# Patient Record
Sex: MALE | Race: BLACK OR AFRICAN AMERICAN | NOT HISPANIC OR LATINO | Employment: OTHER | RURAL
[De-identification: names, ages, dates, MRNs, and addresses within clinical notes are randomized per-mention and may not be internally consistent; named-entity substitution may affect disease eponyms.]

---

## 2020-08-04 ENCOUNTER — HISTORICAL (OUTPATIENT)
Dept: ADMINISTRATIVE | Facility: HOSPITAL | Age: 75
End: 2020-08-04

## 2020-08-04 LAB — SARS-COV+SARS-COV-2 AG RESP QL IA.RAPID: NEGATIVE

## 2020-10-14 ENCOUNTER — HISTORICAL (OUTPATIENT)
Dept: ADMINISTRATIVE | Facility: HOSPITAL | Age: 75
End: 2020-10-14

## 2021-07-26 ENCOUNTER — OFFICE VISIT (OUTPATIENT)
Dept: FAMILY MEDICINE | Facility: CLINIC | Age: 76
End: 2021-07-26
Payer: COMMERCIAL

## 2021-07-26 VITALS
OXYGEN SATURATION: 99 % | TEMPERATURE: 98 F | RESPIRATION RATE: 18 BRPM | BODY MASS INDEX: 21.75 KG/M2 | HEIGHT: 71 IN | HEART RATE: 82 BPM | SYSTOLIC BLOOD PRESSURE: 140 MMHG | WEIGHT: 155.38 LBS | DIASTOLIC BLOOD PRESSURE: 70 MMHG

## 2021-07-26 DIAGNOSIS — N50.89 SCROTAL SWELLING: ICD-10-CM

## 2021-07-26 DIAGNOSIS — Z13.6 ENCOUNTER FOR SCREENING FOR ABDOMINAL AORTIC ANEURYSM (AAA) IN PATIENT 50 YEARS OF AGE OR OLDER WITH HISTORY OF SMOKING: ICD-10-CM

## 2021-07-26 DIAGNOSIS — Z12.5 ENCOUNTER FOR SCREENING FOR MALIGNANT NEOPLASM OF PROSTATE: ICD-10-CM

## 2021-07-26 DIAGNOSIS — Z87.891 ENCOUNTER FOR SCREENING FOR ABDOMINAL AORTIC ANEURYSM (AAA) IN PATIENT 50 YEARS OF AGE OR OLDER WITH HISTORY OF SMOKING: ICD-10-CM

## 2021-07-26 DIAGNOSIS — J30.2 SEASONAL ALLERGIES: ICD-10-CM

## 2021-07-26 DIAGNOSIS — R35.0 URINARY FREQUENCY: ICD-10-CM

## 2021-07-26 DIAGNOSIS — I10 ESSENTIAL HYPERTENSION: Primary | ICD-10-CM

## 2021-07-26 DIAGNOSIS — Z11.59 ENCOUNTER FOR HEPATITIS C SCREENING TEST FOR LOW RISK PATIENT: ICD-10-CM

## 2021-07-26 LAB
ALBUMIN SERPL BCP-MCNC: 3.3 G/DL (ref 3.5–5)
ALBUMIN/GLOB SERPL: 0.7 {RATIO}
ALP SERPL-CCNC: 68 U/L (ref 45–115)
ALT SERPL W P-5'-P-CCNC: 18 U/L (ref 16–61)
ANION GAP SERPL CALCULATED.3IONS-SCNC: 8 MMOL/L (ref 7–16)
AST SERPL W P-5'-P-CCNC: 19 U/L (ref 15–37)
BASOPHILS # BLD AUTO: 0.05 K/UL (ref 0–0.2)
BASOPHILS NFR BLD AUTO: 0.6 % (ref 0–1)
BILIRUB SERPL-MCNC: 0.3 MG/DL (ref 0–1.2)
BILIRUB UR QL STRIP: NEGATIVE
BUN SERPL-MCNC: 14 MG/DL (ref 7–18)
BUN/CREAT SERPL: 14 (ref 6–20)
CALCIUM SERPL-MCNC: 8.7 MG/DL (ref 8.5–10.1)
CHLORIDE SERPL-SCNC: 105 MMOL/L (ref 98–107)
CHOLEST SERPL-MCNC: 212 MG/DL (ref 0–200)
CHOLEST/HDLC SERPL: 2.9 {RATIO}
CLARITY UR: CLEAR
CO2 SERPL-SCNC: 28 MMOL/L (ref 21–32)
COLOR UR: YELLOW
CREAT SERPL-MCNC: 1 MG/DL (ref 0.7–1.3)
DIFFERENTIAL METHOD BLD: ABNORMAL
EOSINOPHIL # BLD AUTO: 0.11 K/UL (ref 0–0.5)
EOSINOPHIL NFR BLD AUTO: 1.4 % (ref 1–4)
ERYTHROCYTE [DISTWIDTH] IN BLOOD BY AUTOMATED COUNT: 18.2 % (ref 11.5–14.5)
GLOBULIN SER-MCNC: 5 G/DL (ref 2–4)
GLUCOSE SERPL-MCNC: 90 MG/DL (ref 74–106)
GLUCOSE UR STRIP-MCNC: NEGATIVE MG/DL
HCT VFR BLD AUTO: 25.7 % (ref 40–54)
HCV AB SER QL: NORMAL
HDLC SERPL-MCNC: 73 MG/DL (ref 40–60)
HGB BLD-MCNC: 7.1 G/DL (ref 13.5–18)
IMM GRANULOCYTES # BLD AUTO: 0.02 K/UL (ref 0–0.04)
IMM GRANULOCYTES NFR BLD: 0.3 % (ref 0–0.4)
KETONES UR STRIP-SCNC: NEGATIVE MG/DL
LDLC SERPL CALC-MCNC: 122 MG/DL
LDLC/HDLC SERPL: 1.7 {RATIO}
LEUKOCYTE ESTERASE UR QL STRIP: NEGATIVE
LYMPHOCYTES # BLD AUTO: 2.1 K/UL (ref 1–4.8)
LYMPHOCYTES NFR BLD AUTO: 26.5 % (ref 27–41)
MCH RBC QN AUTO: 21.6 PG (ref 27–31)
MCHC RBC AUTO-ENTMCNC: 27.6 G/DL (ref 32–36)
MCV RBC AUTO: 78.4 FL (ref 80–96)
MONOCYTES # BLD AUTO: 0.96 K/UL (ref 0–0.8)
MONOCYTES NFR BLD AUTO: 12.1 % (ref 2–6)
MPC BLD CALC-MCNC: 12 FL (ref 9.4–12.4)
NEUTROPHILS # BLD AUTO: 4.68 K/UL (ref 1.8–7.7)
NEUTROPHILS NFR BLD AUTO: 59.1 % (ref 53–65)
NITRITE UR QL STRIP: NEGATIVE
NONHDLC SERPL-MCNC: 139 MG/DL
NRBC # BLD AUTO: 0 X10E3/UL
NRBC, AUTO (.00): 0 %
PH UR STRIP: 6 PH UNITS
PLATELET # BLD AUTO: 363 K/UL (ref 150–400)
POTASSIUM SERPL-SCNC: 4.4 MMOL/L (ref 3.5–5.1)
PROT SERPL-MCNC: 8.3 G/DL (ref 6.4–8.2)
PROT UR QL STRIP: NEGATIVE
PSA SERPL-MCNC: 2.84 NG/ML (ref 0–4.4)
RBC # BLD AUTO: 3.28 M/UL (ref 4.6–6.2)
RBC # UR STRIP: NEGATIVE /UL
SODIUM SERPL-SCNC: 137 MMOL/L (ref 136–145)
SP GR UR STRIP: 1.02
TRIGL SERPL-MCNC: 83 MG/DL (ref 35–150)
UROBILINOGEN UR STRIP-ACNC: 1 MG/DL
VLDLC SERPL-MCNC: 17 MG/DL
WBC # BLD AUTO: 7.92 K/UL (ref 4.5–11)

## 2021-07-26 PROCEDURE — 80053 COMPREHENSIVE METABOLIC PANEL: ICD-10-PCS | Mod: QW,,, | Performed by: CLINICAL MEDICAL LABORATORY

## 2021-07-26 PROCEDURE — 85025 CBC WITH DIFFERENTIAL: ICD-10-PCS | Mod: QW,,, | Performed by: CLINICAL MEDICAL LABORATORY

## 2021-07-26 PROCEDURE — G0103 PSA, SCREENING: ICD-10-PCS | Mod: ,,, | Performed by: CLINICAL MEDICAL LABORATORY

## 2021-07-26 PROCEDURE — 81003 URINALYSIS, REFLEX TO URINE CULTURE: ICD-10-PCS | Mod: QW,,, | Performed by: CLINICAL MEDICAL LABORATORY

## 2021-07-26 PROCEDURE — 86803 HEPATITIS C ANTIBODY: ICD-10-PCS | Mod: QW,,, | Performed by: CLINICAL MEDICAL LABORATORY

## 2021-07-26 PROCEDURE — 80053 COMPREHEN METABOLIC PANEL: CPT | Mod: QW,,, | Performed by: CLINICAL MEDICAL LABORATORY

## 2021-07-26 PROCEDURE — G0103 PSA SCREENING: HCPCS | Mod: ,,, | Performed by: CLINICAL MEDICAL LABORATORY

## 2021-07-26 PROCEDURE — 99214 OFFICE O/P EST MOD 30 MIN: CPT | Mod: ,,, | Performed by: FAMILY MEDICINE

## 2021-07-26 PROCEDURE — 80061 LIPID PANEL: CPT | Mod: QW,,, | Performed by: CLINICAL MEDICAL LABORATORY

## 2021-07-26 PROCEDURE — 81003 URINALYSIS AUTO W/O SCOPE: CPT | Mod: QW,,, | Performed by: CLINICAL MEDICAL LABORATORY

## 2021-07-26 PROCEDURE — 80061 LIPID PANEL: ICD-10-PCS | Mod: QW,,, | Performed by: CLINICAL MEDICAL LABORATORY

## 2021-07-26 PROCEDURE — 86803 HEPATITIS C AB TEST: CPT | Mod: QW,,, | Performed by: CLINICAL MEDICAL LABORATORY

## 2021-07-26 PROCEDURE — 99214 PR OFFICE/OUTPT VISIT, EST, LEVL IV, 30-39 MIN: ICD-10-PCS | Mod: ,,, | Performed by: FAMILY MEDICINE

## 2021-07-26 PROCEDURE — 85025 COMPLETE CBC W/AUTO DIFF WBC: CPT | Mod: QW,,, | Performed by: CLINICAL MEDICAL LABORATORY

## 2021-07-26 RX ORDER — AMLODIPINE BESYLATE 10 MG/1
10 TABLET ORAL DAILY
COMMUNITY
End: 2021-07-26 | Stop reason: SDUPTHER

## 2021-07-26 RX ORDER — LISINOPRIL 40 MG/1
40 TABLET ORAL DAILY
COMMUNITY
End: 2021-07-26 | Stop reason: SDUPTHER

## 2021-07-26 RX ORDER — AMLODIPINE BESYLATE 10 MG/1
10 TABLET ORAL DAILY
Qty: 90 TABLET | Refills: 1 | Status: SHIPPED | OUTPATIENT
Start: 2021-07-26 | End: 2022-08-01 | Stop reason: SDUPTHER

## 2021-07-26 RX ORDER — LISINOPRIL 40 MG/1
40 TABLET ORAL DAILY
Qty: 90 TABLET | Refills: 1 | Status: SHIPPED | OUTPATIENT
Start: 2021-07-26 | End: 2022-06-16 | Stop reason: ALTCHOICE

## 2021-07-26 RX ORDER — HYDRALAZINE HYDROCHLORIDE 100 MG/1
100 TABLET, FILM COATED ORAL 2 TIMES DAILY
Qty: 180 TABLET | Refills: 1 | Status: SHIPPED | OUTPATIENT
Start: 2021-07-26 | End: 2022-06-16 | Stop reason: ALTCHOICE

## 2021-07-26 RX ORDER — CETIRIZINE HYDROCHLORIDE 10 MG/1
10 TABLET ORAL DAILY
COMMUNITY
End: 2021-07-26 | Stop reason: SDUPTHER

## 2021-07-26 RX ORDER — CETIRIZINE HYDROCHLORIDE 10 MG/1
10 TABLET ORAL DAILY
Qty: 90 TABLET | Refills: 1 | Status: SHIPPED | OUTPATIENT
Start: 2021-07-26 | End: 2022-06-16

## 2021-07-26 RX ORDER — HYDRALAZINE HYDROCHLORIDE 100 MG/1
100 TABLET, FILM COATED ORAL 2 TIMES DAILY
COMMUNITY
End: 2021-07-26 | Stop reason: SDUPTHER

## 2021-08-02 ENCOUNTER — HOSPITAL ENCOUNTER (OUTPATIENT)
Dept: RADIOLOGY | Facility: HOSPITAL | Age: 76
Discharge: HOME | End: 2021-08-02
Attending: FAMILY MEDICINE
Payer: COMMERCIAL

## 2021-08-02 DIAGNOSIS — Z13.6 ENCOUNTER FOR SCREENING FOR ABDOMINAL AORTIC ANEURYSM (AAA) IN PATIENT 50 YEARS OF AGE OR OLDER WITH HISTORY OF SMOKING: ICD-10-CM

## 2021-08-02 DIAGNOSIS — Z87.891 ENCOUNTER FOR SCREENING FOR ABDOMINAL AORTIC ANEURYSM (AAA) IN PATIENT 50 YEARS OF AGE OR OLDER WITH HISTORY OF SMOKING: ICD-10-CM

## 2021-08-02 DIAGNOSIS — N50.89 SCROTAL SWELLING: ICD-10-CM

## 2021-08-02 PROCEDURE — 76706 US AAA SCREENING: ICD-10-PCS | Mod: 26,,, | Performed by: RADIOLOGY

## 2021-08-02 PROCEDURE — 76706 US ABDL AORTA SCREEN AAA: CPT | Mod: TC

## 2021-08-02 PROCEDURE — 76870 US EXAM SCROTUM: CPT | Mod: TC

## 2021-08-02 PROCEDURE — 76706 US ABDL AORTA SCREEN AAA: CPT | Mod: 26,,, | Performed by: RADIOLOGY

## 2021-08-02 PROCEDURE — 76870 US SCROTUM AND TESTICLES: ICD-10-PCS | Mod: 26,,, | Performed by: RADIOLOGY

## 2021-08-02 PROCEDURE — 76870 US EXAM SCROTUM: CPT | Mod: 26,,, | Performed by: RADIOLOGY

## 2021-08-17 ENCOUNTER — LAB VISIT (OUTPATIENT)
Dept: LAB | Facility: CLINIC | Age: 76
End: 2021-08-17
Payer: COMMERCIAL

## 2021-08-17 DIAGNOSIS — D64.9 ANEMIA, UNSPECIFIED TYPE: Primary | ICD-10-CM

## 2021-08-17 LAB
BASOPHILS # BLD AUTO: 0.05 K/UL (ref 0–0.2)
BASOPHILS NFR BLD AUTO: 0.7 % (ref 0–1)
DIFFERENTIAL METHOD BLD: ABNORMAL
EOSINOPHIL # BLD AUTO: 0.13 K/UL (ref 0–0.5)
EOSINOPHIL NFR BLD AUTO: 1.8 % (ref 1–4)
ERYTHROCYTE [DISTWIDTH] IN BLOOD BY AUTOMATED COUNT: 18.7 % (ref 11.5–14.5)
FERRITIN SERPL-MCNC: 6 NG/ML (ref 26–388)
HCT VFR BLD AUTO: 24.2 % (ref 40–54)
HGB BLD-MCNC: 6.7 G/DL (ref 13.5–18)
IMM GRANULOCYTES # BLD AUTO: 0.03 K/UL (ref 0–0.04)
IMM GRANULOCYTES NFR BLD: 0.4 % (ref 0–0.4)
IRON SATN MFR SERPL: 5 % (ref 14–50)
IRON SERPL-MCNC: 20 ΜG/DL (ref 65–175)
LYMPHOCYTES # BLD AUTO: 1.91 K/UL (ref 1–4.8)
LYMPHOCYTES NFR BLD AUTO: 27.1 % (ref 27–41)
MCH RBC QN AUTO: 21.3 PG (ref 27–31)
MCHC RBC AUTO-ENTMCNC: 27.7 G/DL (ref 32–36)
MCV RBC AUTO: 77.1 FL (ref 80–96)
MONOCYTES # BLD AUTO: 0.74 K/UL (ref 0–0.8)
MONOCYTES NFR BLD AUTO: 10.5 % (ref 2–6)
MPC BLD CALC-MCNC: 11.7 FL (ref 9.4–12.4)
NEUTROPHILS # BLD AUTO: 4.19 K/UL (ref 1.8–7.7)
NEUTROPHILS NFR BLD AUTO: 59.5 % (ref 53–65)
NRBC # BLD AUTO: 0 X10E3/UL
NRBC, AUTO (.00): 0 %
PLATELET # BLD AUTO: 314 K/UL (ref 150–400)
RBC # BLD AUTO: 3.14 M/UL (ref 4.6–6.2)
TIBC SERPL-MCNC: 404 ΜG/DL (ref 250–450)
WBC # BLD AUTO: 7.05 K/UL (ref 4.5–11)

## 2021-08-17 PROCEDURE — 82728 ASSAY OF FERRITIN: CPT | Mod: ,,, | Performed by: CLINICAL MEDICAL LABORATORY

## 2021-08-17 PROCEDURE — 82728 FERRITIN: ICD-10-PCS | Mod: ,,, | Performed by: CLINICAL MEDICAL LABORATORY

## 2021-08-17 PROCEDURE — 85025 CBC WITH DIFFERENTIAL: ICD-10-PCS | Mod: ,,, | Performed by: CLINICAL MEDICAL LABORATORY

## 2021-08-17 PROCEDURE — 85025 COMPLETE CBC W/AUTO DIFF WBC: CPT | Mod: ,,, | Performed by: CLINICAL MEDICAL LABORATORY

## 2021-08-17 PROCEDURE — 83550 IRON BINDING TEST: CPT | Mod: ,,, | Performed by: CLINICAL MEDICAL LABORATORY

## 2021-08-17 PROCEDURE — 83540 IRON AND TIBC: ICD-10-PCS | Mod: ,,, | Performed by: CLINICAL MEDICAL LABORATORY

## 2021-08-17 PROCEDURE — 83550 IRON AND TIBC: ICD-10-PCS | Mod: ,,, | Performed by: CLINICAL MEDICAL LABORATORY

## 2021-08-17 PROCEDURE — 83540 ASSAY OF IRON: CPT | Mod: ,,, | Performed by: CLINICAL MEDICAL LABORATORY

## 2021-08-24 ENCOUNTER — LAB VISIT (OUTPATIENT)
Dept: LAB | Facility: CLINIC | Age: 76
End: 2021-08-24
Payer: COMMERCIAL

## 2021-08-24 DIAGNOSIS — R89.9 ABNORMAL LABORATORY TEST RESULT: Primary | ICD-10-CM

## 2021-08-24 LAB
BASOPHILS # BLD AUTO: 0.03 K/UL (ref 0–0.2)
BASOPHILS NFR BLD AUTO: 0.4 % (ref 0–1)
DIFFERENTIAL METHOD BLD: ABNORMAL
EOSINOPHIL # BLD AUTO: 0.09 K/UL (ref 0–0.5)
EOSINOPHIL NFR BLD AUTO: 1.3 % (ref 1–4)
ERYTHROCYTE [DISTWIDTH] IN BLOOD BY AUTOMATED COUNT: 18.6 % (ref 11.5–14.5)
HCT VFR BLD AUTO: 22.4 % (ref 40–54)
HGB BLD-MCNC: 6.4 G/DL (ref 13.5–18)
IMM GRANULOCYTES # BLD AUTO: 0.02 K/UL (ref 0–0.04)
IMM GRANULOCYTES NFR BLD: 0.3 % (ref 0–0.4)
LYMPHOCYTES # BLD AUTO: 1.88 K/UL (ref 1–4.8)
LYMPHOCYTES NFR BLD AUTO: 27.3 % (ref 27–41)
MCH RBC QN AUTO: 21.8 PG (ref 27–31)
MCHC RBC AUTO-ENTMCNC: 28.6 G/DL (ref 32–36)
MCV RBC AUTO: 76.2 FL (ref 80–96)
MONOCYTES # BLD AUTO: 0.94 K/UL (ref 0–0.8)
MONOCYTES NFR BLD AUTO: 13.7 % (ref 2–6)
MPC BLD CALC-MCNC: 12.3 FL (ref 9.4–12.4)
NEUTROPHILS # BLD AUTO: 3.92 K/UL (ref 1.8–7.7)
NEUTROPHILS NFR BLD AUTO: 57 % (ref 53–65)
NRBC # BLD AUTO: 0 X10E3/UL
NRBC, AUTO (.00): 0 %
PLATELET # BLD AUTO: 307 K/UL (ref 150–400)
RBC # BLD AUTO: 2.94 M/UL (ref 4.6–6.2)
WBC # BLD AUTO: 6.88 K/UL (ref 4.5–11)

## 2021-08-24 PROCEDURE — 85025 CBC WITH DIFFERENTIAL: ICD-10-PCS | Mod: QW,,, | Performed by: CLINICAL MEDICAL LABORATORY

## 2021-08-24 PROCEDURE — 85025 COMPLETE CBC W/AUTO DIFF WBC: CPT | Mod: QW,,, | Performed by: CLINICAL MEDICAL LABORATORY

## 2021-08-25 ENCOUNTER — INFUSION (OUTPATIENT)
Dept: INFUSION THERAPY | Facility: HOSPITAL | Age: 76
End: 2021-08-25
Attending: FAMILY MEDICINE
Payer: COMMERCIAL

## 2021-08-25 ENCOUNTER — LAB VISIT (OUTPATIENT)
Dept: LAB | Facility: CLINIC | Age: 76
End: 2021-08-25
Payer: COMMERCIAL

## 2021-08-25 VITALS
OXYGEN SATURATION: 100 % | HEIGHT: 71 IN | WEIGHT: 160 LBS | RESPIRATION RATE: 18 BRPM | BODY MASS INDEX: 22.4 KG/M2 | SYSTOLIC BLOOD PRESSURE: 163 MMHG | HEART RATE: 68 BPM | TEMPERATURE: 99 F | DIASTOLIC BLOOD PRESSURE: 76 MMHG

## 2021-08-25 DIAGNOSIS — D50.0 IRON DEFICIENCY ANEMIA DUE TO CHRONIC BLOOD LOSS: Primary | ICD-10-CM

## 2021-08-25 DIAGNOSIS — R89.9 ABNORMAL LABORATORY TEST RESULT: Primary | ICD-10-CM

## 2021-08-25 DIAGNOSIS — R89.9 ABNORMAL LABORATORY TEST RESULT: ICD-10-CM

## 2021-08-25 LAB — OCCULT BLOOD: NEGATIVE

## 2021-08-25 PROCEDURE — P9016 RBC LEUKOCYTES REDUCED: HCPCS

## 2021-08-25 PROCEDURE — 36430 TRANSFUSION BLD/BLD COMPNT: CPT

## 2021-08-25 PROCEDURE — 25000003 PHARM REV CODE 250: Performed by: FAMILY MEDICINE

## 2021-08-25 PROCEDURE — 82271 OCCULT BLOOD X 1, STOOL: ICD-10-PCS | Mod: ,,, | Performed by: CLINICAL MEDICAL LABORATORY

## 2021-08-25 PROCEDURE — 82271 OCCULT BLOOD OTHER SOURCES: CPT | Mod: ,,, | Performed by: CLINICAL MEDICAL LABORATORY

## 2021-08-25 RX ORDER — HYDROCODONE BITARTRATE AND ACETAMINOPHEN 500; 5 MG/1; MG/1
TABLET ORAL
Status: DISCONTINUED | OUTPATIENT
Start: 2021-08-25 | End: 2022-06-16

## 2021-08-25 RX ADMIN — SODIUM CHLORIDE: 9 INJECTION, SOLUTION INTRAVENOUS at 03:08

## 2021-12-20 ENCOUNTER — TELEPHONE (OUTPATIENT)
Dept: FAMILY MEDICINE | Facility: CLINIC | Age: 76
End: 2021-12-20
Payer: MEDICAID

## 2022-01-13 ENCOUNTER — TELEPHONE (OUTPATIENT)
Dept: FAMILY MEDICINE | Facility: CLINIC | Age: 77
End: 2022-01-13
Payer: MEDICAID

## 2022-01-26 ENCOUNTER — OFFICE VISIT (OUTPATIENT)
Dept: FAMILY MEDICINE | Facility: CLINIC | Age: 77
End: 2022-01-26
Payer: COMMERCIAL

## 2022-01-26 VITALS
WEIGHT: 169 LBS | OXYGEN SATURATION: 100 % | BODY MASS INDEX: 23.66 KG/M2 | SYSTOLIC BLOOD PRESSURE: 146 MMHG | HEART RATE: 78 BPM | DIASTOLIC BLOOD PRESSURE: 70 MMHG | HEIGHT: 71 IN | TEMPERATURE: 99 F | RESPIRATION RATE: 18 BRPM

## 2022-01-26 DIAGNOSIS — Z23 IMMUNIZATION DUE: ICD-10-CM

## 2022-01-26 DIAGNOSIS — I10 ESSENTIAL HYPERTENSION: Primary | ICD-10-CM

## 2022-01-26 DIAGNOSIS — J30.2 SEASONAL ALLERGIES: ICD-10-CM

## 2022-01-26 DIAGNOSIS — D50.8 OTHER IRON DEFICIENCY ANEMIA: ICD-10-CM

## 2022-01-26 LAB
ALBUMIN SERPL BCP-MCNC: 3.6 G/DL (ref 3.5–5)
ALBUMIN/GLOB SERPL: 0.8 {RATIO}
ALP SERPL-CCNC: 61 U/L (ref 45–115)
ALT SERPL W P-5'-P-CCNC: 20 U/L (ref 16–61)
ANION GAP SERPL CALCULATED.3IONS-SCNC: 12 MMOL/L (ref 7–16)
AST SERPL W P-5'-P-CCNC: 16 U/L (ref 15–37)
BASOPHILS # BLD AUTO: 0.06 K/UL (ref 0–0.2)
BASOPHILS NFR BLD AUTO: 0.8 % (ref 0–1)
BILIRUB SERPL-MCNC: 0.3 MG/DL (ref 0–1.2)
BUN SERPL-MCNC: 16 MG/DL (ref 7–18)
BUN/CREAT SERPL: 15 (ref 6–20)
CALCIUM SERPL-MCNC: 9.3 MG/DL (ref 8.5–10.1)
CHLORIDE SERPL-SCNC: 104 MMOL/L (ref 98–107)
CHOLEST SERPL-MCNC: 241 MG/DL (ref 0–200)
CHOLEST/HDLC SERPL: 2.5 {RATIO}
CO2 SERPL-SCNC: 25 MMOL/L (ref 21–32)
CREAT SERPL-MCNC: 1.04 MG/DL (ref 0.7–1.3)
CREAT UR-MCNC: 114 MG/DL (ref 39–259)
DIFFERENTIAL METHOD BLD: ABNORMAL
EOSINOPHIL # BLD AUTO: 0.39 K/UL (ref 0–0.5)
EOSINOPHIL NFR BLD AUTO: 5 % (ref 1–4)
ERYTHROCYTE [DISTWIDTH] IN BLOOD BY AUTOMATED COUNT: 16.2 % (ref 11.5–14.5)
GLOBULIN SER-MCNC: 4.7 G/DL (ref 2–4)
GLUCOSE SERPL-MCNC: 58 MG/DL (ref 74–106)
HCT VFR BLD AUTO: 31.2 % (ref 40–54)
HDLC SERPL-MCNC: 98 MG/DL (ref 40–60)
HGB BLD-MCNC: 9.2 G/DL (ref 13.5–18)
IMM GRANULOCYTES # BLD AUTO: 0.01 K/UL (ref 0–0.04)
IMM GRANULOCYTES NFR BLD: 0.1 % (ref 0–0.4)
LDLC SERPL CALC-MCNC: 130 MG/DL
LDLC/HDLC SERPL: 1.3 {RATIO}
LYMPHOCYTES # BLD AUTO: 1.97 K/UL (ref 1–4.8)
LYMPHOCYTES NFR BLD AUTO: 25.4 % (ref 27–41)
MCH RBC QN AUTO: 28.2 PG (ref 27–31)
MCHC RBC AUTO-ENTMCNC: 29.5 G/DL (ref 32–36)
MCV RBC AUTO: 95.7 FL (ref 80–96)
MICROALBUMIN UR-MCNC: 1.3 MG/DL (ref 0–2.8)
MICROALBUMIN/CREAT RATIO PNL UR: 11.4 MG/G (ref 0–30)
MONOCYTES # BLD AUTO: 1.04 K/UL (ref 0–0.8)
MONOCYTES NFR BLD AUTO: 13.4 % (ref 2–6)
MPC BLD CALC-MCNC: 12.1 FL (ref 9.4–12.4)
NEUTROPHILS # BLD AUTO: 4.3 K/UL (ref 1.8–7.7)
NEUTROPHILS NFR BLD AUTO: 55.3 % (ref 53–65)
NONHDLC SERPL-MCNC: 143 MG/DL
NRBC # BLD AUTO: 0 X10E3/UL
NRBC, AUTO (.00): 0 %
PLATELET # BLD AUTO: 276 K/UL (ref 150–400)
POTASSIUM SERPL-SCNC: 4.1 MMOL/L (ref 3.5–5.1)
PROT SERPL-MCNC: 8.3 G/DL (ref 6.4–8.2)
RBC # BLD AUTO: 3.26 M/UL (ref 4.6–6.2)
SODIUM SERPL-SCNC: 137 MMOL/L (ref 136–145)
TRIGL SERPL-MCNC: 64 MG/DL (ref 35–150)
VLDLC SERPL-MCNC: 13 MG/DL
WBC # BLD AUTO: 7.77 K/UL (ref 4.5–11)

## 2022-01-26 PROCEDURE — 99213 OFFICE O/P EST LOW 20 MIN: CPT | Mod: ,,, | Performed by: FAMILY MEDICINE

## 2022-01-26 PROCEDURE — 90662 IIV NO PRSV INCREASED AG IM: CPT | Mod: ,,, | Performed by: FAMILY MEDICINE

## 2022-01-26 PROCEDURE — 3077F PR MOST RECENT SYSTOLIC BLOOD PRESSURE >= 140 MM HG: ICD-10-PCS | Mod: ,,, | Performed by: FAMILY MEDICINE

## 2022-01-26 PROCEDURE — G0008 ADMIN INFLUENZA VIRUS VAC: HCPCS | Mod: ,,, | Performed by: FAMILY MEDICINE

## 2022-01-26 PROCEDURE — 99213 PR OFFICE/OUTPT VISIT, EST, LEVL III, 20-29 MIN: ICD-10-PCS | Mod: ,,, | Performed by: FAMILY MEDICINE

## 2022-01-26 PROCEDURE — 82570 MICROALBUMIN / CREATININE RATIO URINE: ICD-10-PCS | Mod: ,,, | Performed by: CLINICAL MEDICAL LABORATORY

## 2022-01-26 PROCEDURE — 80061 LIPID PANEL: ICD-10-PCS | Mod: ,,, | Performed by: CLINICAL MEDICAL LABORATORY

## 2022-01-26 PROCEDURE — 82043 MICROALBUMIN / CREATININE RATIO URINE: ICD-10-PCS | Mod: ,,, | Performed by: CLINICAL MEDICAL LABORATORY

## 2022-01-26 PROCEDURE — 3288F FALL RISK ASSESSMENT DOCD: CPT | Mod: ,,, | Performed by: FAMILY MEDICINE

## 2022-01-26 PROCEDURE — 1126F AMNT PAIN NOTED NONE PRSNT: CPT | Mod: ,,, | Performed by: FAMILY MEDICINE

## 2022-01-26 PROCEDURE — 1160F PR REVIEW ALL MEDS BY PRESCRIBER/CLIN PHARMACIST DOCUMENTED: ICD-10-PCS | Mod: ,,, | Performed by: FAMILY MEDICINE

## 2022-01-26 PROCEDURE — 82570 ASSAY OF URINE CREATININE: CPT | Mod: ,,, | Performed by: CLINICAL MEDICAL LABORATORY

## 2022-01-26 PROCEDURE — 1160F RVW MEDS BY RX/DR IN RCRD: CPT | Mod: ,,, | Performed by: FAMILY MEDICINE

## 2022-01-26 PROCEDURE — 85025 CBC WITH DIFFERENTIAL: ICD-10-PCS | Mod: ,,, | Performed by: CLINICAL MEDICAL LABORATORY

## 2022-01-26 PROCEDURE — 1159F PR MEDICATION LIST DOCUMENTED IN MEDICAL RECORD: ICD-10-PCS | Mod: ,,, | Performed by: FAMILY MEDICINE

## 2022-01-26 PROCEDURE — 3008F PR BODY MASS INDEX (BMI) DOCUMENTED: ICD-10-PCS | Mod: ,,, | Performed by: FAMILY MEDICINE

## 2022-01-26 PROCEDURE — 90662 FLU VACCINE - QUADRIVALENT - HIGH DOSE (65+) PRESERVATIVE FREE IM: ICD-10-PCS | Mod: ,,, | Performed by: FAMILY MEDICINE

## 2022-01-26 PROCEDURE — 3078F DIAST BP <80 MM HG: CPT | Mod: ,,, | Performed by: FAMILY MEDICINE

## 2022-01-26 PROCEDURE — 3288F PR FALLS RISK ASSESSMENT DOCUMENTED: ICD-10-PCS | Mod: ,,, | Performed by: FAMILY MEDICINE

## 2022-01-26 PROCEDURE — 3078F PR MOST RECENT DIASTOLIC BLOOD PRESSURE < 80 MM HG: ICD-10-PCS | Mod: ,,, | Performed by: FAMILY MEDICINE

## 2022-01-26 PROCEDURE — 80053 COMPREHENSIVE METABOLIC PANEL: ICD-10-PCS | Mod: ,,, | Performed by: CLINICAL MEDICAL LABORATORY

## 2022-01-26 PROCEDURE — 85025 COMPLETE CBC W/AUTO DIFF WBC: CPT | Mod: ,,, | Performed by: CLINICAL MEDICAL LABORATORY

## 2022-01-26 PROCEDURE — 1126F PR PAIN SEVERITY QUANTIFIED, NO PAIN PRESENT: ICD-10-PCS | Mod: ,,, | Performed by: FAMILY MEDICINE

## 2022-01-26 PROCEDURE — G0008 FLU VACCINE - QUADRIVALENT - HIGH DOSE (65+) PRESERVATIVE FREE IM: ICD-10-PCS | Mod: ,,, | Performed by: FAMILY MEDICINE

## 2022-01-26 PROCEDURE — 80061 LIPID PANEL: CPT | Mod: ,,, | Performed by: CLINICAL MEDICAL LABORATORY

## 2022-01-26 PROCEDURE — 3077F SYST BP >= 140 MM HG: CPT | Mod: ,,, | Performed by: FAMILY MEDICINE

## 2022-01-26 PROCEDURE — 82043 UR ALBUMIN QUANTITATIVE: CPT | Mod: ,,, | Performed by: CLINICAL MEDICAL LABORATORY

## 2022-01-26 PROCEDURE — 1159F MED LIST DOCD IN RCRD: CPT | Mod: ,,, | Performed by: FAMILY MEDICINE

## 2022-01-26 PROCEDURE — 1101F PT FALLS ASSESS-DOCD LE1/YR: CPT | Mod: ,,, | Performed by: FAMILY MEDICINE

## 2022-01-26 PROCEDURE — 3008F BODY MASS INDEX DOCD: CPT | Mod: ,,, | Performed by: FAMILY MEDICINE

## 2022-01-26 PROCEDURE — 1101F PR PT FALLS ASSESS DOC 0-1 FALLS W/OUT INJ PAST YR: ICD-10-PCS | Mod: ,,, | Performed by: FAMILY MEDICINE

## 2022-01-26 PROCEDURE — 80053 COMPREHEN METABOLIC PANEL: CPT | Mod: ,,, | Performed by: CLINICAL MEDICAL LABORATORY

## 2022-01-26 NOTE — PROGRESS NOTES
Clinic Note    Patient Name: Stephane Mathis  : 1945  MRN: [id number]    HPI:    Chief Complaint   Patient presents with    Follow-up     Six months      Mr. Stephane Mathis is a 76 y.o. male who present to clinic today with CC of follow up on chronic disease processes including HTN and allergies.  Patient reports chronic issues are well controlled on current medication regimen.  Denies problems or side effects with mediations.  Patient has a h/o scrotal pain and was previously referred to Urology. PSA was normal at last office visit. Appt with Urology is scheduled for tomorrow at [facility].  Patient has a h/o iron deficiency anemia. He has had a prior blood transfusion. Stool for occult blood was previously negative. Patient is scheduled to see Hematology/Oncology at [facility] in 2022. Reports he is taking an OTC iron supplement. States this particular one does not upset his stomach (blood builder minis). Reports he feels much improved.   Patient is, otherwise, without complaints.     Medications:  Current Outpatient Medications on File Prior to Visit   Medication Sig Dispense Refill    amLODIPine (NORVASC) 10 MG tablet Take 1 tablet (10 mg total) by mouth once daily. 90 tablet 1    cetirizine (ZYRTEC) 10 MG tablet Take 1 tablet (10 mg total) by mouth once daily. 90 tablet 1    hydrALAZINE (APRESOLINE) 100 MG tablet Take 1 tablet (100 mg total) by mouth 2 (two) times daily. 180 tablet 1    lisinopriL (PRINIVIL,ZESTRIL) 40 MG tablet Take 1 tablet (40 mg total) by mouth once daily. 90 tablet 1     Current Facility-Administered Medications on File Prior to Visit   Medication Dose Route Frequency Provider Last Rate Last Admin    0.9%  NaCl infusion (for blood administration)   Intravenous Q24H PRN Scott Maynard,    Given at 21 1523     Allergies: Codeine      Past Medical History:    Past Medical History:   Diagnosis Date    Anemia     Hypertension        Past Surgical  "History:    History reviewed. No pertinent surgical history.      Social History:    Social History     Tobacco Use   Smoking Status Current Every Day Smoker    Packs/day: 10.00    Types: Cigarettes   Smokeless Tobacco Never Used     Social History     Substance and Sexual Activity   Alcohol Use Yes    Comment: half pint of liquor a day     Social History     Substance and Sexual Activity   Drug Use Yes    Types: Marijuana         Family History:    History reviewed. No pertinent family history.    Review of Systems:    Review of Systems   Constitutional: Negative for appetite change, chills, fatigue, fever and unexpected weight change.   Eyes: Negative for visual disturbance.   Respiratory: Negative for cough and shortness of breath.    Cardiovascular: Negative for chest pain and leg swelling.   Gastrointestinal: Negative for abdominal pain, change in bowel habit, constipation, diarrhea, nausea, vomiting and change in bowel habit.   Musculoskeletal: Negative for arthralgias.   Integumentary:  Negative for rash.   Neurological: Negative for dizziness and headaches.   Psychiatric/Behavioral: The patient is not nervous/anxious.         Vitals:    Vitals:    01/26/22 0829 01/26/22 0930   BP: (!) 140/60 (!) 146/70   BP Location: Right arm Left arm   Patient Position: Sitting Sitting   BP Method: Medium (Manual) Large (Manual)   Pulse: 78    Resp: 18    Temp: 98.7 °F (37.1 °C)    TempSrc: Oral    SpO2: 100%    Weight: 76.7 kg (169 lb)    Height: 5' 11" (1.803 m)           Physical Exam:    Physical Exam  Constitutional:       General: He is not in acute distress.     Appearance: Normal appearance.   HENT:      Nose: Nose normal.      Mouth/Throat:      Mouth: Mucous membranes are moist.      Pharynx: Oropharynx is clear.   Eyes:      Conjunctiva/sclera: Conjunctivae normal.   Cardiovascular:      Rate and Rhythm: Normal rate and regular rhythm.      Heart sounds: Normal heart sounds. No murmur heard.      Pulmonary: "      Effort: Pulmonary effort is normal. No respiratory distress.      Breath sounds: Normal breath sounds. No wheezing, rhonchi or rales.   Abdominal:      General: Bowel sounds are normal.      Palpations: Abdomen is soft.      Tenderness: There is no abdominal tenderness.   Musculoskeletal:      Cervical back: Neck supple.   Skin:     Findings: No rash.   Neurological:      General: No focal deficit present.      Mental Status: He is alert. Mental status is at baseline.   Psychiatric:         Mood and Affect: Mood normal.         Assessment/Plan:   Stephane was seen today for follow-up.    Diagnoses and all orders for this visit:    Essential hypertension  -     CBC Auto Differential; Future  -     Comprehensive Metabolic Panel; Future  -     Lipid Panel; Future  -     Microalbumin/Creatinine Ratio, Urine    Seasonal allergies         - Continue current medications and plan.    Immunization due  -     Influenza - High Dose (65+) (PF) (IM)    Other iron deficiency anemia  -     CBC Auto Differential; Future  -     Comprehensive Metabolic Panel; Future  - Follow up with Hematology as scheduled in [place]      RTC in 6 months for follow up on chronic disease processes.  RTC sooner if needed.   Patient voiced understanding and is agreeable to plan.      Pebbles Dickerson MD    Family Medicine

## 2022-02-14 NOTE — TELEPHONE ENCOUNTER
Urology Referral placed at last office visit. Appt scheduled for 1/27/2022 at 2:15p.m. with Ernestine Mcintyre NP. [facility]. Pt notified of appt date and time. Pt voiced understanding. Letter Mailed.   
No

## 2022-06-10 ENCOUNTER — HOSPITAL ENCOUNTER (EMERGENCY)
Facility: HOSPITAL | Age: 77
Discharge: ANOTHER HOSPITAL | End: 2022-06-11
Payer: MEDICARE

## 2022-06-10 DIAGNOSIS — R41.82 ALTERED MENTAL STATUS, UNSPECIFIED ALTERED MENTAL STATUS TYPE: ICD-10-CM

## 2022-06-10 DIAGNOSIS — R53.1 ACUTE RIGHT-SIDED WEAKNESS: Primary | ICD-10-CM

## 2022-06-10 DIAGNOSIS — R56.9 SEIZURE: ICD-10-CM

## 2022-06-10 LAB
ALBUMIN SERPL BCP-MCNC: 3.6 G/DL (ref 3.5–5)
ALBUMIN/GLOB SERPL: 0.8 {RATIO}
ALP SERPL-CCNC: 59 U/L (ref 45–115)
ALT SERPL W P-5'-P-CCNC: 31 U/L (ref 16–61)
ANION GAP SERPL CALCULATED.3IONS-SCNC: 14 MMOL/L (ref 7–16)
AST SERPL W P-5'-P-CCNC: 32 U/L (ref 15–37)
BASOPHILS # BLD AUTO: 0.03 K/UL (ref 0–0.2)
BASOPHILS NFR BLD AUTO: 0.4 % (ref 0–1)
BILIRUB SERPL-MCNC: 0.2 MG/DL (ref 0–1.2)
BUN SERPL-MCNC: 19 MG/DL (ref 7–18)
BUN/CREAT SERPL: 15 (ref 6–20)
CALCIUM SERPL-MCNC: 9.1 MG/DL (ref 8.5–10.1)
CHLORIDE SERPL-SCNC: 102 MMOL/L (ref 98–107)
CO2 SERPL-SCNC: 26 MMOL/L (ref 21–32)
CREAT SERPL-MCNC: 1.25 MG/DL (ref 0.7–1.3)
DIFFERENTIAL METHOD BLD: ABNORMAL
EOSINOPHIL # BLD AUTO: 0.1 K/UL (ref 0–0.5)
EOSINOPHIL NFR BLD AUTO: 1.3 % (ref 1–4)
ERYTHROCYTE [DISTWIDTH] IN BLOOD BY AUTOMATED COUNT: 18.7 % (ref 11.5–14.5)
GLOBULIN SER-MCNC: 4.5 G/DL (ref 2–4)
GLUCOSE SERPL-MCNC: 88 MG/DL (ref 74–106)
HCT VFR BLD AUTO: 33.3 % (ref 40–54)
HGB BLD-MCNC: 10.8 G/DL (ref 13.5–18)
INR BLD: 0.83 (ref 0.9–1.1)
LYMPHOCYTES # BLD AUTO: 1.93 K/UL (ref 1–4.8)
LYMPHOCYTES NFR BLD AUTO: 25.2 % (ref 27–41)
MCH RBC QN AUTO: 31.1 PG (ref 27–31)
MCHC RBC AUTO-ENTMCNC: 32.4 G/DL (ref 32–36)
MCV RBC AUTO: 96 FL (ref 80–96)
MONOCYTES # BLD AUTO: 0.94 K/UL (ref 0–0.8)
MONOCYTES NFR BLD AUTO: 12.3 % (ref 2–6)
MPC BLD CALC-MCNC: 12.5 FL (ref 9.4–12.4)
NEUTROPHILS # BLD AUTO: 4.65 K/UL (ref 1.8–7.7)
NEUTROPHILS NFR BLD AUTO: 60.8 % (ref 53–65)
PLATELET # BLD AUTO: 215 K/UL (ref 150–400)
POTASSIUM SERPL-SCNC: 3.9 MMOL/L (ref 3.5–5.1)
PROT SERPL-MCNC: 8.1 G/DL (ref 6.4–8.2)
PROTHROMBIN TIME: 11.2 SECONDS (ref 11.7–14.7)
RBC # BLD AUTO: 3.47 M/UL (ref 4.6–6.2)
SODIUM SERPL-SCNC: 138 MMOL/L (ref 136–145)
WBC # BLD AUTO: 7.65 K/UL (ref 4.5–11)

## 2022-06-10 PROCEDURE — 63600175 PHARM REV CODE 636 W HCPCS: Performed by: NURSE PRACTITIONER

## 2022-06-10 PROCEDURE — 25000003 PHARM REV CODE 250

## 2022-06-10 PROCEDURE — 99285 EMERGENCY DEPT VISIT HI MDM: CPT | Mod: 25

## 2022-06-10 PROCEDURE — 85610 PROTHROMBIN TIME: CPT | Performed by: NURSE PRACTITIONER

## 2022-06-10 PROCEDURE — G0426 PR INPT TELEHEALTH CONSULT 50M: ICD-10-PCS | Mod: GT,,, | Performed by: PSYCHIATRY & NEUROLOGY

## 2022-06-10 PROCEDURE — G0426 INPT/ED TELECONSULT50: HCPCS | Mod: GT,,, | Performed by: PSYCHIATRY & NEUROLOGY

## 2022-06-10 PROCEDURE — 96375 TX/PRO/DX INJ NEW DRUG ADDON: CPT

## 2022-06-10 PROCEDURE — 80053 COMPREHEN METABOLIC PANEL: CPT | Performed by: NURSE PRACTITIONER

## 2022-06-10 PROCEDURE — 99285 EMERGENCY DEPT VISIT HI MDM: CPT | Mod: GF | Performed by: NURSE PRACTITIONER

## 2022-06-10 PROCEDURE — 25000003 PHARM REV CODE 250: Performed by: NURSE PRACTITIONER

## 2022-06-10 PROCEDURE — 36415 COLL VENOUS BLD VENIPUNCTURE: CPT | Performed by: NURSE PRACTITIONER

## 2022-06-10 PROCEDURE — 96365 THER/PROPH/DIAG IV INF INIT: CPT

## 2022-06-10 PROCEDURE — 85025 COMPLETE CBC W/AUTO DIFF WBC: CPT | Performed by: NURSE PRACTITIONER

## 2022-06-10 RX ORDER — KETOROLAC TROMETHAMINE 30 MG/ML
30 INJECTION, SOLUTION INTRAMUSCULAR; INTRAVENOUS
Status: DISCONTINUED | OUTPATIENT
Start: 2022-06-10 | End: 2022-06-10

## 2022-06-10 RX ORDER — LEVETIRACETAM 500 MG/5ML
2000 INJECTION, SOLUTION, CONCENTRATE INTRAVENOUS
Status: COMPLETED | OUTPATIENT
Start: 2022-06-10 | End: 2022-06-10

## 2022-06-10 RX ORDER — DEXTROSE MONOHYDRATE 50 MG/ML
INJECTION, SOLUTION INTRAVENOUS
Status: COMPLETED
Start: 2022-06-10 | End: 2022-06-10

## 2022-06-10 RX ORDER — ORPHENADRINE CITRATE 30 MG/ML
60 INJECTION INTRAMUSCULAR; INTRAVENOUS
Status: DISCONTINUED | OUTPATIENT
Start: 2022-06-10 | End: 2022-06-10

## 2022-06-10 RX ORDER — HYDRALAZINE HYDROCHLORIDE 20 MG/ML
10 INJECTION INTRAMUSCULAR; INTRAVENOUS
Status: COMPLETED | OUTPATIENT
Start: 2022-06-10 | End: 2022-06-10

## 2022-06-10 RX ORDER — ASPIRIN 325 MG
325 TABLET ORAL
Status: COMPLETED | OUTPATIENT
Start: 2022-06-10 | End: 2022-06-10

## 2022-06-10 RX ADMIN — LEVETIRACETAM 2000 MG: 100 INJECTION, SOLUTION INTRAVENOUS at 06:06

## 2022-06-10 RX ADMIN — ASPIRIN 325 MG ORAL TABLET 325 MG: 325 PILL ORAL at 05:06

## 2022-06-10 RX ADMIN — HYDRALAZINE HYDROCHLORIDE 10 MG: 20 INJECTION INTRAMUSCULAR; INTRAVENOUS at 07:06

## 2022-06-10 RX ADMIN — DEXTROSE 100 ML: 50 INJECTION, SOLUTION INTRAVENOUS at 06:06

## 2022-06-10 NOTE — HPI
76M started feeling funny this afternoon in garden, developed R hemiparesis and slurred speech.  Symptoms had completely resolved upon return to CT and now he has recurrent symptoms again.

## 2022-06-10 NOTE — SUBJECTIVE & OBJECTIVE
Woke up with symptoms?: no    Recent bleeding noted: no  Does the patient take any Blood Thinners? no  Medications: No relevant medications      Past Medical History: hypertension    Past Surgical History: no major surgeries within the last 2 weeks    Family History: no relevant history    Social History: no smoking, no drinking, no drugs    Allergies: Codeine No relevant allergies    Review of Systems   Constitutional: Negative for appetite change, chills and fever.   HENT: Negative for congestion and sore throat.    Eyes: Negative for discharge and itching.   Respiratory: Negative for apnea and shortness of breath.    Cardiovascular: Negative for chest pain and palpitations.   Gastrointestinal: Negative for abdominal pain and anal bleeding.   Endocrine: Negative for cold intolerance and polydipsia.   Genitourinary: Negative for dysuria and hematuria.   Musculoskeletal: Negative for joint swelling and myalgias.   Skin: Negative for color change and rash.   Neurological: Negative for tremors.   Psychiatric/Behavioral: Negative for hallucinations and self-injury.     Objective:   Vitals: Blood pressure (!) 200/84, pulse 87, resp. rate 18, SpO2 97 %.     CT READ: Yes  No hemmorhage. No mass effect. No early infarct signs.     Physical Exam  Constitutional:       General: He is not in acute distress.  HENT:      Head: Atraumatic.      Right Ear: External ear normal.      Left Ear: External ear normal.   Eyes:      General: No scleral icterus.     Conjunctiva/sclera: Conjunctivae normal.   Pulmonary:      Effort: Pulmonary effort is normal.   Abdominal:      General: There is no distension.      Tenderness: There is no guarding.   Musculoskeletal:         General: No deformity. Normal range of motion.      Cervical back: Normal range of motion.   Skin:     General: Skin is warm and dry.   Neurological:      Mental Status: He is alert.

## 2022-06-10 NOTE — CONSULTS
[facility]  Vascular Neurology  Comprehensive Stroke Center  TeleVascular Neurology Acute Consultation Note      Consult to Telemedicine - Acute Stroke  Consult performed by: Orlando Andino MD  Consult ordered by: EVIE Chamberlain          Consulting Provider: [facility] MAIN.  Current Providers  No providers found    Patient Location:  [facility] EMERGENCY DEPART* Emergency Department  Spoke hospital nurse at bedside with patient assisting consultant.     Patient information was obtained from patient.         Assessment/Plan:       Diagnoses:   Seizure  Multiple witnessed transient episodes of neurological dysfunction. Initially described as speech difficulty and right sided weakness that resolved after CT. But then patient had a witnessed episode during the video encounter where he started to have focal motor seizure activity of R UE and then became nonverbal with grunting and unable to raise all arms or legs without considerable effort, was still able to follow commands very slowly and seemed very frustrated and then eventually after 2-3 minutes immediately returned to baseline w/o any deficits and stated that he was aware of my commands the whole time. No postictal state appreciated.    High suspicion at this time for seizure activity, recommending loading of Keppra 30 mg/kg and avoiding benzodiazepines at this time given his return to neurological baseline.    Also recommend vessel imaging as some basilar artery occlusions can have intermittent stuttering movements that appear to be seizure-like and can involve all 4 limbs including anarthria. I think this is less likely but should be ruled out. Ok to start asa 81 until vessels are cleared.        STROKE DOCUMENTATION     Acute Stroke Times:   Acute Stroke Times   Last Known Normal Time: 1400  Stroke Team Called Time: 1725  Stroke Team Arrival Time: 1735  CT Interpretation Time: 1735    NIH Scale:        Modified  [place] Coma Scale:    ABCD2 Score:    [id number] Score:   HAS -BLED Score:   ICH Score:   Hunt & Hendricks Classification:       Blood pressure (!) 200/84, pulse 87, resp. rate 18, weight 70.3 kg (155 lb), SpO2 97 %.  Alteplase Eligible?: No  Alteplase Recommendation: Alteplase not recommended due to Patient back to neurological baseline  Possible Interventional Revascularization Candidate? vessel imaging requested    Disposition Recommendation: pending further studies    Subjective:     History of Present Illness:  76M started feeling funny this afternoon in garden, developed R hemiparesis and slurred speech.  Symptoms had completely resolved upon return to CT and now he has recurrent symptoms again.        Woke up with symptoms?: no    Recent bleeding noted: no  Does the patient take any Blood Thinners? no  Medications: No relevant medications      Past Medical History: hypertension    Past Surgical History: no major surgeries within the last 2 weeks    Family History: no relevant history    Social History: no smoking, no drinking, no drugs    Allergies: Codeine No relevant allergies    Review of Systems   Constitutional: Negative for appetite change, chills and fever.   HENT: Negative for congestion and sore throat.    Eyes: Negative for discharge and itching.   Respiratory: Negative for apnea and shortness of breath.    Cardiovascular: Negative for chest pain and palpitations.   Gastrointestinal: Negative for abdominal pain and anal bleeding.   Endocrine: Negative for cold intolerance and polydipsia.   Genitourinary: Negative for dysuria and hematuria.   Musculoskeletal: Negative for joint swelling and myalgias.   Skin: Negative for color change and rash.   Neurological: Negative for tremors.   Psychiatric/Behavioral: Negative for hallucinations and self-injury.     Objective:   Vitals: Blood pressure (!) 200/84, pulse 87, resp. rate 18, SpO2 97 %.     CT READ: Yes  No hemmorhage. No mass effect. No  early infarct signs.     Physical Exam  Constitutional:       General: He is not in acute distress.  HENT:      Head: Atraumatic.      Right Ear: External ear normal.      Left Ear: External ear normal.   Eyes:      General: No scleral icterus.     Conjunctiva/sclera: Conjunctivae normal.   Pulmonary:      Effort: Pulmonary effort is normal.   Abdominal:      General: There is no distension.      Tenderness: There is no guarding.   Musculoskeletal:         General: No deformity. Normal range of motion.      Cervical back: Normal range of motion.   Skin:     General: Skin is warm and dry.   Neurological:      Mental Status: He is alert.               Recommended the emergency room physician to have a brief discussion with the patient and/or family if available regarding the  risks and benefits of treatment, and to briefly document the occurrence of that discussion in his clinical encounter note.     The attending portion of this evaluation, treatment, and documentation was performed per Orlando Andino MD via audiovisual.    In your opinion, this was a: Tier 1 Van Negative    Consult End Time: 5:53 PM     Orlando Andino MD  [facility] Stroke Center  Vascular Neurology   [facility]

## 2022-06-10 NOTE — ED NOTES
Patient now awake and alert. Moves right arm and right leg and speaks coherently and is oriented x 3

## 2022-06-10 NOTE — ED NOTES
Presents with weakness on right side. Unable to raise right arm and right leg and unable to speak

## 2022-06-10 NOTE — ED PROVIDER NOTES
Encounter Date: 6/10/2022       History     Chief Complaint   Patient presents with    Altered Mental Status     Patient presents with garbled speech in right-sided weakness with a loose onset around 1400 today.  We the patient was with a friend he reports he was normal, then spoke to  family around 1400 and was normal on the phone.  Around 1500, family went to his home and found him with garbled speech and right-sided weakness.  Is unable to move the right upper or lower extremities.  He is able to follow commands.         Review of patient's allergies indicates:   Allergen Reactions    Codeine Itching     Past Medical History:   Diagnosis Date    Anemia     Hypertension      Past Surgical History:   Procedure Laterality Date    SCROTAL SURGERY      in february     History reviewed. No pertinent family history.  Social History     Tobacco Use    Smoking status: Current Every Day Smoker     Packs/day: 10.00     Types: Cigarettes    Smokeless tobacco: Never Used   Substance Use Topics    Alcohol use: Yes     Comment: half pint of liquor a day    Drug use: Yes     Types: Marijuana     Review of Systems   Unable to perform ROS: Mental status change       Physical Exam     Initial Vitals   BP Pulse Resp Temp SpO2   06/10/22 1708 06/10/22 1705 06/10/22 1705 -- 06/10/22 1705   (!) 200/84 87 18  97 %      MAP       --                Physical Exam    Vitals reviewed.  Constitutional: No distress.   HENT:   Head: Normocephalic and atraumatic.   Eyes: EOM are normal. Pupils are equal, round, and reactive to light.   Neck: Neck supple.   Cardiovascular: Normal rate, regular rhythm and normal heart sounds.   Pulmonary/Chest: Breath sounds normal. No respiratory distress.   Musculoskeletal:         General: No edema.      Cervical back: Neck supple.     Neurological: He is alert. A cranial nerve deficit is present. GCS eye subscore is 4. GCS verbal subscore is 2. GCS motor subscore is 6.   Initial exam, right facial  droop.  Right upper and lower extremities rigid. Able to follow commands.   Skin: Skin is warm and dry. Capillary refill takes less than 2 seconds.         Medical Screening Exam   See Full Note    ED Course   Procedures  Labs Reviewed   COMPREHENSIVE METABOLIC PANEL - Abnormal; Notable for the following components:       Result Value    BUN 19 (*)     Globulin 4.5 (*)     All other components within normal limits   PROTIME-INR - Abnormal; Notable for the following components:    PT 11.2 (*)     INR 0.83 (*)     All other components within normal limits   CBC WITH DIFFERENTIAL - Abnormal; Notable for the following components:    RBC 3.47 (*)     Hemoglobin 10.8 (*)     Hematocrit 33.3 (*)     MCH 31.1 (*)     RDW 18.7 (*)     MPV 12.5 (*)     Lymphocytes % 25.2 (*)     Monocytes % 12.3 (*)     Monocytes, Absolute 0.94 (*)     All other components within normal limits   CBC W/ AUTO DIFFERENTIAL    Narrative:     The following orders were created for panel order CBC auto differential.  Procedure                               Abnormality         Status                     ---------                               -----------         ------                     CBC with Differential[[id number]]        Abnormal            Final result                 Please view results for these tests on the individual orders.          Imaging Results          CT Head Without Contrast (Final result)  Result time 06/10/22 17:24:33    Final result by Teresa Lugo MD (06/10/22 17:24:33)                 Impression:      White matter changes most likely attributable to chronic microvascular ischemia.  No cortical infarct is seen at this time.  No evidence of acute intracranial hemorrhage.      Electronically signed by: Teresa Luog  Date:    06/10/2022  Time:    17:24             Narrative:    EXAMINATION:  CT HEAD WITHOUT CONTRAST    CLINICAL HISTORY:  Neuro deficit, acute, stroke suspected;right sided weakness;    TECHNIQUE:  Low dose axial  images were obtained through the head.  Sagittal and coronal 2D reconstructions were performed.  Contrast was not administered.    COMPARISON:  12/23/2016    FINDINGS:  The ventricles and sulci are normally prominent for the patient's age.  There is no mass effect or midline shift.  There is no evidence of acute hemorrhage.  No cortical infarct is seen at this time.  There are mild to moderate areas of low density in the periventricular white matter.  The calvarium is intact.  There is a small mucous retention cyst in the left maxillary sinus.  There is calcific plaque present within the cavernous ICAs.                                 Medications   levETIRAcetam injection 2,000 mg (2,000 mg Intravenous Given 6/10/22 1806)   aspirin tablet 325 mg (325 mg Oral Given 6/10/22 1754)   dextrose 5 % infusion (  Stopped 6/10/22 1925)   hydrALAZINE injection 10 mg (10 mg Intravenous Given 6/10/22 1929)                 ED Course as of 06/11/22 0007   Fri Rolando 10, 2022   1710 Patient has had complete resolution of symptoms.  Face symmetrical.  Alert and oriented x4.  Moves all extremities equally.  Negative pronator drift. [GM]   1718 Dr Andino notified by [facility] at 1718 [GM]   1740 Video consult with , neurology at [facility].  Patient had a witnessed episode during the consult. Dr Andino advise likely partial seizure vs basal artery ischemia.  Recommended CTA or MRA.  Load with Keppra and give aspirin.  [GM]   1821 Care taken lover from [facility]. Awaiting confirmation of if [facility] can perform CTA. It has been confirmed that we cannot do CTA and patient will require tsf for further work-up. Patient currently getting Keppra.  [LP]   1842 Family present and requesting tsf to [facility] in [place], [place] as this is where daughter is from and she will be the one caring for him on d/c.   Notified [facility] of request. Ebonie with [facility] updated request in chart.  Patient is asymptomatic at this time. State that he feels back to  baseline.  [LP]   7057 SNEHA Garcia with [facility] with [facility] hospitalist services returned call. Discussed patient case and today's findings. She has agreed to accept patient to their facility on behalf of [place] [facility] hospital[facility] services at [facility]. Requesting that we fax records to [phone]. Will return call with room number. Call ended at 1204. [LP]      ED Course User Index  [GM] EVIE Chamberlain  [LP] EVIE Potter          Clinical Impression:   Final diagnoses:  [R53.1] Acute right-sided weakness (Primary)  [R41.82] Altered mental status, unspecified altered mental status type          ED Disposition Condition    Transfer to Another Facility Stable              EVIE Potter  06/11/22 0007

## 2022-06-10 NOTE — ASSESSMENT & PLAN NOTE
Multiple witnessed transient episodes of neurological dysfunction. Initially described as speech difficulty and right sided weakness that resolved after CT. But then patient had a witnessed episode during the video encounter where he started to have focal motor seizure activity of R UE and then became nonverbal with grunting and unable to raise all arms or legs without considerable effort, was still able to follow commands very slowly and seemed very frustrated and then eventually after 2-3 minutes immediately returned to baseline w/o any deficits and stated that he was aware of my commands the whole time. No postictal state appreciated.    High suspicion at this time for seizure activity, recommending loading of Keppra 30 mg/kg and avoiding benzodiazepines at this time given his return to neurological baseline.    Also recommend vessel imaging as some basilar artery occlusions can have intermittent stuttering movements that appear to be seizure-like and can involve all 4 limbs including anarthria. I think this is less likely but should be ruled out. Ok to start asa 81 until vessels are cleared.

## 2022-06-11 VITALS
DIASTOLIC BLOOD PRESSURE: 61 MMHG | RESPIRATION RATE: 18 BRPM | WEIGHT: 155 LBS | HEART RATE: 61 BPM | OXYGEN SATURATION: 100 % | BODY MASS INDEX: 21.62 KG/M2 | SYSTOLIC BLOOD PRESSURE: 140 MMHG

## 2022-06-11 LAB — SARS-COV+SARS-COV-2 AG RESP QL IA.RAPID: NEGATIVE

## 2022-06-11 PROCEDURE — 87426 SARSCOV CORONAVIRUS AG IA: CPT | Performed by: NURSE PRACTITIONER

## 2022-06-11 NOTE — ED NOTES
Addended by: MARINA CLARKE on: 5/1/2019 03:01 PM     Modules accepted: Orders     Report given to ANTONI Walker

## 2022-06-11 NOTE — ED NOTES
"Secure chat sent to [facility] asking if [facility] has reached out to them with room number or number for report. Was advised that they are "having a number of beds cleaned at this time" and "when bed is ready they will call out". Pt continues to sleep in room. VS are WNL.  "

## 2022-06-11 NOTE — ED NOTES
[facility] called with room number and number for report. Pt being transferred to [facility] Bed 1221. Number for report is [phone]

## 2022-06-16 ENCOUNTER — OFFICE VISIT (OUTPATIENT)
Dept: FAMILY MEDICINE | Facility: CLINIC | Age: 77
End: 2022-06-16
Payer: MEDICARE

## 2022-06-16 VITALS
DIASTOLIC BLOOD PRESSURE: 68 MMHG | RESPIRATION RATE: 20 BRPM | BODY MASS INDEX: 21.31 KG/M2 | WEIGHT: 152.19 LBS | OXYGEN SATURATION: 96 % | HEIGHT: 71 IN | TEMPERATURE: 98 F | SYSTOLIC BLOOD PRESSURE: 134 MMHG | HEART RATE: 86 BPM

## 2022-06-16 DIAGNOSIS — Z09 HOSPITAL DISCHARGE FOLLOW-UP: Primary | ICD-10-CM

## 2022-06-16 DIAGNOSIS — R56.9 SEIZURE-LIKE ACTIVITY: ICD-10-CM

## 2022-06-16 DIAGNOSIS — Z51.81 ENCOUNTER FOR THERAPEUTIC DRUG LEVEL MONITORING: ICD-10-CM

## 2022-06-16 DIAGNOSIS — J30.2 SEASONAL ALLERGIES: ICD-10-CM

## 2022-06-16 LAB
ALBUMIN SERPL BCP-MCNC: 3.3 G/DL (ref 3.5–5)
ALBUMIN/GLOB SERPL: 0.7 {RATIO}
ALP SERPL-CCNC: 60 U/L (ref 45–115)
ALT SERPL W P-5'-P-CCNC: 24 U/L (ref 16–61)
ANION GAP SERPL CALCULATED.3IONS-SCNC: 8 MMOL/L (ref 7–16)
AST SERPL W P-5'-P-CCNC: 23 U/L (ref 15–37)
BASOPHILS # BLD AUTO: 0.04 K/UL (ref 0–0.2)
BASOPHILS NFR BLD AUTO: 0.6 % (ref 0–1)
BILIRUB SERPL-MCNC: 0.2 MG/DL (ref 0–1.2)
BUN SERPL-MCNC: 19 MG/DL (ref 7–18)
BUN/CREAT SERPL: 17 (ref 6–20)
CALCIUM SERPL-MCNC: 9.2 MG/DL (ref 8.5–10.1)
CHLORIDE SERPL-SCNC: 109 MMOL/L (ref 98–107)
CO2 SERPL-SCNC: 26 MMOL/L (ref 21–32)
CREAT SERPL-MCNC: 1.12 MG/DL (ref 0.7–1.3)
DIFFERENTIAL METHOD BLD: ABNORMAL
EOSINOPHIL # BLD AUTO: 0.15 K/UL (ref 0–0.5)
EOSINOPHIL NFR BLD AUTO: 2.2 % (ref 1–4)
ERYTHROCYTE [DISTWIDTH] IN BLOOD BY AUTOMATED COUNT: 18.3 % (ref 11.5–14.5)
GLOBULIN SER-MCNC: 4.5 G/DL (ref 2–4)
GLUCOSE SERPL-MCNC: 72 MG/DL (ref 74–106)
HCT VFR BLD AUTO: 30.4 % (ref 40–54)
HGB BLD-MCNC: 9.6 G/DL (ref 13.5–18)
IMM GRANULOCYTES # BLD AUTO: 0.01 K/UL (ref 0–0.04)
IMM GRANULOCYTES NFR BLD: 0.1 % (ref 0–0.4)
LYMPHOCYTES # BLD AUTO: 2.1 K/UL (ref 1–4.8)
LYMPHOCYTES NFR BLD AUTO: 30.9 % (ref 27–41)
MCH RBC QN AUTO: 30.6 PG (ref 27–31)
MCHC RBC AUTO-ENTMCNC: 31.6 G/DL (ref 32–36)
MCV RBC AUTO: 96.8 FL (ref 80–96)
MONOCYTES # BLD AUTO: 0.71 K/UL (ref 0–0.8)
MONOCYTES NFR BLD AUTO: 10.4 % (ref 2–6)
MPC BLD CALC-MCNC: 12.5 FL (ref 9.4–12.4)
NEUTROPHILS # BLD AUTO: 3.79 K/UL (ref 1.8–7.7)
NEUTROPHILS NFR BLD AUTO: 55.8 % (ref 53–65)
NRBC # BLD AUTO: 0 X10E3/UL
NRBC, AUTO (.00): 0 %
PLATELET # BLD AUTO: 206 K/UL (ref 150–400)
POTASSIUM SERPL-SCNC: 4.5 MMOL/L (ref 3.5–5.1)
PROT SERPL-MCNC: 7.8 G/DL (ref 6.4–8.2)
RBC # BLD AUTO: 3.14 M/UL (ref 4.6–6.2)
SODIUM SERPL-SCNC: 138 MMOL/L (ref 136–145)
WBC # BLD AUTO: 6.8 K/UL (ref 4.5–11)

## 2022-06-16 PROCEDURE — 3061F PR NEG MICROALBUMINURIA RESULT DOCUMENTED/REVIEW: ICD-10-PCS | Mod: ,,, | Performed by: FAMILY MEDICINE

## 2022-06-16 PROCEDURE — 80177 DRUG SCRN QUAN LEVETIRACETAM: CPT | Mod: ,,, | Performed by: CLINICAL MEDICAL LABORATORY

## 2022-06-16 PROCEDURE — 80053 COMPREHENSIVE METABOLIC PANEL: ICD-10-PCS | Mod: ,,, | Performed by: CLINICAL MEDICAL LABORATORY

## 2022-06-16 PROCEDURE — 3078F PR MOST RECENT DIASTOLIC BLOOD PRESSURE < 80 MM HG: ICD-10-PCS | Mod: ,,, | Performed by: FAMILY MEDICINE

## 2022-06-16 PROCEDURE — 1160F PR REVIEW ALL MEDS BY PRESCRIBER/CLIN PHARMACIST DOCUMENTED: ICD-10-PCS | Mod: ,,, | Performed by: FAMILY MEDICINE

## 2022-06-16 PROCEDURE — 99213 PR OFFICE/OUTPT VISIT, EST, LEVL III, 20-29 MIN: ICD-10-PCS | Mod: ,,, | Performed by: FAMILY MEDICINE

## 2022-06-16 PROCEDURE — 1101F PR PT FALLS ASSESS DOC 0-1 FALLS W/OUT INJ PAST YR: ICD-10-PCS | Mod: ,,, | Performed by: FAMILY MEDICINE

## 2022-06-16 PROCEDURE — 1159F PR MEDICATION LIST DOCUMENTED IN MEDICAL RECORD: ICD-10-PCS | Mod: ,,, | Performed by: FAMILY MEDICINE

## 2022-06-16 PROCEDURE — 85025 COMPLETE CBC W/AUTO DIFF WBC: CPT | Mod: ,,, | Performed by: CLINICAL MEDICAL LABORATORY

## 2022-06-16 PROCEDURE — 3075F PR MOST RECENT SYSTOLIC BLOOD PRESS GE 130-139MM HG: ICD-10-PCS | Mod: ,,, | Performed by: FAMILY MEDICINE

## 2022-06-16 PROCEDURE — 1126F PR PAIN SEVERITY QUANTIFIED, NO PAIN PRESENT: ICD-10-PCS | Mod: ,,, | Performed by: FAMILY MEDICINE

## 2022-06-16 PROCEDURE — 1126F AMNT PAIN NOTED NONE PRSNT: CPT | Mod: ,,, | Performed by: FAMILY MEDICINE

## 2022-06-16 PROCEDURE — 85025 CBC WITH DIFFERENTIAL: ICD-10-PCS | Mod: ,,, | Performed by: CLINICAL MEDICAL LABORATORY

## 2022-06-16 PROCEDURE — 3008F BODY MASS INDEX DOCD: CPT | Mod: ,,, | Performed by: FAMILY MEDICINE

## 2022-06-16 PROCEDURE — 3288F PR FALLS RISK ASSESSMENT DOCUMENTED: ICD-10-PCS | Mod: ,,, | Performed by: FAMILY MEDICINE

## 2022-06-16 PROCEDURE — 3288F FALL RISK ASSESSMENT DOCD: CPT | Mod: ,,, | Performed by: FAMILY MEDICINE

## 2022-06-16 PROCEDURE — 80053 COMPREHEN METABOLIC PANEL: CPT | Mod: ,,, | Performed by: CLINICAL MEDICAL LABORATORY

## 2022-06-16 PROCEDURE — 3078F DIAST BP <80 MM HG: CPT | Mod: ,,, | Performed by: FAMILY MEDICINE

## 2022-06-16 PROCEDURE — 1160F RVW MEDS BY RX/DR IN RCRD: CPT | Mod: ,,, | Performed by: FAMILY MEDICINE

## 2022-06-16 PROCEDURE — 99213 OFFICE O/P EST LOW 20 MIN: CPT | Mod: ,,, | Performed by: FAMILY MEDICINE

## 2022-06-16 PROCEDURE — 3075F SYST BP GE 130 - 139MM HG: CPT | Mod: ,,, | Performed by: FAMILY MEDICINE

## 2022-06-16 PROCEDURE — 3008F PR BODY MASS INDEX (BMI) DOCUMENTED: ICD-10-PCS | Mod: ,,, | Performed by: FAMILY MEDICINE

## 2022-06-16 PROCEDURE — 1159F MED LIST DOCD IN RCRD: CPT | Mod: ,,, | Performed by: FAMILY MEDICINE

## 2022-06-16 PROCEDURE — 3066F PR DOCUMENTATION OF TREATMENT FOR NEPHROPATHY: ICD-10-PCS | Mod: ,,, | Performed by: FAMILY MEDICINE

## 2022-06-16 PROCEDURE — 1101F PT FALLS ASSESS-DOCD LE1/YR: CPT | Mod: ,,, | Performed by: FAMILY MEDICINE

## 2022-06-16 PROCEDURE — 3066F NEPHROPATHY DOC TX: CPT | Mod: ,,, | Performed by: FAMILY MEDICINE

## 2022-06-16 PROCEDURE — 3061F NEG MICROALBUMINURIA REV: CPT | Mod: ,,, | Performed by: FAMILY MEDICINE

## 2022-06-16 PROCEDURE — 80177 LEVETIRACETAM  (KEPPRA) LEVEL: ICD-10-PCS | Mod: ,,, | Performed by: CLINICAL MEDICAL LABORATORY

## 2022-06-16 RX ORDER — ASPIRIN 81 MG/1
81 TABLET ORAL DAILY
COMMUNITY
End: 2023-10-04 | Stop reason: SDUPTHER

## 2022-06-16 RX ORDER — ATORVASTATIN CALCIUM 40 MG/1
40 TABLET, FILM COATED ORAL NIGHTLY
COMMUNITY
End: 2022-08-01 | Stop reason: SDUPTHER

## 2022-06-16 RX ORDER — CETIRIZINE HYDROCHLORIDE 10 MG/1
10 TABLET ORAL DAILY PRN
Qty: 90 TABLET | Refills: 1
Start: 2022-06-16 | End: 2023-06-22

## 2022-06-16 RX ORDER — LEVETIRACETAM 1000 MG/1
1000 TABLET ORAL 2 TIMES DAILY
COMMUNITY
End: 2022-12-22 | Stop reason: SDUPTHER

## 2022-06-16 RX ORDER — OXYCODONE AND ACETAMINOPHEN 5; 325 MG/1; MG/1
1 TABLET ORAL EVERY 6 HOURS PRN
COMMUNITY
Start: 2022-02-07 | End: 2022-06-16

## 2022-06-16 NOTE — PROGRESS NOTES
"Clinic Note    Patient Name: Stephane Mathis  : 1945  MRN: [id number]    HPI:    Chief Complaint   Patient presents with    Hospital Follow Up     Mr. Stephane Mathis is a 76 y.o. male who present to clinic today with CC of hospital follow up. Patient was initially evaluated at [facility] in the ER on 6/10/22 with CC of slurred speech, AMS, and R sided weakness. Patient's evaluation in ER concerning for CVA and/or some possible associated seizure activity. Telemedicine consult with Neurology from ER recommended CTA, ASA, and Keppra. [facility] does not have capability to perform CTA so transfer to another facility was required. Family requested transfer to [place], [place] because this where patient's daughter lives. Patient was subsequently transferred.   I do not have discharge summary from [place]- records requested from [facility].  Today, patient comes in for hospital follow up. He is accompanied to this visit by his grandaughter.   Patient has a few papers from the hospital with him today. The information is on alcohol withdrawal and seizures.  Patient and granddaughter both confirm that he was diagnosed with seizure as a result of alcohol withdrawal.   Patient states his routine has been to drink a couple of beers every evening. Reports he traveled to [place] over Memorial Day weekend and drank more heavily than he normally does. He reports, however, after he got back from [place] he quit drinking to "clean out to get ready for the next party" reporting that he was planning to drink again this weekend at a "trail ride".  Patient reports he is doing well but is still currently staying with his daughter in [place]. Reports he plans to move back home soon. Denies any further issues or seizure activity since being home from hospital. Denies any alcohol use.  Patient is, otherwise, without complaints.     Medications:  Current Outpatient Medications on File Prior to Visit "   Medication Sig Dispense Refill    amLODIPine (NORVASC) 10 MG tablet Take 1 tablet (10 mg total) by mouth once daily. 90 tablet 1    aspirin (ECOTRIN) 81 MG EC tablet Take 81 mg by mouth once daily.      atorvastatin (LIPITOR) 40 MG tablet Take 40 mg by mouth once daily.      levETIRAcetam (KEPPRA) 1000 MG tablet Take 1,000 mg by mouth 2 (two) times daily.      naltrexone HCl (NALTREXONE ORAL) Take 50 mg by mouth Daily. For 12 weeks       No current facility-administered medications on file prior to visit.         Allergies: Codeine      Past Medical History:    Past Medical History:   Diagnosis Date    Anemia     Hypertension        Past Surgical History:    Past Surgical History:   Procedure Laterality Date    SCROTAL SURGERY      in february         Social History:    Social History     Tobacco Use   Smoking Status Current Every Day Smoker    Packs/day: 10.00    Types: Cigarettes   Smokeless Tobacco Never Used     Social History     Substance and Sexual Activity   Alcohol Use Yes    Comment: half pint of liquor a day     Social History     Substance and Sexual Activity   Drug Use Yes    Types: Marijuana         Family History:    History reviewed. No pertinent family history.    Review of Systems:    Review of Systems   Constitutional: Negative for appetite change, chills, fatigue, fever and unexpected weight change.   Eyes: Negative for visual disturbance.   Respiratory: Negative for cough and shortness of breath.    Cardiovascular: Negative for chest pain and leg swelling.   Gastrointestinal: Negative for abdominal pain, change in bowel habit, constipation, diarrhea, nausea, vomiting and change in bowel habit.   Musculoskeletal: Negative for arthralgias.   Integumentary:  Negative for rash.   Neurological: Negative for dizziness and headaches.   Psychiatric/Behavioral: The patient is not nervous/anxious.         Vitals:    /68 (BP Location: Left arm, Patient Position: Sitting, BP Method: Large  "(Manual))   Pulse 86   Temp 98.3 °F (36.8 °C) (Temporal)   Resp 20   Ht 5' 11" (1.803 m)   Wt 69 kg (152 lb 3.2 oz)   SpO2 96%   BMI 21.23 kg/m²        Physical Exam:    Physical Exam  Constitutional:       General: He is not in acute distress.     Appearance: Normal appearance.   HENT:      Nose: Nose normal.      Mouth/Throat:      Mouth: Mucous membranes are moist.      Pharynx: Oropharynx is clear.   Eyes:      Conjunctiva/sclera: Conjunctivae normal.   Cardiovascular:      Rate and Rhythm: Normal rate and regular rhythm.      Heart sounds: Normal heart sounds. No murmur heard.  Pulmonary:      Effort: Pulmonary effort is normal. No respiratory distress.      Breath sounds: Normal breath sounds. No wheezing, rhonchi or rales.   Abdominal:      General: Bowel sounds are normal.      Palpations: Abdomen is soft.      Tenderness: There is no abdominal tenderness.   Musculoskeletal:      Cervical back: Neck supple.   Skin:     Findings: No rash.   Neurological:      General: No focal deficit present.      Mental Status: He is alert. Mental status is at baseline.      Comments: Ambulating with assistance of cane   Psychiatric:         Mood and Affect: Mood normal.         Assessment/Plan:   Hospital discharge follow-up        - Hospital discharge medications reconciled with home medications. Patient provided an up to date list of medications.        - Complete records requested from [facility] in [place], [place]    Seizure-like activity  -     Comprehensive Metabolic Panel; Future; Expected date: 06/16/2022  -     CBC Auto Differential; Future; Expected date: 06/16/2022  -     Levetiracetam Level; Future; Expected date: 06/16/2022  - Referral to Neurology at [place] (Dr. Ramsey)    Encounter for therapeutic drug level monitoring  -     Levetiracetam Level; Future; Expected date: 06/16/2022    Seasonal allergies  -     cetirizine (ZYRTEC) 10 MG tablet; Take 1 tablet (10 mg total) by mouth daily as needed " for Allergies.  Dispense: 90 tablet; Refill: 1       RTC as scheduled.  RTC sooner if needed.   Patient voiced understanding and is agreeable to plan.      Pebbles Dickerson MD    Family Medicine

## 2022-06-21 LAB — LEVETIRACETAM SERPL-MCNC: 52 ΜG/ML (ref 12–46)

## 2022-06-22 ENCOUNTER — PATIENT MESSAGE (OUTPATIENT)
Dept: FAMILY MEDICINE | Facility: CLINIC | Age: 77
End: 2022-06-22
Payer: MEDICARE

## 2022-07-11 ENCOUNTER — LAB VISIT (OUTPATIENT)
Dept: LAB | Facility: CLINIC | Age: 77
End: 2022-07-11
Payer: MEDICARE

## 2022-07-11 ENCOUNTER — HOSPITAL ENCOUNTER (EMERGENCY)
Facility: HOSPITAL | Age: 77
Discharge: OTHER INSTITUTION | End: 2022-07-12
Payer: MEDICARE

## 2022-07-11 DIAGNOSIS — R42 DIZZINESS: ICD-10-CM

## 2022-07-11 DIAGNOSIS — R89.9 ABNORMAL LABORATORY TEST RESULT: Primary | ICD-10-CM

## 2022-07-11 DIAGNOSIS — R53.1 WEAKNESS: ICD-10-CM

## 2022-07-11 DIAGNOSIS — K92.2 GASTROINTESTINAL HEMORRHAGE, UNSPECIFIED GASTROINTESTINAL HEMORRHAGE TYPE: Primary | ICD-10-CM

## 2022-07-11 DIAGNOSIS — D64.9 ANEMIA, UNSPECIFIED TYPE: ICD-10-CM

## 2022-07-11 LAB
ALBUMIN SERPL BCP-MCNC: 2.9 G/DL (ref 3.5–5)
ALBUMIN/GLOB SERPL: 0.7 {RATIO}
ALP SERPL-CCNC: 69 U/L (ref 45–115)
ALT SERPL W P-5'-P-CCNC: 11 U/L (ref 16–61)
ANION GAP SERPL CALCULATED.3IONS-SCNC: 12 MMOL/L (ref 7–16)
ANISOCYTOSIS BLD QL SMEAR: ABNORMAL
AST SERPL W P-5'-P-CCNC: 13 U/L (ref 15–37)
BASOPHILS # BLD AUTO: 0.02 K/UL (ref 0–0.2)
BASOPHILS # BLD AUTO: 0.06 K/UL (ref 0–0.2)
BASOPHILS NFR BLD AUTO: 0.3 % (ref 0–1)
BASOPHILS NFR BLD AUTO: 0.8 % (ref 0–1)
BILIRUB SERPL-MCNC: 0.1 MG/DL (ref 0–1.2)
BUN SERPL-MCNC: 12 MG/DL (ref 7–18)
BUN/CREAT SERPL: 11 (ref 6–20)
CALCIUM SERPL-MCNC: 8.4 MG/DL (ref 8.5–10.1)
CHLORIDE SERPL-SCNC: 105 MMOL/L (ref 98–107)
CO2 SERPL-SCNC: 27 MMOL/L (ref 21–32)
CREAT SERPL-MCNC: 1.14 MG/DL (ref 0.7–1.3)
DIFFERENTIAL METHOD BLD: ABNORMAL
DIFFERENTIAL METHOD BLD: ABNORMAL
EOSINOPHIL # BLD AUTO: 0.1 K/UL (ref 0–0.5)
EOSINOPHIL # BLD AUTO: 0.16 K/UL (ref 0–0.5)
EOSINOPHIL NFR BLD AUTO: 1.3 % (ref 1–4)
EOSINOPHIL NFR BLD AUTO: 2.3 % (ref 1–4)
ERYTHROCYTE [DISTWIDTH] IN BLOOD BY AUTOMATED COUNT: 16.4 % (ref 11.5–14.5)
ERYTHROCYTE [DISTWIDTH] IN BLOOD BY AUTOMATED COUNT: 16.9 % (ref 11.5–14.5)
FOLATE SERPL-MCNC: 17 NG/ML (ref 3.1–17.5)
GLOBULIN SER-MCNC: 4 G/DL (ref 2–4)
GLUCOSE SERPL-MCNC: 127 MG/DL (ref 74–106)
GROUP & RH: NORMAL
HCT VFR BLD AUTO: 15.9 % (ref 40–54)
HCT VFR BLD AUTO: 16 % (ref 40–54)
HGB BLD-MCNC: 4.9 G/DL (ref 13.5–18)
HGB BLD-MCNC: 5 G/DL (ref 13.5–18)
HYPOCHROMIA BLD QL SMEAR: ABNORMAL
IMM GRANULOCYTES # BLD AUTO: 0.03 K/UL (ref 0–0.04)
IMM GRANULOCYTES NFR BLD: 0.4 % (ref 0–0.4)
INDIRECT COOMBS GEL: NORMAL
LYMPHOCYTES # BLD AUTO: 1.89 K/UL (ref 1–4.8)
LYMPHOCYTES # BLD AUTO: 1.95 K/UL (ref 1–4.8)
LYMPHOCYTES NFR BLD AUTO: 24.7 % (ref 27–41)
LYMPHOCYTES NFR BLD AUTO: 26.7 % (ref 27–41)
MCH RBC QN AUTO: 29.2 PG (ref 27–31)
MCH RBC QN AUTO: 29.9 PG (ref 27–31)
MCHC RBC AUTO-ENTMCNC: 30.8 G/DL (ref 32–36)
MCHC RBC AUTO-ENTMCNC: 31.3 G/DL (ref 32–36)
MCV RBC AUTO: 94.6 FL (ref 80–96)
MCV RBC AUTO: 95.8 FL (ref 80–96)
MONOCYTES # BLD AUTO: 0.71 K/UL (ref 0–0.8)
MONOCYTES # BLD AUTO: 0.75 K/UL (ref 0–0.8)
MONOCYTES NFR BLD AUTO: 10 % (ref 2–6)
MONOCYTES NFR BLD AUTO: 9.5 % (ref 2–6)
MPC BLD CALC-MCNC: 11.1 FL (ref 9.4–12.4)
MPC BLD CALC-MCNC: 12 FL (ref 9.4–12.4)
NEUTROPHILS # BLD AUTO: 4.25 K/UL (ref 1.8–7.7)
NEUTROPHILS # BLD AUTO: 5.05 K/UL (ref 1.8–7.7)
NEUTROPHILS NFR BLD AUTO: 60.2 % (ref 53–65)
NEUTROPHILS NFR BLD AUTO: 63.8 % (ref 53–65)
NRBC # BLD AUTO: 0 X10E3/UL
NRBC, AUTO (.00): 0 %
OCCULT BLOOD: POSITIVE
PLATELET # BLD AUTO: 320 K/UL (ref 150–400)
PLATELET # BLD AUTO: 342 K/UL (ref 150–400)
PLATELET MORPHOLOGY: ABNORMAL
POLYCHROMASIA BLD QL SMEAR: ABNORMAL
POTASSIUM SERPL-SCNC: 3.6 MMOL/L (ref 3.5–5.1)
PROT SERPL-MCNC: 6.9 G/DL (ref 6.4–8.2)
RBC # BLD AUTO: 1.67 M/UL (ref 4.6–6.2)
RBC # BLD AUTO: 1.68 M/UL (ref 4.6–6.2)
SODIUM SERPL-SCNC: 140 MMOL/L (ref 136–145)
TARGETS BLD QL SMEAR: ABNORMAL
VIT B12 SERPL-MCNC: 1502 PG/ML (ref 193–986)
WBC # BLD AUTO: 7.07 K/UL (ref 4.5–11)
WBC # BLD AUTO: 7.9 K/UL (ref 4.5–11)

## 2022-07-11 PROCEDURE — 96374 THER/PROPH/DIAG INJ IV PUSH: CPT

## 2022-07-11 PROCEDURE — 82746 ASSAY OF FOLIC ACID SERUM: CPT | Mod: GZ,,, | Performed by: CLINICAL MEDICAL LABORATORY

## 2022-07-11 PROCEDURE — 63600175 PHARM REV CODE 636 W HCPCS: Performed by: PHYSICIAN ASSISTANT

## 2022-07-11 PROCEDURE — 85025 COMPLETE CBC W/AUTO DIFF WBC: CPT | Performed by: PHYSICIAN ASSISTANT

## 2022-07-11 PROCEDURE — 85025 CBC WITH DIFFERENTIAL: ICD-10-PCS | Mod: ,,, | Performed by: CLINICAL MEDICAL LABORATORY

## 2022-07-11 PROCEDURE — 82272 OCCULT BLD FECES 1-3 TESTS: CPT | Mod: GZ,,, | Performed by: CLINICAL MEDICAL LABORATORY

## 2022-07-11 PROCEDURE — 82272 OCCULT BLOOD X 1, STOOL: ICD-10-PCS | Mod: GZ,,, | Performed by: CLINICAL MEDICAL LABORATORY

## 2022-07-11 PROCEDURE — 82607 VITAMIN B12/FOLATE, SERUM PANEL: ICD-10-PCS | Mod: GZ,,, | Performed by: CLINICAL MEDICAL LABORATORY

## 2022-07-11 PROCEDURE — 99283 EMERGENCY DEPT VISIT LOW MDM: CPT | Mod: GF | Performed by: PHYSICIAN ASSISTANT

## 2022-07-11 PROCEDURE — 99285 EMERGENCY DEPT VISIT HI MDM: CPT | Mod: 25

## 2022-07-11 PROCEDURE — 86901 BLOOD TYPING SEROLOGIC RH(D): CPT

## 2022-07-11 PROCEDURE — 80053 COMPREHEN METABOLIC PANEL: CPT | Performed by: PHYSICIAN ASSISTANT

## 2022-07-11 PROCEDURE — 86850 RBC ANTIBODY SCREEN: CPT

## 2022-07-11 PROCEDURE — 82607 VITAMIN B-12: CPT | Mod: GZ,,, | Performed by: CLINICAL MEDICAL LABORATORY

## 2022-07-11 PROCEDURE — 85025 COMPLETE CBC W/AUTO DIFF WBC: CPT | Mod: ,,, | Performed by: CLINICAL MEDICAL LABORATORY

## 2022-07-11 PROCEDURE — 25000003 PHARM REV CODE 250: Performed by: PHYSICIAN ASSISTANT

## 2022-07-11 PROCEDURE — 82746 VITAMIN B12/FOLATE, SERUM PANEL: ICD-10-PCS | Mod: GZ,,, | Performed by: CLINICAL MEDICAL LABORATORY

## 2022-07-11 PROCEDURE — P9016 RBC LEUKOCYTES REDUCED: HCPCS

## 2022-07-11 PROCEDURE — 36430 TRANSFUSION BLD/BLD COMPNT: CPT

## 2022-07-11 PROCEDURE — 86900 BLOOD TYPING SEROLOGIC ABO: CPT

## 2022-07-11 PROCEDURE — 36415 COLL VENOUS BLD VENIPUNCTURE: CPT | Performed by: PHYSICIAN ASSISTANT

## 2022-07-11 RX ORDER — HYDROCODONE BITARTRATE AND ACETAMINOPHEN 500; 5 MG/1; MG/1
TABLET ORAL
Status: DISCONTINUED | OUTPATIENT
Start: 2022-07-11 | End: 2022-07-11

## 2022-07-11 RX ORDER — DIPHENHYDRAMINE HYDROCHLORIDE 50 MG/ML
25 INJECTION INTRAMUSCULAR; INTRAVENOUS
Status: COMPLETED | OUTPATIENT
Start: 2022-07-11 | End: 2022-07-11

## 2022-07-11 RX ORDER — ACETAMINOPHEN 500 MG
1000 TABLET ORAL
Status: COMPLETED | OUTPATIENT
Start: 2022-07-11 | End: 2022-07-11

## 2022-07-11 RX ORDER — HYDROCODONE BITARTRATE AND ACETAMINOPHEN 500; 5 MG/1; MG/1
TABLET ORAL
Status: DISCONTINUED | OUTPATIENT
Start: 2022-07-11 | End: 2022-07-12 | Stop reason: HOSPADM

## 2022-07-11 RX ADMIN — ACETAMINOPHEN 1000 MG: 500 TABLET ORAL at 11:07

## 2022-07-11 RX ADMIN — DIPHENHYDRAMINE HYDROCHLORIDE 25 MG: 50 INJECTION, SOLUTION INTRAMUSCULAR; INTRAVENOUS at 11:07

## 2022-07-12 ENCOUNTER — ANESTHESIA (OUTPATIENT)
Dept: GASTROENTEROLOGY | Facility: HOSPITAL | Age: 77
DRG: 378 | End: 2022-07-12
Payer: MEDICARE

## 2022-07-12 ENCOUNTER — HOSPITAL ENCOUNTER (INPATIENT)
Facility: HOSPITAL | Age: 77
LOS: 2 days | Discharge: HOME | DRG: 378 | End: 2022-07-14
Attending: INTERNAL MEDICINE | Admitting: INTERNAL MEDICINE
Payer: MEDICARE

## 2022-07-12 ENCOUNTER — ANESTHESIA EVENT (OUTPATIENT)
Dept: GASTROENTEROLOGY | Facility: HOSPITAL | Age: 77
DRG: 378 | End: 2022-07-12
Payer: MEDICARE

## 2022-07-12 VITALS
OXYGEN SATURATION: 98 % | SYSTOLIC BLOOD PRESSURE: 109 MMHG | HEART RATE: 79 BPM | DIASTOLIC BLOOD PRESSURE: 68 MMHG | BODY MASS INDEX: 21.42 KG/M2 | RESPIRATION RATE: 16 BRPM | TEMPERATURE: 98 F | HEIGHT: 71 IN | WEIGHT: 153 LBS

## 2022-07-12 DIAGNOSIS — R56.9 SEIZURE: ICD-10-CM

## 2022-07-12 DIAGNOSIS — R07.9 CHEST PAIN: ICD-10-CM

## 2022-07-12 DIAGNOSIS — K57.90 DIVERTICULOSIS: ICD-10-CM

## 2022-07-12 DIAGNOSIS — K55.20 AVM (ARTERIOVENOUS MALFORMATION) OF COLON: ICD-10-CM

## 2022-07-12 DIAGNOSIS — I10 PRIMARY HYPERTENSION: ICD-10-CM

## 2022-07-12 DIAGNOSIS — K92.2 GIB (GASTROINTESTINAL BLEEDING): Primary | ICD-10-CM

## 2022-07-12 DIAGNOSIS — K57.30 COLON, DIVERTICULOSIS: ICD-10-CM

## 2022-07-12 DIAGNOSIS — F17.210 CONTINUOUS DEPENDENCE ON CIGARETTE SMOKING: ICD-10-CM

## 2022-07-12 DIAGNOSIS — D62 ACUTE BLOOD LOSS ANEMIA: ICD-10-CM

## 2022-07-12 DIAGNOSIS — F10.21 ALCOHOL DEPENDENCE IN EARLY FULL REMISSION: ICD-10-CM

## 2022-07-12 DIAGNOSIS — J30.2 SEASONAL ALLERGIES: ICD-10-CM

## 2022-07-12 DIAGNOSIS — K29.00 ACUTE SUPERFICIAL GASTRITIS WITHOUT HEMORRHAGE: ICD-10-CM

## 2022-07-12 DIAGNOSIS — D50.0 IRON DEFICIENCY ANEMIA DUE TO CHRONIC BLOOD LOSS: ICD-10-CM

## 2022-07-12 DIAGNOSIS — K63.5 POLYP OF SIGMOID COLON, UNSPECIFIED TYPE: ICD-10-CM

## 2022-07-12 DIAGNOSIS — K92.2 ACUTE GI BLEEDING: ICD-10-CM

## 2022-07-12 LAB
ABO + RH BLD: NORMAL
ABO + RH BLD: NORMAL
ANION GAP SERPL CALCULATED.3IONS-SCNC: 8 MMOL/L (ref 7–16)
APTT PPP: 37.4 SECONDS (ref 25.2–37.3)
BLD PROD TYP BPU: NORMAL
BLD PROD TYP BPU: NORMAL
BLOOD UNIT EXPIRATION DATE: NORMAL
BLOOD UNIT EXPIRATION DATE: NORMAL
BLOOD UNIT TYPE CODE: 5100
BLOOD UNIT TYPE CODE: 5100
BUN SERPL-MCNC: 13 MG/DL (ref 7–18)
BUN/CREAT SERPL: 14 (ref 6–20)
CALCIUM SERPL-MCNC: 8.4 MG/DL (ref 8.5–10.1)
CHLORIDE SERPL-SCNC: 109 MMOL/L (ref 98–107)
CO2 SERPL-SCNC: 28 MMOL/L (ref 21–32)
CREAT SERPL-MCNC: 0.95 MG/DL (ref 0.7–1.3)
CROSSMATCH INTERPRETATION: NORMAL
CROSSMATCH INTERPRETATION: NORMAL
DISPENSE STATUS: NORMAL
DISPENSE STATUS: NORMAL
GLUCOSE SERPL-MCNC: 82 MG/DL (ref 74–106)
HCT VFR BLD AUTO: 18.8 % (ref 40–54)
HCT VFR BLD AUTO: 19 % (ref 40–54)
HCT VFR BLD AUTO: 28.7 % (ref 40–54)
HGB BLD-MCNC: 6.1 G/DL (ref 13.5–18)
HGB BLD-MCNC: 6.2 G/DL (ref 13.5–18)
HGB BLD-MCNC: 9.1 G/DL (ref 13.5–18)
INDIRECT COOMBS: NORMAL
INR BLD: 0.99 (ref 0.9–1.1)
LEVETIRACETAM SERPL-MCNC: 35.5 ΜG/ML (ref 12–46)
MAGNESIUM SERPL-MCNC: 2.3 MG/DL (ref 1.7–2.3)
POTASSIUM SERPL-SCNC: 3.8 MMOL/L (ref 3.5–5.1)
PROTHROMBIN TIME: 12.7 SECONDS (ref 11.7–14.7)
RH BLD: NORMAL
SODIUM SERPL-SCNC: 141 MMOL/L (ref 136–145)
TSH SERPL DL<=0.005 MIU/L-ACNC: 1.09 UIU/ML (ref 0.36–3.74)
UNIT NUMBER: NORMAL
UNIT NUMBER: NORMAL

## 2022-07-12 PROCEDURE — 82728 ASSAY OF FERRITIN: CPT | Performed by: HOSPITALIST

## 2022-07-12 PROCEDURE — 94761 N-INVAS EAR/PLS OXIMETRY MLT: CPT

## 2022-07-12 PROCEDURE — 43239 EGD BIOPSY SINGLE/MULTIPLE: CPT

## 2022-07-12 PROCEDURE — 83540 ASSAY OF IRON: CPT | Performed by: HOSPITALIST

## 2022-07-12 PROCEDURE — 25000003 PHARM REV CODE 250: Performed by: INTERNAL MEDICINE

## 2022-07-12 PROCEDURE — 80048 BASIC METABOLIC PNL TOTAL CA: CPT | Performed by: HOSPITALIST

## 2022-07-12 PROCEDURE — 36415 COLL VENOUS BLD VENIPUNCTURE: CPT | Performed by: INTERNAL MEDICINE

## 2022-07-12 PROCEDURE — 99223 1ST HOSP IP/OBS HIGH 75: CPT | Mod: AI,,, | Performed by: INTERNAL MEDICINE

## 2022-07-12 PROCEDURE — 25000003 PHARM REV CODE 250: Performed by: NURSE ANESTHETIST, CERTIFIED REGISTERED

## 2022-07-12 PROCEDURE — D9220A PRA ANESTHESIA: Mod: ,,, | Performed by: NURSE ANESTHETIST, CERTIFIED REGISTERED

## 2022-07-12 PROCEDURE — 85014 HEMATOCRIT: CPT | Performed by: INTERNAL MEDICINE

## 2022-07-12 PROCEDURE — C9113 INJ PANTOPRAZOLE SODIUM, VIA: HCPCS | Performed by: INTERNAL MEDICINE

## 2022-07-12 PROCEDURE — 99223 PR INITIAL HOSPITAL CARE,LEVL III: ICD-10-PCS | Mod: AI,,, | Performed by: INTERNAL MEDICINE

## 2022-07-12 PROCEDURE — 63600175 PHARM REV CODE 636 W HCPCS: Performed by: INTERNAL MEDICINE

## 2022-07-12 PROCEDURE — 63600175 PHARM REV CODE 636 W HCPCS: Performed by: NURSE ANESTHETIST, CERTIFIED REGISTERED

## 2022-07-12 PROCEDURE — 36430 TRANSFUSION BLD/BLD COMPNT: CPT

## 2022-07-12 PROCEDURE — 86850 RBC ANTIBODY SCREEN: CPT | Performed by: FAMILY MEDICINE

## 2022-07-12 PROCEDURE — 11000001 HC ACUTE MED/SURG PRIVATE ROOM

## 2022-07-12 PROCEDURE — P9016 RBC LEUKOCYTES REDUCED: HCPCS | Performed by: FAMILY MEDICINE

## 2022-07-12 PROCEDURE — 85610 PROTHROMBIN TIME: CPT | Performed by: INTERNAL MEDICINE

## 2022-07-12 PROCEDURE — 83735 ASSAY OF MAGNESIUM: CPT | Performed by: INTERNAL MEDICINE

## 2022-07-12 PROCEDURE — 85730 THROMBOPLASTIN TIME PARTIAL: CPT | Performed by: INTERNAL MEDICINE

## 2022-07-12 PROCEDURE — 25000003 PHARM REV CODE 250: Performed by: HOSPITALIST

## 2022-07-12 PROCEDURE — 80177 DRUG SCRN QUAN LEVETIRACETAM: CPT | Performed by: INTERNAL MEDICINE

## 2022-07-12 PROCEDURE — 80048 BASIC METABOLIC PNL TOTAL CA: CPT | Performed by: INTERNAL MEDICINE

## 2022-07-12 PROCEDURE — 86923 COMPATIBILITY TEST ELECTRIC: CPT | Mod: 91 | Performed by: FAMILY MEDICINE

## 2022-07-12 PROCEDURE — 25000003 PHARM REV CODE 250: Performed by: PHYSICIAN ASSISTANT

## 2022-07-12 PROCEDURE — D9220A PRA ANESTHESIA: ICD-10-PCS | Mod: ,,, | Performed by: NURSE ANESTHETIST, CERTIFIED REGISTERED

## 2022-07-12 PROCEDURE — 84443 ASSAY THYROID STIM HORMONE: CPT | Performed by: INTERNAL MEDICINE

## 2022-07-12 RX ORDER — POLYETHYLENE GLYCOL 3350, SODIUM SULFATE ANHYDROUS, SODIUM BICARBONATE, SODIUM CHLORIDE, POTASSIUM CHLORIDE 236; 22.74; 6.74; 5.86; 2.97 G/4L; G/4L; G/4L; G/4L; G/4L
4000 POWDER, FOR SOLUTION ORAL ONCE
Status: COMPLETED | OUTPATIENT
Start: 2022-07-12 | End: 2022-07-12

## 2022-07-12 RX ORDER — TALC
6 POWDER (GRAM) TOPICAL NIGHTLY PRN
Status: DISCONTINUED | OUTPATIENT
Start: 2022-07-12 | End: 2022-07-12

## 2022-07-12 RX ORDER — SODIUM CHLORIDE 0.9 % (FLUSH) 0.9 %
10 SYRINGE (ML) INJECTION
Status: DISCONTINUED | OUTPATIENT
Start: 2022-07-12 | End: 2022-07-14 | Stop reason: HOSPADM

## 2022-07-12 RX ORDER — ETOMIDATE 2 MG/ML
INJECTION INTRAVENOUS
Status: DISCONTINUED | OUTPATIENT
Start: 2022-07-12 | End: 2022-07-12

## 2022-07-12 RX ORDER — LORAZEPAM 2 MG/ML
2 INJECTION INTRAMUSCULAR ONCE AS NEEDED
Status: DISCONTINUED | OUTPATIENT
Start: 2022-07-12 | End: 2022-07-12

## 2022-07-12 RX ORDER — BISACODYL 10 MG
10 SUPPOSITORY, RECTAL RECTAL DAILY PRN
Status: DISCONTINUED | OUTPATIENT
Start: 2022-07-12 | End: 2022-07-12

## 2022-07-12 RX ORDER — PANTOPRAZOLE SODIUM 40 MG/10ML
40 INJECTION, POWDER, LYOPHILIZED, FOR SOLUTION INTRAVENOUS EVERY 12 HOURS
Status: DISCONTINUED | OUTPATIENT
Start: 2022-07-12 | End: 2022-07-13

## 2022-07-12 RX ORDER — PANTOPRAZOLE SODIUM 40 MG/10ML
40 INJECTION, POWDER, LYOPHILIZED, FOR SOLUTION INTRAVENOUS DAILY
Status: DISCONTINUED | OUTPATIENT
Start: 2022-07-12 | End: 2022-07-12

## 2022-07-12 RX ORDER — LORAZEPAM 1 MG/1
1 TABLET ORAL EVERY 4 HOURS PRN
Status: DISCONTINUED | OUTPATIENT
Start: 2022-07-12 | End: 2022-07-14 | Stop reason: HOSPADM

## 2022-07-12 RX ORDER — SODIUM CHLORIDE 0.9 % (FLUSH) 0.9 %
10 SYRINGE (ML) INJECTION EVERY 12 HOURS PRN
Status: DISCONTINUED | OUTPATIENT
Start: 2022-07-12 | End: 2022-07-14 | Stop reason: HOSPADM

## 2022-07-12 RX ORDER — NALOXONE HCL 0.4 MG/ML
0.02 VIAL (ML) INJECTION
Status: DISCONTINUED | OUTPATIENT
Start: 2022-07-12 | End: 2022-07-12

## 2022-07-12 RX ORDER — FOLIC ACID 1 MG/1
1 TABLET ORAL DAILY
Status: DISCONTINUED | OUTPATIENT
Start: 2022-07-12 | End: 2022-07-14 | Stop reason: HOSPADM

## 2022-07-12 RX ORDER — MUPIROCIN 20 MG/G
OINTMENT TOPICAL 2 TIMES DAILY
Status: DISCONTINUED | OUTPATIENT
Start: 2022-07-12 | End: 2022-07-14 | Stop reason: HOSPADM

## 2022-07-12 RX ORDER — HYDROCODONE BITARTRATE AND ACETAMINOPHEN 500; 5 MG/1; MG/1
TABLET ORAL
Status: DISCONTINUED | OUTPATIENT
Start: 2022-07-12 | End: 2022-07-14 | Stop reason: HOSPADM

## 2022-07-12 RX ORDER — GLUCAGON 1 MG
1 KIT INJECTION
Status: DISCONTINUED | OUTPATIENT
Start: 2022-07-12 | End: 2022-07-12

## 2022-07-12 RX ORDER — PANTOPRAZOLE SODIUM 40 MG/10ML
40 INJECTION, POWDER, LYOPHILIZED, FOR SOLUTION INTRAVENOUS EVERY 12 HOURS
Status: DISCONTINUED | OUTPATIENT
Start: 2022-07-12 | End: 2022-07-12

## 2022-07-12 RX ORDER — AMLODIPINE BESYLATE 10 MG/1
10 TABLET ORAL DAILY
Status: DISCONTINUED | OUTPATIENT
Start: 2022-07-12 | End: 2022-07-12

## 2022-07-12 RX ORDER — LEVETIRACETAM 500 MG/1
1000 TABLET ORAL 2 TIMES DAILY
Status: DISCONTINUED | OUTPATIENT
Start: 2022-07-12 | End: 2022-07-14 | Stop reason: HOSPADM

## 2022-07-12 RX ORDER — PANTOPRAZOLE SODIUM 40 MG/10ML
80 INJECTION, POWDER, LYOPHILIZED, FOR SOLUTION INTRAVENOUS ONCE
Status: COMPLETED | OUTPATIENT
Start: 2022-07-12 | End: 2022-07-12

## 2022-07-12 RX ORDER — IPRATROPIUM BROMIDE AND ALBUTEROL SULFATE 2.5; .5 MG/3ML; MG/3ML
3 SOLUTION RESPIRATORY (INHALATION) EVERY 4 HOURS PRN
Status: DISCONTINUED | OUTPATIENT
Start: 2022-07-12 | End: 2022-07-14 | Stop reason: HOSPADM

## 2022-07-12 RX ORDER — LIDOCAINE HYDROCHLORIDE 20 MG/ML
INJECTION, SOLUTION EPIDURAL; INFILTRATION; INTRACAUDAL; PERINEURAL
Status: DISCONTINUED | OUTPATIENT
Start: 2022-07-12 | End: 2022-07-12

## 2022-07-12 RX ORDER — PANTOPRAZOLE SODIUM 40 MG/1
40 TABLET, DELAYED RELEASE ORAL DAILY
Status: DISCONTINUED | OUTPATIENT
Start: 2022-07-12 | End: 2022-07-12

## 2022-07-12 RX ORDER — IBUPROFEN 200 MG
24 TABLET ORAL
Status: DISCONTINUED | OUTPATIENT
Start: 2022-07-12 | End: 2022-07-12

## 2022-07-12 RX ORDER — ACETAMINOPHEN 325 MG/1
650 TABLET ORAL EVERY 4 HOURS PRN
Status: DISCONTINUED | OUTPATIENT
Start: 2022-07-12 | End: 2022-07-12

## 2022-07-12 RX ORDER — ATORVASTATIN CALCIUM 40 MG/1
40 TABLET, FILM COATED ORAL DAILY
Status: DISCONTINUED | OUTPATIENT
Start: 2022-07-12 | End: 2022-07-13

## 2022-07-12 RX ORDER — ONDANSETRON 2 MG/ML
4 INJECTION INTRAMUSCULAR; INTRAVENOUS EVERY 8 HOURS PRN
Status: DISCONTINUED | OUTPATIENT
Start: 2022-07-12 | End: 2022-07-14 | Stop reason: HOSPADM

## 2022-07-12 RX ORDER — KETOROLAC TROMETHAMINE 30 MG/ML
15 INJECTION, SOLUTION INTRAMUSCULAR; INTRAVENOUS EVERY 6 HOURS PRN
Status: DISCONTINUED | OUTPATIENT
Start: 2022-07-12 | End: 2022-07-12

## 2022-07-12 RX ORDER — PROPOFOL 10 MG/ML
VIAL (ML) INTRAVENOUS
Status: DISCONTINUED | OUTPATIENT
Start: 2022-07-12 | End: 2022-07-12

## 2022-07-12 RX ORDER — SODIUM CHLORIDE 9 MG/ML
INJECTION, SOLUTION INTRAVENOUS CONTINUOUS
Status: DISCONTINUED | OUTPATIENT
Start: 2022-07-12 | End: 2022-07-13

## 2022-07-12 RX ORDER — IBUPROFEN 200 MG
16 TABLET ORAL
Status: DISCONTINUED | OUTPATIENT
Start: 2022-07-12 | End: 2022-07-12

## 2022-07-12 RX ADMIN — PANTOPRAZOLE SODIUM 40 MG: 40 INJECTION, POWDER, FOR SOLUTION INTRAVENOUS at 09:07

## 2022-07-12 RX ADMIN — POLYETHYLENE GLYCOL 3350, SODIUM SULFATE ANHYDROUS, SODIUM BICARBONATE, SODIUM CHLORIDE, POTASSIUM CHLORIDE 4000 ML: 236; 22.74; 6.74; 5.86; 2.97 POWDER, FOR SOLUTION ORAL at 02:07

## 2022-07-12 RX ADMIN — PANTOPRAZOLE SODIUM 80 MG: 40 INJECTION, POWDER, FOR SOLUTION INTRAVENOUS at 05:07

## 2022-07-12 RX ADMIN — THIAMINE HYDROCHLORIDE 250 MG: 100 INJECTION, SOLUTION INTRAMUSCULAR; INTRAVENOUS at 09:07

## 2022-07-12 RX ADMIN — PROPOFOL 50 MG: 10 INJECTION, EMULSION INTRAVENOUS at 12:07

## 2022-07-12 RX ADMIN — ETOMIDATE 10 MG: 20 INJECTION, SOLUTION INTRAVENOUS at 12:07

## 2022-07-12 RX ADMIN — SODIUM CHLORIDE: 9 INJECTION, SOLUTION INTRAVENOUS at 12:07

## 2022-07-12 RX ADMIN — LEVETIRACETAM 1000 MG: 500 TABLET, FILM COATED ORAL at 11:07

## 2022-07-12 RX ADMIN — LIDOCAINE HYDROCHLORIDE 50 MG: 20 INJECTION, SOLUTION INTRAVENOUS at 12:07

## 2022-07-12 RX ADMIN — SODIUM CHLORIDE: 9 INJECTION, SOLUTION INTRAVENOUS at 05:07

## 2022-07-12 RX ADMIN — FOLIC ACID 1 MG: 1 TABLET ORAL at 09:07

## 2022-07-12 RX ADMIN — MUPIROCIN: 20 OINTMENT TOPICAL at 09:07

## 2022-07-12 RX ADMIN — ATORVASTATIN CALCIUM 40 MG: 40 TABLET, FILM COATED ORAL at 09:07

## 2022-07-12 NOTE — ED PROVIDER NOTES
Encounter Date: 7/11/2022       History     Chief Complaint   Patient presents with    Dizziness     Patient is a 76-year-old male with history of anemia, he was contacted by his primary care provider and told that his H&H was very low today, and he should report to the emergency department.  He is a recovering alcoholic, it has been a month since he has had a drink.  He states he feels a little dizzy, and is fatigued.  His occult blood test was positive earlier today, he denies any bright red blood per rectum.  He does not have any pain.  He has a past medical history of hypertension, anemia, and alcoholism.  He has a past surgical history of scrotal surgery  He is a current smoker with a greater than 50 year pack history.  He admits to smoking marijuana        Review of patient's allergies indicates:   Allergen Reactions    Codeine Itching     Past Medical History:   Diagnosis Date    Anemia     Hypertension      Past Surgical History:   Procedure Laterality Date    SCROTAL SURGERY      in february     History reviewed. No pertinent family history.  Social History     Tobacco Use    Smoking status: Current Every Day Smoker     Packs/day: 10.00     Types: Cigarettes    Smokeless tobacco: Never Used   Substance Use Topics    Alcohol use: Yes     Comment: half pint of liquor a day    Drug use: Yes     Types: Marijuana     Review of Systems   Constitutional: Positive for fatigue.   Neurological: Positive for dizziness and weakness.   All other systems reviewed and are negative.      Physical Exam     Initial Vitals [07/11/22 2222]   BP Pulse Resp Temp SpO2   (!) 139/56 73 18 98 °F (36.7 °C) 98 %      MAP       --         Physical Exam    Nursing note and vitals reviewed.  Constitutional: He appears well-developed and well-nourished. No distress.   HENT:   Head: Normocephalic and atraumatic.   Eyes: EOM are normal. Pupils are equal, round, and reactive to light.   Neck: Neck supple.   Normal range of  motion.  Cardiovascular: Normal rate, regular rhythm and normal heart sounds.   Pulmonary/Chest: Breath sounds normal.   Abdominal: Abdomen is soft. Bowel sounds are normal. There is no abdominal tenderness.   Musculoskeletal:         General: No edema.      Cervical back: Normal range of motion and neck supple.     Neurological: He is alert and oriented to person, place, and time. No cranial nerve deficit or sensory deficit.   Skin: Skin is dry.   Psychiatric: He has a normal mood and affect.         Medical Screening Exam   See Full Note    ED Course   Procedures  Labs Reviewed   CBC WITH DIFFERENTIAL - Abnormal; Notable for the following components:       Result Value    RBC 1.68 (*)     Hemoglobin 4.9 (*)     Hematocrit 15.9 (*)     MCHC 30.8 (*)     RDW 16.4 (*)     Lymphocytes % 26.7 (*)     Monocytes % 10.0 (*)     All other components within normal limits   CBC W/ AUTO DIFFERENTIAL    Narrative:     The following orders were created for panel order CBC auto differential.  Procedure                               Abnormality         Status                     ---------                               -----------         ------                     CBC with Differential[[id number]]        Abnormal            Final result                 Please view results for these tests on the individual orders.   COMPREHENSIVE METABOLIC PANEL   TYPE & SCREEN   PREPARE RBC SOFT          Imaging Results    None          Medications   0.9%  NaCl infusion (for blood administration) (has no administration in time range)                 ED Course as of 07/11/22 2304 Mon Jul 11, 2022 2302 Patient has an H&H of 4.9 in 15.9, I have type and screen him, and ordered 2 units of blood, also I have put in a transfer request with [facility] for higher level care to include gastroenterology [CB]      ED Course User Index  [CB] SNEHA Myers          Clinical Impression:   Final diagnoses:  [R42] Dizziness  [R53.1] Weakness  [D64.9]  Anemia, unspecified type (Primary)                 SNEHA Myers  07/11/22 7199

## 2022-07-12 NOTE — ASSESSMENT & PLAN NOTE
-reports 1 PPD for 50 plus years  -smoking cessation counseling provided.  -nicotine patch during hospital stay

## 2022-07-12 NOTE — CONSULTS
[facility] - Orthopedic  Gastroenterology  Consult Note    Patient Name: Stephane Mathis  MRN: [id number]  Admission Date: 7/12/2022  Hospital Length of Stay: 0 days  Code Status: Full Code   Attending Provider: Juan Mahajan MD  Consulting Provider: Juan Mahajan MD  Primary Care Physician: Sierra Dickerson MD  Principal Problem:Acute GI bleeding    Inpatient consult to Gastroenterology  Consult performed by: Juan Mahajan MD  Consult ordered by: Thelma Bentley DO        Subjective:     HPI: GI consult was received re: gi bleed. Pt is heme + with recurrent iron deficiency anemia. EGD reveals gastritis without bleeding.    Past Medical History:   Diagnosis Date    Acute superficial gastritis without hemorrhage 7/12/2022    Alcoholism     Anemia     Hypertension     Iron deficiency anemia due to chronic blood loss 7/12/2022    Seizures     Stroke     right sided weakness       Past Surgical History:   Procedure Laterality Date    SCROTAL SURGERY      in february       Review of patient's allergies indicates:   Allergen Reactions    Codeine Itching     Family History     Family history is unknown by patient.        Tobacco Use    Smoking status: Current Every Day Smoker     Packs/day: 1.00     Years: 50.00     Pack years: 50.00     Types: Cigarettes    Smokeless tobacco: Never Used   Substance and Sexual Activity    Alcohol use: Not Currently     Comment: half pint of liquor a day. but reports last drink was a month ago.    Drug use: Yes     Types: Marijuana     Comment: daily    Sexual activity: Not on file     Review of Systems  Objective:     Vital Signs (Most Recent):  Temp: 98.4 °F (36.9 °C) (07/12/22 1225)  Pulse: (!) 57 (07/12/22 1225)  Resp: 13 (07/12/22 1225)  BP: (!) 142/66 (07/12/22 1225)  SpO2: 97 % (07/12/22 1225) Vital Signs (24h Range):  Temp:  [97 °F (36.1 °C)-98.4 °F (36.9 °C)] 98.4 °F (36.9 °C)  Pulse:  [57-98] 57  Resp:  [13-20] 13  SpO2:  [66 %-100 %] 97  %  BP: ()/(47-73) 142/66     Weight: 70.3 kg (155 lb) (07/12/22 1225)  Body mass index is 21.62 kg/m².      Intake/Output Summary (Last 24 hours) at 7/12/2022 1250  Last data filed at 7/12/2022 1238  Gross per 24 hour   Intake 575.42 ml   Output 400 ml   Net 175.42 ml       Lines/Drains/Airways     Peripheral Intravenous Line  Duration                Peripheral IV - Single Lumen 07/11/22 2245 20 G Posterior;Right Wrist <1 day                Physical Exam    Significant Labs:  CBC:   Recent Labs   Lab 07/11/22  1604 07/11/22  2245 07/12/22  0404   WBC 7.90 7.07  --    HGB 5.0* 4.9* 6.1*  6.2*   HCT 16.0* 15.9* 18.8*  19.0*    342  --        Significant Imaging:  Imaging results within the past 24 hours have been reviewed.    Assessment/Plan:     Active Diagnoses:    Diagnosis Date Noted POA    PRINCIPAL PROBLEM:  Acute GI bleeding [K92.2] 07/12/2022 Yes    HTN (hypertension) [I10] 07/12/2022 Yes     Chronic    Acute blood loss anemia [D62] 07/12/2022 Yes    Alcohol dependence in early full remission [F10.21] 07/12/2022 Yes    Continuous dependence on cigarette smoking [F17.210] 07/12/2022 Yes     Chronic    Acute superficial gastritis without hemorrhage [K29.00] 07/12/2022 Yes    Iron deficiency anemia due to chronic blood loss [D50.0] 07/12/2022 Yes    Seizure [R56.9] 06/10/2022 Yes     Chronic      Problems Resolved During this Admission:       Imp: heme + stool, iron deficiency anemia, gastritis  Rec: PPI, avoid nsaids; f/u pathology results; prep for colonoscopy tomorrow.    Thank you for your consult. I will follow-up with patient. Please contact us if you have any additional questions.    Juan Mahajan MD  Gastroenterology  [facility] - Orthopedic

## 2022-07-12 NOTE — ED TRIAGE NOTES
Went to see Dr. Penelope Pak today for lab work.  Was called by Dr. Pak at 9pm and told to come to the hospital, blood too low.  Patient says he has been dizzy, but this is not new.  Patient had an alcohol seizure over a month ago and has not had a drink since.  Patient smokes marijuana though per his admission.

## 2022-07-12 NOTE — DISCHARGE INSTRUCTIONS
Procedure Date  7/13/22     Impression  Overall Impression: Diverticula were noted in the sigmoid and descending colon. No active bleeding was seen. A diminutive polyp was removed with biopsy from the sigmoid colon. Two avm's were ablated with APC, in the cecum and ascending colon.     Recommendation  Await pathology results  Repeat screening colonoscopy in 10 years; resume diet and po iron. Avoid nsaids and anti-coagulation for 14 days.     Indication  Iron deficiency anemia

## 2022-07-12 NOTE — PLAN OF CARE
Problem: Adult Inpatient Plan of Care  Goal: Plan of Care Review  7/12/2022 0502 by Kathy Madsen RN  Outcome: Ongoing, Progressing  Flowsheets (Taken 7/12/2022 0502)  Plan of Care Reviewed With: patient  7/12/2022 0500 by Kathy Madsen RN  Outcome: Ongoing, Progressing  Flowsheets (Taken 7/12/2022 0500)  Plan of Care Reviewed With: patient  Goal: Patient-Specific Goal (Individualized)  7/12/2022 0502 by Kathy Madsen RN  Outcome: Ongoing, Progressing  Flowsheets (Taken 7/12/2022 0502)  Anxieties, Fears or Concerns: none  Individualized Care Needs: stand by assist  Patient-Specific Goals (Include Timeframe): maintain H&H level  7/12/2022 0500 by Kathy Madsen RN  Outcome: Ongoing, Progressing  Flowsheets (Taken 7/12/2022 0500)  Anxieties, Fears or Concerns: none stated  Individualized Care Needs: blood transfusion, stand by assist  Patient-Specific Goals (Include Timeframe): stablize H&H levels  Goal: Absence of Hospital-Acquired Illness or Injury  7/12/2022 0502 by Kathy Madsen RN  Outcome: Ongoing, Progressing  7/12/2022 0500 by Kathy Madsen RN  Outcome: Ongoing, Progressing  Intervention: Prevent Skin Injury  Flowsheets (Taken 7/12/2022 0502)  Body Position: position changed independently  Skin Protection:   adhesive use limited   incontinence pads utilized   tubing/devices free from skin contact  Goal: Optimal Comfort and Wellbeing  7/12/2022 0502 by Kathy Madsen RN  Outcome: Ongoing, Progressing  7/12/2022 0500 by Kathy Madsen RN  Outcome: Ongoing, Progressing  Intervention: Monitor Pain and Promote Comfort  Flowsheets (Taken 7/12/2022 0502)  Pain Management Interventions:   care clustered   quiet environment facilitated  Goal: Readiness for Transition of Care  7/12/2022 0502 by Kathy Madsen RN  Outcome: Ongoing, Progressing  7/12/2022 0500 by Kathy Madsen RN  Outcome: Ongoing, Progressing     Problem: Fall Injury Risk  Goal: Absence of Fall  and Fall-Related Injury  Outcome: Ongoing, Progressing  Intervention: Promote Injury-Free Environment  Flowsheets (Taken 7/12/2022 0502)  Safety Promotion/Fall Prevention:   assistive device/personal item within reach   Fall Risk reviewed with patient/family   Fall Risk signage in place   instructed to call staff for mobility   nonskid shoes/socks when out of bed     Problem: Anemia  Goal: Anemia Symptom Improvement  Outcome: Ongoing, Progressing  Intervention: Monitor and Manage Anemia  Flowsheets (Taken 7/12/2022 0502)  Oral Nutrition Promotion: rest periods promoted  Safety Promotion/Fall Prevention:   assistive device/personal item within reach   Fall Risk reviewed with patient/family   Fall Risk signage in place   instructed to call staff for mobility   nonskid shoes/socks when out of bed  Fatigue Management: frequent rest breaks encouraged

## 2022-07-12 NOTE — ASSESSMENT & PLAN NOTE
-H/H 5/16, FOBT positive. 2 units PRBC at [facility].  -Hg and Hct q6hrs pending  -2 units of PRBC ordered for transfusion  -NPO except meds and sips with meds  -GI consulted. Assistance appreciated.

## 2022-07-12 NOTE — H&P
[facility] - Orthopedic  Gastroenterology  H&P    Patient Name: Stephane Mathis  MRN: [id number]  Admission Date: 7/12/2022  Code Status: Full Code    Attending Provider: Juan Mahajan MD  Primary Care Physician: Sierra Dickerson MD  Principal Problem:Acute GI bleeding    Subjective:     History of Present Illness: Pt has gi bleed with anemia; for egd.    Past Medical History:   Diagnosis Date    Alcoholism     Anemia     Hypertension     Seizures     Stroke     right sided weakness       Past Surgical History:   Procedure Laterality Date    SCROTAL SURGERY      in february       Review of patient's allergies indicates:   Allergen Reactions    Codeine Itching     Family History     Family history is unknown by patient.        Tobacco Use    Smoking status: Current Every Day Smoker     Packs/day: 1.00     Years: 50.00     Pack years: 50.00     Types: Cigarettes    Smokeless tobacco: Never Used   Substance and Sexual Activity    Alcohol use: Not Currently     Comment: half pint of liquor a day. but reports last drink was a month ago.    Drug use: Yes     Types: Marijuana     Comment: daily    Sexual activity: Not on file     Review of Systems   Respiratory: Negative.    Cardiovascular: Negative.    Gastrointestinal: Negative.      Objective:     Vital Signs (Most Recent):  Temp: 98.4 °F (36.9 °C) (07/12/22 1225)  Pulse: (!) 57 (07/12/22 1225)  Resp: 13 (07/12/22 1225)  BP: (!) 142/66 (07/12/22 1225)  SpO2: 97 % (07/12/22 1225) Vital Signs (24h Range):  Temp:  [97 °F (36.1 °C)-98.4 °F (36.9 °C)] 98.4 °F (36.9 °C)  Pulse:  [57-98] 57  Resp:  [13-20] 13  SpO2:  [66 %-100 %] 97 %  BP: ()/(47-73) 142/66     Weight: 70.3 kg (155 lb) (07/12/22 1225)  Body mass index is 21.62 kg/m².      Intake/Output Summary (Last 24 hours) at 7/12/2022 1236  Last data filed at 7/12/2022 1115  Gross per 24 hour   Intake 475.42 ml   Output 400 ml   Net 75.42 ml       Lines/Drains/Airways     Peripheral  Intravenous Line  Duration                Peripheral IV - Single Lumen 07/11/22 2245 20 G Posterior;Right Wrist <1 day                Physical Exam  Vitals reviewed.   Constitutional:       General: He is not in acute distress.     Appearance: Normal appearance. He is well-developed. He is not ill-appearing.   HENT:      Head: Normocephalic and atraumatic.      Nose: Nose normal.   Eyes:      Pupils: Pupils are equal, round, and reactive to light.   Cardiovascular:      Rate and Rhythm: Normal rate and regular rhythm.   Pulmonary:      Effort: Pulmonary effort is normal.      Breath sounds: Normal breath sounds. No wheezing.   Abdominal:      General: Abdomen is flat. Bowel sounds are normal. There is no distension.      Palpations: Abdomen is soft.      Tenderness: There is no abdominal tenderness. There is no guarding.   Skin:     General: Skin is warm and dry.      Coloration: Skin is not jaundiced.   Neurological:      Mental Status: He is alert.   Psychiatric:         Attention and Perception: Attention normal.         Mood and Affect: Affect normal.         Speech: Speech normal.         Behavior: Behavior is cooperative.      Comments: Pt was calm while speaking.         Significant Labs:  CBC:   Recent Labs   Lab 07/11/22  1604 07/11/22  2245 07/12/22  0404   WBC 7.90 7.07  --    HGB 5.0* 4.9* 6.1*  6.2*   HCT 16.0* 15.9* 18.8*  19.0*    342  --      CMP:   Recent Labs   Lab 07/11/22  2245 07/12/22  0404   * 82   CALCIUM 8.4* 8.4*   ALBUMIN 2.9*  --    PROT 6.9  --     141   K 3.6 3.8   CO2 27 28    109*   BUN 12 13   CREATININE 1.14 0.95   ALKPHOS 69  --    ALT 11*  --    AST 13*  --    BILITOT 0.1  --        Significant Imaging:  Imaging results within the past 24 hours have been reviewed.    Assessment/Plan:     Active Diagnoses:    Diagnosis Date Noted POA    PRINCIPAL PROBLEM:  Acute GI bleeding [K92.2] 07/12/2022 Yes    HTN (hypertension) [I10] 07/12/2022 Yes     Chronic     Acute blood loss anemia [D62] 07/12/2022 Yes    Alcohol dependence in early full remission [F10.21] 07/12/2022 Yes    Continuous dependence on cigarette smoking [F17.210] 07/12/2022 Yes     Chronic    Seizure [R56.9] 06/10/2022 Yes     Chronic      Problems Resolved During this Admission:       Imp: gi bleed, blood loss anemia  Plan: egd    Juan Mahajan MD  Gastroenterology  [facility] - Orthopedic

## 2022-07-12 NOTE — ED NOTES
Blood transfusion begun after consent and verification of two nurses.  Blood administration tubing used and whole blood infusing IVPB to 250cc's NSS.  Blood initiated at 25cc's per hour.

## 2022-07-12 NOTE — ED NOTES
[facility] called with room assignment - pt will be transferred to [facility] room 462 with Dr. Arizmendi accepting

## 2022-07-12 NOTE — TRANSFER OF CARE
"Anesthesia Transfer of Care Note    Patient: Stephane Mathis    Procedure(s) Performed: * No procedures listed *    Patient location: GI    Anesthesia Type: general    Transport from OR: Transported from OR on room air with adequate spontaneous ventilation    Post pain: adequate analgesia    Post assessment: no apparent anesthetic complications    Post vital signs: stable    Level of consciousness: responds to stimulation    Nausea/Vomiting: no nausea/vomiting    Complications: none    Transfer of care protocol was followed      Last vitals:   Visit Vitals  /66 (BP Location: Left arm, Patient Position: Lying)   Pulse 61   Temp 36.4 °C (97.6 °F) (Oral)   Resp 13   Ht 5' 11" (1.803 m)   Wt 70.3 kg (155 lb)   SpO2 98%   BMI 21.62 kg/m²     "

## 2022-07-12 NOTE — NURSING
0200 Pt arrived via stretcher by EMS. 20g in right hand with blood products infusing. Blood products completed. IV flushed with 10 ml  NS. No complaints of shortness of breath, pain, or dizziness. Pt uses a cane when ambulating. Informed to use call bell before ambulating. Pt verbalized understanding. Bed alarm on, call bell within reach, bed position low.

## 2022-07-12 NOTE — ANESTHESIA POSTPROCEDURE EVALUATION
Anesthesia Post Evaluation    Patient: Stephane Mathis    Procedure(s) Performed: * No procedures listed *    Final Anesthesia Type: general      Patient location during evaluation: GI PACU  Patient participation: Yes- Able to Participate  Level of consciousness: awake and alert  Post-procedure vital signs: reviewed and stable  Pain management: adequate  Airway patency: patent    PONV status at discharge: No PONV  Anesthetic complications: no      Cardiovascular status: blood pressure returned to baseline and hemodynamically stable  Respiratory status: spontaneous ventilation  Hydration status: euvolemic  Follow-up not needed.          Vitals Value Taken Time   /69 07/12/22 1314   Temp 36.4 °C (97.6 °F) 07/12/22 1246   Pulse 58 07/12/22 1315   Resp 12 07/12/22 1315   SpO2 97 % 07/12/22 1315   Vitals shown include unvalidated device data.      No case tracking events are documented in the log.      Pain/Mariana Score: Pain Rating Prior to Med Admin: 0 (7/11/2022 11:39 PM)  Mariana Score: 8 (7/12/2022 12:56 PM)

## 2022-07-12 NOTE — ANESTHESIA PREPROCEDURE EVALUATION
07/12/2022  Stephane Mathis is a 76 y.o., male.      Pre-op Assessment    I have reviewed the Patient Summary Reports.     I have reviewed the Nursing Notes. I have reviewed the NPO Status.   I have reviewed the Medications.     Review of Systems  Anesthesia Hx:  No problems with previous Anesthesia    Social:  Smoker    Hematology/Oncology:         -- Anemia:   Cardiovascular:   Hypertension    Neurological:   CVA Seizures    Psych:   Psychiatric History depression          Physical Exam  General: Well nourished, Cooperative, Alert and Oriented    Airway:  Mallampati: II   Mouth Opening: Normal  Neck ROM: Normal ROM    Dental:  Dentures    Chest/Lungs:  Normal Respiratory Rate    Heart:  Rate: Bradycardia  Rhythm: Regular Rhythm        Anesthesia Plan  Type of Anesthesia, risks & benefits discussed:    Anesthesia Type: Gen Natural Airway  Intra-op Monitoring Plan: Standard ASA Monitors  Post Op Pain Control Plan: multimodal analgesia  Induction:  IV  Informed Consent: Informed consent signed with the Patient and all parties understand the risks and agree with anesthesia plan.  All questions answered. Patient consented to blood products? Yes  ASA Score: 3  Day of Surgery Review of History & Physical: H&P Update referred to the surgeon/provider.I have interviewed and examined the patient. I have reviewed the patient's H&P dated:     Ready For Surgery From Anesthesia Perspective.     .    Hypertension Anemia   Stroke Seizures   Alcoholism

## 2022-07-12 NOTE — HPI
76 y.o. male with a PMHx of HTN, anemia, seizures, stroke (right-sided residual weakness), and alcohol abuse presented to the [facility] ED for dizziness today. Pt reports his primary care doctor needed some labs and on the labs his blood level was low so Dr. Penelope Pak told him to go to the ED. Pt reports he has felt dizzy for the past several days described as light-headedness and feeling faint. Pt reports he also has generalized weakness and fatigue. Per patient he did see that his stools were dark today. Pt reports similar symptoms previously of anemia but is unsure what was done. Pt denies any hematemesis, hemoptysis, fever, N/V/D, syncope, SOB, chest pain, or any other complaints at this time. Of note pt reports he was discharged from [facility] in [place] a week ago and was admitted because he had speech difficulties so there was a concern for stroke vs. Seizures.     In the ED at [facility] H/H 5/16. FOBT positive. Pt type and screened and treated with 2 units of PRBC. Pt will be admitted to the hospital service for administration of PRBC transfusion and further evaluation by GI.

## 2022-07-12 NOTE — H&P
[facility] Medicine  History & Physical    Patient Name: Stephane Mathis  MRN: [id number]  Patient Class: IP- Inpatient  Admission Date: 7/12/2022  Attending Physician: Herbie Arizmendi MD   Primary Care Provider: Sierra Dickerson MD         Patient information was obtained from patient and ER records.     Subjective:     Principal Problem:Acute GI bleeding    Chief Complaint:   Chief Complaint   Patient presents with    Dizziness        HPI: 76 y.o. male with a PMHx of HTN, anemia, seizures, stroke (right-sided residual weakness), and alcohol abuse presented to the [facility] ED for dizziness today. Pt reports his primary care doctor needed some labs and on the labs his blood level was low so Dr. Penelope Pak told him to go to the ED. Pt reports he has felt dizzy for the past several days described as light-headedness and feeling faint. Pt reports he also has generalized weakness and fatigue. Per patient he did see that his stools were dark today. Pt reports similar symptoms previously of anemia but is unsure what was done. Pt denies any hematemesis, hemoptysis, fever, N/V/D, syncope, SOB, chest pain, or any other complaints at this time. Of note pt reports he was discharged from [facility] in [place] a week ago and was admitted because he had speech difficulties so there was a concern for stroke vs. Seizures.     In the ED at [facility] H/ 5/16. FOBT positive. Pt type and screened and treated with 2 units of PRBC. Pt will be admitted to the hospital service for administration of PRBC transfusion and further evaluation by GI.       Past Medical History:   Diagnosis Date    Alcoholism     Anemia     Hypertension     Seizures     Stroke     right sided weakness       Past Surgical History:   Procedure Laterality Date    SCROTAL SURGERY      in february       Review of patient's allergies indicates:   Allergen Reactions    Codeine Itching       Current Facility-Administered  Medications on File Prior to Encounter   Medication    [COMPLETED] acetaminophen tablet 1,000 mg    [COMPLETED] diphenhydrAMINE injection 25 mg    [DISCONTINUED] 0.9%  NaCl infusion (for blood administration)    [DISCONTINUED] 0.9%  NaCl infusion (for blood administration)     Current Outpatient Medications on File Prior to Encounter   Medication Sig    atorvastatin (LIPITOR) 40 MG tablet Take 40 mg by mouth once daily.    levETIRAcetam (KEPPRA) 1000 MG tablet Take 1,000 mg by mouth 2 (two) times daily.    naltrexone HCl (NALTREXONE ORAL) Take 50 mg by mouth Daily. For 12 weeks    amLODIPine (NORVASC) 10 MG tablet Take 1 tablet (10 mg total) by mouth once daily.    aspirin (ECOTRIN) 81 MG EC tablet Take 81 mg by mouth once daily.    cetirizine (ZYRTEC) 10 MG tablet Take 1 tablet (10 mg total) by mouth daily as needed for Allergies.     Family History       Family history is unknown by patient.          Tobacco Use    Smoking status: Current Every Day Smoker     Packs/day: 1.00     Years: 50.00     Pack years: 50.00     Types: Cigarettes    Smokeless tobacco: Never Used   Substance and Sexual Activity    Alcohol use: Not Currently     Comment: half pint of liquor a day. but reports last drink was a month ago.    Drug use: Yes     Types: Marijuana     Comment: daily    Sexual activity: Not on file     Review of Systems   Constitutional:  Positive for fatigue.   HENT:  Negative for hearing loss and trouble swallowing.    Eyes:  Negative for visual disturbance.   Respiratory:  Negative for chest tightness and shortness of breath.    Cardiovascular:  Negative for chest pain, palpitations and leg swelling.   Gastrointestinal:  Positive for blood in stool. Negative for abdominal pain, diarrhea, nausea and vomiting.   Genitourinary:  Negative for difficulty urinating and hematuria.   Musculoskeletal:  Negative for myalgias.   Skin:  Negative for rash and wound.   Neurological:  Positive for dizziness,  weakness (generalized) and light-headedness. Negative for seizures and syncope.   Psychiatric/Behavioral:  The patient is not nervous/anxious.    Objective:     Vital Signs (Most Recent):  Temp: 97.7 °F (36.5 °C) (07/12/22 0200)  Pulse: 69 (07/12/22 0200)  Resp: 18 (07/12/22 0200)  BP: (!) 135/48 (07/12/22 0200)  SpO2: 99 % (07/12/22 0200)   Vital Signs (24h Range):  Temp:  [97.7 °F (36.5 °C)-98.2 °F (36.8 °C)] 97.7 °F (36.5 °C)  Pulse:  [65-98] 69  Resp:  [16-20] 18  SpO2:  [66 %-100 %] 99 %  BP: (109-139)/(47-68) 135/48     Weight: 69.7 kg (153 lb 10.6 oz)  Body mass index is 21.43 kg/m².    Physical Exam  Vitals reviewed.   Constitutional:       General: He is not in acute distress.     Appearance: Normal appearance. He is normal weight. He is not ill-appearing or toxic-appearing.   HENT:      Head: Normocephalic and atraumatic.      Right Ear: External ear normal.      Left Ear: External ear normal.      Nose: Nose normal.      Mouth/Throat:      Mouth: Mucous membranes are moist.      Pharynx: Oropharynx is clear.   Eyes:      General: No scleral icterus.     Extraocular Movements: Extraocular movements intact.      Conjunctiva/sclera: Conjunctivae normal.      Pupils: Pupils are equal, round, and reactive to light.   Cardiovascular:      Rate and Rhythm: Normal rate and regular rhythm.      Pulses: Normal pulses.      Heart sounds: Normal heart sounds. No murmur heard.  Pulmonary:      Effort: Pulmonary effort is normal. No respiratory distress.      Breath sounds: Normal breath sounds. No wheezing, rhonchi or rales.   Abdominal:      General: Abdomen is flat. Bowel sounds are normal. There is no distension.      Palpations: Abdomen is soft.      Tenderness: There is no abdominal tenderness. There is no guarding or rebound.   Genitourinary:     Rectum: Guaiac result positive.   Musculoskeletal:         General: No swelling, tenderness or signs of injury. Normal range of motion.      Cervical back: Normal range  of motion and neck supple. No rigidity.      Right lower leg: No edema.      Left lower leg: No edema.   Skin:     General: Skin is warm and dry.      Capillary Refill: Capillary refill takes less than 2 seconds.      Coloration: Skin is not pale.      Findings: No rash.   Neurological:      General: No focal deficit present.      Mental Status: He is alert and oriented to person, place, and time. Mental status is at baseline.      Motor: Weakness: residual right-sided weakness reported from previous stroke..   Psychiatric:         Mood and Affect: Mood normal.         Behavior: Behavior normal.         Thought Content: Thought content normal.         Judgment: Judgment normal.         CRANIAL NERVES     CN III, IV, VI   Pupils are equal, round, and reactive to light.     Significant Labs: All pertinent labs within the past 24 hours have been reviewed.  Recent Lab Results         07/11/22  2245   07/11/22  1604        Albumin/Globulin Ratio 0.7         Albumin 2.9         Alkaline Phosphatase 69         ALT 11         Anion Gap 12         Aniso   2+       AST 13         Baso # 0.06   0.02       Basophil % 0.8   0.3       BILIRUBIN TOTAL 0.1         BUN 12         BUN/CREAT RATIO 11         Calcium 8.4         Chloride 105         CO2 27         Creatinine 1.14         Differential Type Auto   Scan Smear       eGFR if non  67         Eos # 0.16   0.10       Eosinophil % 2.3   1.3       Folate   17.0       Globulin, Total 4.0         Glucose 127         Group & Rh OPOS         Hematocrit 15.9   16.0       Hemoglobin 4.9   5.0       Hypo   1+       Immature Grans (Abs)   0.03       Immature Granulocytes   0.4       Indirect Craig GEL NEG         Lymph # 1.89   1.95       Lymph % 26.7   24.7       MCH 29.2   29.9       MCHC 30.8   31.3       MCV 94.6   95.8       Mono # 0.71   0.75       Mono % 10.0   9.5       MPV 11.1   12.0       Neutrophils, Abs 4.25   5.05       Neutrophils Relative 60.2   63.8        nRBC   0.0       NUCLEATED RBC ABSOLUTE   0.00       Occult Blood   Positive       PLATELET MORPHOLOGY   Few Large Platelets       Platelets 342   320       Poly   Few       Potassium 3.6         PROTEIN TOTAL 6.9         RBC 1.68   1.67       RDW 16.4   16.9       Sodium 140         Target Cells   Few       Vitamin B-12   1,502       WBC 7.07   7.90               Significant Imaging: I have reviewed all pertinent imaging results/findings within the past 24 hours.    Assessment/Plan:     * Acute GI bleeding  -H/H 5/16, FOBT positive. 2 units PRBC at [facility].  -Hg and Hct q6hrs pending  -2 units of PRBC ordered for transfusion  -NPO except meds and sips with meds  -GI consulted. Assistance appreciated.     Acute blood loss anemia  -2 large bore IV's. IV fluids NS at 150 cc/hr.   -will transfuse 2 units and do serial H/H.   -No iron studies were done since pt already received 2 units of blood prior to arrival.     Alcohol dependence in early full remission  -reports last drink was one month ago  -continue home naltrexone and since it is not on the formulary pt was told to bring his home medication and reports his daughter will bring the medication first thing tomorrow morning.   -thiamine 250 mg X 5 days and folic acid  -lorazepam 2 mg PRN for seizures    Continuous dependence on cigarette smoking  -reports 1 PPD for 50 plus years  -smoking cessation counseling provided.  -nicotine patch during hospital stay    Seizure  -Continue home levetiracetam 1000 mg PO BID    HTN (hypertension)  -hold home amlodipine 10 mg tablet       VTE Risk Mitigation (From admission, onward)         Ordered     IP VTE HIGH RISK PATIENT  Once         07/12/22 0331     Place sequential compression device  Until discontinued         07/12/22 0331                   Thelmaeran Bentley DO  Department of Hospital Medicine   [facility]

## 2022-07-12 NOTE — PLAN OF CARE
Problem: Anemia  Goal: Anemia Symptom Improvement  Outcome: Ongoing, Progressing  Intervention: Monitor and Manage Anemia  Flowsheets (Taken 7/12/2022 1307)  Oral Nutrition Promotion: rest periods promoted  Safety Promotion/Fall Prevention: assistive device/personal item within reach  Fatigue Management:   activity assistance provided   frequent rest breaks encouraged

## 2022-07-12 NOTE — NURSING
0720 Pt resting in bed. Receiving blood. VS stable. No distress noted. No complaints or requests at this time. Call bell in reach. Side rails up x2.     0958 Pt resting in bed with eyes closed. Still receiving blood. No distress noted. No complaints or requests at this time. Call bell in reach. Side rails up x2.     1008 Messaged Dr. Mahajan about consult.     1108 Pt left for EGD.     1435 Pt back from EGD. Gave ice and pre-op golytely. No complaints or requests at this time. Call bell in reach. Side rails up x2.     1514 Started second unit of blood. VS stable. No distress noted. No complaints or requests at this time. Call bell in reach. Side rails up x2.     1705 Pt sitting up in bed eating dinner. No distress noted. No complaints or requests at this time. Call bell in reach. Side rails up x2.     1840 Pt resting in bed. Blood ended. Call bell in reach. Side rails up x3. Reminded pt about c-scope prep.

## 2022-07-12 NOTE — SUBJECTIVE & OBJECTIVE
Past Medical History:   Diagnosis Date    Alcoholism     Anemia     Hypertension     Seizures     Stroke     right sided weakness       Past Surgical History:   Procedure Laterality Date    SCROTAL SURGERY      in february       Review of patient's allergies indicates:   Allergen Reactions    Codeine Itching       Current Facility-Administered Medications on File Prior to Encounter   Medication    [COMPLETED] acetaminophen tablet 1,000 mg    [COMPLETED] diphenhydrAMINE injection 25 mg    [DISCONTINUED] 0.9%  NaCl infusion (for blood administration)    [DISCONTINUED] 0.9%  NaCl infusion (for blood administration)     Current Outpatient Medications on File Prior to Encounter   Medication Sig    atorvastatin (LIPITOR) 40 MG tablet Take 40 mg by mouth once daily.    levETIRAcetam (KEPPRA) 1000 MG tablet Take 1,000 mg by mouth 2 (two) times daily.    naltrexone HCl (NALTREXONE ORAL) Take 50 mg by mouth Daily. For 12 weeks    amLODIPine (NORVASC) 10 MG tablet Take 1 tablet (10 mg total) by mouth once daily.    aspirin (ECOTRIN) 81 MG EC tablet Take 81 mg by mouth once daily.    cetirizine (ZYRTEC) 10 MG tablet Take 1 tablet (10 mg total) by mouth daily as needed for Allergies.     Family History       Family history is unknown by patient.          Tobacco Use    Smoking status: Current Every Day Smoker     Packs/day: 1.00     Years: 50.00     Pack years: 50.00     Types: Cigarettes    Smokeless tobacco: Never Used   Substance and Sexual Activity    Alcohol use: Not Currently     Comment: half pint of liquor a day. but reports last drink was a month ago.    Drug use: Yes     Types: Marijuana     Comment: daily    Sexual activity: Not on file     Review of Systems   Constitutional:  Positive for fatigue.   HENT:  Negative for hearing loss and trouble swallowing.    Eyes:  Negative for visual disturbance.   Respiratory:  Negative for chest tightness and shortness of breath.    Cardiovascular:  Negative for chest pain,  palpitations and leg swelling.   Gastrointestinal:  Positive for blood in stool. Negative for abdominal pain, diarrhea, nausea and vomiting.   Genitourinary:  Negative for difficulty urinating and hematuria.   Musculoskeletal:  Negative for myalgias.   Skin:  Negative for rash and wound.   Neurological:  Positive for dizziness, weakness (generalized) and light-headedness. Negative for seizures and syncope.   Psychiatric/Behavioral:  The patient is not nervous/anxious.    Objective:     Vital Signs (Most Recent):  Temp: 97.7 °F (36.5 °C) (07/12/22 0200)  Pulse: 69 (07/12/22 0200)  Resp: 18 (07/12/22 0200)  BP: (!) 135/48 (07/12/22 0200)  SpO2: 99 % (07/12/22 0200)   Vital Signs (24h Range):  Temp:  [97.7 °F (36.5 °C)-98.2 °F (36.8 °C)] 97.7 °F (36.5 °C)  Pulse:  [65-98] 69  Resp:  [16-20] 18  SpO2:  [66 %-100 %] 99 %  BP: (109-139)/(47-68) 135/48     Weight: 69.7 kg (153 lb 10.6 oz)  Body mass index is 21.43 kg/m².    Physical Exam  Vitals reviewed.   Constitutional:       General: He is not in acute distress.     Appearance: Normal appearance. He is normal weight. He is not ill-appearing or toxic-appearing.   HENT:      Head: Normocephalic and atraumatic.      Right Ear: External ear normal.      Left Ear: External ear normal.      Nose: Nose normal.      Mouth/Throat:      Mouth: Mucous membranes are moist.      Pharynx: Oropharynx is clear.   Eyes:      General: No scleral icterus.     Extraocular Movements: Extraocular movements intact.      Conjunctiva/sclera: Conjunctivae normal.      Pupils: Pupils are equal, round, and reactive to light.   Cardiovascular:      Rate and Rhythm: Normal rate and regular rhythm.      Pulses: Normal pulses.      Heart sounds: Normal heart sounds. No murmur heard.  Pulmonary:      Effort: Pulmonary effort is normal. No respiratory distress.      Breath sounds: Normal breath sounds. No wheezing, rhonchi or rales.   Abdominal:      General: Abdomen is flat. Bowel sounds are normal.  There is no distension.      Palpations: Abdomen is soft.      Tenderness: There is no abdominal tenderness. There is no guarding or rebound.   Genitourinary:     Rectum: Guaiac result positive.   Musculoskeletal:         General: No swelling, tenderness or signs of injury. Normal range of motion.      Cervical back: Normal range of motion and neck supple. No rigidity.      Right lower leg: No edema.      Left lower leg: No edema.   Skin:     General: Skin is warm and dry.      Capillary Refill: Capillary refill takes less than 2 seconds.      Coloration: Skin is not pale.      Findings: No rash.   Neurological:      General: No focal deficit present.      Mental Status: He is alert and oriented to person, place, and time. Mental status is at baseline.      Motor: Weakness: residual right-sided weakness reported from previous stroke..   Psychiatric:         Mood and Affect: Mood normal.         Behavior: Behavior normal.         Thought Content: Thought content normal.         Judgment: Judgment normal.         CRANIAL NERVES     CN III, IV, VI   Pupils are equal, round, and reactive to light.     Significant Labs: All pertinent labs within the past 24 hours have been reviewed.  Recent Lab Results         07/11/22  2245   07/11/22  1604        Albumin/Globulin Ratio 0.7         Albumin 2.9         Alkaline Phosphatase 69         ALT 11         Anion Gap 12         Aniso   2+       AST 13         Baso # 0.06   0.02       Basophil % 0.8   0.3       BILIRUBIN TOTAL 0.1         BUN 12         BUN/CREAT RATIO 11         Calcium 8.4         Chloride 105         CO2 27         Creatinine 1.14         Differential Type Auto   Scan Smear       eGFR if non  67         Eos # 0.16   0.10       Eosinophil % 2.3   1.3       Folate   17.0       Globulin, Total 4.0         Glucose 127         Group & Rh OPOS         Hematocrit 15.9   16.0       Hemoglobin 4.9   5.0       Hypo   1+       Immature Grans (Abs)   0.03        Immature Granulocytes   0.4       Indirect Craig GEL NEG         Lymph # 1.89   1.95       Lymph % 26.7   24.7       MCH 29.2   29.9       MCHC 30.8   31.3       MCV 94.6   95.8       Mono # 0.71   0.75       Mono % 10.0   9.5       MPV 11.1   12.0       Neutrophils, Abs 4.25   5.05       Neutrophils Relative 60.2   63.8       nRBC   0.0       NUCLEATED RBC ABSOLUTE   0.00       Occult Blood   Positive       PLATELET MORPHOLOGY   Few Large Platelets       Platelets 342   320       Poly   Few       Potassium 3.6         PROTEIN TOTAL 6.9         RBC 1.68   1.67       RDW 16.4   16.9       Sodium 140         Target Cells   Few       Vitamin B-12   1,502       WBC 7.07   7.90               Significant Imaging: I have reviewed all pertinent imaging results/findings within the past 24 hours.

## 2022-07-12 NOTE — ASSESSMENT & PLAN NOTE
-reports last drink was one month ago  -continue home naltrexone and since it is not on the formulary pt was told to bring his home medication and reports his daughter will bring the medication first thing tomorrow morning.   -thiamine 250 mg X 5 days and folic acid  -lorazepam 2 mg PRN for seizures

## 2022-07-12 NOTE — ASSESSMENT & PLAN NOTE
-2 large bore IV's. IV fluids NS at 150 cc/hr.   -will transfuse 2 units and do serial H/H.   -No iron studies were done since pt already received 2 units of blood prior to arrival.

## 2022-07-13 ENCOUNTER — ANESTHESIA (OUTPATIENT)
Dept: GASTROENTEROLOGY | Facility: HOSPITAL | Age: 77
DRG: 378 | End: 2022-07-13
Payer: MEDICARE

## 2022-07-13 ENCOUNTER — ANESTHESIA EVENT (OUTPATIENT)
Dept: GASTROENTEROLOGY | Facility: HOSPITAL | Age: 77
DRG: 378 | End: 2022-07-13
Payer: MEDICARE

## 2022-07-13 PROBLEM — K63.5 POLYP, SIGMOID COLON: Status: ACTIVE | Noted: 2022-07-13

## 2022-07-13 PROBLEM — K55.20 AVM (ARTERIOVENOUS MALFORMATION) OF COLON: Status: ACTIVE | Noted: 2022-07-13

## 2022-07-13 PROBLEM — K57.30 COLON, DIVERTICULOSIS: Status: ACTIVE | Noted: 2022-07-13

## 2022-07-13 LAB
ANION GAP SERPL CALCULATED.3IONS-SCNC: 9 MMOL/L (ref 7–16)
BUN SERPL-MCNC: 8 MG/DL (ref 7–18)
BUN/CREAT SERPL: 10 (ref 6–20)
CALCIUM SERPL-MCNC: 8 MG/DL (ref 8.5–10.1)
CHLORIDE SERPL-SCNC: 110 MMOL/L (ref 98–107)
CO2 SERPL-SCNC: 27 MMOL/L (ref 21–32)
CREAT SERPL-MCNC: 0.81 MG/DL (ref 0.7–1.3)
ESTROGEN SERPL-MCNC: NORMAL PG/ML
FERRITIN SERPL-MCNC: 19 NG/ML (ref 26–388)
GLUCOSE SERPL-MCNC: 68 MG/DL (ref 74–106)
HCT VFR BLD AUTO: 29.8 % (ref 40–54)
HCT VFR BLD AUTO: 33.1 % (ref 40–54)
HGB BLD-MCNC: 10.1 G/DL (ref 13.5–18)
HGB BLD-MCNC: 10.8 G/DL (ref 13.5–18)
INSULIN SERPL-ACNC: NORMAL U[IU]/ML
IRON SATN MFR SERPL: 8 % (ref 14–50)
IRON SERPL-MCNC: 24 ΜG/DL (ref 65–175)
LAB AP GROSS DESCRIPTION: NORMAL
LAB AP LABORATORY NOTES: NORMAL
POTASSIUM SERPL-SCNC: 3.9 MMOL/L (ref 3.5–5.1)
SODIUM SERPL-SCNC: 142 MMOL/L (ref 136–145)
T3RU NFR SERPL: NORMAL %
TIBC SERPL-MCNC: 309 ΜG/DL (ref 250–450)

## 2022-07-13 PROCEDURE — 99233 SBSQ HOSP IP/OBS HIGH 50: CPT | Mod: ,,, | Performed by: HOSPITALIST

## 2022-07-13 PROCEDURE — 63600175 PHARM REV CODE 636 W HCPCS: Performed by: INTERNAL MEDICINE

## 2022-07-13 PROCEDURE — 25000003 PHARM REV CODE 250: Performed by: HOSPITALIST

## 2022-07-13 PROCEDURE — 11000001 HC ACUTE MED/SURG PRIVATE ROOM

## 2022-07-13 PROCEDURE — 25000003 PHARM REV CODE 250: Performed by: INTERNAL MEDICINE

## 2022-07-13 PROCEDURE — 85014 HEMATOCRIT: CPT | Performed by: INTERNAL MEDICINE

## 2022-07-13 PROCEDURE — 63600175 PHARM REV CODE 636 W HCPCS: Performed by: HOSPITALIST

## 2022-07-13 PROCEDURE — 45388 COLONOSCOPY W/ABLATION: CPT

## 2022-07-13 PROCEDURE — 94761 N-INVAS EAR/PLS OXIMETRY MLT: CPT

## 2022-07-13 PROCEDURE — 36415 COLL VENOUS BLD VENIPUNCTURE: CPT | Performed by: INTERNAL MEDICINE

## 2022-07-13 PROCEDURE — 45380 COLONOSCOPY AND BIOPSY: CPT | Mod: 59

## 2022-07-13 PROCEDURE — 27000284 HC CANNULA NASAL: Performed by: NURSE ANESTHETIST, CERTIFIED REGISTERED

## 2022-07-13 PROCEDURE — 63600175 PHARM REV CODE 636 W HCPCS: Performed by: NURSE ANESTHETIST, CERTIFIED REGISTERED

## 2022-07-13 PROCEDURE — 82962 GLUCOSE BLOOD TEST: CPT

## 2022-07-13 PROCEDURE — D9220A PRA ANESTHESIA: ICD-10-PCS | Mod: ,,, | Performed by: NURSE ANESTHETIST, CERTIFIED REGISTERED

## 2022-07-13 PROCEDURE — 99233 PR SUBSEQUENT HOSPITAL CARE,LEVL III: ICD-10-PCS | Mod: ,,, | Performed by: HOSPITALIST

## 2022-07-13 PROCEDURE — D9220A PRA ANESTHESIA: Mod: ,,, | Performed by: NURSE ANESTHETIST, CERTIFIED REGISTERED

## 2022-07-13 PROCEDURE — 25000003 PHARM REV CODE 250: Performed by: NURSE ANESTHETIST, CERTIFIED REGISTERED

## 2022-07-13 PROCEDURE — C9113 INJ PANTOPRAZOLE SODIUM, VIA: HCPCS | Performed by: INTERNAL MEDICINE

## 2022-07-13 RX ORDER — LIDOCAINE HYDROCHLORIDE 20 MG/ML
INJECTION, SOLUTION EPIDURAL; INFILTRATION; INTRACAUDAL; PERINEURAL
Status: DISCONTINUED | OUTPATIENT
Start: 2022-07-13 | End: 2022-07-13

## 2022-07-13 RX ORDER — PROPOFOL 10 MG/ML
VIAL (ML) INTRAVENOUS
Status: DISCONTINUED | OUTPATIENT
Start: 2022-07-13 | End: 2022-07-13

## 2022-07-13 RX ORDER — DIPHENHYDRAMINE HYDROCHLORIDE 50 MG/ML
25 INJECTION INTRAMUSCULAR; INTRAVENOUS ONCE
Status: COMPLETED | OUTPATIENT
Start: 2022-07-13 | End: 2022-07-13

## 2022-07-13 RX ORDER — DOCUSATE SODIUM 100 MG/1
100 CAPSULE, LIQUID FILLED ORAL DAILY
Status: DISCONTINUED | OUTPATIENT
Start: 2022-07-14 | End: 2022-07-14 | Stop reason: HOSPADM

## 2022-07-13 RX ORDER — ATORVASTATIN CALCIUM 40 MG/1
40 TABLET, FILM COATED ORAL NIGHTLY
Status: DISCONTINUED | OUTPATIENT
Start: 2022-07-14 | End: 2022-07-14 | Stop reason: HOSPADM

## 2022-07-13 RX ORDER — PANTOPRAZOLE SODIUM 40 MG/1
40 TABLET, DELAYED RELEASE ORAL DAILY
Status: DISCONTINUED | OUTPATIENT
Start: 2022-07-14 | End: 2022-07-14 | Stop reason: HOSPADM

## 2022-07-13 RX ORDER — LANOLIN ALCOHOL/MO/W.PET/CERES
1 CREAM (GRAM) TOPICAL DAILY
Status: DISCONTINUED | OUTPATIENT
Start: 2022-07-13 | End: 2022-07-14 | Stop reason: HOSPADM

## 2022-07-13 RX ORDER — SODIUM CHLORIDE 0.9 % (FLUSH) 0.9 %
10 SYRINGE (ML) INJECTION
Status: DISCONTINUED | OUTPATIENT
Start: 2022-07-13 | End: 2022-07-14 | Stop reason: HOSPADM

## 2022-07-13 RX ADMIN — PROPOFOL 20 MG: 10 INJECTION, EMULSION INTRAVENOUS at 09:07

## 2022-07-13 RX ADMIN — SODIUM CHLORIDE 975 MG: 9 INJECTION, SOLUTION INTRAVENOUS at 07:07

## 2022-07-13 RX ADMIN — THIAMINE HYDROCHLORIDE 250 MG: 100 INJECTION, SOLUTION INTRAMUSCULAR; INTRAVENOUS at 10:07

## 2022-07-13 RX ADMIN — DIPHENHYDRAMINE HYDROCHLORIDE 25 MG: 50 INJECTION, SOLUTION INTRAMUSCULAR; INTRAVENOUS at 07:07

## 2022-07-13 RX ADMIN — SODIUM CHLORIDE: 9 INJECTION, SOLUTION INTRAVENOUS at 09:07

## 2022-07-13 RX ADMIN — MUPIROCIN: 20 OINTMENT TOPICAL at 10:07

## 2022-07-13 RX ADMIN — LIDOCAINE HYDROCHLORIDE 60 MG: 20 INJECTION, SOLUTION INTRAVENOUS at 09:07

## 2022-07-13 RX ADMIN — PROPOFOL 80 MG: 10 INJECTION, EMULSION INTRAVENOUS at 09:07

## 2022-07-13 RX ADMIN — SODIUM CHLORIDE 25 MG: 9 INJECTION, SOLUTION INTRAVENOUS at 06:07

## 2022-07-13 RX ADMIN — PROPOFOL 30 MG: 10 INJECTION, EMULSION INTRAVENOUS at 09:07

## 2022-07-13 RX ADMIN — SODIUM CHLORIDE: 9 INJECTION, SOLUTION INTRAVENOUS at 01:07

## 2022-07-13 RX ADMIN — PANTOPRAZOLE SODIUM 40 MG: 40 INJECTION, POWDER, FOR SOLUTION INTRAVENOUS at 10:07

## 2022-07-13 RX ADMIN — MUPIROCIN: 20 OINTMENT TOPICAL at 08:07

## 2022-07-13 RX ADMIN — LEVETIRACETAM 1000 MG: 500 TABLET, FILM COATED ORAL at 10:07

## 2022-07-13 RX ADMIN — SODIUM CHLORIDE: 9 INJECTION, SOLUTION INTRAVENOUS at 10:07

## 2022-07-13 RX ADMIN — FOLIC ACID 1 MG: 1 TABLET ORAL at 10:07

## 2022-07-13 RX ADMIN — FERROUS SULFATE TAB 325 MG (65 MG ELEMENTAL FE) 1 EACH: 325 (65 FE) TAB at 10:07

## 2022-07-13 RX ADMIN — ATORVASTATIN CALCIUM 40 MG: 40 TABLET, FILM COATED ORAL at 10:07

## 2022-07-13 RX ADMIN — LEVETIRACETAM 1000 MG: 500 TABLET, FILM COATED ORAL at 08:07

## 2022-07-13 NOTE — ANESTHESIA PREPROCEDURE EVALUATION
07/13/2022  Stephane Mathis is a 76 y.o., male.    Past Medical History:   Diagnosis Date    Acute superficial gastritis without hemorrhage 7/12/2022    Alcoholism     Anemia     Hypertension     Iron deficiency anemia due to chronic blood loss 7/12/2022    Seizures     Stroke     right sided weakness       Past Surgical History:   Procedure Laterality Date    SCROTAL SURGERY      in february       Family History   Family history unknown: Yes       Social History     Socioeconomic History    Marital status: Unknown   Tobacco Use    Smoking status: Current Every Day Smoker     Packs/day: 1.00     Years: 50.00     Pack years: 50.00     Types: Cigarettes    Smokeless tobacco: Never Used   Substance and Sexual Activity    Alcohol use: Not Currently     Comment: half pint of liquor a day. but reports last drink was a month ago.    Drug use: Yes     Types: Marijuana     Comment: daily       Current Facility-Administered Medications   Medication Dose Route Frequency Provider Last Rate Last Admin    0.9%  NaCl infusion (for blood administration)   Intravenous Q24H PRN Herbie Arizmendi MD   Paused at 07/12/22 0615    0.9%  NaCl infusion   Intravenous Continuous Herbie Arizmendi  mL/hr at 07/13/22 0149 New Bag at 07/13/22 0149    albuterol-ipratropium 2.5 mg-0.5 mg/3 mL nebulizer solution 3 mL  3 mL Nebulization Q4H PRN Herbie Arizmendi MD        atorvastatin tablet 40 mg  40 mg Oral Daily Herbie Arizmendi MD   40 mg at 07/12/22 0905    folic acid tablet 1 mg  1 mg Oral Daily Herbie Arizmendi MD   1 mg at 07/12/22 0905    levETIRAcetam tablet 1,000 mg  1,000 mg Oral BID Herbie Arizmendi MD   1,000 mg at 07/12/22 2332    LORazepam tablet 1 mg  1 mg Oral Q4H PRN Herbie Arizmendi MD        mupirocin 2 % ointment   Nasal BID Jurgen Hart MD   Given at 07/12/22 2145    Naltrexone 50 mg tablet  50 mg Oral Daily Herbie Arizmendi MD   50 mg at  07/13/22 0526    ondansetron injection 4 mg  4 mg Intravenous Q8H PRN Herbie Arizmendi MD        pantoprazole injection 40 mg  40 mg Intravenous Q12H Min NICOLLE Arizmendi MD   40 mg at 07/12/22 2141    sodium chloride 0.9% flush 10 mL  10 mL Intravenous Q12H PRN Herbie Arizmendi MD        sodium chloride 0.9% flush 10 mL  10 mL Intravenous PRN Juan Mahajan MD        thiamine (B-1) 250 mg in dextrose 5 % 100 mL IVPB  250 mg Intravenous Daily Min NICOLLE Arizmendi MD   Stopped at 07/12/22 1024       Review of patient's allergies indicates:   Allergen Reactions    Codeine Itching       Pre-op Assessment    I have reviewed the Patient Summary Reports.     I have reviewed the Nursing Notes. I have reviewed the NPO Status.   I have reviewed the Medications.     Review of Systems  Anesthesia Hx:  No problems with previous Anesthesia    Social:  Smoker, Alcohol Use    Cardiovascular:   Hypertension    Neurological:   CVA Seizures    Psych:   Psychiatric History          Physical Exam    Airway:  Mallampati: II   Mouth Opening: Normal  TM Distance: Normal  Tongue: Normal  Neck ROM: Normal ROM        Anesthesia Plan  Type of Anesthesia, risks & benefits discussed:    Anesthesia Type: Gen Natural Airway  Intra-op Monitoring Plan: Standard ASA Monitors  Post Op Pain Control Plan: multimodal analgesia  Induction:  IV  Informed Consent: Informed consent signed with the Patient and all parties understand the risks and agree with anesthesia plan.  All questions answered. Patient consented to blood products? Yes  ASA Score: 3  Day of Surgery Review of History & Physical: H&P Update referred to the surgeon/provider.I have interviewed and examined the patient. I have reviewed the patient's H&P dated: 07/12/2022. There are no significant changes.     Ready For Surgery From Anesthesia Perspective.     .

## 2022-07-13 NOTE — PROGRESS NOTES
Records reviewed.  Seen earlier today and charged by Dr. Arizmendi.  Patient had recent evaluation in [place] where there was concern over stroke versus seizure.  Then he was evaluated for right upper extremity weakness that since resolved.  Told to have follow-up laboratory results following that admit.  Was called and told was extremely anemic and to come to emergency room.  Patient states he has had previous evaluations for anemia without etiologies found.  Reports having some dark stools in the recent past but no other obvious bleeding.  States he has been told he should be on iron therapy but has not been reliable.  Has history of fairly heavy alcohol use.  States he drinks beer several days during the week but drinks cognac on week in.  Smokes cigarettes 1 pack per day and strongly encouraged to stop.  History of hypertension   Previous evaluation from February this year showed B12 level not low and serum protein electrophoresis without monoclonal gammopathy.  Iron studies were borderline with ferritin of 22.  Iron studies in 2021 were more consistent with iron deficiency.  Will update studies.  If not reliable with taking iron may need IV iron.     Colonoscopy planned by GI tomorrow.

## 2022-07-13 NOTE — NURSING
2145 Pt consumed only half of the GoLyte solution. Pt asleep. Woke pt up and told him he needs to drink his solution. Verbalized he will. Educated the pt of the importance of GoLyte and why it's needed for. Pt stated he knows and he will drink it, but went back to sleep.

## 2022-07-13 NOTE — PLAN OF CARE
Problem: Anemia  Goal: Anemia Symptom Improvement  Outcome: Ongoing, Progressing  Intervention: Monitor and Manage Anemia  Flowsheets (Taken 7/13/2022 8102)  Oral Nutrition Promotion: rest periods promoted  Safety Promotion/Fall Prevention: Fall Risk signage in place

## 2022-07-13 NOTE — PLAN OF CARE
Problem: Adult Inpatient Plan of Care  Goal: Plan of Care Review  Outcome: Ongoing, Progressing  Goal: Patient-Specific Goal (Individualized)  Outcome: Ongoing, Progressing  Goal: Absence of Hospital-Acquired Illness or Injury  Outcome: Ongoing, Progressing  Goal: Optimal Comfort and Wellbeing  Outcome: Ongoing, Progressing  Goal: Readiness for Transition of Care  Outcome: Ongoing, Progressing     Problem: Fall Injury Risk  Goal: Absence of Fall and Fall-Related Injury  Outcome: Ongoing, Progressing     Problem: Anemia  Goal: Anemia Symptom Improvement  Outcome: Ongoing, Progressing

## 2022-07-13 NOTE — NURSING
0720 Pt resting in bed. Finished about 3000ml of prep. Reports that his stool is clear. VS stable. Call bell in reach. Side rails up x2.     1100 Pt back from C-scope. Gave medications. Vs stable. Repositioned in bed. No distress noted. Call bell in reach. Side rails up x2.     1310 Pt resting in bed. Dr. Hart at bedside. No distress noted. No complaints or requests at this time. Call bell in reach. Side rails up x2.     1507 Pt resting in bed on R side. Visitor at bedside. No distress noted. No complaints or requests at this time. Call bell in reach. Side rails up x2.     1710 Pt resting in bed. Discontinued iv fluids. Pt refused SCDs. No complaints or requests at this time. Hopes he will discharge tomorrow. Call bell in reach. Side rails up x2.     1807 Pt resting in bed watching tv. No distress noted. Started iron test dose. Staying with pt for the 10 mins. No complaints or requests at this time. Call bell in reach. Side rails up x2.

## 2022-07-13 NOTE — ANESTHESIA POSTPROCEDURE EVALUATION
Anesthesia Post Evaluation    Patient: Stephane Mathis    Procedure(s) Performed: Colonoscopy    Final Anesthesia Type: general      Patient location during evaluation: GI PACU  Patient participation: Yes- Able to Participate  Level of consciousness: awake and alert  Post-procedure vital signs: reviewed and stable  Pain management: adequate  Airway patency: patent  CHAZ mitigation strategies: Multimodal analgesia  PONV status at discharge: No PONV  Anesthetic complications: no      Cardiovascular status: stable  Respiratory status: unassisted  Hydration status: euvolemic  Follow-up not needed.          Vitals Value Taken Time   /75 07/13/22 1010   Temp 36.7 °C (98.1 °F) 07/13/22 0938   Pulse 71 07/13/22 1012   Resp 14 07/13/22 1012   SpO2 97 % 07/13/22 1011   Vitals shown include unvalidated device data.      No case tracking events are documented in the log.      Pain/Mariana Score: Mariana Score: 10 (7/12/2022  1:23 PM)

## 2022-07-13 NOTE — TRANSFER OF CARE
"Anesthesia Transfer of Care Note    Patient: Stephane Mathis    Procedure(s) Performed: Colonoscopy    Patient location: GI    Anesthesia Type: general    Transport from OR: Transported from OR on 2-3 L/min O2 by NC with adequate spontaneous ventilation    Post pain: adequate analgesia    Post assessment: no apparent anesthetic complications and tolerated procedure well    Post vital signs: stable    Level of consciousness: responds to stimulation and sedated    Nausea/Vomiting: no nausea/vomiting    Complications: none    Transfer of care protocol was followed      Last vitals:   Visit Vitals  BP (!) 118/52 (BP Location: Left arm, Patient Position: Lying)   Pulse (!) 58   Temp 36.7 °C (98.1 °F) (Skin)   Resp 12   Ht 5' 11" (1.803 m)   Wt 74.9 kg (165 lb 2 oz)   SpO2 100%   BMI 23.03 kg/m²     "

## 2022-07-13 NOTE — PLAN OF CARE
[facility] - Orthopedic  Initial Discharge Assessment       Primary Care Provider: Sierra Dickerson MD    Admission Diagnosis: GIB (gastrointestinal bleeding) [K92.2]    Admission Date: 7/12/2022  Expected Discharge Date:     Discharge Barriers Identified: None    Payor: [employer] MANAGED MEDICARE / Plan: [employer] MEDICARE PPO / Product Type: Medicare Advantage /     Extended Emergency Contact Information  Primary Emergency Contact: nikolas cindy  Mobile Phone: [phone]  Relation: Daughter  Preferred language: English   needed? No    Discharge Plan A: Home  Discharge Plan B: Home      [facility] - [place], [place] - [address]  [address]  [place] [address]  Phone: [phone] Fax: [phone]      Initial Assessment (most recent)     Adult Discharge Assessment - 07/13/22 1532        Discharge Assessment    Assessment Type Discharge Planning Assessment     Source of Information family     Lives With alone     Do you expect to return to your current living situation? Yes     Do you have help at home or someone to help you manage your care at home? Yes     Who are your caregiver(s) and their phone number(s)? Cindy Rowe [phone]   Cindy Goddard anthony [phone]     Prior to hospitilization cognitive status: Alert/Oriented     Current cognitive status: Alert/Oriented     Home Accessibility stairs to enter home;stairs within home     Number of Stairs, Within Home, Primary none     Number of Stairs, Main Entrance three     Equipment Currently Used at Home cane, straight;walker, rolling     Patient currently being followed by outpatient case management? No     Do you currently have service(s) that help you manage your care at home? No     Do you take prescription medications? Yes     Do you have prescription coverage? Yes     Coverage [employer] Managed Medicare     Do you have any problems affording any of your prescribed medications? No     Is the patient taking  medications as prescribed? yes     Who is going to help you get home at discharge? Cindy Maldonado     How do you get to doctors appointments? car, drives self     Are you on dialysis? No     Do you take coumadin? No     Discharge Plan A Home     Discharge Plan B Home     DME Needed Upon Discharge  none     Discharge Plan discussed with: --   Cindy Maldonado    Discharge Barriers Identified None               SW spoke with pts Cindy maldonado. Pt lives at home alone, not current with hh and uses a rw if needed and a cane. The plan is for pt to dc back home when medically ready, I.M. obtained. SW will cont to follow for dc needs.

## 2022-07-13 NOTE — H&P
[facility] - Orthopedic  Gastroenterology  H&P    Patient Name: Stephane Mathis  MRN: [id number]  Admission Date: 7/12/2022  Code Status: Full Code    Attending Provider: Juan Mahajan MD  Primary Care Physician: Sierra Dickerson MD  Principal Problem:Acute GI bleeding    Subjective:     History of Present Illness: Pt has iron deficiency anemia. His last colonoscopy was 2014.    Past Medical History:   Diagnosis Date    Acute superficial gastritis without hemorrhage 7/12/2022    Alcoholism     Anemia     Hypertension     Iron deficiency anemia due to chronic blood loss 7/12/2022    Seizures     Stroke     right sided weakness       Past Surgical History:   Procedure Laterality Date    SCROTAL SURGERY      in february       Review of patient's allergies indicates:   Allergen Reactions    Codeine Itching     Family History     Family history is unknown by patient.        Tobacco Use    Smoking status: Current Every Day Smoker     Packs/day: 1.00     Years: 50.00     Pack years: 50.00     Types: Cigarettes    Smokeless tobacco: Never Used   Substance and Sexual Activity    Alcohol use: Not Currently     Comment: half pint of liquor a day. but reports last drink was a month ago.    Drug use: Yes     Types: Marijuana     Comment: daily    Sexual activity: Not on file     Review of Systems   Respiratory: Negative.    Cardiovascular: Negative.    Gastrointestinal: Negative.      Objective:     Vital Signs (Most Recent):  Temp: 98.2 °F (36.8 °C) (07/13/22 0720)  Pulse: 64 (07/13/22 0858)  Resp: 17 (07/13/22 0858)  BP: (!) 159/76 (07/13/22 0858)  SpO2: 99 % (07/13/22 0858) Vital Signs (24h Range):  Temp:  [97 °F (36.1 °C)-98.7 °F (37.1 °C)] 98.2 °F (36.8 °C)  Pulse:  [53-68] 64  Resp:  [9-20] 17  SpO2:  [96 %-100 %] 99 %  BP: (126-162)/(51-91) 159/76     Weight: 74.9 kg (165 lb 2 oz) (07/13/22 0532)  Body mass index is 23.03 kg/m².      Intake/Output Summary (Last 24 hours) at 7/13/2022  0918  Last data filed at 7/13/2022 0000  Gross per 24 hour   Intake 4765.42 ml   Output 2075 ml   Net 2690.42 ml       Lines/Drains/Airways     Peripheral Intravenous Line  Duration                Peripheral IV - Single Lumen 07/11/22 2245 20 G Posterior;Right Wrist 1 day                Physical Exam  Vitals reviewed.   Constitutional:       General: He is not in acute distress.     Appearance: Normal appearance. He is well-developed. He is not ill-appearing.   HENT:      Head: Normocephalic and atraumatic.      Nose: Nose normal.   Eyes:      Pupils: Pupils are equal, round, and reactive to light.   Cardiovascular:      Rate and Rhythm: Normal rate and regular rhythm.   Pulmonary:      Effort: Pulmonary effort is normal.      Breath sounds: Normal breath sounds. No wheezing.   Abdominal:      General: Abdomen is flat. Bowel sounds are normal. There is no distension.      Palpations: Abdomen is soft.      Tenderness: There is no abdominal tenderness. There is no guarding.   Skin:     General: Skin is warm and dry.      Coloration: Skin is not jaundiced.   Neurological:      Mental Status: He is alert.   Psychiatric:         Attention and Perception: Attention normal.         Mood and Affect: Affect normal.         Speech: Speech normal.         Behavior: Behavior is cooperative.      Comments: Pt was calm while speaking.         Significant Labs:  CBC:   Recent Labs   Lab 07/11/22  1604 07/11/22  2245 07/12/22  0404 07/12/22  1455 07/12/22  2349   WBC 7.90 7.07  --   --   --    HGB 5.0* 4.9* 6.1*  6.2* 9.1* 10.1*   HCT 16.0* 15.9* 18.8*  19.0* 28.7* 29.8*    342  --   --   --      CMP:   Recent Labs   Lab 07/11/22  2245 07/12/22  0404 07/12/22  2349   *   < > 68*   CALCIUM 8.4*   < > 8.0*   ALBUMIN 2.9*  --   --    PROT 6.9  --   --       < > 142   K 3.6   < > 3.9   CO2 27   < > 27      < > 110*   BUN 12   < > 8   CREATININE 1.14   < > 0.81   ALKPHOS 69  --   --    ALT 11*  --   --    AST  13*  --   --    BILITOT 0.1  --   --     < > = values in this interval not displayed.       Significant Imaging:  Imaging results within the past 24 hours have been reviewed.    Assessment/Plan:     Active Diagnoses:    Diagnosis Date Noted POA    PRINCIPAL PROBLEM:  Acute GI bleeding [K92.2] 07/12/2022 Yes    HTN (hypertension) [I10] 07/12/2022 Yes     Chronic    Acute blood loss anemia [D62] 07/12/2022 Yes    Alcohol dependence in early full remission [F10.21] 07/12/2022 Yes    Continuous dependence on cigarette smoking [F17.210] 07/12/2022 Yes     Chronic    Acute superficial gastritis without hemorrhage [K29.00] 07/12/2022 Yes    Iron deficiency anemia due to chronic blood loss [D50.0] 07/12/2022 Yes    Seizure [R56.9] 06/10/2022 Yes     Chronic      Problems Resolved During this Admission:       Imp: iron deficiency anemia  Plan: colonscopy    Juan Mahajan MD  Gastroenterology  [facility] - Orthopedic

## 2022-07-14 ENCOUNTER — TELEPHONE (OUTPATIENT)
Dept: GASTROENTEROLOGY | Facility: CLINIC | Age: 77
End: 2022-07-14
Payer: MEDICARE

## 2022-07-14 VITALS
HEART RATE: 85 BPM | RESPIRATION RATE: 18 BRPM | SYSTOLIC BLOOD PRESSURE: 158 MMHG | TEMPERATURE: 98 F | DIASTOLIC BLOOD PRESSURE: 76 MMHG | HEIGHT: 71 IN | OXYGEN SATURATION: 99 % | WEIGHT: 158.19 LBS | BODY MASS INDEX: 22.15 KG/M2

## 2022-07-14 LAB
ESTROGEN SERPL-MCNC: NORMAL PG/ML
GLUCOSE SERPL-MCNC: 122 MG/DL (ref 70–105)
HCT VFR BLD AUTO: 29.7 % (ref 40–54)
HGB BLD-MCNC: 9.8 G/DL (ref 13.5–18)
INSULIN SERPL-ACNC: NORMAL U[IU]/ML
LAB AP GROSS DESCRIPTION: NORMAL
LAB AP LABORATORY NOTES: NORMAL
T3RU NFR SERPL: NORMAL %

## 2022-07-14 PROCEDURE — 1111F PR DISCHARGE MEDS RECONCILED W/ CURRENT OUTPATIENT MED LIST: ICD-10-PCS | Mod: CPTII,,, | Performed by: HOSPITALIST

## 2022-07-14 PROCEDURE — 99239 PR HOSPITAL DISCHARGE DAY,>30 MIN: ICD-10-PCS | Mod: ,,, | Performed by: HOSPITALIST

## 2022-07-14 PROCEDURE — 94761 N-INVAS EAR/PLS OXIMETRY MLT: CPT

## 2022-07-14 PROCEDURE — 99239 HOSP IP/OBS DSCHRG MGMT >30: CPT | Mod: ,,, | Performed by: HOSPITALIST

## 2022-07-14 PROCEDURE — 85018 HEMOGLOBIN: CPT | Performed by: HOSPITALIST

## 2022-07-14 PROCEDURE — 25000003 PHARM REV CODE 250: Performed by: HOSPITALIST

## 2022-07-14 PROCEDURE — 85014 HEMATOCRIT: CPT | Performed by: HOSPITALIST

## 2022-07-14 PROCEDURE — 1111F DSCHRG MED/CURRENT MED MERGE: CPT | Mod: CPTII,,, | Performed by: HOSPITALIST

## 2022-07-14 PROCEDURE — 25000003 PHARM REV CODE 250: Performed by: INTERNAL MEDICINE

## 2022-07-14 PROCEDURE — 36415 COLL VENOUS BLD VENIPUNCTURE: CPT | Performed by: HOSPITALIST

## 2022-07-14 RX ORDER — LISINOPRIL 10 MG/1
10 TABLET ORAL DAILY
Status: DISCONTINUED | OUTPATIENT
Start: 2022-07-15 | End: 2022-07-14 | Stop reason: HOSPADM

## 2022-07-14 RX ORDER — LISINOPRIL AND HYDROCHLOROTHIAZIDE 10; 12.5 MG/1; MG/1
1 TABLET ORAL DAILY
Qty: 30 TABLET | Refills: 3 | Status: SHIPPED | OUTPATIENT
Start: 2022-07-14 | End: 2022-11-09 | Stop reason: SDUPTHER

## 2022-07-14 RX ORDER — FERROUS SULFATE 325(65) MG
325 TABLET, DELAYED RELEASE (ENTERIC COATED) ORAL DAILY
Qty: 30 TABLET | Refills: 3 | Status: SHIPPED | OUTPATIENT
Start: 2022-07-14 | End: 2023-10-04 | Stop reason: SDUPTHER

## 2022-07-14 RX ORDER — PANTOPRAZOLE SODIUM 40 MG/1
40 TABLET, DELAYED RELEASE ORAL DAILY
Qty: 30 TABLET | Refills: 3 | Status: SHIPPED | OUTPATIENT
Start: 2022-07-14 | End: 2022-11-09 | Stop reason: SDUPTHER

## 2022-07-14 RX ORDER — HYDROCHLOROTHIAZIDE 25 MG/1
25 TABLET ORAL DAILY
Status: DISCONTINUED | OUTPATIENT
Start: 2022-07-14 | End: 2022-07-14 | Stop reason: HOSPADM

## 2022-07-14 RX ADMIN — FOLIC ACID 1 MG: 1 TABLET ORAL at 09:07

## 2022-07-14 RX ADMIN — MUPIROCIN: 20 OINTMENT TOPICAL at 09:07

## 2022-07-14 RX ADMIN — LEVETIRACETAM 1000 MG: 500 TABLET, FILM COATED ORAL at 09:07

## 2022-07-14 RX ADMIN — THERA TABS 1 TABLET: TAB at 09:07

## 2022-07-14 RX ADMIN — HYDROCHLOROTHIAZIDE 25 MG: 25 TABLET ORAL at 09:07

## 2022-07-14 RX ADMIN — DOCUSATE SODIUM 100 MG: 100 CAPSULE, LIQUID FILLED ORAL at 09:07

## 2022-07-14 RX ADMIN — PANTOPRAZOLE SODIUM 40 MG: 40 TABLET, DELAYED RELEASE ORAL at 09:07

## 2022-07-14 RX ADMIN — FERROUS SULFATE TAB 325 MG (65 MG ELEMENTAL FE) 1 EACH: 325 (65 FE) TAB at 09:07

## 2022-07-14 NOTE — ASSESSMENT & PLAN NOTE
-H/H 5/16, FOBT positive. 2 units PRBC at [facility].  -Hg and Hct q6hrs pending  -1 unit of PRBC ordered for transfusion (so looks to had 3 units PRBCs total)  -GI consulted. Assistance appreciated.   -GI evaluation as above

## 2022-07-14 NOTE — TELEPHONE ENCOUNTER
Called patient to discuss results and verbalized understanding.      ----- Message from Juan Mahajan MD sent at 7/13/2022  3:38 PM CDT -----  EGD biopsy shows gastritis without H.pylori.

## 2022-07-14 NOTE — PLAN OF CARE
[facility] - Orthopedic  Discharge Final Note    Primary Care Provider: Sierra Dickerson MD    Expected Discharge Date: 7/14/2022    Final Discharge Note (most recent)     Final Note - 07/14/22 1217        Final Note    Assessment Type Final Discharge Note     Anticipated Discharge Disposition Home or Self Care     What phone number can be called within the next 1-3 days to see how you are doing after discharge? [phone]        Post-Acute Status    Discharge Delays None known at this time                 Important Message from Medicare  Important Message from Medicare regarding Discharge Appeal Rights: Explained to patient/caregiver     Date IMM was signed: 07/13/22  Time IMM was signed: 1532    Contact Info     Sierra Dickerson MD   Specialty: Family Medicine   Relationship: PCP - General    [address]  [facility] [place] [address]   Phone: [phone]       Next Steps: Schedule an appointment as soon as possible for a visit in 1 week(s)    Instructions: Recheck H&H on follow-up on July 21 at 8:30 for lab work and keep follow up appt, on July 26 at 8:40    Manpreet Bradley MD   Specialty: Hematology and Oncology    [facility]  [address]  [facility] [address]   Phone: [phone]       Next Steps: Schedule an appointment as soon as possible for a visit in 1 month(s)    Instructions: Dr. Roland is going to call patient with appointment        Pt discharged home today, no needs.

## 2022-07-14 NOTE — SUBJECTIVE & OBJECTIVE
Past Medical History:   Diagnosis Date    Acute superficial gastritis without hemorrhage 7/12/2022    Alcoholism     Anemia     AVM (arteriovenous malformation) of colon 7/13/2022    Colon, diverticulosis 7/13/2022    Hypertension     Iron deficiency anemia due to chronic blood loss 7/12/2022    Polyp, sigmoid colon 7/13/2022    Seizures     Stroke     right sided weakness       Past Surgical History:   Procedure Laterality Date    SCROTAL SURGERY      in february       Review of patient's allergies indicates:   Allergen Reactions    Codeine Itching       No current facility-administered medications on file prior to encounter.     Current Outpatient Medications on File Prior to Encounter   Medication Sig    atorvastatin (LIPITOR) 40 MG tablet Take 40 mg by mouth once daily.    levETIRAcetam (KEPPRA) 1000 MG tablet Take 1,000 mg by mouth 2 (two) times daily.    naltrexone HCl (NALTREXONE ORAL) Take 50 mg by mouth Daily. For 12 weeks    amLODIPine (NORVASC) 10 MG tablet Take 1 tablet (10 mg total) by mouth once daily.    aspirin (ECOTRIN) 81 MG EC tablet Take 81 mg by mouth once daily.    cetirizine (ZYRTEC) 10 MG tablet Take 1 tablet (10 mg total) by mouth daily as needed for Allergies.     Family History       Family history is unknown by patient.          Tobacco Use    Smoking status: Current Every Day Smoker     Packs/day: 1.00     Years: 50.00     Pack years: 50.00     Types: Cigarettes    Smokeless tobacco: Never Used   Substance and Sexual Activity    Alcohol use: Not Currently     Comment: half pint of liquor a day. but reports last drink was a month ago.    Drug use: Yes     Types: Marijuana     Comment: daily    Sexual activity: Not on file     Review of Systems   Constitutional:  Positive for fatigue.   HENT:  Negative for hearing loss and trouble swallowing.    Eyes:  Negative for visual disturbance.   Respiratory:  Negative for chest tightness and shortness of breath.     Cardiovascular:  Negative for chest pain, palpitations and leg swelling.   Gastrointestinal:  Positive for blood in stool. Negative for abdominal pain, diarrhea, nausea and vomiting.   Genitourinary:  Negative for difficulty urinating and hematuria.   Musculoskeletal:  Negative for myalgias.   Skin:  Negative for rash and wound.   Neurological:  Positive for dizziness, weakness (generalized) and light-headedness. Negative for seizures and syncope.   Psychiatric/Behavioral:  The patient is not nervous/anxious.    Objective:     Vital Signs (Most Recent):  Temp: 98.2 °F (36.8 °C) (07/13/22 1600)  Pulse: 64 (07/13/22 1600)  Resp: 18 (07/13/22 1600)  BP: (!) 135/54 (07/13/22 1600)  SpO2: 96 % (07/13/22 1600)   Vital Signs (24h Range):  Temp:  [97.9 °F (36.6 °C)-98.7 °F (37.1 °C)] 98.2 °F (36.8 °C)  Pulse:  [58-70] 64  Resp:  [11-20] 18  SpO2:  [96 %-100 %] 96 %  BP: (118-163)/(52-91) 135/54     Weight: 74.9 kg (165 lb 2 oz)  Body mass index is 23.03 kg/m².    Physical Exam  Vitals reviewed.   Constitutional:       General: He is not in acute distress.     Appearance: Normal appearance. He is normal weight. He is not ill-appearing or toxic-appearing.   HENT:      Head: Normocephalic and atraumatic.      Right Ear: External ear normal.      Left Ear: External ear normal.      Nose: Nose normal.      Mouth/Throat:      Mouth: Mucous membranes are moist.      Pharynx: Oropharynx is clear.   Eyes:      General: No scleral icterus.     Extraocular Movements: Extraocular movements intact.      Conjunctiva/sclera: Conjunctivae normal.      Pupils: Pupils are equal, round, and reactive to light.   Cardiovascular:      Rate and Rhythm: Normal rate and regular rhythm.      Pulses: Normal pulses.      Heart sounds: Normal heart sounds. No murmur heard.  Pulmonary:      Effort: Pulmonary effort is normal. No respiratory distress.      Breath sounds: Normal breath sounds. No wheezing, rhonchi or rales.   Abdominal:      General:  Abdomen is flat. Bowel sounds are normal. There is no distension.      Palpations: Abdomen is soft.      Tenderness: There is no abdominal tenderness. There is no guarding or rebound.   Genitourinary:     Rectum: Guaiac result positive.   Musculoskeletal:         General: No swelling, tenderness or signs of injury. Normal range of motion.      Cervical back: Normal range of motion and neck supple. No rigidity.      Right lower leg: No edema.      Left lower leg: No edema.   Skin:     General: Skin is warm and dry.      Capillary Refill: Capillary refill takes less than 2 seconds.      Coloration: Skin is not pale.      Findings: No rash.   Neurological:      General: No focal deficit present.      Mental Status: He is alert and oriented to person, place, and time. Mental status is at baseline.      Motor: Weakness: residual right-sided weakness reported from previous stroke..   Psychiatric:         Mood and Affect: Mood normal.         Behavior: Behavior normal.         Thought Content: Thought content normal.         Judgment: Judgment normal.         CRANIAL NERVES     CN III, IV, VI   Pupils are equal, round, and reactive to light.     Significant Labs: All pertinent labs within the past 24 hours have been reviewed.  Recent Lab Results         07/13/22  1137   07/12/22  2349        Anion Gap   9       BUN   8       BUN/CREAT RATIO   10       Calcium   8.0       Chloride   110       CO2   27       Creatinine   0.81       eGFR if non African American   99       Ferritin   19       Glucose   68       Hematocrit 33.1   29.8       Hemoglobin 10.8   10.1       Iron   24       Iron Saturation   8       Potassium   3.9       Sodium   142       TIBC   309               Significant Imaging: I have reviewed all pertinent imaging results/findings within the past 24 hours.      Intake/Output - Last 3 Shifts         07/12 0700 07/13 0659 07/13 0700 07/14 0659    P.O. 3240     I.V. (mL/kg) 600 (8) 1445 (19.3)    Blood 825.4      IV Piggyback 100 100    Total Intake(mL/kg) 4765.4 (63.6) 1545 (20.6)    Urine (mL/kg/hr) 2075 (1.2) 1225 (1.1)    Total Output 2075 1225    Net +2690.4 +320          Stool Occurrence 7 x

## 2022-07-14 NOTE — DISCHARGE SUMMARY
[facility] Medicine  Discharge Summary      Patient Name: Stephane Mathis  MRN: [id number]  Patient Class: IP- Inpatient  Admission Date: 7/12/2022  Hospital Length of Stay: 2 days  Discharge Date and Time:  07/14/2022 11:35 AM  Attending Physician: Jurgen Hart MD   Discharging Provider: Jurgen Hart MD  Primary Care Provider: Sierra Dickerson MD      HPI:   76 y.o. male with a PMHx of HTN, anemia, seizures, stroke (right-sided residual weakness), and alcohol abuse presented to the [facility] ED for dizziness today. Pt reports his primary care doctor needed some labs and on the labs his blood level was low so Dr. Penelope Pak told him to go to the ED. Pt reports he has felt dizzy for the past several days described as light-headedness and feeling faint. Pt reports he also has generalized weakness and fatigue. Per patient he did see that his stools were dark today. Pt reports similar symptoms previously of anemia but is unsure what was done. Pt denies any hematemesis, hemoptysis, fever, N/V/D, syncope, SOB, chest pain, or any other complaints at this time. Of note pt reports he was discharged from [facility] in [place] a week ago and was admitted because he had speech difficulties so there was a concern for stroke vs. Seizures.     In the ED at [facility]/ 5/16. FOBT positive. Pt type and screened and treated with 2 units of PRBC. Pt will be admitted to the hospital service for administration of PRBC transfusion and further evaluation by GI.       * No surgery found *      Hospital Course:   07/12 - Patient had recent evaluation in [place] where there was concern over stroke versus seizure.  Then he was evaluated for right upper extremity weakness that since resolved.  Told to have follow-up laboratory results following that admit.  Was called and told was extremely anemic and to come to emergency room.  Patient states he has had previous evaluations for anemia  without etiologies found.  Reports having some dark stools in the recent past but no other obvious bleeding.  States he has been told he should be on iron therapy but has not been reliable.  Has history of fairly heavy alcohol use.  States he drinks beer several days during the week but drinks cognac on week in.  Smokes cigarettes 1 pack per day and strongly encouraged to stop.  History of hypertension   Previous evaluation from February this year showed B12 level not low and serum protein electrophoresis without monoclonal gammopathy.  Iron studies were borderline with ferritin of 22.  Iron studies in 2021 were more consistent with iron deficiency.  Will update studies.  If not reliable with taking iron may need IV iron.     Colonoscopy planned by GI tomorrow.    EGD Procedure Date 7/12/22   Impression  Overall Impression: Mucosa of the esophagus was normal without varices. The stomach had gastritis without bleeding or ulcer, biopsies were obtained. The pyloric channel was patent. Mucosa of the duodenum was normal without ulcer or bleeding.   Recommendation  Await pathology results; avoid nsaids; ppi 1/day; prep for colonoscopy tomorrow.       07/13 - colonoscopy done with diverticulosis but also AVMs that were treated.  Iron studies did show iron deficiency anemia.  Patient has been taking oral iron but states he is not totally compliant.  Complains of some GI side effects with iron orally.  Patient still has some fatigue.  Will advance diet.  Offered to have him take intravenous iron before going home.  He did wish to get the intravenous iron.  His family ride would be back tomorrow and if doing well will discharge in the morning after receiving intravenous iron today    07/14 - patient without new complaints.  No issues in receiving the intravenous iron and patient is anxious to go home.  Daughter is here with questions.  Apparently patient was sent to see hematologist in [place] several years ago for anemia and  was told needed to continue with his iron therapy.  No further testing was done at that time and never has received intravenous iron before.  In discussing with patient will make outpatient referral to Dr. Bradley.  With AVMs present likely will have chronic bleeding and will need to be on chronic iron replacement.  Since patient is not able to take oral iron because GI side effects will likely need intermittent iron infusions.  Will defer this to Dr. Bradley.  Will have him see Dr. Pak in 1 week and have his H&H recheck.  Discussed the importance in this process in staying off alcohol and tobacco.  Also talked about importance of controlling his blood pressure.  He states his blood pressure had been recently low but I told him it is likely related to his anemia.  Will add some additional medication adjustment and have him follow-up with Dr. Pak to further monitor and address.  Discharge home.                 Goals of Care Treatment Preferences:  Code Status: Full Code      Consults:   Consults (From admission, onward)        Status Ordering Provider     Inpatient consult to Gastroenterology  Once        Provider:  (Not yet assigned)    Completed NAVI, MIN S          * Acute GI bleeding  -H/H 5/16, FOBT positive. 2 units PRBC at [facility].  -Hg and Hct q6hrs pending  -1 unit of PRBC ordered for transfusion (so looks to had 3 units PRBCs total)  -GI consulted. Assistance appreciated.   -GI evaluation as above    AVM (arteriovenous malformation) of colon  Treated at time of colonoscopy  Told patient to avoid any blood thinners even being careful with aspirin  Told him likely will need iron replacement lifelong    Polyp, sigmoid colon        Colon, diverticulosis  Dietary information given to patient  Needs increased fiber in diet      Iron deficiency anemia due to chronic blood loss  With patient's intolerance  and compliance to oral iron go ahead with IV iron      Acute superficial gastritis without  hemorrhage  Remain on PPI    Continuous dependence on cigarette smoking  -reports 1 PPD for 50 plus years  -smoking cessation counseling provided.  -nicotine patch during hospital stay    Acute blood loss anemia  As above stated looks to have received 3 units PRBCs total ( 2 at [facility] in 1 here)  To follow-up with Dr. Bradley outpatient for continue monitoring and likely IV iron since he does not tolerate well orally    HTN (hypertension)  -hold home amlodipine 10 mg tablet     Seizure  -Continue home levetiracetam 1000 mg PO BID      Final Active Diagnoses:    Diagnosis Date Noted POA    PRINCIPAL PROBLEM:  Acute GI bleeding [K92.2] 07/12/2022 Yes    Colon, diverticulosis [K57.30] 07/13/2022 Yes    Polyp, sigmoid colon [K63.5] 07/13/2022 Yes    AVM (arteriovenous malformation) of colon [K55.20] 07/13/2022 Yes    HTN (hypertension) [I10] 07/12/2022 Yes     Chronic    Acute blood loss anemia [D62] 07/12/2022 Yes    Alcohol dependence in early full remission [F10.21] 07/12/2022 Yes    Continuous dependence on cigarette smoking [F17.210] 07/12/2022 Yes     Chronic    Acute superficial gastritis without hemorrhage [K29.00] 07/12/2022 Yes    Iron deficiency anemia due to chronic blood loss [D50.0] 07/12/2022 Yes    Seizure [R56.9] 06/10/2022 Yes     Chronic      Problems Resolved During this Admission:       Discharged Condition: good    Disposition: Home or Self Care    Follow Up:   Follow-up Information     Sierra Dickerson MD. Schedule an appointment as soon as possible for a visit in 1 week(s).    Specialty: Family Medicine  Why: Recheck H&H on follow-up on July 21 at 8:30 for lab work and keep follow up appt, on July 26 at 8:40  Contact information:  [address]  [facility] [place] [address]  [phone]             Manpreet Bradley MD. Schedule an appointment as soon as possible for a visit in 1 month(s).    Specialty: Hematology and Oncology  Why: Dr. Roland is  going to call patient with appointment  Contact information:  [address]  [place] [id number] [phone]                       Patient Instructions:      Ambulatory referral/consult to Hematology / Oncology   Standing Status: Future   Referral Priority: Routine Referral Type: Consultation   Referral Reason: Specialty Services Required   Referred to Provider: ALEJO RODRIGUEZ Requested Specialty: Hematology and Oncology     Diet Adult Regular     Activity as tolerated       Significant Diagnostic Studies: Labs:   BMP:   Recent Labs   Lab 07/12/22 2349   GLU 68*      K 3.9   *   CO2 27   BUN 8   CREATININE 0.81   CALCIUM 8.0*   , CMP   Recent Labs   Lab 07/12/22 2349      K 3.9   *   CO2 27   GLU 68*   BUN 8   CREATININE 0.81   CALCIUM 8.0*   ANIONGAP 9   EGFRNONAA 99    and CBC   Recent Labs   Lab 07/12/22 2349 07/13/22  1137 07/14/22 0427   HGB 10.1* 10.8* 9.8*   HCT 29.8* 33.1* 29.7*       Pending Diagnostic Studies:     Procedure Component Value Units Date/Time    EXTRA TUBES [[id number]] Collected: 07/14/22 0428    Order Status: Sent Lab Status: In process Updated: 07/14/22 0530    Specimen: Blood, Venous     Narrative:      The following orders were created for panel order EXTRA TUBES.  Procedure                               Abnormality         Status                     ---------                               -----------         ------                     Light Green Top Hold[[id number]]                             In process                   Please view results for these tests on the individual orders.    EXTRA TUBES [[id number]] Collected: 07/12/22 1457    Order Status: Sent Lab Status: In process Updated: 07/12/22 1457    Specimen: Blood, Venous     Narrative:      The following orders were created for panel order EXTRA TUBES.  Procedure                               Abnormality         Status                     ---------                               -----------          ------                     Light Green Top Hold[[id number]]                             In process                   Please view results for these tests on the individual orders.         Medications:  Reconciled Home Medications:      Medication List      START taking these medications    ferrous sulfate 325 (65 FE) MG EC tablet  Take 1 tablet (325 mg total) by mouth once daily.     lisinopriL-hydrochlorothiazide 10-12.5 mg per tablet  Commonly known as: PRINZIDE,ZESTORETIC  Take 1 tablet by mouth once daily.     pantoprazole 40 MG tablet  Commonly known as: PROTONIX  Take 1 tablet (40 mg total) by mouth once daily.        CONTINUE taking these medications    amLODIPine 10 MG tablet  Commonly known as: NORVASC  Take 1 tablet (10 mg total) by mouth once daily.     aspirin 81 MG EC tablet  Commonly known as: ECOTRIN  Take 81 mg by mouth once daily.     atorvastatin 40 MG tablet  Commonly known as: LIPITOR  Take 40 mg by mouth once daily.     cetirizine 10 MG tablet  Commonly known as: ZYRTEC  Take 1 tablet (10 mg total) by mouth daily as needed for Allergies.     levETIRAcetam 1000 MG tablet  Commonly known as: KEPPRA  Take 1,000 mg by mouth 2 (two) times daily.     NALTREXONE ORAL  Take 50 mg by mouth Daily. For 12 weeks            Indwelling Lines/Drains at time of discharge:   Lines/Drains/Airways     None                 Time spent on the discharge of patient: more 30   minutes         Jurgen Hart MD  Department of Hospital Medicine  [facility]

## 2022-07-14 NOTE — ASSESSMENT & PLAN NOTE
Treated at time of colonoscopy  Told patient to avoid any blood thinners even being careful with aspirin  Told him likely will need iron replacement lifelong

## 2022-07-14 NOTE — HOSPITAL COURSE
07/12 - Patient had recent evaluation in [place] where there was concern over stroke versus seizure.  Then he was evaluated for right upper extremity weakness that since resolved.  Told to have follow-up laboratory results following that admit.  Was called and told was extremely anemic and to come to emergency room.  Patient states he has had previous evaluations for anemia without etiologies found.  Reports having some dark stools in the recent past but no other obvious bleeding.  States he has been told he should be on iron therapy but has not been reliable.  Has history of fairly heavy alcohol use.  States he drinks beer several days during the week but drinks cognac on week in.  Smokes cigarettes 1 pack per day and strongly encouraged to stop.  History of hypertension   Previous evaluation from February this year showed B12 level not low and serum protein electrophoresis without monoclonal gammopathy.  Iron studies were borderline with ferritin of 22.  Iron studies in 2021 were more consistent with iron deficiency.  Will update studies.  If not reliable with taking iron may need IV iron.     Colonoscopy planned by GI tomorrow.    EGD Procedure Date 7/12/22   Impression  Overall Impression: Mucosa of the esophagus was normal without varices. The stomach had gastritis without bleeding or ulcer, biopsies were obtained. The pyloric channel was patent. Mucosa of the duodenum was normal without ulcer or bleeding.   Recommendation  Await pathology results; avoid nsaids; ppi 1/day; prep for colonoscopy tomorrow.       07/13 - colonoscopy done with diverticulosis but also AVMs that were treated.  Iron studies did show iron deficiency anemia.  Patient has been taking oral iron but states he is not totally compliant.  Complains of some GI side effects with iron orally.  Patient still has some fatigue.  Will advance diet.  Offered to have him take intravenous iron before going home.  He did wish to get the intravenous  iron.  His family ride would be back tomorrow and if doing well will discharge in the morning after receiving intravenous iron today    07/14 - patient without new complaints.  No issues in receiving the intravenous iron and patient is anxious to go home.  Daughter is here with questions.  Apparently patient was sent to see hematologist in [place] several years ago for anemia and was told needed to continue with his iron therapy.  No further testing was done at that time and never has received intravenous iron before.  In discussing with patient will make outpatient referral to Dr. Bradley.  With AVMs present likely will have chronic bleeding and will need to be on chronic iron replacement.  Since patient is not able to take oral iron because GI side effects will likely need intermittent iron infusions.  Will defer this to Dr. Bradley.  Will have him see Dr. Pak in 1 week and have his H&H recheck.  Discussed the importance in this process in staying off alcohol and tobacco.  Also talked about importance of controlling his blood pressure.  He states his blood pressure had been recently low but I told him it is likely related to his anemia.  Will add some additional medication adjustment and have him follow-up with Dr. Pak to further monitor and address.  Discharge home.

## 2022-07-14 NOTE — PROGRESS NOTES
[facility] Medicine  Progress Note    Patient Name: Stephane Mathis  MRN: [id number]  Patient Class: IP- Inpatient   Admission Date: 7/12/2022  Length of Stay: 1 days  Attending Physician: Jurgen Hart MD  Primary Care Provider: Sierra Dickerson MD        Subjective:     Principal Problem:Acute GI bleeding        HPI:  76 y.o. male with a PMHx of HTN, anemia, seizures, stroke (right-sided residual weakness), and alcohol abuse presented to the [facility] ED for dizziness today. Pt reports his primary care doctor needed some labs and on the labs his blood level was low so Dr. Penelope Pak told him to go to the ED. Pt reports he has felt dizzy for the past several days described as light-headedness and feeling faint. Pt reports he also has generalized weakness and fatigue. Per patient he did see that his stools were dark today. Pt reports similar symptoms previously of anemia but is unsure what was done. Pt denies any hematemesis, hemoptysis, fever, N/V/D, syncope, SOB, chest pain, or any other complaints at this time. Of note pt reports he was discharged from [facility] in [place] a week ago and was admitted because he had speech difficulties so there was a concern for stroke vs. Seizures.     In the ED at [facility] H/H 5/16. FOBT positive. Pt type and screened and treated with 2 units of PRBC. Pt will be admitted to the hospital service for administration of PRBC transfusion and further evaluation by GI.       Overview/Hospital Course:  07/12 - Records reviewed. Patient had recent evaluation in [place] where there was concern over stroke versus seizure.  Then he was evaluated for right upper extremity weakness that since resolved.  Told to have follow-up laboratory results following that admit.  Was called and told was extremely anemic and to come to emergency room.  Patient states he has had previous evaluations for anemia without etiologies found.  Reports having some  dark stools in the recent past but no other obvious bleeding.  States he has been told he should be on iron therapy but has not been reliable.  Has history of fairly heavy alcohol use.  States he drinks beer several days during the week but drinks cognac on week in.  Smokes cigarettes 1 pack per day and strongly encouraged to stop.  History of hypertension   Previous evaluation from February this year showed B12 level not low and serum protein electrophoresis without monoclonal gammopathy.  Iron studies were borderline with ferritin of 22.  Iron studies in 2021 were more consistent with iron deficiency.  Will update studies.  If not reliable with taking iron may need IV iron.     Colonoscopy planned by GI tomorrow.    07/13 - colonoscopy done with diverticulosis but also AVMs that were treated.  Iron studies did show iron deficiency anemia.  Patient has been taking oral iron but states he is not totally compliant.  Complains of some GI side effects with iron orally.  Patient still has some fatigue.  Will advance diet.  Offered to have him take intravenous iron before going home.  He did wish to get the intravenous iron.  His family ride would be back tomorrow and if doing well will discharge in the morning after receiving intravenous iron today                Past Medical History:   Diagnosis Date    Acute superficial gastritis without hemorrhage 7/12/2022    Alcoholism     Anemia     AVM (arteriovenous malformation) of colon 7/13/2022    Colon, diverticulosis 7/13/2022    Hypertension     Iron deficiency anemia due to chronic blood loss 7/12/2022    Polyp, sigmoid colon 7/13/2022    Seizures     Stroke     right sided weakness       Past Surgical History:   Procedure Laterality Date    SCROTAL SURGERY      in february       Review of patient's allergies indicates:   Allergen Reactions    Codeine Itching       No current facility-administered medications on file prior to encounter.     Current Outpatient  Medications on File Prior to Encounter   Medication Sig    atorvastatin (LIPITOR) 40 MG tablet Take 40 mg by mouth once daily.    levETIRAcetam (KEPPRA) 1000 MG tablet Take 1,000 mg by mouth 2 (two) times daily.    naltrexone HCl (NALTREXONE ORAL) Take 50 mg by mouth Daily. For 12 weeks    amLODIPine (NORVASC) 10 MG tablet Take 1 tablet (10 mg total) by mouth once daily.    aspirin (ECOTRIN) 81 MG EC tablet Take 81 mg by mouth once daily.    cetirizine (ZYRTEC) 10 MG tablet Take 1 tablet (10 mg total) by mouth daily as needed for Allergies.     Family History       Family history is unknown by patient.          Tobacco Use    Smoking status: Current Every Day Smoker     Packs/day: 1.00     Years: 50.00     Pack years: 50.00     Types: Cigarettes    Smokeless tobacco: Never Used   Substance and Sexual Activity    Alcohol use: Not Currently     Comment: half pint of liquor a day. but reports last drink was a month ago.    Drug use: Yes     Types: Marijuana     Comment: daily    Sexual activity: Not on file     Review of Systems   Constitutional:  Positive for fatigue.   HENT:  Negative for hearing loss and trouble swallowing.    Eyes:  Negative for visual disturbance.   Respiratory:  Negative for chest tightness and shortness of breath.    Cardiovascular:  Negative for chest pain, palpitations and leg swelling.   Gastrointestinal:  Positive for blood in stool. Negative for abdominal pain, diarrhea, nausea and vomiting.   Genitourinary:  Negative for difficulty urinating and hematuria.   Musculoskeletal:  Negative for myalgias.   Skin:  Negative for rash and wound.   Neurological:  Positive for dizziness, weakness (generalized) and light-headedness. Negative for seizures and syncope.   Psychiatric/Behavioral:  The patient is not nervous/anxious.    Objective:     Vital Signs (Most Recent):  Temp: 98.2 °F (36.8 °C) (07/13/22 1600)  Pulse: 64 (07/13/22 1600)  Resp: 18 (07/13/22 1600)  BP: (!) 135/54 (07/13/22  1600)  SpO2: 96 % (07/13/22 1600)   Vital Signs (24h Range):  Temp:  [97.9 °F (36.6 °C)-98.7 °F (37.1 °C)] 98.2 °F (36.8 °C)  Pulse:  [58-70] 64  Resp:  [11-20] 18  SpO2:  [96 %-100 %] 96 %  BP: (118-163)/(52-91) 135/54     Weight: 74.9 kg (165 lb 2 oz)  Body mass index is 23.03 kg/m².    Physical Exam  Vitals reviewed.   Constitutional:       General: He is not in acute distress.     Appearance: Normal appearance. He is normal weight. He is not ill-appearing or toxic-appearing.   HENT:      Head: Normocephalic and atraumatic.      Right Ear: External ear normal.      Left Ear: External ear normal.      Nose: Nose normal.      Mouth/Throat:      Mouth: Mucous membranes are moist.      Pharynx: Oropharynx is clear.   Eyes:      General: No scleral icterus.     Extraocular Movements: Extraocular movements intact.      Conjunctiva/sclera: Conjunctivae normal.      Pupils: Pupils are equal, round, and reactive to light.   Cardiovascular:      Rate and Rhythm: Normal rate and regular rhythm.      Pulses: Normal pulses.      Heart sounds: Normal heart sounds. No murmur heard.  Pulmonary:      Effort: Pulmonary effort is normal. No respiratory distress.      Breath sounds: Normal breath sounds. No wheezing, rhonchi or rales.   Abdominal:      General: Abdomen is flat. Bowel sounds are normal. There is no distension.      Palpations: Abdomen is soft.      Tenderness: There is no abdominal tenderness. There is no guarding or rebound.   Genitourinary:     Rectum: Guaiac result positive.   Musculoskeletal:         General: No swelling, tenderness or signs of injury. Normal range of motion.      Cervical back: Normal range of motion and neck supple. No rigidity.      Right lower leg: No edema.      Left lower leg: No edema.   Skin:     General: Skin is warm and dry.      Capillary Refill: Capillary refill takes less than 2 seconds.      Coloration: Skin is not pale.      Findings: No rash.   Neurological:      General: No focal  deficit present.      Mental Status: He is alert and oriented to person, place, and time. Mental status is at baseline.      Motor: Weakness: residual right-sided weakness reported from previous stroke..   Psychiatric:         Mood and Affect: Mood normal.         Behavior: Behavior normal.         Thought Content: Thought content normal.         Judgment: Judgment normal.         CRANIAL NERVES     CN III, IV, VI   Pupils are equal, round, and reactive to light.     Significant Labs: All pertinent labs within the past 24 hours have been reviewed.  Recent Lab Results         07/13/22  1137   07/12/22  2349        Anion Gap   9       BUN   8       BUN/CREAT RATIO   10       Calcium   8.0       Chloride   110       CO2   27       Creatinine   0.81       eGFR if non African American   99       Ferritin   19       Glucose   68       Hematocrit 33.1   29.8       Hemoglobin 10.8   10.1       Iron   24       Iron Saturation   8       Potassium   3.9       Sodium   142       TIBC   309               Significant Imaging: I have reviewed all pertinent imaging results/findings within the past 24 hours.      Intake/Output - Last 3 Shifts         07/12 0700 07/13 0659 07/13 0700 07/14 0659    P.O. 3240     I.V. (mL/kg) 600 (8) 1445 (19.3)    Blood 825.4     IV Piggyback 100 100    Total Intake(mL/kg) 4765.4 (63.6) 1545 (20.6)    Urine (mL/kg/hr) 2075 (1.2) 1225 (1.1)    Total Output 2075 1225    Net +2690.4 +320          Stool Occurrence 7 x                     Assessment/Plan:      * Acute GI bleeding  -H/H 5/16, FOBT positive. 2 units PRBC at [facility].  -Hg and Hct q6hrs pending  -2 units of PRBC ordered for transfusion  -NPO except meds and sips with meds  -GI consulted. Assistance appreciated.     AVM (arteriovenous malformation) of colon  Treated at time of colonoscopy  Told patient to avoid any blood thinners even being careful with aspirin  Told him likely will need iron replacement lifelong    Colon,  diverticulosis        Iron deficiency anemia due to chronic blood loss  With patient's intolerance  and compliance to oral iron go ahead with IV iron      Acute superficial gastritis without hemorrhage        Continuous dependence on cigarette smoking  -reports 1 PPD for 50 plus years  -smoking cessation counseling provided.  -nicotine patch during hospital stay    Alcohol dependence in early full remission  -reports last drink was one month ago  -continue home naltrexone and since it is not on the formulary pt was told to bring his home medication and reports his daughter will bring the medication first thing tomorrow morning.   -thiamine 250 mg X 5 days and folic acid  -lorazepam 2 mg PRN for seizures    Acute blood loss anemia  -2 large bore IV's. IV fluids NS at 150 cc/hr.   -will transfuse 2 units and do serial H/H.   -No iron studies were done since pt already received 2 units of blood prior to arrival.     HTN (hypertension)  -hold home amlodipine 10 mg tablet     Seizure  -Continue home levetiracetam 1000 mg PO BID      VTE Risk Mitigation (From admission, onward)         Ordered     IP VTE HIGH RISK PATIENT  Once         07/12/22 0331     Place sequential compression device  Until discontinued         07/12/22 0331                Discharge Planning   REJI:      Code Status: Full Code   Is the patient medically ready for discharge?:     Reason for patient still in hospital (select all that apply): Patient trending condition, Laboratory test and Treatment  Discharge Plan A: Home                  Jurgen Hart MD  Department of Hospital Medicine   [facility] - Orthopedic

## 2022-07-14 NOTE — ASSESSMENT & PLAN NOTE
Treated at time of colonoscopy  Told patient to avoid any blood thinners even being careful with aspirin  Told him likely will need iron replacement lifelong   Left arm;

## 2022-07-14 NOTE — ASSESSMENT & PLAN NOTE
As above stated looks to have received 3 units PRBCs total ( 2 at [facility] in 1 here)  To follow-up with Dr. Bradley outpatient for continue monitoring and likely IV iron since he does not tolerate well orally

## 2022-07-15 ENCOUNTER — TELEPHONE (OUTPATIENT)
Dept: FAMILY MEDICINE | Facility: CLINIC | Age: 77
End: 2022-07-15

## 2022-07-15 ENCOUNTER — PATIENT MESSAGE (OUTPATIENT)
Dept: FAMILY MEDICINE | Facility: CLINIC | Age: 77
End: 2022-07-15
Payer: MEDICARE

## 2022-07-15 NOTE — TELEPHONE ENCOUNTER
7/15/22-spoke with patient this afternoon. Reports he is doing pretty good. Denies any rectal bleeding or bloody stool. States his bowels have not moved since coming home.. denies any abd pain/weakness/shortness of breath or chest pain. Denies lightheadedness or faint spells. Reviewed all discharge meds. States he has not picked up the 3 new meds. Discussed they were at [facility] pharmacy for him to . William. Informed of all followup appt. William. Instructed to bring all meds to follow up visit and to call office for questions/concerns. Also to seek medical attention for any new or worsening sx. Report any rectal bleeding or bloody stools. William. [email or web address]

## 2022-07-18 ENCOUNTER — TELEPHONE (OUTPATIENT)
Dept: GASTROENTEROLOGY | Facility: CLINIC | Age: 77
End: 2022-07-18
Payer: MEDICARE

## 2022-07-18 NOTE — TELEPHONE ENCOUNTER
Called patient to discuss results and verbalized understanding.      ----- Message from Juan Mahajan MD sent at 7/14/2022  6:04 PM CDT -----  Place pt on list for colonoscopy in 10 years. Polyp was hyperplastic.

## 2022-07-25 ENCOUNTER — OFFICE VISIT (OUTPATIENT)
Dept: NEUROLOGY | Facility: CLINIC | Age: 77
End: 2022-07-25
Payer: MEDICARE

## 2022-07-25 VITALS
HEART RATE: 73 BPM | HEIGHT: 71 IN | OXYGEN SATURATION: 99 % | WEIGHT: 152 LBS | SYSTOLIC BLOOD PRESSURE: 140 MMHG | DIASTOLIC BLOOD PRESSURE: 70 MMHG | BODY MASS INDEX: 21.28 KG/M2

## 2022-07-25 DIAGNOSIS — R56.9 SEIZURE-LIKE ACTIVITY: Primary | ICD-10-CM

## 2022-07-25 PROCEDURE — 1111F PR DISCHARGE MEDS RECONCILED W/ CURRENT OUTPATIENT MED LIST: ICD-10-PCS | Mod: CPTII,,, | Performed by: PSYCHIATRY & NEUROLOGY

## 2022-07-25 PROCEDURE — 1159F PR MEDICATION LIST DOCUMENTED IN MEDICAL RECORD: ICD-10-PCS | Mod: CPTII,,, | Performed by: PSYCHIATRY & NEUROLOGY

## 2022-07-25 PROCEDURE — 1159F MED LIST DOCD IN RCRD: CPT | Mod: CPTII,,, | Performed by: PSYCHIATRY & NEUROLOGY

## 2022-07-25 PROCEDURE — 3066F NEPHROPATHY DOC TX: CPT | Mod: CPTII,,, | Performed by: PSYCHIATRY & NEUROLOGY

## 2022-07-25 PROCEDURE — 3078F DIAST BP <80 MM HG: CPT | Mod: CPTII,,, | Performed by: PSYCHIATRY & NEUROLOGY

## 2022-07-25 PROCEDURE — 3077F PR MOST RECENT SYSTOLIC BLOOD PRESSURE >= 140 MM HG: ICD-10-PCS | Mod: CPTII,,, | Performed by: PSYCHIATRY & NEUROLOGY

## 2022-07-25 PROCEDURE — 1160F PR REVIEW ALL MEDS BY PRESCRIBER/CLIN PHARMACIST DOCUMENTED: ICD-10-PCS | Mod: CPTII,,, | Performed by: PSYCHIATRY & NEUROLOGY

## 2022-07-25 PROCEDURE — 3061F PR NEG MICROALBUMINURIA RESULT DOCUMENTED/REVIEW: ICD-10-PCS | Mod: CPTII,,, | Performed by: PSYCHIATRY & NEUROLOGY

## 2022-07-25 PROCEDURE — 99204 PR OFFICE/OUTPT VISIT, NEW, LEVL IV, 45-59 MIN: ICD-10-PCS | Mod: S$PBB,,, | Performed by: PSYCHIATRY & NEUROLOGY

## 2022-07-25 PROCEDURE — 3078F PR MOST RECENT DIASTOLIC BLOOD PRESSURE < 80 MM HG: ICD-10-PCS | Mod: CPTII,,, | Performed by: PSYCHIATRY & NEUROLOGY

## 2022-07-25 PROCEDURE — 3008F BODY MASS INDEX DOCD: CPT | Mod: CPTII,,, | Performed by: PSYCHIATRY & NEUROLOGY

## 2022-07-25 PROCEDURE — 1160F RVW MEDS BY RX/DR IN RCRD: CPT | Mod: CPTII,,, | Performed by: PSYCHIATRY & NEUROLOGY

## 2022-07-25 PROCEDURE — 3061F NEG MICROALBUMINURIA REV: CPT | Mod: CPTII,,, | Performed by: PSYCHIATRY & NEUROLOGY

## 2022-07-25 PROCEDURE — 1111F DSCHRG MED/CURRENT MED MERGE: CPT | Mod: CPTII,,, | Performed by: PSYCHIATRY & NEUROLOGY

## 2022-07-25 PROCEDURE — 3077F SYST BP >= 140 MM HG: CPT | Mod: CPTII,,, | Performed by: PSYCHIATRY & NEUROLOGY

## 2022-07-25 PROCEDURE — 4010F ACE/ARB THERAPY RXD/TAKEN: CPT | Mod: CPTII,,, | Performed by: PSYCHIATRY & NEUROLOGY

## 2022-07-25 PROCEDURE — 3066F PR DOCUMENTATION OF TREATMENT FOR NEPHROPATHY: ICD-10-PCS | Mod: CPTII,,, | Performed by: PSYCHIATRY & NEUROLOGY

## 2022-07-25 PROCEDURE — 99214 OFFICE O/P EST MOD 30 MIN: CPT | Mod: PBBFAC | Performed by: PSYCHIATRY & NEUROLOGY

## 2022-07-25 PROCEDURE — 3008F PR BODY MASS INDEX (BMI) DOCUMENTED: ICD-10-PCS | Mod: CPTII,,, | Performed by: PSYCHIATRY & NEUROLOGY

## 2022-07-25 PROCEDURE — 4010F PR ACE/ARB THEARPY RXD/TAKEN: ICD-10-PCS | Mod: CPTII,,, | Performed by: PSYCHIATRY & NEUROLOGY

## 2022-07-25 PROCEDURE — 99204 OFFICE O/P NEW MOD 45 MIN: CPT | Mod: S$PBB,,, | Performed by: PSYCHIATRY & NEUROLOGY

## 2022-07-25 NOTE — PATIENT INSTRUCTIONS
Stop alcohol in excess  Stop tobacco smoking if possible   Caution w/ marijuana   OK to taper off Keppra as not needed  Caution with driving while impaired   F/u GI re: GI bleeding  F/u prn

## 2022-07-25 NOTE — PROGRESS NOTES
Subjective:       Patient ID: Stephane Mathis is a 76 y.o. male     Chief Complaint:    Chief Complaint   Patient presents with    Referral     RF Dr. Gonzalez for Seizer activity        Allergies:  Codeine    Current Medications:    Outpatient Encounter Medications as of 7/25/2022   Medication Sig Dispense Refill    amLODIPine (NORVASC) 10 MG tablet Take 1 tablet (10 mg total) by mouth once daily. 90 tablet 1    aspirin (ECOTRIN) 81 MG EC tablet Take 81 mg by mouth once daily.      atorvastatin (LIPITOR) 40 MG tablet Take 40 mg by mouth once daily.      cetirizine (ZYRTEC) 10 MG tablet Take 1 tablet (10 mg total) by mouth daily as needed for Allergies. 90 tablet 1    ferrous sulfate 325 (65 FE) MG EC tablet Take 1 tablet (325 mg total) by mouth once daily. 30 tablet 3    levETIRAcetam (KEPPRA) 1000 MG tablet Take 1,000 mg by mouth 2 (two) times daily.      lisinopriL-hydrochlorothiazide (PRINZIDE,ZESTORETIC) 10-12.5 mg per tablet Take 1 tablet by mouth once daily. 30 tablet 3    naltrexone HCl (NALTREXONE ORAL) Take 50 mg by mouth Daily. For 12 weeks      pantoprazole (PROTONIX) 40 MG tablet Take 1 tablet (40 mg total) by mouth once daily. 30 tablet 3     No facility-administered encounter medications on file as of 7/25/2022.       History of Present Illness  77 yo gentleman in clinic for eval of prev ETOH w/d seizure early June 2022- CT head showed nothing acute and pt transferred to hosp in [place] for further w/u- poss stroke? I have no verified results of that hosp encounter? Pt placed prophylactically on Keppra and ASPIRIN 81mg  per telehealth however his event was due to alcohol w/d as he admitted to heavy routine daily drinking through Mem [employer] then stopping for several days prior to preparing for next Party  Pt also found to have GI bleeding after very low H & H - needed scoping and further GI eval   Pts compliance is in question?   Will defer the need for anti-plt therapy to GI        Past  "Medical History:   Diagnosis Date    Acute superficial gastritis without hemorrhage 7/12/2022    Alcoholism     Anemia     AVM (arteriovenous malformation) of colon 7/13/2022    Colon, diverticulosis 7/13/2022    Hypertension     Iron deficiency anemia due to chronic blood loss 7/12/2022    Polyp, sigmoid colon 7/13/2022    Seizures     Stroke     right sided weakness       Past Surgical History:   Procedure Laterality Date    SCROTAL SURGERY      in february       Social History  Mr. Mathis  reports that he has been smoking cigarettes. He has a 50.00 pack-year smoking history. He has never used smokeless tobacco. He reports previous alcohol use. He reports current drug use. Drug: Marijuana.    Family History  Mr.'s Mathis Family history is unknown by patient.    Review of Systems  Review of Systems   Psychiatric/Behavioral: Positive for substance abuse.   All other systems reviewed and are negative.     Objective:   BP (!) 140/70 (BP Location: Left arm, Patient Position: Sitting)   Pulse 73   Ht 5' 11" (1.803 m)   Wt 68.9 kg (152 lb)   SpO2 99%   BMI 21.20 kg/m²    NEUROLOGICAL EXAMINATION:     MENTAL STATUS   Oriented to person, place, and time.   Attention: normal. Concentration: normal.   Speech: slurred   Level of consciousness: alert  Knowledge: consistent with education.     CRANIAL NERVES   Cranial nerves II through XII intact.     MOTOR EXAM   Muscle bulk: normal  Overall muscle tone: normal    Strength   Strength 5/5 throughout.     REFLEXES     Reflexes   Right brachioradialis: 1+  Left brachioradialis: 1+  Right biceps: 1+  Left biceps: 1+  Right triceps: 1+  Left triceps: 1+  Right patellar: 1+  Left patellar: 1+  Right achilles: 1+  Left achilles: 1+  Right : 1+  Left : 1+    SENSORY EXAM   Light touch normal.     GAIT AND COORDINATION        Antalgic gait         Physical Exam  Vitals reviewed.   Neurological:      Mental Status: He is oriented to person, place, and time. "      Cranial Nerves: Cranial nerves 2-12 are intact.      Motor: Motor strength is normal.      Deep Tendon Reflexes:      Reflex Scores:       Tricep reflexes are 1+ on the right side and 1+ on the left side.       Bicep reflexes are 1+ on the right side and 1+ on the left side.       Brachioradialis reflexes are 1+ on the right side and 1+ on the left side.       Patellar reflexes are 1+ on the right side and 1+ on the left side.       Achilles reflexes are 1+ on the right side and 1+ on the left side.  Psychiatric:         Speech: Speech is slurred.          Assessment:     Seizure-like activity  Comments:  singular isolated first time sz event secondary to ETOH use and w/d syndrome   Orders:  -     Ambulatory referral/consult to Neurology         Primary Diagnosis and ICD10  Seizure-like activity [R56.9]    Plan:     Patient Instructions   Stop alcohol in excess  Stop tobacco smoking if possible   Caution w/ marijuana   OK to taper off Keppra as not needed  Caution with driving while impaired   F/u GI re: GI bleeding  F/u prn        There are no discontinued medications.    Requested Prescriptions      No prescriptions requested or ordered in this encounter

## 2022-07-26 ENCOUNTER — OFFICE VISIT (OUTPATIENT)
Dept: FAMILY MEDICINE | Facility: CLINIC | Age: 77
End: 2022-07-26
Payer: MEDICARE

## 2022-07-26 VITALS
BODY MASS INDEX: 21 KG/M2 | DIASTOLIC BLOOD PRESSURE: 60 MMHG | HEIGHT: 71 IN | OXYGEN SATURATION: 99 % | RESPIRATION RATE: 20 BRPM | WEIGHT: 150 LBS | SYSTOLIC BLOOD PRESSURE: 90 MMHG | HEART RATE: 66 BPM | TEMPERATURE: 98 F

## 2022-07-26 DIAGNOSIS — F10.932 PERCEPTUAL DISTURBANCES AND SEIZURES CONCURRENT WITH AND DUE TO ALCOHOL WITHDRAWAL: ICD-10-CM

## 2022-07-26 DIAGNOSIS — R56.9 PERCEPTUAL DISTURBANCES AND SEIZURES CONCURRENT WITH AND DUE TO ALCOHOL WITHDRAWAL: ICD-10-CM

## 2022-07-26 DIAGNOSIS — D50.0 IRON DEFICIENCY ANEMIA DUE TO CHRONIC BLOOD LOSS: ICD-10-CM

## 2022-07-26 DIAGNOSIS — Z09 HOSPITAL DISCHARGE FOLLOW-UP: Primary | ICD-10-CM

## 2022-07-26 DIAGNOSIS — Z51.81 ENCOUNTER FOR THERAPEUTIC DRUG LEVEL MONITORING: ICD-10-CM

## 2022-07-26 DIAGNOSIS — I10 ESSENTIAL HYPERTENSION: ICD-10-CM

## 2022-07-26 LAB
ALBUMIN SERPL BCP-MCNC: 3.3 G/DL (ref 3.5–5)
ALBUMIN/GLOB SERPL: 0.8 {RATIO}
ALP SERPL-CCNC: 73 U/L (ref 45–115)
ALT SERPL W P-5'-P-CCNC: 30 U/L (ref 16–61)
ANION GAP SERPL CALCULATED.3IONS-SCNC: 11 MMOL/L (ref 7–16)
AST SERPL W P-5'-P-CCNC: 23 U/L (ref 15–37)
BASOPHILS # BLD AUTO: 0.05 K/UL (ref 0–0.2)
BASOPHILS NFR BLD AUTO: 0.6 % (ref 0–1)
BILIRUB SERPL-MCNC: 0.1 MG/DL (ref 0–1.2)
BUN SERPL-MCNC: 17 MG/DL (ref 7–18)
BUN/CREAT SERPL: 16 (ref 6–20)
CALCIUM SERPL-MCNC: 9.4 MG/DL (ref 8.5–10.1)
CHLORIDE SERPL-SCNC: 106 MMOL/L (ref 98–107)
CO2 SERPL-SCNC: 29 MMOL/L (ref 21–32)
CREAT SERPL-MCNC: 1.06 MG/DL (ref 0.7–1.3)
DIFFERENTIAL METHOD BLD: ABNORMAL
EOSINOPHIL # BLD AUTO: 0.11 K/UL (ref 0–0.5)
EOSINOPHIL NFR BLD AUTO: 1.4 % (ref 1–4)
ERYTHROCYTE [DISTWIDTH] IN BLOOD BY AUTOMATED COUNT: 19.1 % (ref 11.5–14.5)
GLOBULIN SER-MCNC: 4.3 G/DL (ref 2–4)
GLUCOSE SERPL-MCNC: 78 MG/DL (ref 74–106)
HCT VFR BLD AUTO: 29.9 % (ref 40–54)
HGB BLD-MCNC: 9.1 G/DL (ref 13.5–18)
IMM GRANULOCYTES # BLD AUTO: 0.02 K/UL (ref 0–0.04)
IMM GRANULOCYTES NFR BLD: 0.2 % (ref 0–0.4)
LEVETIRACETAM SERPL-MCNC: 96.1 ΜG/ML (ref 12–46)
LYMPHOCYTES # BLD AUTO: 1.58 K/UL (ref 1–4.8)
LYMPHOCYTES NFR BLD AUTO: 19.5 % (ref 27–41)
MCH RBC QN AUTO: 29.9 PG (ref 27–31)
MCHC RBC AUTO-ENTMCNC: 30.4 G/DL (ref 32–36)
MCV RBC AUTO: 98.4 FL (ref 80–96)
MONOCYTES # BLD AUTO: 0.75 K/UL (ref 0–0.8)
MONOCYTES NFR BLD AUTO: 9.2 % (ref 2–6)
MPC BLD CALC-MCNC: 12.1 FL (ref 9.4–12.4)
NEUTROPHILS # BLD AUTO: 5.61 K/UL (ref 1.8–7.7)
NEUTROPHILS NFR BLD AUTO: 69.1 % (ref 53–65)
NRBC # BLD AUTO: 0 X10E3/UL
NRBC, AUTO (.00): 0 %
PLATELET # BLD AUTO: 278 K/UL (ref 150–400)
POTASSIUM SERPL-SCNC: 4.3 MMOL/L (ref 3.5–5.1)
PROT SERPL-MCNC: 7.6 G/DL (ref 6.4–8.2)
RBC # BLD AUTO: 3.04 M/UL (ref 4.6–6.2)
SODIUM SERPL-SCNC: 142 MMOL/L (ref 136–145)
WBC # BLD AUTO: 8.12 K/UL (ref 4.5–11)

## 2022-07-26 PROCEDURE — 1126F PR PAIN SEVERITY QUANTIFIED, NO PAIN PRESENT: ICD-10-PCS | Mod: ,,, | Performed by: FAMILY MEDICINE

## 2022-07-26 PROCEDURE — 3078F PR MOST RECENT DIASTOLIC BLOOD PRESSURE < 80 MM HG: ICD-10-PCS | Mod: ,,, | Performed by: FAMILY MEDICINE

## 2022-07-26 PROCEDURE — 3074F SYST BP LT 130 MM HG: CPT | Mod: ,,, | Performed by: FAMILY MEDICINE

## 2022-07-26 PROCEDURE — 1160F RVW MEDS BY RX/DR IN RCRD: CPT | Mod: ,,, | Performed by: FAMILY MEDICINE

## 2022-07-26 PROCEDURE — 80177 LEVETIRACETAM  (KEPPRA) LEVEL: ICD-10-PCS | Mod: ,,, | Performed by: CLINICAL MEDICAL LABORATORY

## 2022-07-26 PROCEDURE — 1101F PR PT FALLS ASSESS DOC 0-1 FALLS W/OUT INJ PAST YR: ICD-10-PCS | Mod: ,,, | Performed by: FAMILY MEDICINE

## 2022-07-26 PROCEDURE — 3008F PR BODY MASS INDEX (BMI) DOCUMENTED: ICD-10-PCS | Mod: ,,, | Performed by: FAMILY MEDICINE

## 2022-07-26 PROCEDURE — 3066F PR DOCUMENTATION OF TREATMENT FOR NEPHROPATHY: ICD-10-PCS | Mod: ,,, | Performed by: FAMILY MEDICINE

## 2022-07-26 PROCEDURE — 3078F DIAST BP <80 MM HG: CPT | Mod: ,,, | Performed by: FAMILY MEDICINE

## 2022-07-26 PROCEDURE — 3061F NEG MICROALBUMINURIA REV: CPT | Mod: ,,, | Performed by: FAMILY MEDICINE

## 2022-07-26 PROCEDURE — 1126F AMNT PAIN NOTED NONE PRSNT: CPT | Mod: ,,, | Performed by: FAMILY MEDICINE

## 2022-07-26 PROCEDURE — 80053 COMPREHEN METABOLIC PANEL: CPT | Mod: ,,, | Performed by: CLINICAL MEDICAL LABORATORY

## 2022-07-26 PROCEDURE — 3008F BODY MASS INDEX DOCD: CPT | Mod: ,,, | Performed by: FAMILY MEDICINE

## 2022-07-26 PROCEDURE — 4010F PR ACE/ARB THEARPY RXD/TAKEN: ICD-10-PCS | Mod: ,,, | Performed by: FAMILY MEDICINE

## 2022-07-26 PROCEDURE — 3061F PR NEG MICROALBUMINURIA RESULT DOCUMENTED/REVIEW: ICD-10-PCS | Mod: ,,, | Performed by: FAMILY MEDICINE

## 2022-07-26 PROCEDURE — 85025 COMPLETE CBC W/AUTO DIFF WBC: CPT | Mod: ,,, | Performed by: CLINICAL MEDICAL LABORATORY

## 2022-07-26 PROCEDURE — 1101F PT FALLS ASSESS-DOCD LE1/YR: CPT | Mod: ,,, | Performed by: FAMILY MEDICINE

## 2022-07-26 PROCEDURE — 99495 TRANSJ CARE MGMT MOD F2F 14D: CPT | Mod: ,,, | Performed by: FAMILY MEDICINE

## 2022-07-26 PROCEDURE — 4010F ACE/ARB THERAPY RXD/TAKEN: CPT | Mod: ,,, | Performed by: FAMILY MEDICINE

## 2022-07-26 PROCEDURE — 3066F NEPHROPATHY DOC TX: CPT | Mod: ,,, | Performed by: FAMILY MEDICINE

## 2022-07-26 PROCEDURE — 3288F PR FALLS RISK ASSESSMENT DOCUMENTED: ICD-10-PCS | Mod: ,,, | Performed by: FAMILY MEDICINE

## 2022-07-26 PROCEDURE — 1159F PR MEDICATION LIST DOCUMENTED IN MEDICAL RECORD: ICD-10-PCS | Mod: ,,, | Performed by: FAMILY MEDICINE

## 2022-07-26 PROCEDURE — 1159F MED LIST DOCD IN RCRD: CPT | Mod: ,,, | Performed by: FAMILY MEDICINE

## 2022-07-26 PROCEDURE — 80053 COMPREHENSIVE METABOLIC PANEL: ICD-10-PCS | Mod: ,,, | Performed by: CLINICAL MEDICAL LABORATORY

## 2022-07-26 PROCEDURE — 80177 DRUG SCRN QUAN LEVETIRACETAM: CPT | Mod: ,,, | Performed by: CLINICAL MEDICAL LABORATORY

## 2022-07-26 PROCEDURE — 3074F PR MOST RECENT SYSTOLIC BLOOD PRESSURE < 130 MM HG: ICD-10-PCS | Mod: ,,, | Performed by: FAMILY MEDICINE

## 2022-07-26 PROCEDURE — 99495 TCM SERVICES (MODERATE COMPLEXITY): ICD-10-PCS | Mod: ,,, | Performed by: FAMILY MEDICINE

## 2022-07-26 PROCEDURE — 3288F FALL RISK ASSESSMENT DOCD: CPT | Mod: ,,, | Performed by: FAMILY MEDICINE

## 2022-07-26 PROCEDURE — 1160F PR REVIEW ALL MEDS BY PRESCRIBER/CLIN PHARMACIST DOCUMENTED: ICD-10-PCS | Mod: ,,, | Performed by: FAMILY MEDICINE

## 2022-07-26 PROCEDURE — 85025 CBC WITH DIFFERENTIAL: ICD-10-PCS | Mod: ,,, | Performed by: CLINICAL MEDICAL LABORATORY

## 2022-07-26 NOTE — PROGRESS NOTES
Clinic Note    Patient Name: Stephane Mathis  : 1945  MRN: [id number]    Chief Complaint   Patient presents with    Follow-up     Hospital discharge [facility]       HPI:    Mr. Stephane Mathis is a 76 y.o. male who presents to clinic today with CC of [facility] Hospital Follow up. Patient was admitted at [facility] from 22 - 22 for GI bleed and anemia. Patient did receive 3 units of blood during recent hospitalization. Patient had EGD and colonoscopy during recent hospitalization. AVMs were identified on colonoscopy. Patient admitted to h/o chronic iron deficiency during hospitalization but also admitted to non-compliance with oral iron due to GI side effects. He did receive an iron infusion prior to discharge. Referral was placed to Dr. Bradley with Hematology for further evaluation/iron infusions. Patient reports he has not heard from this appt.  He has a h/o alcohol abuse and tobacco use.   He was admitted to a hospital in [place] prior to this recent admission in [place] for evaluation for seizure vs CVA. It was felt that his condition at that time was due to alcohol withdrawal seizures. He has since followed up with Dr. Ramsey (Neurology) who advised he could wean seizure medication to discontinue as it was not indicated to continue long term due to seizure related to alcohol withdrawal. Patient is a poor historian and compliance is an issue but he reports he is still taking his seizure medication at this time.  He reports some generalized weakness and requests home health with PT. Denies, however, dizziness, fatigue, light-headedness, chest pain, and SOB. Reports he does feel improved following iron infusion.  Patient is, otherwise, without complaints.     Medications:  Medication List with Changes/Refills   Current Medications    AMLODIPINE (NORVASC) 10 MG TABLET    Take 1 tablet (10 mg total) by mouth once daily.    ASPIRIN (ECOTRIN) 81 MG EC TABLET    Take 81 mg by mouth once daily.    ATORVASTATIN  (LIPITOR) 40 MG TABLET    Take 40 mg by mouth every evening.    CETIRIZINE (ZYRTEC) 10 MG TABLET    Take 1 tablet (10 mg total) by mouth daily as needed for Allergies.    FERROUS SULFATE 325 (65 FE) MG EC TABLET    Take 1 tablet (325 mg total) by mouth once daily.    LEVETIRACETAM (KEPPRA) 1000 MG TABLET    Take 1,000 mg by mouth 2 (two) times daily.    LISINOPRIL-HYDROCHLOROTHIAZIDE (PRINZIDE,ZESTORETIC) 10-12.5 MG PER TABLET    Take 1 tablet by mouth once daily.    NALTREXONE HCL (NALTREXONE ORAL)    Take 50 mg by mouth Daily. For 12 weeks    PANTOPRAZOLE (PROTONIX) 40 MG TABLET    Take 1 tablet (40 mg total) by mouth once daily.        Allergies: Codeine      Past Medical History:    Past Medical History:   Diagnosis Date    Acute superficial gastritis without hemorrhage 7/12/2022    Alcoholism     Anemia     AVM (arteriovenous malformation) of colon 7/13/2022    Colon, diverticulosis 7/13/2022    Hypertension     Iron deficiency anemia due to chronic blood loss 7/12/2022    Polyp, sigmoid colon 7/13/2022    Seizures     Stroke     right sided weakness       Past Surgical History:    Past Surgical History:   Procedure Laterality Date    SCROTAL SURGERY      in february         Social History:    Social History     Tobacco Use   Smoking Status Current Every Day Smoker    Packs/day: 1.00    Years: 50.00    Pack years: 50.00    Types: Cigarettes   Smokeless Tobacco Never Used     Social History     Substance and Sexual Activity   Alcohol Use Not Currently    Comment: half pint of liquor a day. but reports last drink was a month ago.     Social History     Substance and Sexual Activity   Drug Use Yes    Types: Marijuana    Comment: daily         Family History:    Family History   Family history unknown: Yes       Review of Systems:    Review of Systems   Constitutional: Negative for appetite change, chills, fatigue, fever and unexpected weight change.   Eyes: Negative for visual disturbance.  "  Respiratory: Negative for cough and shortness of breath.    Cardiovascular: Negative for chest pain and leg swelling.   Gastrointestinal: Positive for constipation. Negative for abdominal pain, change in bowel habit, diarrhea, nausea, vomiting and change in bowel habit.        Reports he has been trying to take oral iron but it does cause constipation - has been taking OTC stool softener with some improvement   Musculoskeletal: Negative for arthralgias.   Integumentary:  Negative for rash.   Neurological: Negative for dizziness and headaches.        Reports some generalized weakness   Psychiatric/Behavioral: The patient is not nervous/anxious.         Vitals:    Vitals:    07/26/22 0818   BP: 90/60   BP Location: Right arm   Patient Position: Sitting   BP Method: Medium (Manual)   Pulse: 66   Resp: 20   Temp: 97.6 °F (36.4 °C)   TempSrc: Temporal   SpO2: 99%   Weight: 68 kg (150 lb)   Height: 5' 11" (1.803 m)       Body mass index is 20.92 kg/m².    Wt Readings from Last 3 Encounters:   07/26/22 0818 68 kg (150 lb)   07/25/22 0811 68.9 kg (152 lb)   07/14/22 0600 71.8 kg (158 lb 3.2 oz)   07/13/22 0532 74.9 kg (165 lb 2 oz)   07/12/22 1525 70.3 kg (155 lb)   07/12/22 1225 70.3 kg (155 lb)   07/12/22 0200 69.7 kg (153 lb 10.6 oz)        Physical Exam:    Physical Exam  Constitutional:       General: He is not in acute distress.     Appearance: Normal appearance.   HENT:      Nose: Nose normal.      Mouth/Throat:      Mouth: Mucous membranes are moist.      Pharynx: Oropharynx is clear.   Eyes:      Conjunctiva/sclera: Conjunctivae normal.   Cardiovascular:      Rate and Rhythm: Normal rate and regular rhythm.      Heart sounds: Normal heart sounds. No murmur heard.  Pulmonary:      Effort: Pulmonary effort is normal. No respiratory distress.      Breath sounds: Normal breath sounds. No wheezing, rhonchi or rales.   Abdominal:      General: Bowel sounds are normal.      Palpations: Abdomen is soft.      Tenderness: " There is no abdominal tenderness.   Musculoskeletal:      Cervical back: Neck supple.   Skin:     Findings: No rash.   Neurological:      General: No focal deficit present.      Mental Status: He is alert. Mental status is at baseline.      Comments: Ambulating with assistance of cane   Psychiatric:         Mood and Affect: Mood normal.           Assessment/Plan:   Hospital discharge follow-up        - Hospital records reviewed and hospital discharge medication list reconciled with home medications.     Iron deficiency anemia due to chronic blood loss  -     CBC Auto Differential; Future; Expected date: 07/26/2022  -     Ambulatory referral/consult to Home Health; Future; Expected date: 07/27/2022  - Nurse called Dr. Bradley (Hematology) office and is awaiting call back regarding appt. Referral was placed from hospital but patient reports he has not heard from appt.    Essential hypertension  -     Comprehensive Metabolic Panel; Future; Expected date: 07/26/2022  -     Ambulatory referral/consult to Home Health; Future; Expected date: 07/27/2022    Perceptual disturbances and seizures concurrent with and due to alcohol withdrawal  -     Ambulatory referral/consult to Home Health; Future; Expected date: 07/27/2022  -     Levetiracetam Level; Future; Expected date: 07/26/2022    Encounter for therapeutic drug level monitoring  -     Levetiracetam Level; Future; Expected date: 07/26/2022         Active Problem List with Overview Notes    Diagnosis Date Noted    Colon, diverticulosis 07/13/2022    Polyp, sigmoid colon 07/13/2022    AVM (arteriovenous malformation) of colon 07/13/2022    HTN (hypertension) 07/12/2022    Acute GI bleeding 07/12/2022    Acute blood loss anemia 07/12/2022    Alcohol dependence in early full remission 07/12/2022    Continuous dependence on cigarette smoking 07/12/2022    Acute superficial gastritis without hemorrhage 07/12/2022    Iron deficiency anemia due to chronic blood loss  07/12/2022    Seizure 06/10/2022        Health Maintenance:  Health Maintenance   Topic Date Due    TETANUS VACCINE  Never done    LDCT Lung Screen  Never done    Lipid Panel  01/26/2027    Hepatitis C Screening  Completed    Abdominal Aortic Aneurysm Screening  Completed       RTC in 6 weeks for follow up.   RTC sooner if needed.   Patient voiced understanding and is agreeable to plan.      Pebbles Dickerson MD    Family Medicine

## 2022-07-30 ENCOUNTER — PATIENT MESSAGE (OUTPATIENT)
Dept: FAMILY MEDICINE | Facility: CLINIC | Age: 77
End: 2022-07-30
Payer: MEDICARE

## 2022-08-01 DIAGNOSIS — I10 ESSENTIAL HYPERTENSION: ICD-10-CM

## 2022-08-01 RX ORDER — AMLODIPINE BESYLATE 10 MG/1
10 TABLET ORAL DAILY
Qty: 90 TABLET | Refills: 1 | Status: SHIPPED | OUTPATIENT
Start: 2022-08-01 | End: 2022-12-12 | Stop reason: SDUPTHER

## 2022-08-01 RX ORDER — ATORVASTATIN CALCIUM 40 MG/1
40 TABLET, FILM COATED ORAL NIGHTLY
Qty: 90 TABLET | Refills: 1 | Status: SHIPPED | OUTPATIENT
Start: 2022-08-01 | End: 2022-12-12 | Stop reason: SDUPTHER

## 2022-08-25 ENCOUNTER — TELEPHONE (OUTPATIENT)
Dept: FAMILY MEDICINE | Facility: CLINIC | Age: 77
End: 2022-08-25
Payer: MEDICARE

## 2022-08-25 DIAGNOSIS — D50.8 OTHER IRON DEFICIENCY ANEMIA: Primary | ICD-10-CM

## 2022-08-25 NOTE — TELEPHONE ENCOUNTER
Daughter dropped off order from Dr. Bradley's office (Hematology) for standing order for CBC every 2 weeks with results to be faxed to Dr. Bradley's office at [phone].

## 2022-08-31 ENCOUNTER — LAB REQUISITION (OUTPATIENT)
Dept: LAB | Facility: HOSPITAL | Age: 77
End: 2022-08-31
Attending: FAMILY MEDICINE
Payer: MEDICARE

## 2022-08-31 DIAGNOSIS — D50.0 IRON DEFICIENCY ANEMIA SECONDARY TO BLOOD LOSS (CHRONIC): ICD-10-CM

## 2022-08-31 LAB
BASOPHILS # BLD AUTO: 0.02 K/UL (ref 0–0.2)
BASOPHILS NFR BLD AUTO: 0.3 % (ref 0–1)
DIFFERENTIAL METHOD BLD: ABNORMAL
EOSINOPHIL # BLD AUTO: 0.07 K/UL (ref 0–0.5)
EOSINOPHIL NFR BLD AUTO: 1.1 % (ref 1–4)
ERYTHROCYTE [DISTWIDTH] IN BLOOD BY AUTOMATED COUNT: 17.2 % (ref 11.5–14.5)
HCT VFR BLD AUTO: 29 % (ref 40–54)
HGB BLD-MCNC: 9.5 G/DL (ref 13.5–18)
LYMPHOCYTES # BLD AUTO: 1.39 K/UL (ref 1–4.8)
LYMPHOCYTES NFR BLD AUTO: 21.3 % (ref 27–41)
MCH RBC QN AUTO: 33.5 PG (ref 27–31)
MCHC RBC AUTO-ENTMCNC: 32.8 G/DL (ref 32–36)
MCV RBC AUTO: 102.1 FL (ref 80–96)
MONOCYTES # BLD AUTO: 0.77 K/UL (ref 0–0.8)
MONOCYTES NFR BLD AUTO: 11.8 % (ref 2–6)
MPC BLD CALC-MCNC: 11.7 FL (ref 9.4–12.4)
NEUTROPHILS # BLD AUTO: 4.28 K/UL (ref 1.8–7.7)
NEUTROPHILS NFR BLD AUTO: 65.5 % (ref 53–65)
PLATELET # BLD AUTO: 253 K/UL (ref 150–400)
RBC # BLD AUTO: 2.84 M/UL (ref 4.6–6.2)
WBC # BLD AUTO: 6.53 K/UL (ref 4.5–11)

## 2022-08-31 PROCEDURE — 85025 COMPLETE CBC W/AUTO DIFF WBC: CPT | Performed by: INTERNAL MEDICINE

## 2022-09-14 ENCOUNTER — LAB REQUISITION (OUTPATIENT)
Dept: LAB | Facility: HOSPITAL | Age: 77
End: 2022-09-14
Payer: MEDICARE

## 2022-09-14 DIAGNOSIS — D50.0 IRON DEFICIENCY ANEMIA SECONDARY TO BLOOD LOSS (CHRONIC): ICD-10-CM

## 2022-09-14 LAB
BASOPHILS # BLD AUTO: 0.02 K/UL (ref 0–0.2)
BASOPHILS NFR BLD AUTO: 0.3 % (ref 0–1)
EOSINOPHIL # BLD AUTO: 0.11 K/UL (ref 0–0.5)
EOSINOPHIL NFR BLD AUTO: 1.4 % (ref 1–4)
ERYTHROCYTE [DISTWIDTH] IN BLOOD BY AUTOMATED COUNT: 15.8 % (ref 11.5–14.5)
HCT VFR BLD AUTO: 31.6 % (ref 40–54)
HGB BLD-MCNC: 10.4 G/DL (ref 13.5–18)
LYMPHOCYTES # BLD AUTO: 1.47 K/UL (ref 1–4.8)
LYMPHOCYTES NFR BLD AUTO: 18.9 % (ref 27–41)
MCH RBC QN AUTO: 33.1 PG (ref 27–31)
MCHC RBC AUTO-ENTMCNC: 32.9 G/DL (ref 32–36)
MCV RBC AUTO: 100.6 FL (ref 80–96)
MONOCYTES # BLD AUTO: 0.91 K/UL (ref 0–0.8)
MONOCYTES NFR BLD AUTO: 11.7 % (ref 2–6)
MPC BLD CALC-MCNC: 12.1 FL (ref 9.4–12.4)
NEUTROPHILS # BLD AUTO: 5.27 K/UL (ref 1.8–7.7)
NEUTROPHILS NFR BLD AUTO: 67.7 % (ref 53–65)
PLATELET # BLD AUTO: 183 K/UL (ref 150–400)
RBC # BLD AUTO: 3.14 M/UL (ref 4.6–6.2)
WBC # BLD AUTO: 7.78 K/UL (ref 4.5–11)

## 2022-09-14 PROCEDURE — 85025 COMPLETE CBC W/AUTO DIFF WBC: CPT

## 2022-09-28 NOTE — PROGRESS NOTES
[facility] AWV [facility]      PATIENT NAME: Stephane Mathis   : 1945    AGE: 76 y.o. DATE: 2022   MRN: [id number]        Reason for Visit / Chief Complaint: Medicare AWV ( Subsequent [employer] AWV )        Stephane Mathis presents for a Subsequent [employer] AWV today.     The following components were reviewed and updated:    Medical/Social/Family History:  Past Medical History:   Diagnosis Date    Acute superficial gastritis without hemorrhage 2022    Alcoholism     Anemia     AVM (arteriovenous malformation) of colon 2022    Colon, diverticulosis 2022    Hypertension     Iron deficiency anemia due to chronic blood loss 2022    Polyp, sigmoid colon 2022    Seizures     Stroke     right sided weakness        Family History   Problem Relation Age of Onset    Diabetes Mother     Hypertension Mother     Diabetes Sister     Diabetes Brother     Hypertension Brother     Throat cancer Brother         Past Surgical History:   Procedure Laterality Date    KNEE SURGERY Right     SCROTAL SURGERY      in february       Social History     Tobacco Use   Smoking Status Every Day    Packs/day: 1.00    Years: 55.00    Pack years: 55.00    Types: Cigarettes   Smokeless Tobacco Never       Social History     Substance and Sexual Activity   Alcohol Use Not Currently    Comment: half pint of liquor a day. but reports last drink was  a month ago.         Allergies and Current Medications     Review of patient's allergies indicates:   Allergen Reactions    Codeine Itching       Current Outpatient Medications:     amLODIPine (NORVASC) 10 MG tablet, Take 1 tablet (10 mg total) by mouth once daily., Disp: 90 tablet, Rfl: 1    amLODIPine (NORVASC) 10 MG tablet, Take by mouth., Disp: , Rfl:     atorvastatin (LIPITOR) 40 MG tablet, Take 1 tablet (40 mg total) by mouth every evening., Disp: 90 tablet, Rfl: 1    cetirizine (ZYRTEC) 10 MG tablet, Take 1 tablet (10 mg total) by mouth daily as  needed for Allergies., Disp: 90 tablet, Rfl: 1    ferrous sulfate 325 (65 FE) MG EC tablet, Take 1 tablet (325 mg total) by mouth once daily., Disp: 30 tablet, Rfl: 3    levETIRAcetam (KEPPRA) 1000 MG tablet, Take 1,000 mg by mouth 2 (two) times daily., Disp: , Rfl:     lisinopriL (PRINIVIL,ZESTRIL) 40 MG tablet, Take by mouth., Disp: , Rfl:     lisinopriL-hydrochlorothiazide (PRINZIDE,ZESTORETIC) 10-12.5 mg per tablet, Take 1 tablet by mouth once daily., Disp: 30 tablet, Rfl: 3    naltrexone (DEPADE) 50 mg tablet, Take 50 mg by mouth once daily., Disp: , Rfl:     aspirin (ECOTRIN) 81 MG EC tablet, Take 81 mg by mouth once daily., Disp: , Rfl:     hydrALAZINE (APRESOLINE) 100 MG tablet, Take by mouth., Disp: , Rfl:     iron 18 mg Tab, , Disp: , Rfl:     naltrexone HCl (NALTREXONE ORAL), Take 50 mg by mouth Daily. For 12 weeks, Disp: , Rfl:     pantoprazole (PROTONIX) 40 MG tablet, Take 1 tablet (40 mg total) by mouth once daily. (Patient not taking: Reported on 2022), Disp: 30 tablet, Rfl: 3      Health Risk Assessment   Fall Risk:  no   Obesity: BMI Body mass index is 20.7 kg/m².   Advance Directive:  Does not have an advanced directive. Verbal information and written information provided on today's AVS.    Depression: PHQ9- 4   HTN: DASH diet, exercise, weight management, med compliance, home BP monitoring, and follow-up discussed.  STI: not at risk   Statin Use: yes      Health Maintenance   Last eye exam:  Dr. Zaldivar request sent for copy report    Last CV screen with lipids: 2022   Diabetes screening with fasting glucose or A1c: 2022   Colonoscopy: 2022 repeat 10 years   Flu Vaccine: 2022   Pneumonia vaccines: 23 - 10/23/2018   COVID vaccine: 2018, 2021,2021   Hep B vaccine: na   DEXA: na   Last PSA screen: available   AAA screenin2021   HIV Screeing: declines  Hepatitis C Screen: 2021  Low Dose CT Scan: available     Health Maintenance Topics  with due status: Not Due       Topic Last Completion Date    Lipid Panel 01/26/2022    Colorectal Cancer Screening 07/13/2022     Health Maintenance Due   Topic Date Due    TETANUS VACCINE  Never done    LDCT Lung Screen  Never done    Shingles Vaccine (1 of 2) Never done    Pneumococcal Vaccines (Age 65+) (2 - PCV) 10/23/2019    COVID-19 Vaccine (4 - Booster for Pfizer series) 01/11/2022    Influenza Vaccine (1) 09/01/2022         Lab results available in Epic or see dates from [facility] above:   Lab Results   Component Value Date    CHOL 241 (H) 01/26/2022    CHOL 212 (H) 07/26/2021     Lab Results   Component Value Date    HDL 98 (H) 01/26/2022    HDL 73 (H) 07/26/2021     Lab Results   Component Value Date    LDLCALC 130 01/26/2022    LDLCALC 122 07/26/2021     Lab Results   Component Value Date    TRIG 64 01/26/2022    TRIG 83 07/26/2021     Lab Results   Component Value Date    CHOLHDL 2.5 01/26/2022    CHOLHDL 2.9 07/26/2021       No results found for: LABA1C, HGBA1C    Sodium   Date Value Ref Range Status   07/26/2022 142 136 - 145 mmol/L Final     Potassium   Date Value Ref Range Status   07/26/2022 4.3 3.5 - 5.1 mmol/L Final     Chloride   Date Value Ref Range Status   07/26/2022 106 98 - 107 mmol/L Final     CO2   Date Value Ref Range Status   07/26/2022 29 21 - 32 mmol/L Final     Glucose   Date Value Ref Range Status   07/26/2022 78 74 - 106 mg/dL Final     BUN   Date Value Ref Range Status   07/26/2022 17 7 - 18 mg/dL Final     Creatinine   Date Value Ref Range Status   07/26/2022 1.06 0.70 - 1.30 mg/dL Final     Calcium   Date Value Ref Range Status   07/26/2022 9.4 8.5 - 10.1 mg/dL Final     Total Protein   Date Value Ref Range Status   07/26/2022 7.6 6.4 - 8.2 g/dL Final     Albumin   Date Value Ref Range Status   07/26/2022 3.3 (L) 3.5 - 5.0 g/dL Final     Bilirubin, Total   Date Value Ref Range Status   07/26/2022 0.1 0.0 - 1.2 mg/dL Final     Alk Phos   Date Value Ref Range Status   07/26/2022 73 45 -  "115 U/L Final     AST   Date Value Ref Range Status   07/26/2022 23 15 - 37 U/L Final     ALT   Date Value Ref Range Status   07/26/2022 30 16 - 61 U/L Final     Anion Gap   Date Value Ref Range Status   07/26/2022 11 7 - 16 mmol/L Final     eGFR    Date Value Ref Range Status   07/26/2021 94 >=60 mL/min/1.73m² Final     eGFR   Date Value Ref Range Status   07/26/2022 73 >=60 mL/min/1.73m² Final         Lab Results   Component Value Date    PSA 2.840 07/26/2021           Incontinence  Bowel: no  Bladder: yes      Care Team  Dr. Penelope Pak pcp                 Dr. Zaldivar optometry                  Dr. Irwin urology    **See Completed Assessments for Annual Wellness visit within the encounter summary    The following assessments were completed & reviewed:  Depression Screening  Cognitive function Screening  Timed Get Up Test  Whisper Test  Vision Screen  Health Risk Assessment  Checklist of ADLs and IADLs        Objective  Vitals:    09/29/22 1332   BP: 130/70   Pulse: 82   Resp: 14   Temp: 98.6 °F (37 °C)   SpO2: 96%   Weight: 67.3 kg (148 lb 6.4 oz)   Height: 5' 11" (1.803 m)   PainSc:   6   PainLoc: Shoulder      Body mass index is 20.7 kg/m².  Ideal body weight: 75.3 kg (166 lb 0.1 oz)       Physical Exam  Constitutional:       General: He is not in acute distress.     Appearance: Normal appearance.   HENT:      Nose: Nose normal.      Mouth/Throat:      Mouth: Mucous membranes are moist.      Pharynx: Oropharynx is clear.   Eyes:      Conjunctiva/sclera: Conjunctivae normal.   Cardiovascular:      Rate and Rhythm: Normal rate and regular rhythm.      Heart sounds: Normal heart sounds. No murmur heard.  Pulmonary:      Effort: Pulmonary effort is normal. No respiratory distress.      Breath sounds: Normal breath sounds. No wheezing, rhonchi or rales.   Abdominal:      General: Bowel sounds are normal.      Palpations: Abdomen is soft.      Tenderness: There is no abdominal tenderness. "   Musculoskeletal:      Cervical back: Neck supple.   Skin:     Findings: No rash.   Neurological:      General: No focal deficit present.      Mental Status: He is alert. Mental status is at baseline.   Psychiatric:         Mood and Affect: Mood normal.         Assessment:     1. Encounter for subsequent annual wellness visit (AWV) in Medicare patient    2. Essential hypertension    3. BMI 20.0-20.9, adult    4. Screening for malignant neoplasm of prostate  - PSA, Screening; Future    5. Immunization due  - (In Office Administered) Pneumococcal Conjugate Vaccine (20 Valent) (IM)         Problem List Items Addressed This Visit    None  Visit Diagnoses       Encounter for subsequent annual wellness visit (AWV) in Medicare patient    -  Primary    Essential hypertension        BMI 20.0-20.9, adult        Screening for malignant neoplasm of prostate        Relevant Orders    PSA, Screening    Immunization due        Relevant Orders    (In Office Administered) Pneumococcal Conjugate Vaccine (20 Valent) (IM)            Plan:    Referrals:   Patient scheduled face to face visit for evaluation for referral for low dose CT for lung cancer screening.     Advised to call office if does not hear from anyone with referral appt within 2-3 weeks to check on status of referral. Voiced understanding.      Discussed and provided with a screening schedule and personal prevention plan in accordance with USPSTF age appropriate recommendations and Medicare screening guidelines.   Education, counseling, and referrals were provided as needed.  After Visit Summary printed and given to patient which includes written education and a list of any referrals if indicated.     Education including diet, exercise, falls and advanced directives discussed with patient and patient verbalized understanding.      Assistance with smoking cessation was offered, including:  [x]  Medications  [x]  Counseling  [x]  Printed Information on Smoking  Cessation  []  Referral to a Smoking Cessation Program       Patient was counseled regarding smoking for 3-10 minutes.      CAGE Questionnaire    -  Have you ever felt you should Cut down on your drinking? no  -  Have people Annoyed you by criticizing your drinking? no  -  Have you ever felt bad or Guilty about your drinking? no  -  Have your ever had  a drink first thing in the morning to steady your nerves or to get rid of a          hangover (Eye opener)? No   states has not had alcoholic beverage since June 2022      Scoring:  Item responses on the CAGE are scored 0 or 1, with a higher score an indication of alcohol problems.  A total score of 2 or greater is considered clinically significant.         Score: 0    F/u plan for yearly AWV. RTC as scheduled for chronic follow up. Schedule face to face for low dose CT for lung cancer screening.    Signature: Sierra Dickerson MD

## 2022-09-29 ENCOUNTER — OFFICE VISIT (OUTPATIENT)
Dept: FAMILY MEDICINE | Facility: CLINIC | Age: 77
End: 2022-09-29
Payer: MEDICARE

## 2022-09-29 VITALS
WEIGHT: 148.38 LBS | DIASTOLIC BLOOD PRESSURE: 70 MMHG | HEART RATE: 82 BPM | RESPIRATION RATE: 14 BRPM | OXYGEN SATURATION: 96 % | TEMPERATURE: 99 F | BODY MASS INDEX: 20.77 KG/M2 | HEIGHT: 71 IN | SYSTOLIC BLOOD PRESSURE: 130 MMHG

## 2022-09-29 DIAGNOSIS — I10 ESSENTIAL HYPERTENSION: ICD-10-CM

## 2022-09-29 DIAGNOSIS — Z23 IMMUNIZATION DUE: ICD-10-CM

## 2022-09-29 DIAGNOSIS — Z12.5 SCREENING FOR MALIGNANT NEOPLASM OF PROSTATE: ICD-10-CM

## 2022-09-29 DIAGNOSIS — Z00.00 ENCOUNTER FOR SUBSEQUENT ANNUAL WELLNESS VISIT (AWV) IN MEDICARE PATIENT: Primary | ICD-10-CM

## 2022-09-29 PROBLEM — N50.89 SWELLING OF SCROTUM: Status: ACTIVE | Noted: 2022-09-29

## 2022-09-29 PROCEDURE — 1159F MED LIST DOCD IN RCRD: CPT | Mod: ,,, | Performed by: FAMILY MEDICINE

## 2022-09-29 PROCEDURE — 1160F PR REVIEW ALL MEDS BY PRESCRIBER/CLIN PHARMACIST DOCUMENTED: ICD-10-PCS | Mod: ,,, | Performed by: FAMILY MEDICINE

## 2022-09-29 PROCEDURE — 4010F PR ACE/ARB THEARPY RXD/TAKEN: ICD-10-PCS | Mod: ,,, | Performed by: FAMILY MEDICINE

## 2022-09-29 PROCEDURE — 1125F PR PAIN SEVERITY QUANTIFIED, PAIN PRESENT: ICD-10-PCS | Mod: ,,, | Performed by: FAMILY MEDICINE

## 2022-09-29 PROCEDURE — 1101F PT FALLS ASSESS-DOCD LE1/YR: CPT | Mod: ,,, | Performed by: FAMILY MEDICINE

## 2022-09-29 PROCEDURE — 3078F DIAST BP <80 MM HG: CPT | Mod: ,,, | Performed by: FAMILY MEDICINE

## 2022-09-29 PROCEDURE — 3066F PR DOCUMENTATION OF TREATMENT FOR NEPHROPATHY: ICD-10-PCS | Mod: ,,, | Performed by: FAMILY MEDICINE

## 2022-09-29 PROCEDURE — G0009 PNEUMOCOCCAL CONJUGATE VACCINE 20-VALENT: ICD-10-PCS | Mod: ,,, | Performed by: FAMILY MEDICINE

## 2022-09-29 PROCEDURE — 3008F BODY MASS INDEX DOCD: CPT | Mod: ,,, | Performed by: FAMILY MEDICINE

## 2022-09-29 PROCEDURE — 3008F PR BODY MASS INDEX (BMI) DOCUMENTED: ICD-10-PCS | Mod: ,,, | Performed by: FAMILY MEDICINE

## 2022-09-29 PROCEDURE — G0009 ADMIN PNEUMOCOCCAL VACCINE: HCPCS | Mod: ,,, | Performed by: FAMILY MEDICINE

## 2022-09-29 PROCEDURE — 1101F PR PT FALLS ASSESS DOC 0-1 FALLS W/OUT INJ PAST YR: ICD-10-PCS | Mod: ,,, | Performed by: FAMILY MEDICINE

## 2022-09-29 PROCEDURE — 90677 PNEUMOCOCCAL CONJUGATE VACCINE 20-VALENT: ICD-10-PCS | Mod: ,,, | Performed by: FAMILY MEDICINE

## 2022-09-29 PROCEDURE — 3075F SYST BP GE 130 - 139MM HG: CPT | Mod: ,,, | Performed by: FAMILY MEDICINE

## 2022-09-29 PROCEDURE — 3078F PR MOST RECENT DIASTOLIC BLOOD PRESSURE < 80 MM HG: ICD-10-PCS | Mod: ,,, | Performed by: FAMILY MEDICINE

## 2022-09-29 PROCEDURE — 3061F PR NEG MICROALBUMINURIA RESULT DOCUMENTED/REVIEW: ICD-10-PCS | Mod: ,,, | Performed by: FAMILY MEDICINE

## 2022-09-29 PROCEDURE — 1160F RVW MEDS BY RX/DR IN RCRD: CPT | Mod: ,,, | Performed by: FAMILY MEDICINE

## 2022-09-29 PROCEDURE — 4010F ACE/ARB THERAPY RXD/TAKEN: CPT | Mod: ,,, | Performed by: FAMILY MEDICINE

## 2022-09-29 PROCEDURE — 3066F NEPHROPATHY DOC TX: CPT | Mod: ,,, | Performed by: FAMILY MEDICINE

## 2022-09-29 PROCEDURE — G0439 PPPS, SUBSEQ VISIT: HCPCS | Mod: ,,, | Performed by: FAMILY MEDICINE

## 2022-09-29 PROCEDURE — 90677 PCV20 VACCINE IM: CPT | Mod: ,,, | Performed by: FAMILY MEDICINE

## 2022-09-29 PROCEDURE — G0439 PR MEDICARE ANNUAL WELLNESS SUBSEQUENT VISIT: ICD-10-PCS | Mod: ,,, | Performed by: FAMILY MEDICINE

## 2022-09-29 PROCEDURE — 1125F AMNT PAIN NOTED PAIN PRSNT: CPT | Mod: ,,, | Performed by: FAMILY MEDICINE

## 2022-09-29 PROCEDURE — 3288F FALL RISK ASSESSMENT DOCD: CPT | Mod: ,,, | Performed by: FAMILY MEDICINE

## 2022-09-29 PROCEDURE — 3075F PR MOST RECENT SYSTOLIC BLOOD PRESS GE 130-139MM HG: ICD-10-PCS | Mod: ,,, | Performed by: FAMILY MEDICINE

## 2022-09-29 PROCEDURE — 3288F PR FALLS RISK ASSESSMENT DOCUMENTED: ICD-10-PCS | Mod: ,,, | Performed by: FAMILY MEDICINE

## 2022-09-29 PROCEDURE — 3061F NEG MICROALBUMINURIA REV: CPT | Mod: ,,, | Performed by: FAMILY MEDICINE

## 2022-09-29 PROCEDURE — 1159F PR MEDICATION LIST DOCUMENTED IN MEDICAL RECORD: ICD-10-PCS | Mod: ,,, | Performed by: FAMILY MEDICINE

## 2022-09-29 RX ORDER — LISINOPRIL 40 MG/1
TABLET ORAL
COMMUNITY
End: 2022-11-09

## 2022-09-29 RX ORDER — AMLODIPINE BESYLATE 10 MG/1
TABLET ORAL
COMMUNITY
End: 2022-12-12 | Stop reason: SDUPTHER

## 2022-09-29 RX ORDER — IRON 18 MG
TABLET ORAL
COMMUNITY
End: 2023-10-04 | Stop reason: SDUPTHER

## 2022-09-29 RX ORDER — NALTREXONE HYDROCHLORIDE 50 MG/1
50 TABLET, FILM COATED ORAL DAILY
COMMUNITY
Start: 2022-06-13 | End: 2023-06-22

## 2022-09-29 RX ORDER — HYDRALAZINE HYDROCHLORIDE 100 MG/1
TABLET, FILM COATED ORAL
COMMUNITY
End: 2023-10-04 | Stop reason: SDUPTHER

## 2022-09-29 NOTE — PATIENT INSTRUCTIONS
Counseling and Referral of Other Preventative  (Italic type indicates deductible and co-insurance are waived)    Patient Name: Stephane Mathis  Today's Date: 9/29/2022    Health Maintenance         Date Due Completion Date    TETANUS VACCINE Never done ---    LDCT Lung Screen Never done ---    Shingles Vaccine (1 of 2) Never done ---    Pneumococcal Vaccines (Age 65+) (2 - PCV) 10/23/2019 10/23/2018    COVID-19 Vaccine (4 - Booster for Pfizer series) 01/11/2022 11/16/2021    Influenza Vaccine (1) 09/01/2022 1/26/2022    Lipid Panel 01/26/2027 1/26/2022    Colorectal Cancer Screening 07/13/2032 7/13/2022    Override on 5/9/2014: Done (Dr. Jack)          No orders of the defined types were placed in this encounter.

## 2022-11-09 DIAGNOSIS — Z71.89 COMPLEX CARE COORDINATION: ICD-10-CM

## 2022-11-09 RX ORDER — LISINOPRIL AND HYDROCHLOROTHIAZIDE 10; 12.5 MG/1; MG/1
1 TABLET ORAL DAILY
Qty: 30 TABLET | Refills: 3 | Status: SHIPPED | OUTPATIENT
Start: 2022-11-09 | End: 2023-06-22 | Stop reason: DRUGHIGH

## 2022-11-09 RX ORDER — PANTOPRAZOLE SODIUM 40 MG/1
40 TABLET, DELAYED RELEASE ORAL DAILY
Qty: 30 TABLET | Refills: 3 | Status: SHIPPED | OUTPATIENT
Start: 2022-11-09 | End: 2022-12-12 | Stop reason: SDUPTHER

## 2022-11-09 NOTE — TELEPHONE ENCOUNTER
----- Message from Alvaro Lisa sent at 11/9/2022  9:47 AM CST -----  Regarding: med refill  lisinopriL-hydrochlorothiazide (PRINZIDE,ZESTORETIC) 10-12.5 mg per  pantoprazole (PROTONIX) 40 MG tablet pt need these meds to be refill and send to [facility]

## 2022-12-12 ENCOUNTER — OFFICE VISIT (OUTPATIENT)
Dept: FAMILY MEDICINE | Facility: CLINIC | Age: 77
End: 2022-12-12
Payer: MEDICARE

## 2022-12-12 VITALS
OXYGEN SATURATION: 98 % | TEMPERATURE: 98 F | HEART RATE: 88 BPM | BODY MASS INDEX: 22.51 KG/M2 | HEIGHT: 71 IN | WEIGHT: 160.81 LBS | SYSTOLIC BLOOD PRESSURE: 132 MMHG | DIASTOLIC BLOOD PRESSURE: 60 MMHG | RESPIRATION RATE: 18 BRPM

## 2022-12-12 DIAGNOSIS — R53.83 OTHER FATIGUE: ICD-10-CM

## 2022-12-12 DIAGNOSIS — Z23 IMMUNIZATION DUE: ICD-10-CM

## 2022-12-12 DIAGNOSIS — Z12.5 SCREENING FOR MALIGNANT NEOPLASM OF PROSTATE: ICD-10-CM

## 2022-12-12 DIAGNOSIS — I10 ESSENTIAL HYPERTENSION: Primary | ICD-10-CM

## 2022-12-12 DIAGNOSIS — Z79.899 OTHER LONG TERM (CURRENT) DRUG THERAPY: ICD-10-CM

## 2022-12-12 DIAGNOSIS — K21.9 GERD WITHOUT ESOPHAGITIS: ICD-10-CM

## 2022-12-12 DIAGNOSIS — Z51.81 ENCOUNTER FOR THERAPEUTIC DRUG LEVEL MONITORING: ICD-10-CM

## 2022-12-12 DIAGNOSIS — E78.2 MIXED HYPERLIPIDEMIA: ICD-10-CM

## 2022-12-12 DIAGNOSIS — D50.0 IRON DEFICIENCY ANEMIA DUE TO CHRONIC BLOOD LOSS: ICD-10-CM

## 2022-12-12 DIAGNOSIS — G40.909 SEIZURE DISORDER: ICD-10-CM

## 2022-12-12 PROBLEM — K57.30 COLON, DIVERTICULOSIS: Chronic | Status: ACTIVE | Noted: 2022-07-13

## 2022-12-12 PROBLEM — D62 ACUTE BLOOD LOSS ANEMIA: Status: RESOLVED | Noted: 2022-07-12 | Resolved: 2022-12-12

## 2022-12-12 PROBLEM — K55.20 AVM (ARTERIOVENOUS MALFORMATION) OF COLON: Chronic | Status: ACTIVE | Noted: 2022-07-13

## 2022-12-12 PROBLEM — N50.89 SWELLING OF SCROTUM: Status: RESOLVED | Noted: 2022-09-29 | Resolved: 2022-12-12

## 2022-12-12 PROBLEM — F10.21 ALCOHOL DEPENDENCE IN EARLY FULL REMISSION: Chronic | Status: ACTIVE | Noted: 2022-07-12

## 2022-12-12 PROBLEM — K63.5 POLYP, SIGMOID COLON: Chronic | Status: ACTIVE | Noted: 2022-07-13

## 2022-12-12 PROBLEM — K29.00 ACUTE SUPERFICIAL GASTRITIS WITHOUT HEMORRHAGE: Status: RESOLVED | Noted: 2022-07-12 | Resolved: 2022-12-12

## 2022-12-12 PROBLEM — K92.2 ACUTE GI BLEEDING: Status: RESOLVED | Noted: 2022-07-12 | Resolved: 2022-12-12

## 2022-12-12 LAB
25(OH)D3 SERPL-MCNC: 10.5 NG/ML
ALBUMIN SERPL BCP-MCNC: 3.3 G/DL (ref 3.5–5)
ALBUMIN/GLOB SERPL: 0.7 {RATIO}
ALP SERPL-CCNC: 62 U/L (ref 45–115)
ALT SERPL W P-5'-P-CCNC: 17 U/L (ref 16–61)
ANION GAP SERPL CALCULATED.3IONS-SCNC: 8 MMOL/L (ref 7–16)
AST SERPL W P-5'-P-CCNC: 16 U/L (ref 15–37)
BASOPHILS # BLD AUTO: 0.05 K/UL (ref 0–0.2)
BASOPHILS NFR BLD AUTO: 0.8 % (ref 0–1)
BILIRUB SERPL-MCNC: 0.3 MG/DL (ref ?–1.2)
BUN SERPL-MCNC: 21 MG/DL (ref 7–18)
BUN/CREAT SERPL: 21 (ref 6–20)
CALCIUM SERPL-MCNC: 9 MG/DL (ref 8.5–10.1)
CHLORIDE SERPL-SCNC: 105 MMOL/L (ref 98–107)
CHOLEST SERPL-MCNC: 266 MG/DL (ref 0–200)
CHOLEST/HDLC SERPL: 3.1 {RATIO}
CO2 SERPL-SCNC: 29 MMOL/L (ref 21–32)
CREAT SERPL-MCNC: 1 MG/DL (ref 0.7–1.3)
CREAT UR-MCNC: 122 MG/DL (ref 39–259)
DIFFERENTIAL METHOD BLD: ABNORMAL
EGFR (NO RACE VARIABLE) (RUSH/TITUS): 78 ML/MIN/1.73M²
EOSINOPHIL # BLD AUTO: 0.15 K/UL (ref 0–0.5)
EOSINOPHIL NFR BLD AUTO: 2.3 % (ref 1–4)
ERYTHROCYTE [DISTWIDTH] IN BLOOD BY AUTOMATED COUNT: 13.4 % (ref 11.5–14.5)
FOLATE SERPL-MCNC: 11.8 NG/ML (ref 3.1–17.5)
GLOBULIN SER-MCNC: 4.6 G/DL (ref 2–4)
GLUCOSE SERPL-MCNC: 98 MG/DL (ref 74–106)
HCT VFR BLD AUTO: 31.8 % (ref 40–54)
HDLC SERPL-MCNC: 87 MG/DL (ref 40–60)
HGB BLD-MCNC: 9.6 G/DL (ref 13.5–18)
IMM GRANULOCYTES # BLD AUTO: 0.02 K/UL (ref 0–0.04)
IMM GRANULOCYTES NFR BLD: 0.3 % (ref 0–0.4)
LDLC SERPL CALC-MCNC: 165 MG/DL
LDLC/HDLC SERPL: 1.9 {RATIO}
LYMPHOCYTES # BLD AUTO: 1.5 K/UL (ref 1–4.8)
LYMPHOCYTES NFR BLD AUTO: 23.4 % (ref 27–41)
MACROCYTES BLD QL SMEAR: ABNORMAL
MCH RBC QN AUTO: 32.3 PG (ref 27–31)
MCHC RBC AUTO-ENTMCNC: 30.2 G/DL (ref 32–36)
MCV RBC AUTO: 107.1 FL (ref 80–96)
MICROALBUMIN UR-MCNC: 1 MG/DL (ref 0–2.8)
MICROALBUMIN/CREAT RATIO PNL UR: 8.2 MG/G (ref 0–30)
MONOCYTES # BLD AUTO: 0.68 K/UL (ref 0–0.8)
MONOCYTES NFR BLD AUTO: 10.6 % (ref 2–6)
MPC BLD CALC-MCNC: 12.3 FL (ref 9.4–12.4)
NEUTROPHILS # BLD AUTO: 4.01 K/UL (ref 1.8–7.7)
NEUTROPHILS NFR BLD AUTO: 62.6 % (ref 53–65)
NONHDLC SERPL-MCNC: 179 MG/DL
NRBC # BLD AUTO: 0 X10E3/UL
NRBC, AUTO (.00): 0 %
PLATELET # BLD AUTO: 259 K/UL (ref 150–400)
PLATELET MORPHOLOGY: ABNORMAL
POTASSIUM SERPL-SCNC: 4.2 MMOL/L (ref 3.5–5.1)
PROT SERPL-MCNC: 7.9 G/DL (ref 6.4–8.2)
PSA SERPL-MCNC: 2.37 NG/ML
RBC # BLD AUTO: 2.97 M/UL (ref 4.6–6.2)
SODIUM SERPL-SCNC: 138 MMOL/L (ref 136–145)
T4 FREE SERPL-MCNC: 1 NG/DL (ref 0.76–1.46)
TRIGL SERPL-MCNC: 70 MG/DL (ref 35–150)
TSH SERPL DL<=0.005 MIU/L-ACNC: 0.92 UIU/ML (ref 0.36–3.74)
VIT B12 SERPL-MCNC: 499 PG/ML (ref 193–986)
VLDLC SERPL-MCNC: 14 MG/DL
WBC # BLD AUTO: 6.41 K/UL (ref 4.5–11)

## 2022-12-12 PROCEDURE — 82746 ASSAY OF FOLIC ACID SERUM: CPT | Mod: ,,, | Performed by: CLINICAL MEDICAL LABORATORY

## 2022-12-12 PROCEDURE — 3078F PR MOST RECENT DIASTOLIC BLOOD PRESSURE < 80 MM HG: ICD-10-PCS | Mod: ,,, | Performed by: FAMILY MEDICINE

## 2022-12-12 PROCEDURE — 80177 DRUG SCRN QUAN LEVETIRACETAM: CPT | Mod: ,,, | Performed by: CLINICAL MEDICAL LABORATORY

## 2022-12-12 PROCEDURE — G0008 ADMIN INFLUENZA VIRUS VAC: HCPCS | Mod: ,,, | Performed by: FAMILY MEDICINE

## 2022-12-12 PROCEDURE — 3066F NEPHROPATHY DOC TX: CPT | Mod: ,,, | Performed by: FAMILY MEDICINE

## 2022-12-12 PROCEDURE — 1101F PT FALLS ASSESS-DOCD LE1/YR: CPT | Mod: ,,, | Performed by: FAMILY MEDICINE

## 2022-12-12 PROCEDURE — 82043 MICROALBUMIN / CREATININE RATIO URINE: ICD-10-PCS | Mod: ,,, | Performed by: CLINICAL MEDICAL LABORATORY

## 2022-12-12 PROCEDURE — 3066F PR DOCUMENTATION OF TREATMENT FOR NEPHROPATHY: ICD-10-PCS | Mod: ,,, | Performed by: FAMILY MEDICINE

## 2022-12-12 PROCEDURE — 82570 MICROALBUMIN / CREATININE RATIO URINE: ICD-10-PCS | Mod: ,,, | Performed by: CLINICAL MEDICAL LABORATORY

## 2022-12-12 PROCEDURE — 82306 VITAMIN D 25 HYDROXY: CPT | Mod: ,,, | Performed by: CLINICAL MEDICAL LABORATORY

## 2022-12-12 PROCEDURE — 3288F PR FALLS RISK ASSESSMENT DOCUMENTED: ICD-10-PCS | Mod: ,,, | Performed by: FAMILY MEDICINE

## 2022-12-12 PROCEDURE — 82570 ASSAY OF URINE CREATININE: CPT | Mod: ,,, | Performed by: CLINICAL MEDICAL LABORATORY

## 2022-12-12 PROCEDURE — 1159F PR MEDICATION LIST DOCUMENTED IN MEDICAL RECORD: ICD-10-PCS | Mod: ,,, | Performed by: FAMILY MEDICINE

## 2022-12-12 PROCEDURE — 82306 VITAMIN D: ICD-10-PCS | Mod: ,,, | Performed by: CLINICAL MEDICAL LABORATORY

## 2022-12-12 PROCEDURE — 99214 PR OFFICE/OUTPT VISIT, EST, LEVL IV, 30-39 MIN: ICD-10-PCS | Mod: 25,,, | Performed by: FAMILY MEDICINE

## 2022-12-12 PROCEDURE — 82607 VITAMIN B12: ICD-10-PCS | Mod: ,,, | Performed by: CLINICAL MEDICAL LABORATORY

## 2022-12-12 PROCEDURE — 3075F PR MOST RECENT SYSTOLIC BLOOD PRESS GE 130-139MM HG: ICD-10-PCS | Mod: ,,, | Performed by: FAMILY MEDICINE

## 2022-12-12 PROCEDURE — 84439 T4, FREE: ICD-10-PCS | Mod: ,,, | Performed by: CLINICAL MEDICAL LABORATORY

## 2022-12-12 PROCEDURE — 80061 LIPID PANEL: ICD-10-PCS | Mod: ,,, | Performed by: CLINICAL MEDICAL LABORATORY

## 2022-12-12 PROCEDURE — 80177 LEVETIRACETAM  (KEPPRA) LEVEL: ICD-10-PCS | Mod: ,,, | Performed by: CLINICAL MEDICAL LABORATORY

## 2022-12-12 PROCEDURE — 1101F PR PT FALLS ASSESS DOC 0-1 FALLS W/OUT INJ PAST YR: ICD-10-PCS | Mod: ,,, | Performed by: FAMILY MEDICINE

## 2022-12-12 PROCEDURE — 82746 FOLATE: ICD-10-PCS | Mod: ,,, | Performed by: CLINICAL MEDICAL LABORATORY

## 2022-12-12 PROCEDURE — 80050 PR  GENERAL HEALTH PANEL: ICD-10-PCS | Mod: ,,, | Performed by: CLINICAL MEDICAL LABORATORY

## 2022-12-12 PROCEDURE — 82607 VITAMIN B-12: CPT | Mod: ,,, | Performed by: CLINICAL MEDICAL LABORATORY

## 2022-12-12 PROCEDURE — 3075F SYST BP GE 130 - 139MM HG: CPT | Mod: ,,, | Performed by: FAMILY MEDICINE

## 2022-12-12 PROCEDURE — G0008 FLU VACCINE - QUADRIVALENT - ADJUVANTED: ICD-10-PCS | Mod: ,,, | Performed by: FAMILY MEDICINE

## 2022-12-12 PROCEDURE — G0103 PSA SCREENING: HCPCS | Mod: ,,, | Performed by: CLINICAL MEDICAL LABORATORY

## 2022-12-12 PROCEDURE — 84439 ASSAY OF FREE THYROXINE: CPT | Mod: ,,, | Performed by: CLINICAL MEDICAL LABORATORY

## 2022-12-12 PROCEDURE — 99214 OFFICE O/P EST MOD 30 MIN: CPT | Mod: 25,,, | Performed by: FAMILY MEDICINE

## 2022-12-12 PROCEDURE — 1126F AMNT PAIN NOTED NONE PRSNT: CPT | Mod: ,,, | Performed by: FAMILY MEDICINE

## 2022-12-12 PROCEDURE — 1160F RVW MEDS BY RX/DR IN RCRD: CPT | Mod: ,,, | Performed by: FAMILY MEDICINE

## 2022-12-12 PROCEDURE — 90694 FLU VACCINE - QUADRIVALENT - ADJUVANTED: ICD-10-PCS | Mod: ,,, | Performed by: FAMILY MEDICINE

## 2022-12-12 PROCEDURE — 3288F FALL RISK ASSESSMENT DOCD: CPT | Mod: ,,, | Performed by: FAMILY MEDICINE

## 2022-12-12 PROCEDURE — 82043 UR ALBUMIN QUANTITATIVE: CPT | Mod: ,,, | Performed by: CLINICAL MEDICAL LABORATORY

## 2022-12-12 PROCEDURE — 80050 GENERAL HEALTH PANEL: CPT | Mod: ,,, | Performed by: CLINICAL MEDICAL LABORATORY

## 2022-12-12 PROCEDURE — 3078F DIAST BP <80 MM HG: CPT | Mod: ,,, | Performed by: FAMILY MEDICINE

## 2022-12-12 PROCEDURE — 1126F PR PAIN SEVERITY QUANTIFIED, NO PAIN PRESENT: ICD-10-PCS | Mod: ,,, | Performed by: FAMILY MEDICINE

## 2022-12-12 PROCEDURE — 3061F PR NEG MICROALBUMINURIA RESULT DOCUMENTED/REVIEW: ICD-10-PCS | Mod: ,,, | Performed by: FAMILY MEDICINE

## 2022-12-12 PROCEDURE — 90694 VACC AIIV4 NO PRSRV 0.5ML IM: CPT | Mod: ,,, | Performed by: FAMILY MEDICINE

## 2022-12-12 PROCEDURE — 3061F NEG MICROALBUMINURIA REV: CPT | Mod: ,,, | Performed by: FAMILY MEDICINE

## 2022-12-12 PROCEDURE — 4010F ACE/ARB THERAPY RXD/TAKEN: CPT | Mod: ,,, | Performed by: FAMILY MEDICINE

## 2022-12-12 PROCEDURE — 1160F PR REVIEW ALL MEDS BY PRESCRIBER/CLIN PHARMACIST DOCUMENTED: ICD-10-PCS | Mod: ,,, | Performed by: FAMILY MEDICINE

## 2022-12-12 PROCEDURE — 4010F PR ACE/ARB THEARPY RXD/TAKEN: ICD-10-PCS | Mod: ,,, | Performed by: FAMILY MEDICINE

## 2022-12-12 PROCEDURE — 1159F MED LIST DOCD IN RCRD: CPT | Mod: ,,, | Performed by: FAMILY MEDICINE

## 2022-12-12 PROCEDURE — 80061 LIPID PANEL: CPT | Mod: ,,, | Performed by: CLINICAL MEDICAL LABORATORY

## 2022-12-12 PROCEDURE — G0103 PSA, SCREENING: ICD-10-PCS | Mod: ,,, | Performed by: CLINICAL MEDICAL LABORATORY

## 2022-12-12 RX ORDER — TAMSULOSIN HYDROCHLORIDE 0.4 MG/1
0.4 CAPSULE ORAL DAILY
COMMUNITY
End: 2023-10-04 | Stop reason: SDUPTHER

## 2022-12-12 RX ORDER — ATORVASTATIN CALCIUM 40 MG/1
40 TABLET, FILM COATED ORAL NIGHTLY
Qty: 90 TABLET | Refills: 1 | Status: SHIPPED | OUTPATIENT
Start: 2022-12-12 | End: 2023-10-04 | Stop reason: SDUPTHER

## 2022-12-12 RX ORDER — PANTOPRAZOLE SODIUM 40 MG/1
40 TABLET, DELAYED RELEASE ORAL DAILY
Qty: 90 TABLET | Refills: 1 | Status: SHIPPED | OUTPATIENT
Start: 2022-12-12 | End: 2023-06-22

## 2022-12-12 RX ORDER — AMLODIPINE BESYLATE 10 MG/1
10 TABLET ORAL DAILY
Qty: 90 TABLET | Refills: 1 | Status: SHIPPED | OUTPATIENT
Start: 2022-12-12 | End: 2023-10-04 | Stop reason: SDUPTHER

## 2022-12-12 NOTE — PROGRESS NOTES
Clinic Note    Patient Name: Stephane Mathis  : 1945  MRN: [id number]    Chief Complaint   Patient presents with    Medication Refill    Injections     Flu shot         HPI:    Mr. Stephane Mathis is a 77 y.o. male who presents to clinic today with CC of follow up on chronic disease processes including HTN, HLD, seizure DO, h/o iron deficiency anemia, and h/o alcohol abuse.   Patient reports chronic issues are well controlled on current medication regimen.  Denies problems or side effects with medications.  Requests flu vaccine today.  Patient is, otherwise, without complaints.     Medications:  Medication List with Changes/Refills   Current Medications    ASPIRIN (ECOTRIN) 81 MG EC TABLET    Take 81 mg by mouth once daily.    CETIRIZINE (ZYRTEC) 10 MG TABLET    Take 1 tablet (10 mg total) by mouth daily as needed for Allergies.    FERROUS SULFATE 325 (65 FE) MG EC TABLET    Take 1 tablet (325 mg total) by mouth once daily.    HYDRALAZINE (APRESOLINE) 100 MG TABLET    Take by mouth.    IRON 18 MG TAB        LEVETIRACETAM (KEPPRA) 1000 MG TABLET    Take 1,000 mg by mouth 2 (two) times daily.    LISINOPRIL-HYDROCHLOROTHIAZIDE (PRINZIDE,ZESTORETIC) 10-12.5 MG PER TABLET    Take 1 tablet by mouth once daily.    NALTREXONE (DEPADE) 50 MG TABLET    Take 50 mg by mouth once daily.    NALTREXONE HCL (NALTREXONE ORAL)    Take 50 mg by mouth Daily. For 12 weeks    TAMSULOSIN (FLOMAX) 0.4 MG CAP    Take 0.4 mg by mouth once daily.   Changed and/or Refilled Medications    Modified Medication Previous Medication    AMLODIPINE (NORVASC) 10 MG TABLET amLODIPine (NORVASC) 10 MG tablet       Take 1 tablet (10 mg total) by mouth once daily.    Take 1 tablet (10 mg total) by mouth once daily.    ATORVASTATIN (LIPITOR) 40 MG TABLET atorvastatin (LIPITOR) 40 MG tablet       Take 1 tablet (40 mg total) by mouth every evening.    Take 1 tablet (40 mg total) by mouth every evening.    PANTOPRAZOLE (PROTONIX) 40 MG TABLET  pantoprazole (PROTONIX) 40 MG tablet       Take 1 tablet (40 mg total) by mouth once daily.    Take 1 tablet (40 mg total) by mouth once daily.   Discontinued Medications    AMLODIPINE (NORVASC) 10 MG TABLET    Take by mouth.        Allergies: Codeine      Past Medical History:    Past Medical History:   Diagnosis Date    Acute superficial gastritis without hemorrhage 7/12/2022    Alcoholism     Anemia     AVM (arteriovenous malformation) of colon 7/13/2022    Colon, diverticulosis 7/13/2022    Hypertension     Iron deficiency anemia due to chronic blood loss 7/12/2022    Polyp, sigmoid colon 7/13/2022    Seizures     Stroke     right sided weakness       Past Surgical History:    Past Surgical History:   Procedure Laterality Date    KNEE SURGERY Right     SCROTAL SURGERY      in february         Social History:    Social History     Tobacco Use   Smoking Status Every Day    Packs/day: 1.00    Years: 55.00    Pack years: 55.00    Types: Cigarettes   Smokeless Tobacco Never     Social History     Substance and Sexual Activity   Alcohol Use Not Currently    Comment: half pint of liquor a day. but reports last drink was  a month ago.     Social History     Substance and Sexual Activity   Drug Use Yes    Types: Marijuana    Comment: daily         Family History:    Family History   Problem Relation Age of Onset    Diabetes Mother     Hypertension Mother     Diabetes Sister     Diabetes Brother     Hypertension Brother     Throat cancer Brother        Review of Systems:    Review of Systems   Constitutional:  Positive for fatigue. Negative for appetite change, chills, fever and unexpected weight change.   Eyes:  Negative for visual disturbance.   Respiratory:  Negative for cough and shortness of breath.    Cardiovascular:  Negative for chest pain and leg swelling.   Gastrointestinal:  Negative for abdominal pain, change in bowel habit, constipation, diarrhea, nausea, vomiting and change in bowel habit.   Musculoskeletal:  " Negative for arthralgias.   Integumentary:  Negative for rash.   Neurological:  Negative for dizziness.        Reports occasional headaches   Psychiatric/Behavioral:  The patient is not nervous/anxious.       Vitals:    Vitals:    12/12/22 1111   BP: 132/60   BP Location: Left arm   Patient Position: Sitting   BP Method: Large (Manual)   Pulse: 88   Resp: 18   Temp: 97.7 °F (36.5 °C)   TempSrc: Oral   SpO2: 98%   Weight: 72.9 kg (160 lb 12.8 oz)   Height: 5' 11" (1.803 m)       Body mass index is 22.43 kg/m².    Wt Readings from Last 3 Encounters:   12/12/22 1111 72.9 kg (160 lb 12.8 oz)   09/29/22 1332 67.3 kg (148 lb 6.4 oz)   07/26/22 0818 68 kg (150 lb)        Physical Exam:    Physical Exam  Constitutional:       General: He is not in acute distress.     Appearance: Normal appearance.   HENT:      Nose: Nose normal.      Mouth/Throat:      Mouth: Mucous membranes are moist.      Pharynx: Oropharynx is clear.   Eyes:      Conjunctiva/sclera: Conjunctivae normal.   Cardiovascular:      Rate and Rhythm: Normal rate and regular rhythm.      Heart sounds: Normal heart sounds. No murmur heard.  Pulmonary:      Effort: Pulmonary effort is normal. No respiratory distress.      Breath sounds: Normal breath sounds. No wheezing, rhonchi or rales.   Abdominal:      General: Bowel sounds are normal.      Palpations: Abdomen is soft.      Tenderness: There is no abdominal tenderness.   Musculoskeletal:      Cervical back: Neck supple.      Right lower leg: No edema.      Left lower leg: No edema.   Skin:     Findings: No rash.   Neurological:      General: No focal deficit present.      Mental Status: He is alert. Mental status is at baseline.   Psychiatric:         Mood and Affect: Mood normal.         Assessment/Plan:   Essential hypertension  -     amLODIPine (NORVASC) 10 MG tablet; Take 1 tablet (10 mg total) by mouth once daily.  Dispense: 90 tablet; Refill: 1  -     Comprehensive Metabolic Panel; Future; Expected " date: 12/12/2022  -     CBC Auto Differential; Future; Expected date: 12/12/2022  -     Lipid Panel; Future; Expected date: 12/12/2022  -     Microalbumin/Creatinine Ratio, Urine    Iron deficiency anemia due to chronic blood loss  -     CBC Auto Differential; Future; Expected date: 12/12/2022    Mixed hyperlipidemia  -     atorvastatin (LIPITOR) 40 MG tablet; Take 1 tablet (40 mg total) by mouth every evening.  Dispense: 90 tablet; Refill: 1  -     Comprehensive Metabolic Panel; Future; Expected date: 12/12/2022  -     CBC Auto Differential; Future; Expected date: 12/12/2022  -     Lipid Panel; Future; Expected date: 12/12/2022    GERD without esophagitis  -     pantoprazole (PROTONIX) 40 MG tablet; Take 1 tablet (40 mg total) by mouth once daily.  Dispense: 90 tablet; Refill: 1    Screening for malignant neoplasm of prostate  -     PSA, Screening; Future; Expected date: 12/12/2022    Seizure disorder  -     Levetiracetam Level; Future; Expected date: 12/12/2022    Encounter for therapeutic drug level monitoring  -     Levetiracetam Level; Future; Expected date: 12/12/2022    Immunization due  -     Influenza (FLUAD) - Quadrivalent (Adjuvanted) *Preferred* (65+) (PF)    Other fatigue  -     Vitamin B12; Future; Expected date: 12/12/2022  -     Vitamin D; Future; Expected date: 12/12/2022  -     Folate; Future; Expected date: 12/12/2022  -     TSH; Future; Expected date: 12/12/2022  -     T4, Free; Future; Expected date: 12/12/2022    Other long term (current) drug therapy  -     Vitamin B12; Future; Expected date: 12/12/2022  -     Vitamin D; Future; Expected date: 12/12/2022  -     Folate; Future; Expected date: 12/12/2022         Active Problem List with Overview Notes    Diagnosis Date Noted    HTN (hypertension) 07/12/2022    Seizure 06/10/2022    Alcohol dependence in early full remission 07/12/2022    Iron deficiency anemia due to chronic blood loss 07/12/2022    AVM (arteriovenous malformation) of colon  07/13/2022    Continuous dependence on cigarette smoking 07/12/2022    Colon, diverticulosis 07/13/2022    Polyp, sigmoid colon 07/13/2022        Health Maintenance:  Health Maintenance   Topic Date Due    LDCT Lung Screen  Never done    TETANUS VACCINE  09/29/2023 (Originally 10/20/1963)    Lipid Panel  01/26/2027    Hepatitis C Screening  Completed       RTC in 4 months for chronic follow up.  RTC sooner if needed.   Patient voiced understanding and is agreeable to plan.      Pebbles Dickerson MD    Family Medicine

## 2022-12-13 LAB — LEVETIRACETAM SERPL-MCNC: <2 ΜG/ML (ref 12–46)

## 2022-12-22 ENCOUNTER — TELEPHONE (OUTPATIENT)
Dept: FAMILY MEDICINE | Facility: CLINIC | Age: 77
End: 2022-12-22
Payer: MEDICARE

## 2022-12-22 DIAGNOSIS — D64.9 ANEMIA, UNSPECIFIED TYPE: Primary | ICD-10-CM

## 2022-12-22 RX ORDER — LEVETIRACETAM 1000 MG/1
1000 TABLET ORAL 2 TIMES DAILY
Qty: 180 TABLET | Refills: 1 | Status: SHIPPED | OUTPATIENT
Start: 2022-12-22 | End: 2023-06-22 | Stop reason: ALTCHOICE

## 2022-12-22 RX ORDER — ERGOCALCIFEROL 1.25 MG/1
50000 CAPSULE ORAL
Qty: 12 CAPSULE | Refills: 0 | Status: SHIPPED | OUTPATIENT
Start: 2022-12-22 | End: 2023-01-03 | Stop reason: SDUPTHER

## 2022-12-22 RX ORDER — ERGOCALCIFEROL 1.25 MG/1
50000 CAPSULE ORAL
COMMUNITY
End: 2022-12-22 | Stop reason: SDUPTHER

## 2022-12-22 NOTE — TELEPHONE ENCOUNTER
----- Message from Sierra Dickerson MD sent at 12/21/2022 11:48 AM CST -----  Please call patient regarding lab results. Keppra level is low. Is patient still taking this medication as prescribed for seizure prevention? Has he had any recent seizures? Patient is anemic. This is fairly stable when compared to previous. Has he had any blood in his stool? Is colonoscopy up to date? Recommend repeat CBC (lab only) in 2 weeks. Cholesterol is up some. Is he taking statin as prescribed? Recommend low cholesterol diet and exercise. PSA is normal. Vitamin D is low. Recommend ergocalciferol 50,000 units weekly X 12 weeks, then transition to OTC vitamin D 5,000 units daily. Labs, otherwise, ok/stable. Encourage medication compliance. Thanks!

## 2022-12-24 ENCOUNTER — PATIENT MESSAGE (OUTPATIENT)
Dept: FAMILY MEDICINE | Facility: CLINIC | Age: 77
End: 2022-12-24
Payer: MEDICARE

## 2023-01-02 ENCOUNTER — PATIENT MESSAGE (OUTPATIENT)
Dept: FAMILY MEDICINE | Facility: CLINIC | Age: 78
End: 2023-01-02
Payer: MEDICARE

## 2023-01-03 RX ORDER — ERGOCALCIFEROL 1.25 MG/1
50000 CAPSULE ORAL
Qty: 12 CAPSULE | Refills: 0 | Status: SHIPPED | OUTPATIENT
Start: 2023-01-03 | End: 2023-06-22

## 2023-04-04 ENCOUNTER — PATIENT MESSAGE (OUTPATIENT)
Dept: RESEARCH | Facility: HOSPITAL | Age: 78
End: 2023-04-04

## 2023-05-24 ENCOUNTER — PATIENT OUTREACH (OUTPATIENT)
Dept: ADMINISTRATIVE | Facility: HOSPITAL | Age: 78
End: 2023-05-24

## 2023-05-24 NOTE — PROGRESS NOTES
[employer] chart audit for the following:  Pain Assessment, Medication Review, COA Functional Status, Bp in 2023- none done. Patient has not had an appointment in 2023.

## 2023-06-09 DIAGNOSIS — Z71.89 COMPLEX CARE COORDINATION: ICD-10-CM

## 2023-06-22 ENCOUNTER — OFFICE VISIT (OUTPATIENT)
Dept: FAMILY MEDICINE | Facility: CLINIC | Age: 78
End: 2023-06-22
Payer: MEDICARE

## 2023-06-22 VITALS
BODY MASS INDEX: 23.1 KG/M2 | TEMPERATURE: 99 F | DIASTOLIC BLOOD PRESSURE: 62 MMHG | OXYGEN SATURATION: 97 % | SYSTOLIC BLOOD PRESSURE: 138 MMHG | RESPIRATION RATE: 17 BRPM | HEART RATE: 64 BPM | HEIGHT: 71 IN | WEIGHT: 165 LBS

## 2023-06-22 DIAGNOSIS — F10.21 ALCOHOL DEPENDENCE IN EARLY FULL REMISSION: ICD-10-CM

## 2023-06-22 DIAGNOSIS — N52.9 ERECTILE DYSFUNCTION, UNSPECIFIED ERECTILE DYSFUNCTION TYPE: ICD-10-CM

## 2023-06-22 DIAGNOSIS — M15.9 PRIMARY OSTEOARTHRITIS INVOLVING MULTIPLE JOINTS: ICD-10-CM

## 2023-06-22 DIAGNOSIS — M62.838 MUSCLE SPASM: ICD-10-CM

## 2023-06-22 DIAGNOSIS — I10 PRIMARY HYPERTENSION: Primary | Chronic | ICD-10-CM

## 2023-06-22 DIAGNOSIS — G40.909 SEIZURE DISORDER: ICD-10-CM

## 2023-06-22 PROCEDURE — 3075F PR MOST RECENT SYSTOLIC BLOOD PRESS GE 130-139MM HG: ICD-10-PCS | Mod: ,,, | Performed by: FAMILY MEDICINE

## 2023-06-22 PROCEDURE — 3288F FALL RISK ASSESSMENT DOCD: CPT | Mod: ,,, | Performed by: FAMILY MEDICINE

## 2023-06-22 PROCEDURE — 1159F MED LIST DOCD IN RCRD: CPT | Mod: ,,, | Performed by: FAMILY MEDICINE

## 2023-06-22 PROCEDURE — 1101F PT FALLS ASSESS-DOCD LE1/YR: CPT | Mod: ,,, | Performed by: FAMILY MEDICINE

## 2023-06-22 PROCEDURE — 3288F PR FALLS RISK ASSESSMENT DOCUMENTED: ICD-10-PCS | Mod: ,,, | Performed by: FAMILY MEDICINE

## 2023-06-22 PROCEDURE — 1101F PR PT FALLS ASSESS DOC 0-1 FALLS W/OUT INJ PAST YR: ICD-10-PCS | Mod: ,,, | Performed by: FAMILY MEDICINE

## 2023-06-22 PROCEDURE — 1160F PR REVIEW ALL MEDS BY PRESCRIBER/CLIN PHARMACIST DOCUMENTED: ICD-10-PCS | Mod: ,,, | Performed by: FAMILY MEDICINE

## 2023-06-22 PROCEDURE — 3078F PR MOST RECENT DIASTOLIC BLOOD PRESSURE < 80 MM HG: ICD-10-PCS | Mod: ,,, | Performed by: FAMILY MEDICINE

## 2023-06-22 PROCEDURE — 3078F DIAST BP <80 MM HG: CPT | Mod: ,,, | Performed by: FAMILY MEDICINE

## 2023-06-22 PROCEDURE — 1125F PR PAIN SEVERITY QUANTIFIED, PAIN PRESENT: ICD-10-PCS | Mod: ,,, | Performed by: FAMILY MEDICINE

## 2023-06-22 PROCEDURE — 99213 PR OFFICE/OUTPT VISIT, EST, LEVL III, 20-29 MIN: ICD-10-PCS | Mod: ,,, | Performed by: FAMILY MEDICINE

## 2023-06-22 PROCEDURE — 1125F AMNT PAIN NOTED PAIN PRSNT: CPT | Mod: ,,, | Performed by: FAMILY MEDICINE

## 2023-06-22 PROCEDURE — 1159F PR MEDICATION LIST DOCUMENTED IN MEDICAL RECORD: ICD-10-PCS | Mod: ,,, | Performed by: FAMILY MEDICINE

## 2023-06-22 PROCEDURE — 99213 OFFICE O/P EST LOW 20 MIN: CPT | Mod: ,,, | Performed by: FAMILY MEDICINE

## 2023-06-22 PROCEDURE — 1160F RVW MEDS BY RX/DR IN RCRD: CPT | Mod: ,,, | Performed by: FAMILY MEDICINE

## 2023-06-22 PROCEDURE — 3075F SYST BP GE 130 - 139MM HG: CPT | Mod: ,,, | Performed by: FAMILY MEDICINE

## 2023-06-22 RX ORDER — LISINOPRIL AND HYDROCHLOROTHIAZIDE 20; 25 MG/1; MG/1
1 TABLET ORAL DAILY
Qty: 90 TABLET | Refills: 1 | Status: SHIPPED | OUTPATIENT
Start: 2023-06-22 | End: 2023-10-04 | Stop reason: SDUPTHER

## 2023-06-22 RX ORDER — METHOCARBAMOL 500 MG/1
500 TABLET, FILM COATED ORAL 3 TIMES DAILY PRN
Qty: 30 TABLET | Refills: 2 | Status: SHIPPED | OUTPATIENT
Start: 2023-06-22 | End: 2023-10-04 | Stop reason: SDUPTHER

## 2023-06-22 RX ORDER — MELOXICAM 7.5 MG/1
7.5 TABLET ORAL DAILY PRN
Qty: 30 TABLET | Refills: 2 | Status: SHIPPED | OUTPATIENT
Start: 2023-06-22 | End: 2023-10-04 | Stop reason: SDUPTHER

## 2023-06-22 RX ORDER — TADALAFIL 10 MG/1
10 TABLET ORAL DAILY PRN
Qty: 6 TABLET | Refills: 5 | Status: SHIPPED | OUTPATIENT
Start: 2023-06-22 | End: 2023-10-04 | Stop reason: SDUPTHER

## 2023-06-22 NOTE — PROGRESS NOTES
Clinic Note    Patient Name: Stephane Mathis  : 1945  MRN: [id number]    Chief Complaint   Patient presents with    Arm Pain     Left arm pain     Numbness     Numbness of the right leg.        HPI:    Mr. Stephane Mathis is a 77 y.o. male who presents to clinic today with CC of L shoulder pain and R leg pain. Reports flare with pain in cold, damp, rainy weather. Reports the air conditioner seems to also make it worse. Denies associated CP or SOB. Reports he likes to cook and if he stands for a long time cooking or washing dishes he has some hip pain that causes some pain and numbness down RLE. Reports some stiffness when he first starts moving. Reports he has had a prior R knee surgery.  BP is elevated today. Reports medication compliance.  He has a h/o alcohol abuse. Reports he still drinks but has cut way back. Reports he drinks a couple of beers per day and some alcohol on special occasions.   Patient is, otherwise, without complaints.     Medications:  Medication List with Changes/Refills   New Medications    LISINOPRIL-HYDROCHLOROTHIAZIDE (PRINZIDE,ZESTORETIC) 20-25 MG TAB    Take 1 tablet by mouth once daily.    MELOXICAM (MOBIC) 7.5 MG TABLET    Take 1 tablet (7.5 mg total) by mouth daily as needed for Pain.    METHOCARBAMOL (ROBAXIN) 500 MG TAB    Take 1 tablet (500 mg total) by mouth 3 (three) times daily as needed (muscle spasm/pain. May cause drowsiness).    TADALAFIL (CIALIS) 10 MG TABLET    Take 1 tablet (10 mg total) by mouth daily as needed for Erectile Dysfunction.   Current Medications    AMLODIPINE (NORVASC) 10 MG TABLET    Take 1 tablet (10 mg total) by mouth once daily.    ASPIRIN (ECOTRIN) 81 MG EC TABLET    Take 81 mg by mouth once daily.    ATORVASTATIN (LIPITOR) 40 MG TABLET    Take 1 tablet (40 mg total) by mouth every evening.    FERROUS SULFATE 325 (65 FE) MG EC TABLET    Take 1 tablet (325 mg total) by mouth once daily.    HYDRALAZINE (APRESOLINE) 100 MG TABLET    Take by mouth.     IRON 18 MG TAB        TAMSULOSIN (FLOMAX) 0.4 MG CAP    Take 0.4 mg by mouth once daily.   Discontinued Medications    CETIRIZINE (ZYRTEC) 10 MG TABLET    Take 1 tablet (10 mg total) by mouth daily as needed for Allergies.    ERGOCALCIFEROL (ERGOCALCIFEROL) 50,000 UNIT CAP    Take 1 capsule (50,000 Units total) by mouth every 7 days.    LEVETIRACETAM (KEPPRA) 1000 MG TABLET    Take 1 tablet (1,000 mg total) by mouth 2 (two) times daily.    LISINOPRIL-HYDROCHLOROTHIAZIDE (PRINZIDE,ZESTORETIC) 10-12.5 MG PER TABLET    Take 1 tablet by mouth once daily.    NALTREXONE (DEPADE) 50 MG TABLET    Take 50 mg by mouth once daily.    NALTREXONE HCL (NALTREXONE ORAL)    Take 50 mg by mouth Daily. For 12 weeks    PANTOPRAZOLE (PROTONIX) 40 MG TABLET    Take 1 tablet (40 mg total) by mouth once daily.        Allergies: Codeine      Past Medical History:    Past Medical History:   Diagnosis Date    Acute superficial gastritis without hemorrhage 7/12/2022    Alcoholism     Anemia     AVM (arteriovenous malformation) of colon 7/13/2022    Colon, diverticulosis 7/13/2022    Hypertension     Iron deficiency anemia due to chronic blood loss 7/12/2022    Polyp, sigmoid colon 7/13/2022    Seizures     Stroke     right sided weakness       Past Surgical History:    Past Surgical History:   Procedure Laterality Date    KNEE SURGERY Right     SCROTAL SURGERY      in february         Social History:    Social History     Tobacco Use   Smoking Status Every Day    Packs/day: 1.00    Years: 55.00    Pack years: 55.00    Types: Cigarettes   Smokeless Tobacco Never     Social History     Substance and Sexual Activity   Alcohol Use Not Currently    Comment: half pint of liquor a day. but reports last drink was  a month ago.     Social History     Substance and Sexual Activity   Drug Use Yes    Types: Marijuana    Comment: daily         Family History:    Family History   Problem Relation Age of Onset    Diabetes Mother     Hypertension Mother   "   Diabetes Sister     Diabetes Brother     Hypertension Brother     Throat cancer Brother        Review of Systems:    Review of Systems   Constitutional:  Negative for appetite change, chills, fatigue, fever and unexpected weight change.   Eyes:  Negative for visual disturbance.   Respiratory:  Negative for cough and shortness of breath.    Cardiovascular:  Negative for chest pain and leg swelling.   Gastrointestinal:  Negative for abdominal pain, change in bowel habit, constipation, diarrhea, nausea, vomiting and change in bowel habit.   Musculoskeletal:  Positive for arthralgias.   Integumentary:  Negative for rash.   Neurological:  Negative for dizziness.        Reports occasional headaches; denies HA currently   Psychiatric/Behavioral:  The patient is not nervous/anxious.       Vitals:    Vitals:    06/22/23 0956 06/22/23 1050   BP: (!) 146/67 138/62   BP Location: Left arm Left arm   Patient Position: Sitting Sitting   BP Method: Large (Automatic) Large (Manual)   Pulse: 64    Resp: 17    Temp: 98.5 °F (36.9 °C)    TempSrc: Oral    SpO2: 97%    Weight: 74.8 kg (165 lb)    Height: 5' 11" (1.803 m)        Body mass index is 23.01 kg/m².    Wt Readings from Last 3 Encounters:   06/22/23 0956 74.8 kg (165 lb)   12/12/22 1111 72.9 kg (160 lb 12.8 oz)   09/29/22 1332 67.3 kg (148 lb 6.4 oz)        Physical Exam:    Physical Exam  Constitutional:       General: He is not in acute distress.     Appearance: Normal appearance.   HENT:      Nose: Nose normal.      Mouth/Throat:      Mouth: Mucous membranes are moist.      Pharynx: Oropharynx is clear.   Eyes:      Conjunctiva/sclera: Conjunctivae normal.   Cardiovascular:      Rate and Rhythm: Normal rate and regular rhythm.      Heart sounds: Normal heart sounds. No murmur heard.  Pulmonary:      Effort: Pulmonary effort is normal. No respiratory distress.      Breath sounds: Normal breath sounds. No wheezing, rhonchi or rales.   Abdominal:      General: Bowel sounds " are normal.      Palpations: Abdomen is soft.      Tenderness: There is no abdominal tenderness.   Musculoskeletal:         General: Tenderness present. No swelling. Normal range of motion.      Cervical back: Neck supple.      Right lower leg: No edema.      Left lower leg: No edema.      Comments: ROM is normal but patient has significant pain with lifting L arm overhead  + muscle spasm   Skin:     Findings: No rash.   Neurological:      General: No focal deficit present.      Mental Status: He is alert. Mental status is at baseline.   Psychiatric:         Mood and Affect: Mood normal.       Assessment/Plan:   1. Primary hypertension  -     lisinopriL-hydrochlorothiazide (PRINZIDE,ZESTORETIC) 20-25 mg Tab; Take 1 tablet by mouth once daily.  Dispense: 90 tablet; Refill: 1 - dose increase  - DASH diet    2. Alcohol dependence in early full remission  - Reports he has cut back but has not completely quit drinking. Denies interest in cessation.    3. Seizure disorder  - Patient was taken off of all seizure mediation 11 months ago without recurrent seizure activity. Reports he never actually had a seizure. It appears any prior history was associated with alcohol withdrawals.    4. Muscle spasm  -     methocarbamoL (ROBAXIN) 500 MG Tab; Take 1 tablet (500 mg total) by mouth 3 (three) times daily as needed (muscle spasm/pain. May cause drowsiness).  Dispense: 30 tablet; Refill: 2- new medication. Risks/benefits/potential side effects/black box warning reviewed and discussed with patient.     5. Primary osteoarthritis involving multiple joints  -     meloxicam (MOBIC) 7.5 MG tablet; Take 1 tablet (7.5 mg total) by mouth daily as needed for Pain.  Dispense: 30 tablet; Refill: 2- new medication. Risks/benefits/potential side effects/black box warning reviewed and discussed with patient.     6. Erectile dysfunction, unspecified erectile dysfunction type  -     tadalafiL (CIALIS) 10 MG tablet; Take 1 tablet (10 mg total) by  mouth daily as needed for Erectile Dysfunction.  Dispense: 6 tablet; Refill: 5         Active Problem List with Overview Notes    Diagnosis Date Noted    HTN (hypertension) 07/12/2022    Seizure 06/10/2022    Alcohol dependence in early full remission 07/12/2022    Iron deficiency anemia due to chronic blood loss 07/12/2022    AVM (arteriovenous malformation) of colon 07/13/2022    Continuous dependence on cigarette smoking 07/12/2022    Colon, diverticulosis 07/13/2022    Polyp, sigmoid colon 07/13/2022        RTC in 2-4 weeks for follow up on HTN, pain and muscle spasm - medication changes.  RTC sooner if symptoms worsen or fail to resolve.  Patient voiced understanding and is agreeable to plan.      Pebbles Dickerson MD    Family Medicine

## 2023-06-30 ENCOUNTER — EXTERNAL CHRONIC CARE MANAGEMENT (OUTPATIENT)
Dept: FAMILY MEDICINE | Facility: CLINIC | Age: 78
End: 2023-06-30
Payer: MEDICARE

## 2023-06-30 PROCEDURE — G0511 CCM/BHI BY RHC/FQHC 20MIN MO: HCPCS | Mod: ,,, | Performed by: FAMILY MEDICINE

## 2023-06-30 PROCEDURE — G0511 PR CHRONIC CARE MGMT, RHC OR FQHC ONLY, 20 MINS OR MORE: ICD-10-PCS | Mod: ,,, | Performed by: FAMILY MEDICINE

## 2023-07-31 ENCOUNTER — EXTERNAL CHRONIC CARE MANAGEMENT (OUTPATIENT)
Dept: FAMILY MEDICINE | Facility: CLINIC | Age: 78
End: 2023-07-31
Payer: MEDICARE

## 2023-07-31 PROCEDURE — G0511 PR CHRONIC CARE MGMT, RHC OR FQHC ONLY, 20 MINS OR MORE: ICD-10-PCS | Mod: ,,, | Performed by: FAMILY MEDICINE

## 2023-07-31 PROCEDURE — G0511 CCM/BHI BY RHC/FQHC 20MIN MO: HCPCS | Mod: ,,, | Performed by: FAMILY MEDICINE

## 2023-08-31 ENCOUNTER — EXTERNAL CHRONIC CARE MANAGEMENT (OUTPATIENT)
Dept: FAMILY MEDICINE | Facility: CLINIC | Age: 78
End: 2023-08-31
Payer: MEDICARE

## 2023-08-31 PROCEDURE — G0511 PR CHRONIC CARE MGMT, RHC OR FQHC ONLY, 20 MINS OR MORE: ICD-10-PCS | Mod: ,,, | Performed by: FAMILY MEDICINE

## 2023-08-31 PROCEDURE — G0511 CCM/BHI BY RHC/FQHC 20MIN MO: HCPCS | Mod: ,,, | Performed by: FAMILY MEDICINE

## 2023-09-21 ENCOUNTER — TELEPHONE (OUTPATIENT)
Dept: FAMILY MEDICINE | Facility: CLINIC | Age: 78
End: 2023-09-21
Payer: MEDICARE

## 2023-09-30 ENCOUNTER — EXTERNAL CHRONIC CARE MANAGEMENT (OUTPATIENT)
Dept: FAMILY MEDICINE | Facility: CLINIC | Age: 78
End: 2023-09-30
Payer: MEDICARE

## 2023-09-30 PROCEDURE — G0511 CCM/BHI BY RHC/FQHC 20MIN MO: HCPCS | Mod: ,,, | Performed by: FAMILY MEDICINE

## 2023-09-30 PROCEDURE — G0511 PR CHRONIC CARE MGMT, RHC OR FQHC ONLY, 20 MINS OR MORE: ICD-10-PCS | Mod: ,,, | Performed by: FAMILY MEDICINE

## 2023-10-04 ENCOUNTER — OFFICE VISIT (OUTPATIENT)
Dept: FAMILY MEDICINE | Facility: CLINIC | Age: 78
End: 2023-10-04
Payer: MEDICARE

## 2023-10-04 VITALS
TEMPERATURE: 99 F | RESPIRATION RATE: 16 BRPM | WEIGHT: 166 LBS | DIASTOLIC BLOOD PRESSURE: 68 MMHG | HEIGHT: 71 IN | SYSTOLIC BLOOD PRESSURE: 174 MMHG | OXYGEN SATURATION: 98 % | BODY MASS INDEX: 23.24 KG/M2 | HEART RATE: 77 BPM

## 2023-10-04 DIAGNOSIS — N40.0 BENIGN PROSTATIC HYPERPLASIA WITHOUT LOWER URINARY TRACT SYMPTOMS: Chronic | ICD-10-CM

## 2023-10-04 DIAGNOSIS — D50.0 IRON DEFICIENCY ANEMIA DUE TO CHRONIC BLOOD LOSS: ICD-10-CM

## 2023-10-04 DIAGNOSIS — M15.9 PRIMARY OSTEOARTHRITIS INVOLVING MULTIPLE JOINTS: Chronic | ICD-10-CM

## 2023-10-04 DIAGNOSIS — Z23 IMMUNIZATION DUE: ICD-10-CM

## 2023-10-04 DIAGNOSIS — N52.9 ERECTILE DYSFUNCTION, UNSPECIFIED ERECTILE DYSFUNCTION TYPE: Chronic | ICD-10-CM

## 2023-10-04 DIAGNOSIS — I10 ESSENTIAL HYPERTENSION: Primary | ICD-10-CM

## 2023-10-04 DIAGNOSIS — H61.23 BILATERAL IMPACTED CERUMEN: ICD-10-CM

## 2023-10-04 DIAGNOSIS — M62.838 MUSCLE SPASM: Chronic | ICD-10-CM

## 2023-10-04 DIAGNOSIS — E78.2 MIXED HYPERLIPIDEMIA: ICD-10-CM

## 2023-10-04 DIAGNOSIS — E55.9 VITAMIN D DEFICIENCY: Chronic | ICD-10-CM

## 2023-10-04 DIAGNOSIS — F17.210 CONTINUOUS DEPENDENCE ON CIGARETTE SMOKING: Chronic | ICD-10-CM

## 2023-10-04 DIAGNOSIS — Z12.2 SCREENING FOR LUNG CANCER: ICD-10-CM

## 2023-10-04 DIAGNOSIS — H65.93 FLUID LEVEL BEHIND TYMPANIC MEMBRANE OF BOTH EARS: ICD-10-CM

## 2023-10-04 DIAGNOSIS — Z87.891 PERSONAL HISTORY OF NICOTINE DEPENDENCE: ICD-10-CM

## 2023-10-04 DIAGNOSIS — H10.13 ALLERGIC CONJUNCTIVITIS OF BOTH EYES: ICD-10-CM

## 2023-10-04 PROBLEM — M15.0 PRIMARY OSTEOARTHRITIS INVOLVING MULTIPLE JOINTS: Chronic | Status: ACTIVE | Noted: 2023-10-04

## 2023-10-04 LAB
25(OH)D3 SERPL-MCNC: 13 NG/ML
ALBUMIN SERPL BCP-MCNC: 3.5 G/DL (ref 3.5–5)
ALBUMIN/GLOB SERPL: 0.8 {RATIO}
ALP SERPL-CCNC: 68 U/L (ref 45–115)
ALT SERPL W P-5'-P-CCNC: 20 U/L (ref 16–61)
ANION GAP SERPL CALCULATED.3IONS-SCNC: 6 MMOL/L (ref 7–16)
AST SERPL W P-5'-P-CCNC: 18 U/L (ref 15–37)
BASOPHILS # BLD AUTO: 0.04 K/UL (ref 0–0.2)
BASOPHILS NFR BLD AUTO: 0.6 % (ref 0–1)
BILIRUB SERPL-MCNC: 0.3 MG/DL (ref ?–1.2)
BUN SERPL-MCNC: 17 MG/DL (ref 7–18)
BUN/CREAT SERPL: 13 (ref 6–20)
CALCIUM SERPL-MCNC: 9 MG/DL (ref 8.5–10.1)
CHLORIDE SERPL-SCNC: 107 MMOL/L (ref 98–107)
CHOLEST SERPL-MCNC: 179 MG/DL (ref 0–200)
CHOLEST/HDLC SERPL: 1.9 {RATIO}
CO2 SERPL-SCNC: 29 MMOL/L (ref 21–32)
CREAT SERPL-MCNC: 1.26 MG/DL (ref 0.7–1.3)
CREAT UR-MCNC: 150 MG/DL (ref 39–259)
DIFFERENTIAL METHOD BLD: ABNORMAL
EGFR (NO RACE VARIABLE) (RUSH/TITUS): 59 ML/MIN/1.73M2
EOSINOPHIL # BLD AUTO: 0.15 K/UL (ref 0–0.5)
EOSINOPHIL NFR BLD AUTO: 2.2 % (ref 1–4)
ERYTHROCYTE [DISTWIDTH] IN BLOOD BY AUTOMATED COUNT: 15.7 % (ref 11.5–14.5)
FERRITIN SERPL-MCNC: 8 NG/ML (ref 26–388)
GLOBULIN SER-MCNC: 4.5 G/DL (ref 2–4)
GLUCOSE SERPL-MCNC: 104 MG/DL (ref 74–106)
HCT VFR BLD AUTO: 24.6 % (ref 40–54)
HDLC SERPL-MCNC: 92 MG/DL (ref 40–60)
HGB BLD-MCNC: 7.6 G/DL (ref 13.5–18)
IMM GRANULOCYTES # BLD AUTO: 0.02 K/UL (ref 0–0.04)
IMM GRANULOCYTES NFR BLD: 0.3 % (ref 0–0.4)
IRON SATN MFR SERPL: 4 % (ref 14–50)
IRON SERPL-MCNC: 16 ΜG/DL (ref 65–175)
LDLC SERPL CALC-MCNC: 72 MG/DL
LYMPHOCYTES # BLD AUTO: 1.72 K/UL (ref 1–4.8)
LYMPHOCYTES NFR BLD AUTO: 25.5 % (ref 27–41)
MCH RBC QN AUTO: 28.8 PG (ref 27–31)
MCHC RBC AUTO-ENTMCNC: 30.9 G/DL (ref 32–36)
MCV RBC AUTO: 93.2 FL (ref 80–96)
MICROALBUMIN UR-MCNC: 1.1 MG/DL (ref 0–2.8)
MICROALBUMIN/CREAT RATIO PNL UR: 7.3 MG/G (ref 0–30)
MONOCYTES # BLD AUTO: 0.99 K/UL (ref 0–0.8)
MONOCYTES NFR BLD AUTO: 14.7 % (ref 2–6)
MPC BLD CALC-MCNC: 12.3 FL (ref 9.4–12.4)
NEUTROPHILS # BLD AUTO: 3.82 K/UL (ref 1.8–7.7)
NEUTROPHILS NFR BLD AUTO: 56.7 % (ref 53–65)
NONHDLC SERPL-MCNC: 87 MG/DL
NRBC # BLD AUTO: 0 X10E3/UL
NRBC, AUTO (.00): 0 %
PLATELET # BLD AUTO: 279 K/UL (ref 150–400)
POTASSIUM SERPL-SCNC: 4.2 MMOL/L (ref 3.5–5.1)
PROT SERPL-MCNC: 8 G/DL (ref 6.4–8.2)
RBC # BLD AUTO: 2.64 M/UL (ref 4.6–6.2)
SODIUM SERPL-SCNC: 138 MMOL/L (ref 136–145)
TIBC SERPL-MCNC: 442 ΜG/DL (ref 250–450)
TRIGL SERPL-MCNC: 74 MG/DL (ref 35–150)
VLDLC SERPL-MCNC: 15 MG/DL
WBC # BLD AUTO: 6.74 K/UL (ref 4.5–11)

## 2023-10-04 PROCEDURE — 99214 PR OFFICE/OUTPT VISIT, EST, LEVL IV, 30-39 MIN: ICD-10-PCS | Mod: ,,, | Performed by: FAMILY MEDICINE

## 2023-10-04 PROCEDURE — 3288F PR FALLS RISK ASSESSMENT DOCUMENTED: ICD-10-PCS | Mod: ,,, | Performed by: FAMILY MEDICINE

## 2023-10-04 PROCEDURE — 82043 MICROALBUMIN / CREATININE RATIO URINE: ICD-10-PCS | Mod: ,,, | Performed by: CLINICAL MEDICAL LABORATORY

## 2023-10-04 PROCEDURE — 80061 LIPID PANEL: ICD-10-PCS | Mod: ,,, | Performed by: CLINICAL MEDICAL LABORATORY

## 2023-10-04 PROCEDURE — 82306 VITAMIN D 25 HYDROXY: CPT | Mod: ,,, | Performed by: CLINICAL MEDICAL LABORATORY

## 2023-10-04 PROCEDURE — 1160F RVW MEDS BY RX/DR IN RCRD: CPT | Mod: ,,, | Performed by: FAMILY MEDICINE

## 2023-10-04 PROCEDURE — 3077F PR MOST RECENT SYSTOLIC BLOOD PRESSURE >= 140 MM HG: ICD-10-PCS | Mod: ,,, | Performed by: FAMILY MEDICINE

## 2023-10-04 PROCEDURE — 83550 IRON BINDING TEST: CPT | Mod: ,,, | Performed by: CLINICAL MEDICAL LABORATORY

## 2023-10-04 PROCEDURE — G0008 FLU VACCINE - QUADRIVALENT - ADJUVANTED: ICD-10-PCS | Mod: ,,, | Performed by: FAMILY MEDICINE

## 2023-10-04 PROCEDURE — 3077F SYST BP >= 140 MM HG: CPT | Mod: ,,, | Performed by: FAMILY MEDICINE

## 2023-10-04 PROCEDURE — 83550 IRON AND TIBC: ICD-10-PCS | Mod: ,,, | Performed by: CLINICAL MEDICAL LABORATORY

## 2023-10-04 PROCEDURE — 83540 IRON AND TIBC: ICD-10-PCS | Mod: ,,, | Performed by: CLINICAL MEDICAL LABORATORY

## 2023-10-04 PROCEDURE — 1101F PT FALLS ASSESS-DOCD LE1/YR: CPT | Mod: ,,, | Performed by: FAMILY MEDICINE

## 2023-10-04 PROCEDURE — 1159F PR MEDICATION LIST DOCUMENTED IN MEDICAL RECORD: ICD-10-PCS | Mod: ,,, | Performed by: FAMILY MEDICINE

## 2023-10-04 PROCEDURE — 85025 COMPLETE CBC W/AUTO DIFF WBC: CPT | Mod: ,,, | Performed by: CLINICAL MEDICAL LABORATORY

## 2023-10-04 PROCEDURE — 83540 ASSAY OF IRON: CPT | Mod: ,,, | Performed by: CLINICAL MEDICAL LABORATORY

## 2023-10-04 PROCEDURE — 80053 COMPREHEN METABOLIC PANEL: CPT | Mod: ,,, | Performed by: CLINICAL MEDICAL LABORATORY

## 2023-10-04 PROCEDURE — 3078F DIAST BP <80 MM HG: CPT | Mod: ,,, | Performed by: FAMILY MEDICINE

## 2023-10-04 PROCEDURE — 3288F FALL RISK ASSESSMENT DOCD: CPT | Mod: ,,, | Performed by: FAMILY MEDICINE

## 2023-10-04 PROCEDURE — 1101F PR PT FALLS ASSESS DOC 0-1 FALLS W/OUT INJ PAST YR: ICD-10-PCS | Mod: ,,, | Performed by: FAMILY MEDICINE

## 2023-10-04 PROCEDURE — 1159F MED LIST DOCD IN RCRD: CPT | Mod: ,,, | Performed by: FAMILY MEDICINE

## 2023-10-04 PROCEDURE — G0008 ADMIN INFLUENZA VIRUS VAC: HCPCS | Mod: ,,, | Performed by: FAMILY MEDICINE

## 2023-10-04 PROCEDURE — 82728 FERRITIN: ICD-10-PCS | Mod: ,,, | Performed by: CLINICAL MEDICAL LABORATORY

## 2023-10-04 PROCEDURE — 82306 VITAMIN D: ICD-10-PCS | Mod: ,,, | Performed by: CLINICAL MEDICAL LABORATORY

## 2023-10-04 PROCEDURE — 82570 ASSAY OF URINE CREATININE: CPT | Mod: ,,, | Performed by: CLINICAL MEDICAL LABORATORY

## 2023-10-04 PROCEDURE — 82043 UR ALBUMIN QUANTITATIVE: CPT | Mod: ,,, | Performed by: CLINICAL MEDICAL LABORATORY

## 2023-10-04 PROCEDURE — 85025 CBC WITH DIFFERENTIAL: ICD-10-PCS | Mod: ,,, | Performed by: CLINICAL MEDICAL LABORATORY

## 2023-10-04 PROCEDURE — 90694 VACC AIIV4 NO PRSRV 0.5ML IM: CPT | Mod: ,,, | Performed by: FAMILY MEDICINE

## 2023-10-04 PROCEDURE — 82728 ASSAY OF FERRITIN: CPT | Mod: ,,, | Performed by: CLINICAL MEDICAL LABORATORY

## 2023-10-04 PROCEDURE — 3078F PR MOST RECENT DIASTOLIC BLOOD PRESSURE < 80 MM HG: ICD-10-PCS | Mod: ,,, | Performed by: FAMILY MEDICINE

## 2023-10-04 PROCEDURE — 80061 LIPID PANEL: CPT | Mod: ,,, | Performed by: CLINICAL MEDICAL LABORATORY

## 2023-10-04 PROCEDURE — 82570 MICROALBUMIN / CREATININE RATIO URINE: ICD-10-PCS | Mod: ,,, | Performed by: CLINICAL MEDICAL LABORATORY

## 2023-10-04 PROCEDURE — 90694 FLU VACCINE - QUADRIVALENT - ADJUVANTED: ICD-10-PCS | Mod: ,,, | Performed by: FAMILY MEDICINE

## 2023-10-04 PROCEDURE — 1126F AMNT PAIN NOTED NONE PRSNT: CPT | Mod: ,,, | Performed by: FAMILY MEDICINE

## 2023-10-04 PROCEDURE — 80053 COMPREHENSIVE METABOLIC PANEL: ICD-10-PCS | Mod: ,,, | Performed by: CLINICAL MEDICAL LABORATORY

## 2023-10-04 PROCEDURE — 1160F PR REVIEW ALL MEDS BY PRESCRIBER/CLIN PHARMACIST DOCUMENTED: ICD-10-PCS | Mod: ,,, | Performed by: FAMILY MEDICINE

## 2023-10-04 PROCEDURE — 1126F PR PAIN SEVERITY QUANTIFIED, NO PAIN PRESENT: ICD-10-PCS | Mod: ,,, | Performed by: FAMILY MEDICINE

## 2023-10-04 PROCEDURE — 99214 OFFICE O/P EST MOD 30 MIN: CPT | Mod: ,,, | Performed by: FAMILY MEDICINE

## 2023-10-04 RX ORDER — TADALAFIL 10 MG/1
10 TABLET ORAL DAILY PRN
Qty: 6 TABLET | Refills: 5 | Status: SHIPPED | OUTPATIENT
Start: 2023-10-04 | End: 2024-10-03

## 2023-10-04 RX ORDER — ATORVASTATIN CALCIUM 40 MG/1
40 TABLET, FILM COATED ORAL NIGHTLY
Qty: 90 TABLET | Refills: 1 | Status: SHIPPED | OUTPATIENT
Start: 2023-10-04

## 2023-10-04 RX ORDER — IRON 18 MG
TABLET ORAL
Status: CANCELLED | OUTPATIENT
Start: 2023-10-04

## 2023-10-04 RX ORDER — FERROUS SULFATE 325(65) MG
325 TABLET, DELAYED RELEASE (ENTERIC COATED) ORAL DAILY
Qty: 30 TABLET | Refills: 3 | Status: SHIPPED | OUTPATIENT
Start: 2023-10-04 | End: 2023-10-19 | Stop reason: SDUPTHER

## 2023-10-04 RX ORDER — OLOPATADINE HYDROCHLORIDE 1 MG/ML
1 SOLUTION/ DROPS OPHTHALMIC 2 TIMES DAILY PRN
Qty: 5 ML | Refills: 2 | Status: SHIPPED | OUTPATIENT
Start: 2023-10-04 | End: 2024-03-12

## 2023-10-04 RX ORDER — LISINOPRIL 40 MG/1
40 TABLET ORAL DAILY
Qty: 90 TABLET | Refills: 3 | Status: SHIPPED | OUTPATIENT
Start: 2023-10-04 | End: 2023-10-13

## 2023-10-04 RX ORDER — CHLORPHENIRAMINE MALEATE, DEXTROMETHORPHAN HYDROBROMIDE, AND PHENYLEPHRINE HYDROCHLORIDE 4; 10; 10 MG/1; MG/1; MG/1
1 TABLET, COATED ORAL EVERY 8 HOURS PRN
Qty: 30 TABLET | Refills: 1 | Status: SHIPPED | OUTPATIENT
Start: 2023-10-04 | End: 2024-03-12

## 2023-10-04 RX ORDER — AMLODIPINE BESYLATE 10 MG/1
10 TABLET ORAL DAILY
Qty: 90 TABLET | Refills: 1 | Status: SHIPPED | OUTPATIENT
Start: 2023-10-04 | End: 2023-12-14 | Stop reason: SDUPTHER

## 2023-10-04 RX ORDER — HYDROCHLOROTHIAZIDE 25 MG/1
25 TABLET ORAL DAILY
Qty: 90 TABLET | Refills: 1 | Status: SHIPPED | OUTPATIENT
Start: 2023-10-04 | End: 2023-12-14 | Stop reason: SDUPTHER

## 2023-10-04 RX ORDER — ASPIRIN 81 MG/1
81 TABLET ORAL DAILY
Qty: 90 TABLET | Refills: 1 | Status: SHIPPED | OUTPATIENT
Start: 2023-10-04

## 2023-10-04 RX ORDER — HYDRALAZINE HYDROCHLORIDE 100 MG/1
100 TABLET, FILM COATED ORAL DAILY
Qty: 90 TABLET | Refills: 1 | Status: SHIPPED | OUTPATIENT
Start: 2023-10-04 | End: 2023-10-30 | Stop reason: SDUPTHER

## 2023-10-04 RX ORDER — TAMSULOSIN HYDROCHLORIDE 0.4 MG/1
0.4 CAPSULE ORAL DAILY
Qty: 90 CAPSULE | Refills: 1 | Status: SHIPPED | OUTPATIENT
Start: 2023-10-04 | End: 2024-03-12

## 2023-10-04 RX ORDER — MELOXICAM 7.5 MG/1
7.5 TABLET ORAL DAILY PRN
Qty: 30 TABLET | Refills: 2 | Status: SHIPPED | OUTPATIENT
Start: 2023-10-04 | End: 2023-10-30 | Stop reason: ALTCHOICE

## 2023-10-04 RX ORDER — LISINOPRIL AND HYDROCHLOROTHIAZIDE 20; 25 MG/1; MG/1
1 TABLET ORAL DAILY
Qty: 90 TABLET | Refills: 1 | Status: CANCELLED | OUTPATIENT
Start: 2023-10-04

## 2023-10-04 RX ORDER — METHOCARBAMOL 500 MG/1
500 TABLET, FILM COATED ORAL 3 TIMES DAILY PRN
Qty: 30 TABLET | Refills: 2 | Status: SHIPPED | OUTPATIENT
Start: 2023-10-04 | End: 2023-12-14 | Stop reason: SDUPTHER

## 2023-10-04 NOTE — PROGRESS NOTES
Clinic Note    Patient Name: Stephane Mathis  : 1945  MRN: [id number]    Chief Complaint   Patient presents with    Follow-up    Tinnitus     Right ear; states can't hear out of it some times and constant ringing.     Medication Refill       HPI:    Mr. Stephane Mathis is a 77 y.o. male who presents to clinic today with CC of follow up on chronic disease processes including HTN, iron deficiency anemia, seizure DO, h/o alcohol dependence, and tobacco abuse.   BP is elevated today. Patient reports medication compliance. Admits he does not routinely monitor his BP at home.  He reports ringing in R ear with some associated hearing loss X  several months. Reports ear feels stopped up.   Patient reports chronic issues are well controlled on current medication regimen.  Denies problems or side effects with medications.  Patient is, otherwise, without complaints.     Medications:  Medication List with Changes/Refills   New Medications    CARBAMIDE PEROXIDE (DEBROX) 6.5 % OTIC SOLUTION    Place 5 drops into both ears 2 (two) times daily. for 5 days    CHLORPHENIRAMINE-PHENYLEPH-DM (ED A-HIST DM) 4-10-10 MG TAB    Take 1 tablet by mouth every 8 (eight) hours as needed (congestion/ears).    HYDROCHLOROTHIAZIDE (HYDRODIURIL) 25 MG TABLET    Take 1 tablet (25 mg total) by mouth once daily.    LISINOPRIL (PRINIVIL,ZESTRIL) 40 MG TABLET    Take 1 tablet (40 mg total) by mouth once daily.    OLOPATADINE (PATANOL) 0.1 % OPHTHALMIC SOLUTION    Place 1 drop into both eyes 2 (two) times daily as needed for Allergies.   Changed and/or Refilled Medications    Modified Medication Previous Medication    AMLODIPINE (NORVASC) 10 MG TABLET amLODIPine (NORVASC) 10 MG tablet       Take 1 tablet (10 mg total) by mouth once daily.    Take 1 tablet (10 mg total) by mouth once daily.    ASPIRIN (ECOTRIN) 81 MG EC TABLET aspirin (ECOTRIN) 81 MG EC tablet       Take 1 tablet (81 mg total) by mouth once daily.    Take 81 mg by mouth once daily.     ATORVASTATIN (LIPITOR) 40 MG TABLET atorvastatin (LIPITOR) 40 MG tablet       Take 1 tablet (40 mg total) by mouth every evening.    Take 1 tablet (40 mg total) by mouth every evening.    FERROUS SULFATE 325 (65 FE) MG EC TABLET ferrous sulfate 325 (65 FE) MG EC tablet       Take 1 tablet (325 mg total) by mouth once daily.    Take 1 tablet (325 mg total) by mouth once daily.    HYDRALAZINE (APRESOLINE) 100 MG TABLET hydrALAZINE (APRESOLINE) 100 MG tablet       Take 1 tablet (100 mg total) by mouth once daily.    Take by mouth.    MELOXICAM (MOBIC) 7.5 MG TABLET meloxicam (MOBIC) 7.5 MG tablet       Take 1 tablet (7.5 mg total) by mouth daily as needed for Pain.    Take 1 tablet (7.5 mg total) by mouth daily as needed for Pain.    METHOCARBAMOL (ROBAXIN) 500 MG TAB methocarbamoL (ROBAXIN) 500 MG Tab       Take 1 tablet (500 mg total) by mouth 3 (three) times daily as needed (muscle spasm/pain. May cause drowsiness).    Take 1 tablet (500 mg total) by mouth 3 (three) times daily as needed (muscle spasm/pain. May cause drowsiness).    TADALAFIL (CIALIS) 10 MG TABLET tadalafiL (CIALIS) 10 MG tablet       Take 1 tablet (10 mg total) by mouth daily as needed for Erectile Dysfunction.    Take 1 tablet (10 mg total) by mouth daily as needed for Erectile Dysfunction.    TAMSULOSIN (FLOMAX) 0.4 MG CAP tamsulosin (FLOMAX) 0.4 mg Cap       Take 1 capsule (0.4 mg total) by mouth once daily.    Take 0.4 mg by mouth once daily.   Discontinued Medications    IRON 18 MG TAB        LISINOPRIL-HYDROCHLOROTHIAZIDE (PRINZIDE,ZESTORETIC) 20-25 MG TAB    Take 1 tablet by mouth once daily.        Allergies: Codeine      Past Medical History:    Past Medical History:   Diagnosis Date    Acute superficial gastritis without hemorrhage 7/12/2022    Alcoholism     Anemia     AVM (arteriovenous malformation) of colon 7/13/2022    Colon, diverticulosis 7/13/2022    Hypertension     Iron deficiency anemia due to chronic blood loss 7/12/2022     Polyp, sigmoid colon 7/13/2022    Seizures     Stroke     right sided weakness       Past Surgical History:    Past Surgical History:   Procedure Laterality Date    KNEE SURGERY Right     SCROTAL SURGERY      in february         Social History:    Social History     Tobacco Use   Smoking Status Every Day    Current packs/day: 1.00    Average packs/day: 1 pack/day for 55.0 years (55.0 ttl pk-yrs)    Types: Cigarettes   Smokeless Tobacco Never     Social History     Substance and Sexual Activity   Alcohol Use Not Currently    Comment: half pint of liquor a day. but reports last drink was  a month ago.     Social History     Substance and Sexual Activity   Drug Use Yes    Types: Marijuana    Comment: daily         Family History:    Family History   Problem Relation Age of Onset    Diabetes Mother     Hypertension Mother     Diabetes Sister     Diabetes Brother     Hypertension Brother     Throat cancer Brother        Review of Systems:    Review of Systems   Constitutional:  Negative for appetite change, chills, fatigue, fever and unexpected weight change.   HENT:          Ears feel stopped up; decreased hearing; ringing in ears   Eyes:  Negative for visual disturbance.   Respiratory:  Negative for cough and shortness of breath.    Cardiovascular:  Negative for chest pain and leg swelling.   Gastrointestinal:  Negative for abdominal pain, change in bowel habit, constipation, diarrhea, nausea and vomiting.   Musculoskeletal:  Negative for arthralgias.   Integumentary:  Negative for rash.   Neurological:  Negative for dizziness and headaches.   Psychiatric/Behavioral:  The patient is not nervous/anxious.         Vitals:    Vitals:    10/04/23 1317 10/04/23 1419   BP: (!) 176/67 (!) 174/68   BP Location: Left arm Left arm   Patient Position: Sitting Sitting   BP Method: Medium (Automatic) Medium (Manual)   Pulse: 77    Resp: 16    Temp: 98.8 °F (37.1 °C)    TempSrc: Oral    SpO2: 98%    Weight: 75.3 kg (166 lb)   "  Height: 5' 11" (1.803 m)        Body mass index is 23.15 kg/m².    Wt Readings from Last 3 Encounters:   10/04/23 1317 75.3 kg (166 lb)   06/22/23 0956 74.8 kg (165 lb)   12/12/22 1111 72.9 kg (160 lb 12.8 oz)        Physical Exam:    Physical Exam  Constitutional:       General: He is not in acute distress.     Appearance: Normal appearance.   HENT:      Right Ear: There is impacted cerumen.      Left Ear: There is impacted cerumen.      Ears:      Comments: + cerumen impaction  + fluid level     Nose: Nose normal.      Mouth/Throat:      Mouth: Mucous membranes are moist.      Pharynx: Oropharynx is clear.   Eyes:      Conjunctiva/sclera: Conjunctivae normal.   Cardiovascular:      Rate and Rhythm: Normal rate and regular rhythm.      Heart sounds: Normal heart sounds. No murmur heard.  Pulmonary:      Effort: Pulmonary effort is normal. No respiratory distress.      Breath sounds: Normal breath sounds. No wheezing, rhonchi or rales.   Abdominal:      General: Bowel sounds are normal.      Palpations: Abdomen is soft.      Tenderness: There is no abdominal tenderness.   Musculoskeletal:      Cervical back: Neck supple.      Right lower leg: No edema.      Left lower leg: No edema.   Skin:     Findings: No rash.   Neurological:      General: No focal deficit present.      Mental Status: He is alert. Mental status is at baseline.   Psychiatric:         Mood and Affect: Mood normal.           Assessment/Plan:   1. Essential hypertension  -     amLODIPine (NORVASC) 10 MG tablet; Take 1 tablet (10 mg total) by mouth once daily.  Dispense: 90 tablet; Refill: 1  -     hydrALAZINE (APRESOLINE) 100 MG tablet; Take 1 tablet (100 mg total) by mouth once daily.  Dispense: 90 tablet; Refill: 1 - patient is only taking once per day; states he cannot remember to take medications twice per day  -     lisinopriL (PRINIVIL,ZESTRIL) 40 MG tablet; Take 1 tablet (40 mg total) by mouth once daily.  Dispense: 90 tablet; Refill: 3 - " dose increase  -     hydroCHLOROthiazide (HYDRODIURIL) 25 MG tablet; Take 1 tablet (25 mg total) by mouth once daily.  Dispense: 90 tablet; Refill: 1  -     CBC Auto Differential; Future; Expected date: 10/04/2023  -     Comprehensive Metabolic Panel; Future; Expected date: 10/04/2023  -     Lipid Panel; Future; Expected date: 10/04/2023  -     Microalbumin/Creatinine Ratio, Urine    2. Mixed hyperlipidemia  -     atorvastatin (LIPITOR) 40 MG tablet; Take 1 tablet (40 mg total) by mouth every evening.  Dispense: 90 tablet; Refill: 1  -     CBC Auto Differential; Future; Expected date: 10/04/2023  -     Comprehensive Metabolic Panel; Future; Expected date: 10/04/2023  -     Lipid Panel; Future; Expected date: 10/04/2023    3. Primary osteoarthritis involving multiple joints  -     meloxicam (MOBIC) 7.5 MG tablet; Take 1 tablet (7.5 mg total) by mouth daily as needed for Pain.  Dispense: 30 tablet; Refill: 2    4. Muscle spasm  -     methocarbamoL (ROBAXIN) 500 MG Tab; Take 1 tablet (500 mg total) by mouth 3 (three) times daily as needed (muscle spasm/pain. May cause drowsiness).  Dispense: 30 tablet; Refill: 2    5. Erectile dysfunction, unspecified erectile dysfunction type  -     tadalafiL (CIALIS) 10 MG tablet; Take 1 tablet (10 mg total) by mouth daily as needed for Erectile Dysfunction.  Dispense: 6 tablet; Refill: 5    6. Iron deficiency anemia due to chronic blood loss  -     ferrous sulfate 325 (65 FE) MG EC tablet; Take 1 tablet (325 mg total) by mouth once daily.  Dispense: 30 tablet; Refill: 3  -     CBC Auto Differential; Future; Expected date: 10/04/2023  -     Ferritin  -     Iron and TIBC    7. Continuous dependence on cigarette smoking  - Encouraged tobacco cessation    8. Benign prostatic hyperplasia without lower urinary tract symptoms  -     tamsulosin (FLOMAX) 0.4 mg Cap; Take 1 capsule (0.4 mg total) by mouth once daily.  Dispense: 90 capsule; Refill: 1    9. Immunization due  -     Influenza  (FLUAD) - Quadrivalent (Adjuvanted) *Preferred* (65+) (PF)    10. Screening for lung cancer  -     CT Chest Lung Screening Low Dose; Future; Expected date: 10/04/2023  - Current every day smoker 1 ppd X 50 years; agreeable to low dose CT lung cancer screening    11. Bilateral impacted cerumen  -     carbamide peroxide (DEBROX) 6.5 % otic solution; Place 5 drops into both ears 2 (two) times daily. for 5 days  Dispense: 15 mL; Refill: 0  - Some cerumen removed today with irrigation by nurse and curette by physician. Debrox drops to soften wax.     12. Allergic conjunctivitis of both eyes  -     olopatadine (PATANOL) 0.1 % ophthalmic solution; Place 1 drop into both eyes 2 (two) times daily as needed for Allergies.  Dispense: 5 mL; Refill: 2    13. Fluid level behind tympanic membrane of both ears  -     chlorpheniramine-phenyleph-DM (ED A-HIST DM) 4-10-10 mg Tab; Take 1 tablet by mouth every 8 (eight) hours as needed (congestion/ears).  Dispense: 30 tablet; Refill: 1    14. Personal history of nicotine dependence  -     CT Chest Lung Screening Low Dose; Future; Expected date: 10/04/2023    15. Vitamin D deficiency  -     Vitamin D; Future; Expected date: 10/04/2023    Other orders  -     aspirin (ECOTRIN) 81 MG EC tablet; Take 1 tablet (81 mg total) by mouth once daily.  Dispense: 90 tablet; Refill: 1         Active Problem List with Overview Notes    Diagnosis Date Noted    Essential hypertension 07/12/2022    Seizure 06/10/2022    Alcohol dependence in early full remission 07/12/2022    Iron deficiency anemia due to chronic blood loss 07/12/2022    AVM (arteriovenous malformation) of colon 07/13/2022    Continuous dependence on cigarette smoking 07/12/2022    Colon, diverticulosis 07/13/2022    Polyp, sigmoid colon 07/13/2022    Mixed hyperlipidemia 10/04/2023    Primary osteoarthritis involving multiple joints 10/04/2023    Muscle spasm 10/04/2023    Erectile dysfunction 10/04/2023    Benign prostatic hyperplasia  without lower urinary tract symptoms 10/04/2023    Vitamin D deficiency 10/04/2023        Health Maintenance:  Health Maintenance   Topic Date Due    TETANUS VACCINE  Never done    LDCT Lung Screen  Never done    Shingles Vaccine (1 of 2) Never done    Lipid Panel  12/12/2027    Colorectal Cancer Screening  07/13/2032    Hepatitis C Screening  Completed    Abdominal Aortic Aneurysm Screening  Completed       RTC in 3 weeks for follow up on ears and HTN medication changes.  RTC sooner if needed.   Patient voiced understanding and is agreeable to plan.      Pebbles Dickerson MD    Family Medicine

## 2023-10-12 ENCOUNTER — HOSPITAL ENCOUNTER (EMERGENCY)
Facility: HOSPITAL | Age: 78
Discharge: HOME | End: 2023-10-12
Attending: EMERGENCY MEDICINE
Payer: MEDICARE

## 2023-10-12 VITALS
SYSTOLIC BLOOD PRESSURE: 160 MMHG | HEART RATE: 71 BPM | DIASTOLIC BLOOD PRESSURE: 70 MMHG | HEIGHT: 71 IN | BODY MASS INDEX: 22.4 KG/M2 | RESPIRATION RATE: 18 BRPM | WEIGHT: 160 LBS | TEMPERATURE: 98 F | OXYGEN SATURATION: 99 %

## 2023-10-12 DIAGNOSIS — T78.3XXA ANGIOEDEMA, INITIAL ENCOUNTER: Primary | ICD-10-CM

## 2023-10-12 LAB
ANION GAP SERPL CALCULATED.3IONS-SCNC: 14 MMOL/L (ref 7–16)
BASOPHILS # BLD AUTO: 0.03 K/UL (ref 0–0.2)
BASOPHILS NFR BLD AUTO: 0.4 % (ref 0–1)
BUN SERPL-MCNC: 21 MG/DL (ref 7–18)
BUN/CREAT SERPL: 13 (ref 6–20)
CALCIUM SERPL-MCNC: 8.9 MG/DL (ref 8.5–10.1)
CHLORIDE SERPL-SCNC: 101 MMOL/L (ref 98–107)
CO2 SERPL-SCNC: 27 MMOL/L (ref 21–32)
CREAT SERPL-MCNC: 1.65 MG/DL (ref 0.7–1.3)
DIFFERENTIAL METHOD BLD: ABNORMAL
EGFR (NO RACE VARIABLE) (RUSH/TITUS): 43 ML/MIN/1.73M2
EOSINOPHIL # BLD AUTO: 0.24 K/UL (ref 0–0.5)
EOSINOPHIL NFR BLD AUTO: 3 % (ref 1–4)
EOSINOPHIL NFR BLD MANUAL: 3 % (ref 1–4)
ERYTHROCYTE [DISTWIDTH] IN BLOOD BY AUTOMATED COUNT: 17.3 % (ref 11.5–14.5)
FLUAV AG UPPER RESP QL IA.RAPID: NEGATIVE
FLUBV AG UPPER RESP QL IA.RAPID: NEGATIVE
GLUCOSE SERPL-MCNC: 110 MG/DL (ref 74–106)
HCT VFR BLD AUTO: 27 % (ref 40–54)
HGB BLD-MCNC: 8.3 G/DL (ref 13.5–18)
LYMPHOCYTES # BLD AUTO: 1.73 K/UL (ref 1–4.8)
LYMPHOCYTES NFR BLD AUTO: 22 % (ref 27–41)
LYMPHOCYTES NFR BLD MANUAL: 19 % (ref 27–41)
MCH RBC QN AUTO: 29.3 PG (ref 27–31)
MCHC RBC AUTO-ENTMCNC: 30.7 G/DL (ref 32–36)
MCV RBC AUTO: 95.4 FL (ref 80–96)
MONOCYTES # BLD AUTO: 0.91 K/UL (ref 0–0.8)
MONOCYTES NFR BLD AUTO: 11.5 % (ref 2–6)
MONOCYTES NFR BLD MANUAL: 13 % (ref 2–6)
MPC BLD CALC-MCNC: 11.8 FL (ref 9.4–12.4)
NEUTROPHILS # BLD AUTO: 4.97 K/UL (ref 1.8–7.7)
NEUTROPHILS NFR BLD AUTO: 63.1 % (ref 53–65)
NEUTS SEG NFR BLD MANUAL: 65 % (ref 50–62)
NRBC BLD MANUAL-RTO: ABNORMAL %
PLATELET # BLD AUTO: 300 K/UL (ref 150–400)
PLATELET MORPHOLOGY: NORMAL
POTASSIUM SERPL-SCNC: 4 MMOL/L (ref 3.5–5.1)
RAPID GROUP A STREP: NEGATIVE
RBC # BLD AUTO: 2.83 M/UL (ref 4.6–6.2)
RBC MORPH BLD: NORMAL
SARS-COV+SARS-COV-2 AG RESP QL IA.RAPID: NEGATIVE
SODIUM SERPL-SCNC: 138 MMOL/L (ref 136–145)
WBC # BLD AUTO: 7.88 K/UL (ref 4.5–11)

## 2023-10-12 PROCEDURE — 87880 STREP A ASSAY W/OPTIC: CPT | Performed by: EMERGENCY MEDICINE

## 2023-10-12 PROCEDURE — 99284 EMERGENCY DEPT VISIT MOD MDM: CPT | Mod: 25

## 2023-10-12 PROCEDURE — 99284 EMERGENCY DEPT VISIT MOD MDM: CPT | Performed by: EMERGENCY MEDICINE

## 2023-10-12 PROCEDURE — 80048 BASIC METABOLIC PNL TOTAL CA: CPT | Performed by: EMERGENCY MEDICINE

## 2023-10-12 PROCEDURE — 63600175 PHARM REV CODE 636 W HCPCS: Performed by: EMERGENCY MEDICINE

## 2023-10-12 PROCEDURE — 25000003 PHARM REV CODE 250: Performed by: EMERGENCY MEDICINE

## 2023-10-12 PROCEDURE — 85025 COMPLETE CBC W/AUTO DIFF WBC: CPT | Performed by: EMERGENCY MEDICINE

## 2023-10-12 PROCEDURE — 96374 THER/PROPH/DIAG INJ IV PUSH: CPT

## 2023-10-12 PROCEDURE — 87428 SARSCOV & INF VIR A&B AG IA: CPT | Performed by: EMERGENCY MEDICINE

## 2023-10-12 PROCEDURE — 96375 TX/PRO/DX INJ NEW DRUG ADDON: CPT

## 2023-10-12 RX ORDER — FAMOTIDINE 10 MG/ML
20 INJECTION INTRAVENOUS
Status: COMPLETED | OUTPATIENT
Start: 2023-10-12 | End: 2023-10-12

## 2023-10-12 RX ORDER — METHYLPREDNISOLONE 4 MG/1
TABLET ORAL
Qty: 21 EACH | Refills: 0 | Status: SHIPPED | OUTPATIENT
Start: 2023-10-12 | End: 2023-10-30 | Stop reason: ALTCHOICE

## 2023-10-12 RX ORDER — EPINEPHRINE 0.3 MG/.3ML
1 INJECTION SUBCUTANEOUS
Qty: 1 EACH | Refills: 0 | Status: SHIPPED | OUTPATIENT
Start: 2023-10-12 | End: 2024-03-10

## 2023-10-12 RX ORDER — DIPHENHYDRAMINE HYDROCHLORIDE 50 MG/ML
25 INJECTION INTRAMUSCULAR; INTRAVENOUS
Status: COMPLETED | OUTPATIENT
Start: 2023-10-12 | End: 2023-10-12

## 2023-10-12 RX ORDER — FAMOTIDINE 20 MG/1
20 TABLET, FILM COATED ORAL 2 TIMES DAILY
Qty: 28 TABLET | Refills: 0 | Status: SHIPPED | OUTPATIENT
Start: 2023-10-12 | End: 2023-12-14 | Stop reason: SDUPTHER

## 2023-10-12 RX ORDER — METHYLPREDNISOLONE SOD SUCC 125 MG
250 VIAL (EA) INJECTION
Status: COMPLETED | OUTPATIENT
Start: 2023-10-12 | End: 2023-10-12

## 2023-10-12 RX ADMIN — TRANEXAMIC ACID 1000 MG: 100 INJECTION, SOLUTION INTRAVENOUS at 09:10

## 2023-10-12 RX ADMIN — DIPHENHYDRAMINE HYDROCHLORIDE 25 MG: 50 INJECTION INTRAMUSCULAR; INTRAVENOUS at 08:10

## 2023-10-12 RX ADMIN — METHYLPREDNISOLONE SODIUM SUCCINATE 250 MG: 125 INJECTION, POWDER, FOR SOLUTION INTRAMUSCULAR; INTRAVENOUS at 08:10

## 2023-10-12 RX ADMIN — FAMOTIDINE 20 MG: 10 INJECTION, SOLUTION INTRAVENOUS at 08:10

## 2023-10-12 NOTE — DISCHARGE INSTRUCTIONS
INCREASE REST AND FLUIDS  ELEVATE HEAD  DO NOT TAKE LISINOPRIL  OVER THE COUNTER BENADRYL 25 -50 MG EVERY 6 HOURS  TAKE NEW MEDICATIONS PEPCID AND MEDROL DOSE PACK  EPIPEN AS NEEDED FOR EXTREME SWELLING  AND CALL 911  ICE PACK AS NEEDED

## 2023-10-12 NOTE — ED PROVIDER NOTES
Encounter Date: 10/12/2023       History     Chief Complaint   Patient presents with    Oral Swelling     PT IS A 77 YR OLD BF WITH R LOWER FACIAL EDEMA NOTED 1 HR AGO ON AWAKENING WITHOUT DROOLING, DYSPNEA/WHEEZING OR DENTAL PAIN.  PT IS ON LISINOPRIL RX 10/04/2023        Past Medical History    Diagnosis Date Comments  Hypertension [I10]    Anemia [D64.9]    Stroke [I63.9]  right sided weakness  Seizures [R56.9]    Alcoholism [F10.20]    Acute superficial gastritis without hemorrhage [K29.00] 7/12/2022   Iron deficiency anemia due to chronic blood loss [D50.0] 7/12/2022   Colon, diverticulosis [K57.30] 7/13/2022   Polyp, sigmoid colon [K63.5] 7/13/2022   AVM (arteriovenous malformation) of colon [K55.20]            Review of patient's allergies indicates:   Allergen Reactions    Codeine Itching     Past Medical History:   Diagnosis Date    Acute superficial gastritis without hemorrhage 7/12/2022    Alcoholism     Anemia     AVM (arteriovenous malformation) of colon 7/13/2022    Colon, diverticulosis 7/13/2022    Hypertension     Iron deficiency anemia due to chronic blood loss 7/12/2022    Polyp, sigmoid colon 7/13/2022    Seizures     Stroke     right sided weakness     Past Surgical History:   Procedure Laterality Date    KNEE SURGERY Right     SCROTAL SURGERY      in february     Family History   Problem Relation Age of Onset    Diabetes Mother     Hypertension Mother     Diabetes Sister     Diabetes Brother     Hypertension Brother     Throat cancer Brother      Social History     Tobacco Use    Smoking status: Every Day     Current packs/day: 1.00     Average packs/day: 1 pack/day for 55.0 years (55.0 ttl pk-yrs)     Types: Cigarettes    Smokeless tobacco: Never   Substance Use Topics    Alcohol use: Not Currently     Comment: half pint of liquor a day. but reports last drink was  a month ago.    Drug use: Yes     Types: Marijuana     Comment: daily     Review of Systems   Constitutional:  Negative for fever.    HENT:  Positive for facial swelling. Negative for sore throat.    Eyes: Negative.    Respiratory: Negative.  Negative for shortness of breath.    Cardiovascular: Negative.  Negative for chest pain.   Gastrointestinal: Negative.  Negative for nausea.   Endocrine: Negative.    Genitourinary: Negative.  Negative for dysuria.   Musculoskeletal: Negative.  Negative for back pain.   Skin: Negative.  Negative for rash.   Allergic/Immunologic: Negative.    Neurological: Negative.  Negative for weakness.   Hematological:  Does not bruise/bleed easily.   Psychiatric/Behavioral: Negative.         Physical Exam     Initial Vitals [10/12/23 0826]   BP Pulse Resp Temp SpO2   (!) 154/56 81 18 98.3 °F (36.8 °C) 99 %      MAP       --         Physical Exam    Nursing note and vitals reviewed.  Constitutional: He appears well-developed and well-nourished. He is cooperative. He appears distressed.   HENT:   Head: Normocephalic and atraumatic.   Right Ear: External ear normal.   Left Ear: External ear normal.   Nose: Nose normal.   Mouth/Throat: Oropharynx is clear and moist. No oropharyngeal exudate.   EDEMA OF THE L FACE/UPPER LIP  NO DROOLING  TONGUE IS NORMAL   Eyes: Conjunctivae and EOM are normal. Pupils are equal, round, and reactive to light.   Neck: Trachea normal. Neck supple. No thyromegaly present. No tracheal deviation present.   Normal range of motion.  Cardiovascular:  Normal rate, regular rhythm, normal heart sounds and intact distal pulses.           Pulses:       Radial pulses are 3+ on the right side and 3+ on the left side.        Dorsalis pedis pulses are 3+ on the right side and 3+ on the left side.   Pulmonary/Chest: Breath sounds normal. No respiratory distress.   Abdominal: Abdomen is soft. Bowel sounds are normal.   Musculoskeletal:         General: Normal range of motion.      Right shoulder: Normal.      Left shoulder: Normal.      Right upper arm: Normal.      Left upper arm: Normal.      Right elbow:  Normal.      Left elbow: Normal.      Right forearm: Normal.      Left forearm: Normal.      Right wrist: Normal.      Left wrist: Normal.      Right hand: Normal.      Left hand: Normal.      Cervical back: Normal range of motion and neck supple.     Lymphadenopathy:     He has no cervical adenopathy.     He has no axillary adenopathy.   Neurological: He is alert and oriented to person, place, and time. He has normal strength and normal reflexes. No cranial nerve deficit or sensory deficit. He displays a negative Romberg sign. GCS eye subscore is 4. GCS verbal subscore is 5. GCS motor subscore is 6.   Reflex Scores:       Bicep reflexes are 2+ on the right side and 2+ on the left side.       Patellar reflexes are 2+ on the right side and 2+ on the left side.  Skin: Skin is warm and dry. Capillary refill takes less than 2 seconds. No erythema.   Psychiatric: He has a normal mood and affect. His speech is normal and behavior is normal. Judgment and thought content normal. Cognition and memory are normal.         Medical Screening Exam   See Full Note    ED Course   Procedures  Labs Reviewed   BASIC METABOLIC PANEL - Abnormal; Notable for the following components:       Result Value    Glucose 110 (*)     BUN 21 (*)     Creatinine 1.65 (*)     eGFR 43 (*)     All other components within normal limits   CBC WITH DIFFERENTIAL - Abnormal; Notable for the following components:    RBC 2.83 (*)     Hemoglobin 8.3 (*)     Hematocrit 27.0 (*)     MCHC 30.7 (*)     RDW 17.3 (*)     Lymphocytes % 22.0 (*)     Monocytes % 11.5 (*)     Monocytes, Absolute 0.91 (*)     All other components within normal limits   MANUAL DIFFERENTIAL - Abnormal; Notable for the following components:    Segmented Neutrophils, Man % 65 (*)     Lymphocytes, Man % 19 (*)     Monocytes, Man % 13 (*)     All other components within normal limits   THROAT SCREEN, RAPID STREP - Normal   SARS-COV2 (COVID) W/ FLU ANTIGEN - Normal    Narrative:     Negative  SARS-CoV results should not be used as the sole basis for treatment or patient management decisions; negative results should be considered in the context of a patient's recent exposures, history and the presene of clinical signs and symptoms consistent with COVID-19.  Negative results should be treated as presumptive and confirmed by molecular assay, if necessary for patient management.   CBC W/ AUTO DIFFERENTIAL    Narrative:     The following orders were created for panel order CBC Auto Differential.  Procedure                               Abnormality         Status                     ---------                               -----------         ------                     CBC with Differential[[id number]]       Abnormal            Final result               Manual Differential[[id number]]         Abnormal            Final result                 Please view results for these tests on the individual orders.          Imaging Results    None          Medications   methylPREDNISolone sodium succinate injection 250 mg (250 mg Intravenous Given 10/12/23 0856)   diphenhydrAMINE injection 25 mg (25 mg Intravenous Given 10/12/23 0855)   famotidine (PF) injection 20 mg (20 mg Intravenous Given 10/12/23 0855)   tranexamic acid (CYKLOKAPRON) 1,000 mg in sodium chloride 0.9% 100 mL (0 mg Intravenous Stopped 10/12/23 0933)     Medical Decision Making  PT IS A 77 YR OLD BF WITH R LOWER FACIAL EDEMA NOTED 1 HR AGO ON AWAKENING WITHOUT DROOLING, DYSPNEA/WHEEZING OR DENTAL PAIN.  PT IS ON LISINOPRIL RX 10/04/2023        Past Medical History    Diagnosis Date Comments  Hypertension [I10]    Anemia [D64.9]    Stroke [I63.9]  right sided weakness  Seizures [R56.9]    Alcoholism [F10.20]    Acute superficial gastritis without hemorrhage [K29.00] 7/12/2022   Iron deficiency anemia due to chronic blood loss [D50.0] 7/12/2022   Colon, diverticulosis [K57.30] 7/13/2022   Polyp, sigmoid colon [K63.5] 7/13/2022   AVM (arteriovenous  malformation) of colon [K55.20]          Amount and/or Complexity of Data Reviewed  Labs: ordered.     Details:             10/12/23 0924  Rapid strep screen  Collected: 10/12/23 0902  Final result  Specimen: Throat      Rapid Group A Strep Negative       10/12/23 0923  SARS-CoV2 (COVID) with FLU Antigen  Collected: 10/12/23 0902  Final result  Specimen: Respiratory from Nasopharyngeal      Influenza A Negative  Influenza B Negative  COVID-19 Ag Negative       10/12/23 0926  CBC Auto Differential  Collected: 10/12/23 0855  Final result  Specimen: Blood         10/12/23 0910  CBC with Differential  Collected: 10/12/23 0855  Final result  Specimen: Blood      WBC 7.88 K/uL  RBC 2.83 Low  M/uL  Hemoglobin 8.3 Low  g/dL  Hematocrit 27.0 Low  %  MCV 95.4 fL  MCH 29.3 pg  MCHC 30.7 Low  g/dL  RDW 17.3 High  %  Platelet Count 300 K/uL  MPV 11.8 fL  Neutrophils % 63.1 %  Lymphocytes % 22.0 Low  %  Neutrophils, Abs 4.97 K/uL  Lymphocytes, Absolute 1.73 K/uL  Diff Type Manual  Monocytes % 11.5 High  %  Eosinophils % 3.0 %  Basophils % 0.4 %  Monocytes, Absolute 0.91 High  K/uL  Eosinophils, Absolute 0.24 K/uL  Basophils, Absolute 0.03 K/uL       10/12/23 0926  Manual Differential  Collected: 10/12/23 0855  Final result  Specimen: Blood      Segmented Neutrophils, Man % 65 High  %  Lymphocytes, Man % 19 Low  %  Monocytes, Man % 13 High  %  Eosinophils, Man % 3 %  nRBC, Manual -  Platelet Morphology Normal  RBC Morphology Normal       10/12/23 0908  Basic Metabolic Panel  Collected: 10/12/23 0855  Final result  Specimen: Blood      Sodium 138 mmol/L  Potassium 4.0 mmol/L  Chloride 101 mmol/L  CO2 27 mmol/L  Anion Gap 14 mmol/L  Glucose 110 High  mg/dL  BUN 21 High  mg/dL  Creatinine 1.65 High  mg/dL  BUN/Creatinine Ratio 13  Calcium 8.9 mg/dL  eGFR 43 Low  mL/min/1.73m2              Discussion of management or test interpretation with external provider(s): EXAM  MEDS  BENADRYL, PEPCID, SOLUMEDROL, TXA  CARDIAC  MONITOR WITH O2 SAT NSR RATE 70-80 ,SAT 99%  PT MARKEDLY IMPROVED   DC HOME IN STABLE CONDITION  PT OBSERVED FOR 2 HOURS     Risk  OTC drugs.  Prescription drug management.  Risk Details: ANGIO EDEMA                               Clinical Impression:   Final diagnoses:  [T78.3XXA] Angioedema, initial encounter (Primary)        ED Disposition Condition    Discharge Stable          ED Prescriptions       Medication Sig Dispense Start Date End Date Auth. Provider    famotidine (PEPCID) 20 MG tablet Take 1 tablet (20 mg total) by mouth 2 (two) times daily. for 14 days 28 tablet 10/12/2023 10/26/2023 Gabrielle Borges MD    methylPREDNISolone (MEDROL DOSEPACK) 4 mg tablet use as directed 21 each 10/12/2023 11/2/2023 Gabrielle Borges MD    EPINEPHrine (EPIPEN) 0.3 mg/0.3 mL AtIn Inject 0.3 mLs (0.3 mg total) into the muscle as needed. 1 each 10/12/2023 11/11/2023 Gabrielle Borges MD          Follow-up Information       Follow up With Specialties Details Why Contact Info    Sierra Dickerson MD Family Medicine In 1 day If symptoms worsen RETURN TO ER [address]  [facility] [place] [address] [phone]               Gabrielle Borges MD  10/12/23 1312

## 2023-10-13 ENCOUNTER — OFFICE VISIT (OUTPATIENT)
Dept: FAMILY MEDICINE | Facility: CLINIC | Age: 78
End: 2023-10-13
Payer: MEDICARE

## 2023-10-13 VITALS
RESPIRATION RATE: 17 BRPM | SYSTOLIC BLOOD PRESSURE: 185 MMHG | TEMPERATURE: 98 F | HEART RATE: 81 BPM | BODY MASS INDEX: 22.54 KG/M2 | OXYGEN SATURATION: 99 % | WEIGHT: 161 LBS | DIASTOLIC BLOOD PRESSURE: 77 MMHG | HEIGHT: 71 IN

## 2023-10-13 DIAGNOSIS — I10 ESSENTIAL HYPERTENSION: Primary | ICD-10-CM

## 2023-10-13 DIAGNOSIS — T78.3XXD ANGIOEDEMA, SUBSEQUENT ENCOUNTER: ICD-10-CM

## 2023-10-13 PROCEDURE — 1126F PR PAIN SEVERITY QUANTIFIED, NO PAIN PRESENT: ICD-10-PCS | Mod: ,,, | Performed by: NURSE PRACTITIONER

## 2023-10-13 PROCEDURE — 1160F RVW MEDS BY RX/DR IN RCRD: CPT | Mod: ,,, | Performed by: NURSE PRACTITIONER

## 2023-10-13 PROCEDURE — 99213 OFFICE O/P EST LOW 20 MIN: CPT | Mod: ,,, | Performed by: NURSE PRACTITIONER

## 2023-10-13 PROCEDURE — 3078F DIAST BP <80 MM HG: CPT | Mod: ,,, | Performed by: NURSE PRACTITIONER

## 2023-10-13 PROCEDURE — 1101F PR PT FALLS ASSESS DOC 0-1 FALLS W/OUT INJ PAST YR: ICD-10-PCS | Mod: ,,, | Performed by: NURSE PRACTITIONER

## 2023-10-13 PROCEDURE — 3288F PR FALLS RISK ASSESSMENT DOCUMENTED: ICD-10-PCS | Mod: ,,, | Performed by: NURSE PRACTITIONER

## 2023-10-13 PROCEDURE — 1101F PT FALLS ASSESS-DOCD LE1/YR: CPT | Mod: ,,, | Performed by: NURSE PRACTITIONER

## 2023-10-13 PROCEDURE — 1159F PR MEDICATION LIST DOCUMENTED IN MEDICAL RECORD: ICD-10-PCS | Mod: ,,, | Performed by: NURSE PRACTITIONER

## 2023-10-13 PROCEDURE — 3078F PR MOST RECENT DIASTOLIC BLOOD PRESSURE < 80 MM HG: ICD-10-PCS | Mod: ,,, | Performed by: NURSE PRACTITIONER

## 2023-10-13 PROCEDURE — 3288F FALL RISK ASSESSMENT DOCD: CPT | Mod: ,,, | Performed by: NURSE PRACTITIONER

## 2023-10-13 PROCEDURE — 3077F SYST BP >= 140 MM HG: CPT | Mod: ,,, | Performed by: NURSE PRACTITIONER

## 2023-10-13 PROCEDURE — 1160F PR REVIEW ALL MEDS BY PRESCRIBER/CLIN PHARMACIST DOCUMENTED: ICD-10-PCS | Mod: ,,, | Performed by: NURSE PRACTITIONER

## 2023-10-13 PROCEDURE — 1159F MED LIST DOCD IN RCRD: CPT | Mod: ,,, | Performed by: NURSE PRACTITIONER

## 2023-10-13 PROCEDURE — 1126F AMNT PAIN NOTED NONE PRSNT: CPT | Mod: ,,, | Performed by: NURSE PRACTITIONER

## 2023-10-13 PROCEDURE — 3077F PR MOST RECENT SYSTOLIC BLOOD PRESSURE >= 140 MM HG: ICD-10-PCS | Mod: ,,, | Performed by: NURSE PRACTITIONER

## 2023-10-13 PROCEDURE — 99213 PR OFFICE/OUTPT VISIT, EST, LEVL III, 20-29 MIN: ICD-10-PCS | Mod: ,,, | Performed by: NURSE PRACTITIONER

## 2023-10-13 NOTE — PATIENT INSTRUCTIONS
-Keep record of blood pressure readings and bring to follow up.   -Goal blood pressure < 140/90  -may increase hydralazine to twice a day if needed.

## 2023-10-16 NOTE — PROGRESS NOTES
Clinic Note    Stephane Mathis is a 77 y.o. male     Chief Complaint:   Chief Complaint   Patient presents with    Follow-up     ER follow up     Facial Swelling     Patient has left sided facial swelling. Patient is concerned about medications.         Subjective:    Patient comes in today for ED follow up. Patient reports he was seen yesterday at ED due to swelling of left side of face including lips and tongue. Patient reports he was told to d/c lisinopril. Patient states he has not taken any of his meds yet this morning. Reports swelling is significantly improved. Denies difficulty breathing. Denies any complaints or concerns.     Follow-up  Pertinent negatives include no abdominal pain, chest pain, coughing, fever, headaches or sore throat.        Allergies:   Review of patient's allergies indicates:   Allergen Reactions    Ace inhibitors Swelling    Codeine Itching        Past Medical History:  Past Medical History:   Diagnosis Date    Acute superficial gastritis without hemorrhage 7/12/2022    Alcoholism     Anemia     AVM (arteriovenous malformation) of colon 7/13/2022    Colon, diverticulosis 7/13/2022    Hypertension     Iron deficiency anemia due to chronic blood loss 7/12/2022    Polyp, sigmoid colon 7/13/2022    Seizures     Stroke     right sided weakness        Current Medications:    Current Outpatient Medications:     amLODIPine (NORVASC) 10 MG tablet, Take 1 tablet (10 mg total) by mouth once daily., Disp: 90 tablet, Rfl: 1    aspirin (ECOTRIN) 81 MG EC tablet, Take 1 tablet (81 mg total) by mouth once daily., Disp: 90 tablet, Rfl: 1    atorvastatin (LIPITOR) 40 MG tablet, Take 1 tablet (40 mg total) by mouth every evening., Disp: 90 tablet, Rfl: 1    chlorpheniramine-phenyleph-DM (ED A-HIST DM) 4-10-10 mg Tab, Take 1 tablet by mouth every 8 (eight) hours as needed (congestion/ears)., Disp: 30 tablet, Rfl: 1    EPINEPHrine (EPIPEN) 0.3 mg/0.3 mL AtIn, Inject 0.3 mLs (0.3 mg total) into the muscle  "as needed., Disp: 1 each, Rfl: 0    famotidine (PEPCID) 20 MG tablet, Take 1 tablet (20 mg total) by mouth 2 (two) times daily. for 14 days, Disp: 28 tablet, Rfl: 0    ferrous sulfate 325 (65 FE) MG EC tablet, Take 1 tablet (325 mg total) by mouth once daily., Disp: 30 tablet, Rfl: 3    hydrALAZINE (APRESOLINE) 100 MG tablet, Take 1 tablet (100 mg total) by mouth once daily., Disp: 90 tablet, Rfl: 1    hydroCHLOROthiazide (HYDRODIURIL) 25 MG tablet, Take 1 tablet (25 mg total) by mouth once daily., Disp: 90 tablet, Rfl: 1    meloxicam (MOBIC) 7.5 MG tablet, Take 1 tablet (7.5 mg total) by mouth daily as needed for Pain., Disp: 30 tablet, Rfl: 2    methocarbamoL (ROBAXIN) 500 MG Tab, Take 1 tablet (500 mg total) by mouth 3 (three) times daily as needed (muscle spasm/pain. May cause drowsiness)., Disp: 30 tablet, Rfl: 2    methylPREDNISolone (MEDROL DOSEPACK) 4 mg tablet, use as directed, Disp: 21 each, Rfl: 0    olopatadine (PATANOL) 0.1 % ophthalmic solution, Place 1 drop into both eyes 2 (two) times daily as needed for Allergies., Disp: 5 mL, Rfl: 2    tadalafiL (CIALIS) 10 MG tablet, Take 1 tablet (10 mg total) by mouth daily as needed for Erectile Dysfunction., Disp: 6 tablet, Rfl: 5    tamsulosin (FLOMAX) 0.4 mg Cap, Take 1 capsule (0.4 mg total) by mouth once daily., Disp: 90 capsule, Rfl: 1       Review of Systems   Constitutional:  Negative for fever.   HENT:  Negative for facial swelling, sore throat and trouble swallowing.    Respiratory:  Negative for cough and shortness of breath.    Cardiovascular:  Negative for chest pain.   Gastrointestinal:  Negative for abdominal pain.   Neurological:  Negative for headaches.          Objective:    BP (!) 185/77 (BP Location: Left arm, Patient Position: Sitting, BP Method: Medium (Automatic))   Pulse 81   Temp 98.1 °F (36.7 °C) (Oral)   Resp 17   Ht 5' 11" (1.803 m)   Wt 73 kg (161 lb)   SpO2 99%   BMI 22.45 kg/m²      Physical Exam  Constitutional:       " Appearance: Normal appearance.   HENT:      Mouth/Throat:      Mouth: Mucous membranes are moist. No angioedema.      Pharynx: No oropharyngeal exudate or posterior oropharyngeal erythema.   Eyes:      Extraocular Movements: Extraocular movements intact.   Cardiovascular:      Rate and Rhythm: Normal rate and regular rhythm.      Pulses: Normal pulses.      Heart sounds: Normal heart sounds.   Pulmonary:      Effort: Pulmonary effort is normal.      Breath sounds: Normal breath sounds.   Abdominal:      Palpations: Abdomen is soft.      Tenderness: There is no abdominal tenderness.   Skin:     General: Skin is warm and dry.   Neurological:      Mental Status: He is alert and oriented to person, place, and time.          Assessment and Plan:    1. Essential hypertension    2. Angioedema, subsequent encounter         Essential hypertension  -patient has not taken any bp meds this morning  -encouraged to monitor bp at home. Discussed goal readings.   -may increase hydralazine if needed  -f/u in 2 weeks with bp log  -low salt diet and exercise    Angioedema, subsequent encounter  -reviewed ED note including photos  -significant improvement noted.        Patient Instructions   -Keep record of blood pressure readings and bring to follow up.   -Goal blood pressure < 140/90  -may increase hydralazine to twice a day if needed.    Follow up in about 2 weeks (around 10/27/2023), or if symptoms worsen or fail to improve.

## 2023-10-19 ENCOUNTER — TELEPHONE (OUTPATIENT)
Dept: FAMILY MEDICINE | Facility: CLINIC | Age: 78
End: 2023-10-19
Payer: MEDICARE

## 2023-10-19 ENCOUNTER — HOSPITAL ENCOUNTER (OUTPATIENT)
Dept: RADIOLOGY | Facility: HOSPITAL | Age: 78
Discharge: HOME | End: 2023-10-19
Attending: FAMILY MEDICINE
Payer: MEDICARE

## 2023-10-19 VITALS — WEIGHT: 160 LBS | BODY MASS INDEX: 22.4 KG/M2 | HEIGHT: 71 IN

## 2023-10-19 DIAGNOSIS — E55.9 VITAMIN D DEFICIENCY: Primary | ICD-10-CM

## 2023-10-19 DIAGNOSIS — D50.8 OTHER IRON DEFICIENCY ANEMIA: Primary | ICD-10-CM

## 2023-10-19 DIAGNOSIS — Z12.2 SCREENING FOR LUNG CANCER: ICD-10-CM

## 2023-10-19 DIAGNOSIS — Z87.891 PERSONAL HISTORY OF NICOTINE DEPENDENCE: ICD-10-CM

## 2023-10-19 DIAGNOSIS — D50.0 IRON DEFICIENCY ANEMIA DUE TO CHRONIC BLOOD LOSS: ICD-10-CM

## 2023-10-19 PROCEDURE — 71271 CT THORAX LUNG CANCER SCR C-: CPT | Mod: 26,,, | Performed by: RADIOLOGY

## 2023-10-19 PROCEDURE — 71271 CT THORAX LUNG CANCER SCR C-: CPT | Mod: TC

## 2023-10-19 PROCEDURE — 71271 CT CHEST LUNG SCREENING LOW DOSE: ICD-10-PCS | Mod: 26,,, | Performed by: RADIOLOGY

## 2023-10-19 RX ORDER — FERROUS SULFATE 325(65) MG
325 TABLET, DELAYED RELEASE (ENTERIC COATED) ORAL DAILY
Qty: 30 TABLET | Refills: 3 | Status: SHIPPED | OUTPATIENT
Start: 2023-10-19 | End: 2024-03-13

## 2023-10-19 RX ORDER — ERGOCALCIFEROL 1.25 MG/1
50000 CAPSULE ORAL
Qty: 12 CAPSULE | Refills: 0 | Status: SHIPPED | OUTPATIENT
Start: 2023-10-19 | End: 2023-12-14 | Stop reason: SDUPTHER

## 2023-10-19 NOTE — TELEPHONE ENCOUNTER
Attempted to contact patient daughter in regards to patient lab results. Patient daughter was not available, was able to leave message for patient to return phone call.       ----- Message from Sandra Bansal LPN sent at 10/11/2023  4:48 PM CDT -----    ----- Message -----  From: Sierra Dickerson MD  Sent: 10/10/2023  12:20 PM CDT  To: Sandra Bansal LPN    Please call patient regarding lab results. Anemia is worse than previous. Please have patient return stool specimen to check for occult blood. He can come by and  a specimen cup if he does not have one. Iron is low. Recommend starting ferrous sulfate 325 mg PO BID AFTER he comes by and leave stool specimen to check for occult blood. When was his last colonoscopy? Has she seen GI recently?   Vitamin D is low. Recommend ergocalciferol 50,000 units weekly X 12 weeks. Then transition to OTC vitamin D supplementation 2,000-5,000 units daily.   Cholesterol is good.  If patient develops any symptoms of anemia including chest pain, SOB, weakness, fatigue, dizziness, etc he needs to go to ER. Thanks!

## 2023-10-19 NOTE — TELEPHONE ENCOUNTER
Contacted patient daughter in regards to patient lab results. Informed patient daughter that patient anemia has worsened. Also informed patient daughter that patient needs to come in to get a stool specimen cup and to return cup when he has collected stool. Informed daughter that patient iron is low and recommended starting iron 325mg after he has brought in his stool specimen. Also informed daughter that patient vit d is low and recommended 50k units for 12 weeks, weekly. And then transition to OTC Vit d 2k-5k units daily. Patient daughter states patient has not seen GI recently and his last colonoscopy was July of 2022. Also informed daughter that patient cholesterol is good and if patient experiences any signs of anemia to report to the ER.     ----- Message from Sandra Bansal LPN sent at 10/11/2023  4:48 PM CDT -----    ----- Message -----  From: Sierra Dickerson MD  Sent: 10/10/2023  12:20 PM CDT  To: Sandra Bansal LPN    Please call patient regarding lab results. Anemia is worse than previous. Please have patient return stool specimen to check for occult blood. He can come by and  a specimen cup if he does not have one. Iron is low. Recommend starting ferrous sulfate 325 mg PO BID AFTER he comes by and leave stool specimen to check for occult blood. When was his last colonoscopy? Has she seen GI recently?   Vitamin D is low. Recommend ergocalciferol 50,000 units weekly X 12 weeks. Then transition to OTC vitamin D supplementation 2,000-5,000 units daily.   Cholesterol is good.  If patient develops any symptoms of anemia including chest pain, SOB, weakness, fatigue, dizziness, etc he needs to go to ER. Thanks!

## 2023-10-20 ENCOUNTER — TELEPHONE (OUTPATIENT)
Dept: FAMILY MEDICINE | Facility: CLINIC | Age: 78
End: 2023-10-20
Payer: MEDICARE

## 2023-10-20 NOTE — TELEPHONE ENCOUNTER
----- Message from Sierra Dickerson MD sent at 10/19/2023  3:52 PM CDT -----  Please call patient to let him know that his low dose CT screening for lung cancer does not reveal any evidence of cancer. Recommend repeat screening in 1 year. Thanks!      9927- call made to pt regarding above message. Pt voiced understanding and thanked me for the call back.

## 2023-10-23 ENCOUNTER — TELEPHONE (OUTPATIENT)
Dept: FAMILY MEDICINE | Facility: CLINIC | Age: 78
End: 2023-10-23
Payer: MEDICARE

## 2023-10-23 NOTE — TELEPHONE ENCOUNTER
----- Message from Sierra Dickerson MD sent at 10/23/2023  9:26 AM CDT -----  Please call patient regarding lab results. Patient is anemic. If he develops SOB, chest pain, dizziness, weakness, etc he needs to go to ER. Has he started iron supplementation? He has not returned stool specimen to check for occult blood. Recommend he return stool specimen BEFORE he starts iron supplementation. If he does not have a stool specimen cup already he can come in and get one. The test for stool for occult blood has previously been ordered.   He is up to date on colonoscopy and EGD. Has he seen any blood in his stool or had dark stools?   It appears he is an established patient of Dr. Mahajan. Please have Jernita call GI and get him an appt with Dr. Mahajan or his NP as soon as possible due to iron deficiency anemia. Thanks!      9922- call made to pt. No answer. Left message for pt to call back.

## 2023-10-30 ENCOUNTER — OFFICE VISIT (OUTPATIENT)
Dept: FAMILY MEDICINE | Facility: CLINIC | Age: 78
End: 2023-10-30
Payer: MEDICARE

## 2023-10-30 VITALS
OXYGEN SATURATION: 100 % | HEART RATE: 75 BPM | HEIGHT: 71 IN | TEMPERATURE: 98 F | DIASTOLIC BLOOD PRESSURE: 63 MMHG | BODY MASS INDEX: 22.96 KG/M2 | WEIGHT: 164 LBS | SYSTOLIC BLOOD PRESSURE: 151 MMHG | RESPIRATION RATE: 18 BRPM

## 2023-10-30 DIAGNOSIS — D50.0 IRON DEFICIENCY ANEMIA DUE TO CHRONIC BLOOD LOSS: ICD-10-CM

## 2023-10-30 DIAGNOSIS — M15.9 PRIMARY OSTEOARTHRITIS INVOLVING MULTIPLE JOINTS: Chronic | ICD-10-CM

## 2023-10-30 DIAGNOSIS — M25.512 ACUTE PAIN OF LEFT SHOULDER: ICD-10-CM

## 2023-10-30 DIAGNOSIS — I10 ESSENTIAL HYPERTENSION: Primary | ICD-10-CM

## 2023-10-30 LAB
ALBUMIN SERPL BCP-MCNC: 3.3 G/DL (ref 3.5–5)
ALBUMIN/GLOB SERPL: 0.8 {RATIO}
ALP SERPL-CCNC: 59 U/L (ref 45–115)
ALT SERPL W P-5'-P-CCNC: 19 U/L (ref 16–61)
ANION GAP SERPL CALCULATED.3IONS-SCNC: 9 MMOL/L (ref 7–16)
AST SERPL W P-5'-P-CCNC: 18 U/L (ref 15–37)
BASOPHILS # BLD AUTO: 0.03 K/UL (ref 0–0.2)
BASOPHILS NFR BLD AUTO: 0.5 % (ref 0–1)
BILIRUB SERPL-MCNC: 0.3 MG/DL (ref ?–1.2)
BUN SERPL-MCNC: 22 MG/DL (ref 7–18)
BUN/CREAT SERPL: 17 (ref 6–20)
CALCIUM SERPL-MCNC: 9.2 MG/DL (ref 8.5–10.1)
CHLORIDE SERPL-SCNC: 104 MMOL/L (ref 98–107)
CO2 SERPL-SCNC: 29 MMOL/L (ref 21–32)
CREAT SERPL-MCNC: 1.29 MG/DL (ref 0.7–1.3)
DIFFERENTIAL METHOD BLD: ABNORMAL
EGFR (NO RACE VARIABLE) (RUSH/TITUS): 57 ML/MIN/1.73M2
EOSINOPHIL # BLD AUTO: 0.14 K/UL (ref 0–0.5)
EOSINOPHIL NFR BLD AUTO: 2.2 % (ref 1–4)
ERYTHROCYTE [DISTWIDTH] IN BLOOD BY AUTOMATED COUNT: 18.9 % (ref 11.5–14.5)
GLOBULIN SER-MCNC: 4.1 G/DL (ref 2–4)
GLUCOSE SERPL-MCNC: 157 MG/DL (ref 74–106)
HCT VFR BLD AUTO: 24.6 % (ref 40–54)
HGB BLD-MCNC: 7.3 G/DL (ref 13.5–18)
IMM GRANULOCYTES # BLD AUTO: 0.02 K/UL (ref 0–0.04)
IMM GRANULOCYTES NFR BLD: 0.3 % (ref 0–0.4)
LYMPHOCYTES # BLD AUTO: 1.02 K/UL (ref 1–4.8)
LYMPHOCYTES NFR BLD AUTO: 16.1 % (ref 27–41)
MCH RBC QN AUTO: 29.2 PG (ref 27–31)
MCHC RBC AUTO-ENTMCNC: 29.7 G/DL (ref 32–36)
MCV RBC AUTO: 98.4 FL (ref 80–96)
MONOCYTES # BLD AUTO: 0.79 K/UL (ref 0–0.8)
MONOCYTES NFR BLD AUTO: 12.5 % (ref 2–6)
MPC BLD CALC-MCNC: 12.1 FL (ref 9.4–12.4)
NEUTROPHILS # BLD AUTO: 4.32 K/UL (ref 1.8–7.7)
NEUTROPHILS NFR BLD AUTO: 68.4 % (ref 53–65)
NRBC # BLD AUTO: 0 X10E3/UL
NRBC, AUTO (.00): 0 %
PLATELET # BLD AUTO: 312 K/UL (ref 150–400)
POTASSIUM SERPL-SCNC: 3.9 MMOL/L (ref 3.5–5.1)
PROT SERPL-MCNC: 7.4 G/DL (ref 6.4–8.2)
RBC # BLD AUTO: 2.5 M/UL (ref 4.6–6.2)
SODIUM SERPL-SCNC: 138 MMOL/L (ref 136–145)
WBC # BLD AUTO: 6.32 K/UL (ref 4.5–11)

## 2023-10-30 PROCEDURE — 1160F PR REVIEW ALL MEDS BY PRESCRIBER/CLIN PHARMACIST DOCUMENTED: ICD-10-PCS | Mod: ,,, | Performed by: FAMILY MEDICINE

## 2023-10-30 PROCEDURE — 1159F PR MEDICATION LIST DOCUMENTED IN MEDICAL RECORD: ICD-10-PCS | Mod: ,,, | Performed by: FAMILY MEDICINE

## 2023-10-30 PROCEDURE — 99213 OFFICE O/P EST LOW 20 MIN: CPT | Mod: ,,, | Performed by: FAMILY MEDICINE

## 2023-10-30 PROCEDURE — 3288F FALL RISK ASSESSMENT DOCD: CPT | Mod: ,,, | Performed by: FAMILY MEDICINE

## 2023-10-30 PROCEDURE — 1101F PR PT FALLS ASSESS DOC 0-1 FALLS W/OUT INJ PAST YR: ICD-10-PCS | Mod: ,,, | Performed by: FAMILY MEDICINE

## 2023-10-30 PROCEDURE — 1125F PR PAIN SEVERITY QUANTIFIED, PAIN PRESENT: ICD-10-PCS | Mod: ,,, | Performed by: FAMILY MEDICINE

## 2023-10-30 PROCEDURE — 3078F PR MOST RECENT DIASTOLIC BLOOD PRESSURE < 80 MM HG: ICD-10-PCS | Mod: ,,, | Performed by: FAMILY MEDICINE

## 2023-10-30 PROCEDURE — 1160F RVW MEDS BY RX/DR IN RCRD: CPT | Mod: ,,, | Performed by: FAMILY MEDICINE

## 2023-10-30 PROCEDURE — 1125F AMNT PAIN NOTED PAIN PRSNT: CPT | Mod: ,,, | Performed by: FAMILY MEDICINE

## 2023-10-30 PROCEDURE — 3288F PR FALLS RISK ASSESSMENT DOCUMENTED: ICD-10-PCS | Mod: ,,, | Performed by: FAMILY MEDICINE

## 2023-10-30 PROCEDURE — 1101F PT FALLS ASSESS-DOCD LE1/YR: CPT | Mod: ,,, | Performed by: FAMILY MEDICINE

## 2023-10-30 PROCEDURE — 80053 COMPREHEN METABOLIC PANEL: CPT | Mod: ,,, | Performed by: CLINICAL MEDICAL LABORATORY

## 2023-10-30 PROCEDURE — 3077F PR MOST RECENT SYSTOLIC BLOOD PRESSURE >= 140 MM HG: ICD-10-PCS | Mod: ,,, | Performed by: FAMILY MEDICINE

## 2023-10-30 PROCEDURE — 3077F SYST BP >= 140 MM HG: CPT | Mod: ,,, | Performed by: FAMILY MEDICINE

## 2023-10-30 PROCEDURE — 99213 PR OFFICE/OUTPT VISIT, EST, LEVL III, 20-29 MIN: ICD-10-PCS | Mod: ,,, | Performed by: FAMILY MEDICINE

## 2023-10-30 PROCEDURE — 80053 COMPREHENSIVE METABOLIC PANEL: ICD-10-PCS | Mod: ,,, | Performed by: CLINICAL MEDICAL LABORATORY

## 2023-10-30 PROCEDURE — 85025 CBC WITH DIFFERENTIAL: ICD-10-PCS | Mod: ,,, | Performed by: CLINICAL MEDICAL LABORATORY

## 2023-10-30 PROCEDURE — 1159F MED LIST DOCD IN RCRD: CPT | Mod: ,,, | Performed by: FAMILY MEDICINE

## 2023-10-30 PROCEDURE — 85025 COMPLETE CBC W/AUTO DIFF WBC: CPT | Mod: ,,, | Performed by: CLINICAL MEDICAL LABORATORY

## 2023-10-30 PROCEDURE — 3078F DIAST BP <80 MM HG: CPT | Mod: ,,, | Performed by: FAMILY MEDICINE

## 2023-10-30 RX ORDER — HYDROCODONE BITARTRATE AND ACETAMINOPHEN 5; 325 MG/1; MG/1
1 TABLET ORAL EVERY 8 HOURS PRN
Qty: 20 TABLET | Refills: 0 | Status: SHIPPED | OUTPATIENT
Start: 2023-10-30 | End: 2023-12-14 | Stop reason: SDUPTHER

## 2023-10-30 RX ORDER — HYDRALAZINE HYDROCHLORIDE 100 MG/1
100 TABLET, FILM COATED ORAL 2 TIMES DAILY
Qty: 180 TABLET | Refills: 1 | Status: SHIPPED | OUTPATIENT
Start: 2023-10-30

## 2023-10-30 NOTE — PROGRESS NOTES
Clinic Note    Patient Name: Stephane Mathis  : 1945  MRN: [id number]    Chief Complaint   Patient presents with    Follow-up     2 week follow up for hypertension     Neck Pain     Patient is currently having left-sided neck pain. Patient states the pain travels down his left arm and hand. Patient states medication Mobic does not help unless he takes 2 pills.     Health Maintenance     TETANUS VACCINE Never done  Shingles Vaccine(1 of 2) Never done  RSV Vaccine (Age 60+)(1 - 1-dose 60+ series) Never done  COVID-19 Vaccine(2023- season) due on 2023        HPI:    Mr. Stephane Mathis is a 78 y.o. male who presents to clinic today with CC of follow up on HTN.  Lisinopril was discontinued two weeks ago after ER visit for angioedema.  BP is elevated today. He admits he has not yet had his medication this morning. Reports however, he does sometimes have elevated readings at home in 140s systolic. Reports some readings in 120s systolic intermittently as well.  Patient reports issues with angioedema are resolved.  Reports neck and L shoulder pain. Reports pain is worse with movement. Reports he has normal ROM but it is significantly painful to move it overhead. 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 He declined imaging of neck or repeat imaging of shoulder today. He admits he overdid it doing some work this weekend. He reports, however, mobic is not helping. He would like to try something else as needed for pain.   Patient denies any problems or side effects with medications.  Patient is, otherwise, without complaints.     Medications:  Medication List with Changes/Refills   New Medications    HYDROCODONE-ACETAMINOPHEN (NORCO) 5-325 MG PER TABLET    Take 1 tablet by mouth every 8 (eight) hours as needed for Pain.   Current Medications    AMLODIPINE (NORVASC) 10 MG TABLET    Take 1 tablet (10 mg total) by mouth once daily.    ASPIRIN (ECOTRIN) 81 MG EC TABLET    Take 1 tablet (81 mg total) by mouth once  daily.    ATORVASTATIN (LIPITOR) 40 MG TABLET    Take 1 tablet (40 mg total) by mouth every evening.    CHLORPHENIRAMINE-PHENYLEPH-DM (ED A-HIST DM) 4-10-10 MG TAB    Take 1 tablet by mouth every 8 (eight) hours as needed (congestion/ears).    EPINEPHRINE (EPIPEN) 0.3 MG/0.3 ML ATIN    Inject 0.3 mLs (0.3 mg total) into the muscle as needed.    ERGOCALCIFEROL (ERGOCALCIFEROL) 50,000 UNIT CAP    Take 1 capsule (50,000 Units total) by mouth every 7 days.    FAMOTIDINE (PEPCID) 20 MG TABLET    Take 1 tablet (20 mg total) by mouth 2 (two) times daily. for 14 days    FERROUS SULFATE 325 (65 FE) MG EC TABLET    Take 1 tablet (325 mg total) by mouth once daily.    HYDROCHLOROTHIAZIDE (HYDRODIURIL) 25 MG TABLET    Take 1 tablet (25 mg total) by mouth once daily.    METHOCARBAMOL (ROBAXIN) 500 MG TAB    Take 1 tablet (500 mg total) by mouth 3 (three) times daily as needed (muscle spasm/pain. May cause drowsiness).    OLOPATADINE (PATANOL) 0.1 % OPHTHALMIC SOLUTION    Place 1 drop into both eyes 2 (two) times daily as needed for Allergies.    TADALAFIL (CIALIS) 10 MG TABLET    Take 1 tablet (10 mg total) by mouth daily as needed for Erectile Dysfunction.    TAMSULOSIN (FLOMAX) 0.4 MG CAP    Take 1 capsule (0.4 mg total) by mouth once daily.   Changed and/or Refilled Medications    Modified Medication Previous Medication    HYDRALAZINE (APRESOLINE) 100 MG TABLET hydrALAZINE (APRESOLINE) 100 MG tablet       Take 1 tablet (100 mg total) by mouth 2 (two) times a day.    Take 1 tablet (100 mg total) by mouth once daily.   Discontinued Medications    MELOXICAM (MOBIC) 7.5 MG TABLET    Take 1 tablet (7.5 mg total) by mouth daily as needed for Pain.    METHYLPREDNISOLONE (MEDROL DOSEPACK) 4 MG TABLET    use as directed        Allergies: Ace inhibitors and Codeine      Past Medical History:    Past Medical History:   Diagnosis Date    Acute superficial gastritis without hemorrhage 7/12/2022    Alcoholism     Anemia     AVM  (arteriovenous malformation) of colon 7/13/2022    Colon, diverticulosis 7/13/2022    Hypertension     Iron deficiency anemia due to chronic blood loss 7/12/2022    Polyp, sigmoid colon 7/13/2022    Seizures     Stroke     right sided weakness       Past Surgical History:    Past Surgical History:   Procedure Laterality Date    KNEE SURGERY Right     SCROTAL SURGERY      in february         Social History:    Social History     Tobacco Use   Smoking Status Every Day    Current packs/day: 1.00    Average packs/day: 1 pack/day for 55.0 years (55.0 ttl pk-yrs)    Types: Cigarettes   Smokeless Tobacco Never     Social History     Substance and Sexual Activity   Alcohol Use Not Currently    Comment: half pint of liquor a day. but reports last drink was  a month ago.     Social History     Substance and Sexual Activity   Drug Use Yes    Types: Marijuana    Comment: daily         Family History:    Family History   Problem Relation Age of Onset    Diabetes Mother     Hypertension Mother     Diabetes Sister     Diabetes Brother     Hypertension Brother     Throat cancer Brother        Review of Systems:    Review of Systems   Constitutional:  Negative for appetite change, chills, fatigue, fever and unexpected weight change.   Eyes:  Negative for visual disturbance.   Respiratory:  Negative for cough and shortness of breath.    Cardiovascular:  Negative for chest pain and leg swelling.   Gastrointestinal:  Negative for abdominal pain, change in bowel habit, constipation, diarrhea, nausea and vomiting.   Musculoskeletal:  Positive for arthralgias.   Integumentary:  Negative for rash.   Neurological:  Negative for dizziness and headaches.   Psychiatric/Behavioral:  The patient is not nervous/anxious.         Vitals:    Vitals:    10/30/23 1008 10/30/23 1054   BP: (!) 161/65 (!) 151/63   BP Location: Left arm Right arm   Patient Position: Sitting Sitting   BP Method: Medium (Automatic) Medium (Automatic)   Pulse: 75    Resp: 18  "   Temp: 98 °F (36.7 °C)    TempSrc: Oral    SpO2: 100%    Weight: 74.4 kg (164 lb)    Height: 5' 11" (1.803 m)        Body mass index is 22.87 kg/m².    Wt Readings from Last 3 Encounters:   10/30/23 1008 74.4 kg (164 lb)   10/19/23 1507 72.6 kg (160 lb)   10/13/23 0958 73 kg (161 lb)        Physical Exam:    Physical Exam  Constitutional:       General: He is not in acute distress.     Appearance: Normal appearance.   HENT:      Nose: Nose normal.      Mouth/Throat:      Mouth: Mucous membranes are moist.      Pharynx: Oropharynx is clear.   Eyes:      Conjunctiva/sclera: Conjunctivae normal.   Cardiovascular:      Rate and Rhythm: Normal rate and regular rhythm.      Heart sounds: Normal heart sounds. No murmur heard.  Pulmonary:      Effort: Pulmonary effort is normal. No respiratory distress.      Breath sounds: Normal breath sounds. No wheezing, rhonchi or rales.   Abdominal:      General: Bowel sounds are normal.      Palpations: Abdomen is soft.      Tenderness: There is no abdominal tenderness.   Musculoskeletal:         General: Tenderness present. No swelling. Normal range of motion.      Cervical back: Neck supple.      Right lower leg: No edema.      Left lower leg: No edema.   Skin:     Findings: No rash.   Neurological:      General: No focal deficit present.      Mental Status: He is alert. Mental status is at baseline.   Psychiatric:         Mood and Affect: Mood normal.         Assessment/Plan:   1. Essential hypertension  -     CBC Auto Differential; Future; Expected date: 10/30/2023  -     Comprehensive Metabolic Panel; Future; Expected date: 10/30/2023  -     hydrALAZINE (APRESOLINE) 100 MG tablet; Take 1 tablet (100 mg total) by mouth 2 (two) times a day.  Dispense: 180 tablet; Refill: 1 - increase frequency to twice daily  - Monitor BP at home  - DASH diet and exercise.    2. Primary osteoarthritis involving multiple joints  -     HYDROcodone-acetaminophen (NORCO) 5-325 mg per tablet; Take 1 " tablet by mouth every 8 (eight) hours as needed for Pain.  Dispense: 20 tablet; Refill: 0   - For short term use only. Avoid NSAIDs for now. Mobic did not improve pain and creatinine was mildly elevated on last set of labs.  reviewed and appropriate.     3. Acute pain of left shoulder  -     HYDROcodone-acetaminophen (NORCO) 5-325 mg per tablet; Take 1 tablet by mouth every 8 (eight) hours as needed for Pain.  Dispense: 20 tablet; Refill: 0        - For short term use only. Avoid NSAIDs for now. Mobic did not improve pain and creatinine was mildly elevated on last set of labs.  reviewed and appropriate.     4. Iron deficiency anemia due to chronic blood loss  -     CBC Auto Differential; Future; Expected date: 10/30/2023  -     Comprehensive Metabolic Panel; Future; Expected date: 10/30/2023  - Patient reminded again to bring in stool specimen to test for occult blood. Voiced understanding. States he has specimen cup at home and keeps forgetting to return sample to lab.  - He denies any signs of symptoms of anemia.         Active Problem List with Overview Notes    Diagnosis Date Noted    Essential hypertension 07/12/2022    Seizure 06/10/2022    Alcohol dependence in early full remission 07/12/2022    Iron deficiency anemia due to chronic blood loss 07/12/2022    AVM (arteriovenous malformation) of colon 07/13/2022    Continuous dependence on cigarette smoking 07/12/2022    Colon, diverticulosis 07/13/2022    Polyp, sigmoid colon 07/13/2022    Mixed hyperlipidemia 10/04/2023    Primary osteoarthritis involving multiple joints 10/04/2023    Muscle spasm 10/04/2023    Erectile dysfunction 10/04/2023    Benign prostatic hyperplasia without lower urinary tract symptoms 10/04/2023    Vitamin D deficiency 10/04/2023        RTC in 1 month for follow up on HTN, shoulder pain, and anemia.   RTC sooner if symptoms worsen or fail to resolve.  Patient voiced understanding and is agreeable to plan.      Pebbles Esquivel  MD Jayla    [facility]

## 2023-10-31 ENCOUNTER — EXTERNAL CHRONIC CARE MANAGEMENT (OUTPATIENT)
Dept: FAMILY MEDICINE | Facility: CLINIC | Age: 78
End: 2023-10-31
Payer: MEDICARE

## 2023-10-31 PROCEDURE — G0511 CCM/BHI BY RHC/FQHC 20MIN MO: HCPCS | Mod: ,,, | Performed by: FAMILY MEDICINE

## 2023-10-31 PROCEDURE — G0511 PR CHRONIC CARE MGMT, RHC OR FQHC ONLY, 20 MINS OR MORE: ICD-10-PCS | Mod: ,,, | Performed by: FAMILY MEDICINE

## 2023-11-01 ENCOUNTER — TELEPHONE (OUTPATIENT)
Dept: FAMILY MEDICINE | Facility: CLINIC | Age: 78
End: 2023-11-01
Payer: MEDICARE

## 2023-11-01 DIAGNOSIS — R73.09 ELEVATED GLUCOSE LEVEL: Primary | ICD-10-CM

## 2023-11-01 NOTE — TELEPHONE ENCOUNTER
----- Message from Sierra Dickerson MD sent at 10/31/2023  3:02 PM CDT -----  Please call patient regarding lab results. Glucose is elevated. Recommend patient RTC at his earliest convenience for lab only for HbA1C. Patient is anemic. This is slightly worse than previous. He is borderline needing blood transfusion. He has previously been given a stool specimen cup and advised to return sample for testing for blood. I reminded him about this at his last office visit. He advised he has the cup but has not yet returned the sample. He denied any signs or symptoms of anemia at his office visit. Advised, however, if he becomes symptomatic to go to ER. Recommend he follow up with GI ASAP as well. I do not believe he needs a new referral. Please notify Alesha to schedule this appt with GI for him. Thanks!        5594- call made to pt regarding above message. No answer. Left message for pt to call back.

## 2023-11-01 NOTE — TELEPHONE ENCOUNTER
Pt returned phone call from earlier. Pt states he is out of town at this time and will be back next week. Advised pt to come to clinic for a1c lab and to return stool sample cup. Pt voiced understanding.

## 2023-11-27 ENCOUNTER — TELEPHONE (OUTPATIENT)
Dept: FAMILY MEDICINE | Facility: CLINIC | Age: 78
End: 2023-11-27
Payer: MEDICARE

## 2023-11-27 DIAGNOSIS — D50.0 IRON DEFICIENCY ANEMIA SECONDARY TO BLOOD LOSS (CHRONIC): ICD-10-CM

## 2023-11-27 DIAGNOSIS — D50.8 OTHER IRON DEFICIENCY ANEMIA: Primary | ICD-10-CM

## 2023-11-30 ENCOUNTER — TELEPHONE (OUTPATIENT)
Dept: FAMILY MEDICINE | Facility: CLINIC | Age: 78
End: 2023-11-30
Payer: MEDICARE

## 2023-11-30 ENCOUNTER — EXTERNAL CHRONIC CARE MANAGEMENT (OUTPATIENT)
Dept: FAMILY MEDICINE | Facility: CLINIC | Age: 78
End: 2023-11-30
Payer: MEDICARE

## 2023-11-30 PROCEDURE — G0511 PR CHRONIC CARE MGMT, RHC OR FQHC ONLY, 20 MINS OR MORE: ICD-10-PCS | Mod: ,,, | Performed by: FAMILY MEDICINE

## 2023-11-30 PROCEDURE — G0511 CCM/BHI BY RHC/FQHC 20MIN MO: HCPCS | Mod: ,,, | Performed by: FAMILY MEDICINE

## 2023-11-30 NOTE — TELEPHONE ENCOUNTER
"----- Message from Sierra Dickerson MD sent at 11/29/2023 12:56 PM CST -----  This could cause his platelet counts to be low but I don't know that it is the cause of his anemia. However, alcoholism could increase risk of upper GI bleed. I would recommend he see GI first and then follow up with Hematology if needed after seeing GI. Thanks!  ----- Message -----  From: Sandra Bansal LPN  Sent: 11/29/2023  11:38 AM CST  To: Sierra Dickerson MD      ----- Message -----  From: Amado Dickson MA  Sent: 11/27/2023   8:59 AM CST  To: Sandra Bansal LPN    Was able top contact patients daughter. Patient daughter states patient is a "bad alcoholic", and she is wondering could that be the reason for him being extremely anemic? She also states he has followed with hematology and oncology in the past.   ----- Message -----  From: Sandra Bansal LPN  Sent: 11/13/2023   1:04 PM CST  To: Amado Dickson MA      ----- Message -----  From: Sierra Dickerson MD  Sent: 11/7/2023   8:24 AM CST  To: Sandra Bansal LPN    Please call patient to let him know his stool for blood is currently negative/normal. This is good. However, he has been very anemic. I know this is a chronic, intermittent issue for him. He needs to follow up with GI as soon as possible. If he develops symptoms of anemia in the interim he should go to the nearest ER. He did advised that he is going to start spending a lot of his time in [place]. However, if possible, he needs to follow up with GI in [place]. Please call Dr. Mahajan's office to see how soon they can work him in. It appears he has also seen Hematology (Dr. Bradley) for iron deficiency anemia in the past. When you call Dr. Mahajan's office if they prefer him to see Hematology first since his stool is negative for blood please set this appt up with Dr. Bradley. If patient is not coming back to this area and is moving to [place] he needs to establish care with someone there " to assist in managing this issue. Thanks!

## 2023-11-30 NOTE — TELEPHONE ENCOUNTER
Call made to pt daughter regarding labs and GI referral. Pt daughter states she has tried to talk to pt about stopping the alcohol drinking but pt continues to refuse to stop and states that she knows pt will not go to any alcohol cessation. Advised on s/s of GI bleeding and to go to nearest ER if occurs. States she has been called and made aware of GI appt and intends to keep at all possible.

## 2023-12-13 ENCOUNTER — PATIENT OUTREACH (OUTPATIENT)
Dept: ADMINISTRATIVE | Facility: HOSPITAL | Age: 78
End: 2023-12-13

## 2023-12-14 ENCOUNTER — HOSPITAL ENCOUNTER (OUTPATIENT)
Dept: RADIOLOGY | Facility: HOSPITAL | Age: 78
Discharge: HOME | End: 2023-12-14
Attending: FAMILY MEDICINE
Payer: MEDICARE

## 2023-12-14 ENCOUNTER — OFFICE VISIT (OUTPATIENT)
Dept: FAMILY MEDICINE | Facility: CLINIC | Age: 78
End: 2023-12-14
Payer: MEDICARE

## 2023-12-14 VITALS
OXYGEN SATURATION: 99 % | TEMPERATURE: 98 F | HEIGHT: 71 IN | BODY MASS INDEX: 23.13 KG/M2 | HEART RATE: 78 BPM | DIASTOLIC BLOOD PRESSURE: 76 MMHG | RESPIRATION RATE: 16 BRPM | WEIGHT: 165.19 LBS | SYSTOLIC BLOOD PRESSURE: 174 MMHG

## 2023-12-14 DIAGNOSIS — M25.512 ACUTE PAIN OF LEFT SHOULDER: ICD-10-CM

## 2023-12-14 DIAGNOSIS — K21.9 GERD WITHOUT ESOPHAGITIS: ICD-10-CM

## 2023-12-14 DIAGNOSIS — E55.9 VITAMIN D DEFICIENCY: ICD-10-CM

## 2023-12-14 DIAGNOSIS — M62.838 MUSCLE SPASM: Chronic | ICD-10-CM

## 2023-12-14 DIAGNOSIS — I10 ESSENTIAL HYPERTENSION: Primary | ICD-10-CM

## 2023-12-14 DIAGNOSIS — M15.9 PRIMARY OSTEOARTHRITIS INVOLVING MULTIPLE JOINTS: Chronic | ICD-10-CM

## 2023-12-14 PROCEDURE — 1159F PR MEDICATION LIST DOCUMENTED IN MEDICAL RECORD: ICD-10-PCS | Mod: ,,, | Performed by: FAMILY MEDICINE

## 2023-12-14 PROCEDURE — 3077F SYST BP >= 140 MM HG: CPT | Mod: ,,, | Performed by: FAMILY MEDICINE

## 2023-12-14 PROCEDURE — 3078F PR MOST RECENT DIASTOLIC BLOOD PRESSURE < 80 MM HG: ICD-10-PCS | Mod: ,,, | Performed by: FAMILY MEDICINE

## 2023-12-14 PROCEDURE — 1125F PR PAIN SEVERITY QUANTIFIED, PAIN PRESENT: ICD-10-PCS | Mod: ,,, | Performed by: FAMILY MEDICINE

## 2023-12-14 PROCEDURE — 96372 PR INJECTION,THERAP/PROPH/DIAG2ST, IM OR SUBCUT: ICD-10-PCS | Mod: ,,, | Performed by: FAMILY MEDICINE

## 2023-12-14 PROCEDURE — 3077F PR MOST RECENT SYSTOLIC BLOOD PRESSURE >= 140 MM HG: ICD-10-PCS | Mod: ,,, | Performed by: FAMILY MEDICINE

## 2023-12-14 PROCEDURE — 3288F PR FALLS RISK ASSESSMENT DOCUMENTED: ICD-10-PCS | Mod: ,,, | Performed by: FAMILY MEDICINE

## 2023-12-14 PROCEDURE — 3078F DIAST BP <80 MM HG: CPT | Mod: ,,, | Performed by: FAMILY MEDICINE

## 2023-12-14 PROCEDURE — 99214 PR OFFICE/OUTPT VISIT, EST, LEVL IV, 30-39 MIN: ICD-10-PCS | Mod: 25,,, | Performed by: FAMILY MEDICINE

## 2023-12-14 PROCEDURE — 1159F MED LIST DOCD IN RCRD: CPT | Mod: ,,, | Performed by: FAMILY MEDICINE

## 2023-12-14 PROCEDURE — 1125F AMNT PAIN NOTED PAIN PRSNT: CPT | Mod: ,,, | Performed by: FAMILY MEDICINE

## 2023-12-14 PROCEDURE — 1100F PTFALLS ASSESS-DOCD GE2>/YR: CPT | Mod: ,,, | Performed by: FAMILY MEDICINE

## 2023-12-14 PROCEDURE — 73030 X-RAY EXAM OF SHOULDER: CPT | Mod: TC,LT

## 2023-12-14 PROCEDURE — 1100F PR PT FALLS ASSESS DOC 2+ FALLS/FALL W/INJURY/YR: ICD-10-PCS | Mod: ,,, | Performed by: FAMILY MEDICINE

## 2023-12-14 PROCEDURE — 99214 OFFICE O/P EST MOD 30 MIN: CPT | Mod: 25,,, | Performed by: FAMILY MEDICINE

## 2023-12-14 PROCEDURE — 96372 THER/PROPH/DIAG INJ SC/IM: CPT | Mod: ,,, | Performed by: FAMILY MEDICINE

## 2023-12-14 PROCEDURE — 3288F FALL RISK ASSESSMENT DOCD: CPT | Mod: ,,, | Performed by: FAMILY MEDICINE

## 2023-12-14 RX ORDER — AMLODIPINE BESYLATE 10 MG/1
10 TABLET ORAL DAILY
Qty: 90 TABLET | Refills: 1 | Status: SHIPPED | OUTPATIENT
Start: 2023-12-14

## 2023-12-14 RX ORDER — HYDROCHLOROTHIAZIDE 25 MG/1
25 TABLET ORAL DAILY
Qty: 90 TABLET | Refills: 1 | Status: SHIPPED | OUTPATIENT
Start: 2023-12-14

## 2023-12-14 RX ORDER — KETOROLAC TROMETHAMINE 30 MG/ML
30 INJECTION, SOLUTION INTRAMUSCULAR; INTRAVENOUS
Status: COMPLETED | OUTPATIENT
Start: 2023-12-14 | End: 2023-12-14

## 2023-12-14 RX ORDER — FAMOTIDINE 20 MG/1
20 TABLET, FILM COATED ORAL 2 TIMES DAILY PRN
Qty: 60 TABLET | Refills: 3 | Status: SHIPPED | OUTPATIENT
Start: 2023-12-14

## 2023-12-14 RX ORDER — ERGOCALCIFEROL 1.25 MG/1
50000 CAPSULE ORAL
Qty: 12 CAPSULE | Refills: 0 | Status: SHIPPED | OUTPATIENT
Start: 2023-12-14 | End: 2024-01-25 | Stop reason: SDUPTHER

## 2023-12-14 RX ORDER — METHOCARBAMOL 500 MG/1
500 TABLET, FILM COATED ORAL 3 TIMES DAILY PRN
Qty: 30 TABLET | Refills: 2 | Status: SHIPPED | OUTPATIENT
Start: 2023-12-14

## 2023-12-14 RX ORDER — HYDROCODONE BITARTRATE AND ACETAMINOPHEN 5; 325 MG/1; MG/1
1 TABLET ORAL EVERY 8 HOURS PRN
Qty: 20 TABLET | Refills: 0 | Status: SHIPPED | OUTPATIENT
Start: 2023-12-14

## 2023-12-14 RX ADMIN — KETOROLAC TROMETHAMINE 30 MG: 30 INJECTION, SOLUTION INTRAMUSCULAR; INTRAVENOUS at 03:12

## 2023-12-14 NOTE — PROGRESS NOTES
"Clinic Note    Patient Name: Stephane Mathis  : 1945  MRN: [id number]    Chief Complaint   Patient presents with    Shoulder Pain     Patient reports constant left shoulder pain.     Medication Refill    Health Maintenance     TETANUS VACCINE Never done  Shingles Vaccine(1 of 2) Never done  RSV Vaccine (Age 60+ and Pregnant patients)(1 - 1-dose 60+ series) Never done  COVID-19 Vaccine(2023- season) due on 2023        HPI:    Mr. Stephane Mathis is a 78 y.o. male who presents to clinic today with CC of L shoulder pain. Reports pain is worse with movement.  Patient has a PMH significant for HTN, HLD, seizure DO, alcohol dependence, BPH, iron deficiency anemia, Vitamin D deficiency, and OA.  Last imaging of shoulder in  did reveal some degenerative changes. Denies any known injury. Denies any recent seizure activity.   BP is elevated today. Patient reports he believes elevation is due to pain.  Patient has a long-standing h/o non-compliance. Daughter previously told us that he drinks more than he acknowledges that he does. Patient admits that he still drinks but reports "I don't drink as much as I used to".  Patient reports chronic issues are, otherwise, well controlled on current medication regimen. Denies problems or side effects with medications.  Patient is, otherwise, without complaints.     Medications:  Medication List with Changes/Refills   Current Medications    ASPIRIN (ECOTRIN) 81 MG EC TABLET    Take 1 tablet (81 mg total) by mouth once daily.    ATORVASTATIN (LIPITOR) 40 MG TABLET    Take 1 tablet (40 mg total) by mouth every evening.    CHLORPHENIRAMINE-PHENYLEPH-DM (ED A-HIST DM) 4-10-10 MG TAB    Take 1 tablet by mouth every 8 (eight) hours as needed (congestion/ears).    EPINEPHRINE (EPIPEN) 0.3 MG/0.3 ML ATIN    Inject 0.3 mLs (0.3 mg total) into the muscle as needed.    FERROUS SULFATE 325 (65 FE) MG EC TABLET    Take 1 tablet (325 mg total) by mouth once daily.    HYDRALAZINE " (APRESOLINE) 100 MG TABLET    Take 1 tablet (100 mg total) by mouth 2 (two) times a day.    OLOPATADINE (PATANOL) 0.1 % OPHTHALMIC SOLUTION    Place 1 drop into both eyes 2 (two) times daily as needed for Allergies.    TADALAFIL (CIALIS) 10 MG TABLET    Take 1 tablet (10 mg total) by mouth daily as needed for Erectile Dysfunction.    TAMSULOSIN (FLOMAX) 0.4 MG CAP    Take 1 capsule (0.4 mg total) by mouth once daily.   Changed and/or Refilled Medications    Modified Medication Previous Medication    AMLODIPINE (NORVASC) 10 MG TABLET amLODIPine (NORVASC) 10 MG tablet       Take 1 tablet (10 mg total) by mouth once daily.    Take 1 tablet (10 mg total) by mouth once daily.    ERGOCALCIFEROL (ERGOCALCIFEROL) 50,000 UNIT CAP ergocalciferol (ERGOCALCIFEROL) 50,000 unit Cap       Take 1 capsule (50,000 Units total) by mouth every 7 days.    Take 1 capsule (50,000 Units total) by mouth every 7 days.    FAMOTIDINE (PEPCID) 20 MG TABLET famotidine (PEPCID) 20 MG tablet       Take 1 tablet (20 mg total) by mouth 2 (two) times daily as needed for Heartburn.    Take 1 tablet (20 mg total) by mouth 2 (two) times daily. for 14 days    HYDROCHLOROTHIAZIDE (HYDRODIURIL) 25 MG TABLET hydroCHLOROthiazide (HYDRODIURIL) 25 MG tablet       Take 1 tablet (25 mg total) by mouth once daily.    Take 1 tablet (25 mg total) by mouth once daily.    HYDROCODONE-ACETAMINOPHEN (NORCO) 5-325 MG PER TABLET HYDROcodone-acetaminophen (NORCO) 5-325 mg per tablet       Take 1 tablet by mouth every 8 (eight) hours as needed for Pain.    Take 1 tablet by mouth every 8 (eight) hours as needed for Pain.    METHOCARBAMOL (ROBAXIN) 500 MG TAB methocarbamoL (ROBAXIN) 500 MG Tab       Take 1 tablet (500 mg total) by mouth 3 (three) times daily as needed (muscle spasm/pain. May cause drowsiness).    Take 1 tablet (500 mg total) by mouth 3 (three) times daily as needed (muscle spasm/pain. May cause drowsiness).        Allergies: Ace inhibitors and  Codeine      Past Medical History:    Past Medical History:   Diagnosis Date    Acute superficial gastritis without hemorrhage 7/12/2022    Alcoholism     Anemia     AVM (arteriovenous malformation) of colon 7/13/2022    Colon, diverticulosis 7/13/2022    Hypertension     Iron deficiency anemia due to chronic blood loss 7/12/2022    Polyp, sigmoid colon 7/13/2022    Seizures     Stroke     right sided weakness       Past Surgical History:    Past Surgical History:   Procedure Laterality Date    KNEE SURGERY Right     SCROTAL SURGERY      in february         Social History:    Social History     Tobacco Use   Smoking Status Every Day    Current packs/day: 1.00    Average packs/day: 1 pack/day for 55.0 years (55.0 ttl pk-yrs)    Types: Cigarettes   Smokeless Tobacco Never     Social History     Substance and Sexual Activity   Alcohol Use Not Currently    Comment: half pint of liquor a day. but reports last drink was  a month ago.     Social History     Substance and Sexual Activity   Drug Use Yes    Types: Marijuana    Comment: daily         Family History:    Family History   Problem Relation Age of Onset    Diabetes Mother     Hypertension Mother     Diabetes Sister     Diabetes Brother     Hypertension Brother     Throat cancer Brother        Review of Systems:    Review of Systems   Constitutional:  Negative for appetite change, chills, fatigue, fever and unexpected weight change.   Eyes:  Negative for visual disturbance.   Respiratory:  Negative for cough and shortness of breath.    Cardiovascular:  Negative for chest pain and leg swelling.   Gastrointestinal:  Negative for abdominal pain, change in bowel habit, constipation, diarrhea, nausea and vomiting.   Musculoskeletal:  Positive for arthralgias.   Integumentary:  Negative for rash.   Neurological:  Negative for dizziness and headaches.   Psychiatric/Behavioral:  The patient is not nervous/anxious.         Vitals:    Vitals:    12/14/23 1407 12/14/23 1518  "  BP: (!) 179/66 (!) 174/76   BP Location: Left arm Left arm   Patient Position: Sitting Sitting   BP Method: Medium (Automatic) Medium (Automatic)   Pulse: 78    Resp: 16    Temp: 97.9 °F (36.6 °C)    TempSrc: Oral    SpO2: 99%    Weight: 74.9 kg (165 lb 3.2 oz)    Height: 5' 11" (1.803 m)        Body mass index is 23.04 kg/m².    Wt Readings from Last 3 Encounters:   12/14/23 1407 74.9 kg (165 lb 3.2 oz)   10/30/23 1008 74.4 kg (164 lb)   10/19/23 1507 72.6 kg (160 lb)        Physical Exam:    Physical Exam  Constitutional:       General: He is not in acute distress.     Appearance: Normal appearance.   HENT:      Nose: Nose normal.      Mouth/Throat:      Mouth: Mucous membranes are moist.      Pharynx: Oropharynx is clear.   Eyes:      Conjunctiva/sclera: Conjunctivae normal.   Cardiovascular:      Rate and Rhythm: Normal rate and regular rhythm.      Heart sounds: Normal heart sounds. No murmur heard.  Pulmonary:      Effort: Pulmonary effort is normal. No respiratory distress.      Breath sounds: Normal breath sounds. No wheezing, rhonchi or rales.   Abdominal:      General: Bowel sounds are normal.      Palpations: Abdomen is soft.      Tenderness: There is no abdominal tenderness.   Musculoskeletal:         General: Tenderness present. No swelling.      Cervical back: Neck supple.      Right lower leg: No edema.      Left lower leg: No edema.      Comments: ROM shoulder limited secondary to pain   Skin:     Findings: No rash.   Neurological:      General: No focal deficit present.      Mental Status: He is alert. Mental status is at baseline.   Psychiatric:         Mood and Affect: Mood normal.         Assessment/Plan:   1. Essential hypertension  -     amLODIPine (NORVASC) 10 MG tablet; Take 1 tablet (10 mg total) by mouth once daily.  Dispense: 90 tablet; Refill: 1  -     hydroCHLOROthiazide (HYDRODIURIL) 25 MG tablet; Take 1 tablet (25 mg total) by mouth once daily.  Dispense: 90 tablet; Refill: 1  - DASH " diet  - Stressed importance of medication compliance.     2. Primary osteoarthritis involving multiple joints  -     HYDROcodone-acetaminophen (NORCO) 5-325 mg per tablet; Take 1 tablet by mouth every 8 (eight) hours as needed for Pain.  Dispense: 20 tablet; Refill: 0  - Above medication for short term use only for acute flare with pain.  reviewed and appropriate.     3. Acute pain of left shoulder  -     HYDROcodone-acetaminophen (NORCO) 5-325 mg per tablet; Take 1 tablet by mouth every 8 (eight) hours as needed for Pain.  Dispense: 20 tablet; Refill: 0  - Above medication for short term use only for acute flare with pain.  reviewed and appropriate.   -     X-Ray Shoulder 2 or More Views Left; Future; Expected date: 12/14/2023  -     Ambulatory referral/consult to Orthopedics; Future; Expected date: 12/21/2023  -     ketorolac injection 30 mg    4. Muscle spasm  -     methocarbamoL (ROBAXIN) 500 MG Tab; Take 1 tablet (500 mg total) by mouth 3 (three) times daily as needed (muscle spasm/pain. May cause drowsiness).  Dispense: 30 tablet; Refill: 2    5. Vitamin D deficiency  -     ergocalciferol (ERGOCALCIFEROL) 50,000 unit Cap; Take 1 capsule (50,000 Units total) by mouth every 7 days.  Dispense: 12 capsule; Refill: 0    6. GERD without esophagitis  -     famotidine (PEPCID) 20 MG tablet; Take 1 tablet (20 mg total) by mouth 2 (two) times daily as needed for Heartburn.  Dispense: 60 tablet; Refill: 3         Active Problem List with Overview Notes    Diagnosis Date Noted    Essential hypertension 07/12/2022    Seizure 06/10/2022    Alcohol dependence in early full remission 07/12/2022    Iron deficiency anemia due to chronic blood loss 07/12/2022    AVM (arteriovenous malformation) of colon 07/13/2022    Continuous dependence on cigarette smoking 07/12/2022    Colon, diverticulosis 07/13/2022    Polyp, sigmoid colon 07/13/2022    Mixed hyperlipidemia 10/04/2023    Primary osteoarthritis involving multiple  joints 10/04/2023    Muscle spasm 10/04/2023    Erectile dysfunction 10/04/2023    Benign prostatic hyperplasia without lower urinary tract symptoms 10/04/2023    Vitamin D deficiency 10/04/2023        RTC in 1 month for follow up on elevated blood pressure reading.   RTC sooner if symptoms worsen or fail to resolve.  Patient voiced understanding and is agreeable to plan.      Pebbles Dickerson MD    Family Medicine

## 2023-12-15 ENCOUNTER — TELEPHONE (OUTPATIENT)
Dept: FAMILY MEDICINE | Facility: CLINIC | Age: 78
End: 2023-12-15
Payer: MEDICARE

## 2023-12-15 NOTE — TELEPHONE ENCOUNTER
----- Message from Sierra Dickerson MD sent at 12/14/2023  4:34 PM CST -----  Please call patient regarding XR results. He has some arthritis in his shoulder. Recommend continue medications as prescribed today. Follow up with ortho when scheduled. Referral placed. He may benefit from a shoulder injection. Thanks!      [phone]- call made to pt EMILIA White. Informed her of the above message. Voiced understanding and states she will let him know the information.

## 2023-12-31 ENCOUNTER — EXTERNAL CHRONIC CARE MANAGEMENT (OUTPATIENT)
Dept: FAMILY MEDICINE | Facility: CLINIC | Age: 78
End: 2023-12-31
Payer: MEDICARE

## 2023-12-31 PROCEDURE — G0511 CCM/BHI BY RHC/FQHC 20MIN MO: HCPCS | Mod: ,,, | Performed by: FAMILY MEDICINE

## 2024-01-23 ENCOUNTER — TELEPHONE (OUTPATIENT)
Dept: FAMILY MEDICINE | Facility: CLINIC | Age: 79
End: 2024-01-23
Payer: MEDICARE

## 2024-01-24 NOTE — PROGRESS NOTES
"  Stephane Mathis presented for a  Medicare AWV and comprehensive Health Risk Assessment today. The following components were reviewed and updated:    Medical history  Family History  Social history  Allergies and Current Medications  Health Risk Assessment  Health Maintenance  Care Team     Patient presents to clinic today with CC of Medicare AWV. Patient has a PMH significant for HTN, HLD, alcohol dependence, seizures, iron deficiency anemia, vitamin D deficiency, OA, and tobacco use.  Patient reports chronic issues are well controlled on current medication regimen.  Denies any problems or side effects with medications.  Patient admits to alcohol use. He denies need for assistance or interest in cessation.  Patient is, otherwise, without complaints.     ** See Completed Assessments for Annual Wellness Visit within the encounter summary.**         The following assessments were completed:  Living Situation  CAGE  Depression Screening  Timed Get Up and Go  Whisper Test  Cognitive Function Screening  Nutrition Screening  ADL Screening  PAQ Screening          Vitals:    01/25/24 1356   BP: 130/68   BP Location: Left arm   Patient Position: Sitting   BP Method: Large (Manual)   Pulse: 68   Resp: 14   Temp: 98.5 °F (36.9 °C)   TempSrc: Oral   SpO2: 98%   Weight: 75.8 kg (167 lb)   Height: 5' 11" (1.803 m)     Body mass index is 23.29 kg/m².  Physical Exam  Constitutional:       General: He is not in acute distress.     Appearance: Normal appearance.   HENT:      Nose: Nose normal.      Mouth/Throat:      Mouth: Mucous membranes are moist.      Pharynx: Oropharynx is clear.   Eyes:      Conjunctiva/sclera: Conjunctivae normal.   Cardiovascular:      Rate and Rhythm: Normal rate and regular rhythm.      Heart sounds: Normal heart sounds. No murmur heard.  Pulmonary:      Effort: Pulmonary effort is normal. No respiratory distress.      Breath sounds: Normal breath sounds. No wheezing, rhonchi or rales.   Abdominal:      " General: Bowel sounds are normal.      Palpations: Abdomen is soft.      Tenderness: There is no abdominal tenderness.   Musculoskeletal:      Cervical back: Neck supple.      Right lower leg: No edema.      Left lower leg: No edema.   Skin:     Findings: No rash.   Neurological:      General: No focal deficit present.      Mental Status: He is alert. Mental status is at baseline.   Psychiatric:         Mood and Affect: Mood normal.             Diagnoses and health risks identified today and associated recommendations/orders:    1. Encounter for subsequent annual wellness visit (AWV) in Medicare patient      2. Essential hypertension  The current medical regimen is effective;  continue present plan and medications.  - Continue amlodipine, hydralazine, and HCTZ  - DASH diet/exercise    3. Primary osteoarthritis involving multiple joints  The current medical regimen is effective;  continue present plan and medications.    4. Mixed hyperlipidemia  The current medical regimen is effective;  continue present plan and medications.  - Low cholesterol diet/exercise  - Continue lipitor    5. BMI 23.0-23.9, adult    6. Vitamin D deficiency  - ergocalciferol (ERGOCALCIFEROL) 50,000 unit Cap; Take 1 capsule (50,000 Units total) by mouth every 7 days.  Dispense: 12 capsule; Refill: 0    7. Elevated glucose level  - Hemoglobin A1C      Provided Stephane with a 5-10 year written screening schedule and personal prevention plan. Recommendations were developed using the USPSTF age appropriate recommendations. Education, counseling, and referrals were provided as needed. After Visit Summary printed and given to patient which includes a list of additional screenings\tests needed.    [facility] for [facility]sv   [facility]  Decline tetanus vaccine   Anitha eye exam [facility]     Assistance with smoking cessation was offered, including:  [x]  Medications  [x]  Counseling  [x]  Printed Information on Smoking  Cessation  []  Referral to a Smoking Cessation Program    Patient was counseled regarding smoking for 3-10 minutes.     Follow up in about 53 weeks (around 1/30/2025) for in 1 year for annual wellness visit at 1:00 P.M. .    GERARDO HERNANDEZ RN      I offered to discuss advanced care planning, including how to pick a person who would make decisions for you if you were unable to make them for yourself, called a health care power of , and what kind of decisions you might make such as use of life sustaining treatments such as ventilators and tube feeding when faced with a life limiting illness recorded on a living will that they will need to know. (How you want to be cared for as you near the end of your natural life)     X Patient is interested in learning more about how to make advanced directives.  I provided them paperwork and offered to discuss this with them.

## 2024-01-25 ENCOUNTER — OFFICE VISIT (OUTPATIENT)
Dept: FAMILY MEDICINE | Facility: CLINIC | Age: 79
End: 2024-01-25
Payer: MEDICARE

## 2024-01-25 VITALS
TEMPERATURE: 99 F | HEART RATE: 68 BPM | WEIGHT: 167 LBS | HEIGHT: 71 IN | OXYGEN SATURATION: 98 % | DIASTOLIC BLOOD PRESSURE: 68 MMHG | SYSTOLIC BLOOD PRESSURE: 130 MMHG | RESPIRATION RATE: 14 BRPM | BODY MASS INDEX: 23.38 KG/M2

## 2024-01-25 DIAGNOSIS — R73.09 ELEVATED GLUCOSE LEVEL: ICD-10-CM

## 2024-01-25 DIAGNOSIS — Z00.00 ENCOUNTER FOR SUBSEQUENT ANNUAL WELLNESS VISIT (AWV) IN MEDICARE PATIENT: Primary | ICD-10-CM

## 2024-01-25 DIAGNOSIS — I10 ESSENTIAL HYPERTENSION: ICD-10-CM

## 2024-01-25 DIAGNOSIS — E55.9 VITAMIN D DEFICIENCY: ICD-10-CM

## 2024-01-25 DIAGNOSIS — M15.9 PRIMARY OSTEOARTHRITIS INVOLVING MULTIPLE JOINTS: ICD-10-CM

## 2024-01-25 DIAGNOSIS — E78.2 MIXED HYPERLIPIDEMIA: ICD-10-CM

## 2024-01-25 PROCEDURE — 3078F DIAST BP <80 MM HG: CPT | Mod: ,,, | Performed by: FAMILY MEDICINE

## 2024-01-25 PROCEDURE — 3075F SYST BP GE 130 - 139MM HG: CPT | Mod: ,,, | Performed by: FAMILY MEDICINE

## 2024-01-25 PROCEDURE — 83036 HEMOGLOBIN GLYCOSYLATED A1C: CPT | Mod: ,,, | Performed by: CLINICAL MEDICAL LABORATORY

## 2024-01-25 PROCEDURE — 1170F FXNL STATUS ASSESSED: CPT | Mod: ,,, | Performed by: FAMILY MEDICINE

## 2024-01-25 PROCEDURE — G0439 PPPS, SUBSEQ VISIT: HCPCS | Mod: ,,, | Performed by: FAMILY MEDICINE

## 2024-01-25 PROCEDURE — 1159F MED LIST DOCD IN RCRD: CPT | Mod: ,,, | Performed by: FAMILY MEDICINE

## 2024-01-25 PROCEDURE — 3288F FALL RISK ASSESSMENT DOCD: CPT | Mod: ,,, | Performed by: FAMILY MEDICINE

## 2024-01-25 PROCEDURE — 1125F AMNT PAIN NOTED PAIN PRSNT: CPT | Mod: ,,, | Performed by: FAMILY MEDICINE

## 2024-01-25 PROCEDURE — 1101F PT FALLS ASSESS-DOCD LE1/YR: CPT | Mod: ,,, | Performed by: FAMILY MEDICINE

## 2024-01-25 RX ORDER — KETOROLAC TROMETHAMINE 5 MG/ML
SOLUTION OPHTHALMIC
COMMUNITY
Start: 2023-12-18 | End: 2024-03-12

## 2024-01-25 RX ORDER — ERGOCALCIFEROL 1.25 MG/1
50000 CAPSULE ORAL
Qty: 12 CAPSULE | Refills: 0 | Status: SHIPPED | OUTPATIENT
Start: 2024-01-25

## 2024-01-25 RX ORDER — MOXIFLOXACIN 5 MG/ML
SOLUTION/ DROPS OPHTHALMIC
COMMUNITY
Start: 2023-12-18 | End: 2024-03-12

## 2024-01-25 RX ORDER — PREDNISOLONE ACETATE 10 MG/ML
SUSPENSION/ DROPS OPHTHALMIC
COMMUNITY
Start: 2023-12-18 | End: 2024-03-12

## 2024-01-25 NOTE — LETTER
AUTHORIZATION FOR RELEASE OF   CONFIDENTIAL INFORMATION    Dear [facility] ,    We are seeing Stephane Mathis, date of birth 1945, in the clinic at [facility]. Sierra Dickerson MD is the patient's PCP. Stephane Mathis has an outstanding lab/procedure at the time we reviewed his chart. In order to help keep his health information updated, he has authorized us to request the following medical record(s):        (  )  MAMMOGRAM                                      (  )  COLONOSCOPY      (  )  PAP SMEAR                                          (  )  OUTSIDE LAB RESULTS     (  )  DEXA SCAN                                          ( x ) Last EYE EXAM            (  )  FOOT EXAM                                          (  )  ENTIRE RECORD     (  )  OUTSIDE IMMUNIZATIONS                 (  )  _______________         Please fax records to [facility], Hailey-Sharp, Anna-Marie, MD, [phone]     If you have any questions, please contact Patty Ojeda rn awv nurse at   [phone].           Patient Name: Stephane Mathis  : 1945  Patient Phone #: [phone]

## 2024-01-25 NOTE — PATIENT INSTRUCTIONS
Counseling and Referral of Other Preventative  (Italic type indicates deductible and co-insurance are waived)    Patient Name: Stephane Mathis  Today's Date: 1/25/2024    Health Maintenance         Date Due Completion Date    TETANUS VACCINE Never done ---    Shingles Vaccine (1 of 2) Never done ---    RSV Vaccine (Age 60+ and Pregnant patients) (1 - 1-dose 60+ series) Never done ---    COVID-19 Vaccine (6 - 2023-24 season) 11/30/2023 10/5/2023    LDCT Lung Screen 10/19/2024 10/19/2023    Lipid Panel 10/04/2028 10/4/2023          No orders of the defined types were placed in this encounter.      The following information is provided to all patients.  This information is to help you find resources for any of the problems found today that may be affecting your health:                  Living healthy guide: ms.gov    Understanding Diabetes: www.diabetes.org      Eating healthy: www.cdc.gov/healthyweight      CDC home safety checklist: www.cdc.gov/steadi/patient.html      Agency on Aging: [place].gov    Alcoholics anonymous (AA): www.aa.org      Physical Activity: www.anisha.nih.gov/gt4dvkf      Tobacco use: ms.gov

## 2024-01-26 LAB
EST. AVERAGE GLUCOSE BLD GHB EST-MCNC: 100 MG/DL
HBA1C MFR BLD HPLC: 5.1 % (ref 4.5–6.6)

## 2024-01-30 ENCOUNTER — OFFICE VISIT (OUTPATIENT)
Dept: ORTHOPEDICS | Facility: CLINIC | Age: 79
End: 2024-01-30
Payer: MEDICARE

## 2024-01-30 DIAGNOSIS — M19.012 PRIMARY OSTEOARTHRITIS OF LEFT SHOULDER: Primary | ICD-10-CM

## 2024-01-30 DIAGNOSIS — M25.512 ACUTE PAIN OF LEFT SHOULDER: ICD-10-CM

## 2024-01-30 DIAGNOSIS — M25.819 SHOULDER IMPINGEMENT: ICD-10-CM

## 2024-01-30 PROCEDURE — 99213 OFFICE O/P EST LOW 20 MIN: CPT | Mod: PBBFAC

## 2024-01-30 PROCEDURE — 99213 OFFICE O/P EST LOW 20 MIN: CPT | Mod: S$PBB,25,,

## 2024-01-30 PROCEDURE — 99999PBSHW PR PBB SHADOW TECHNICAL ONLY FILED TO HB: Mod: PBBFAC,,,

## 2024-01-30 PROCEDURE — 20605 DRAIN/INJ JOINT/BURSA W/O US: CPT | Mod: PBBFAC,LT

## 2024-01-30 RX ORDER — TRIAMCINOLONE ACETONIDE 40 MG/ML
40 INJECTION, SUSPENSION INTRA-ARTICULAR; INTRAMUSCULAR
Status: SHIPPED | OUTPATIENT
Start: 2024-01-30

## 2024-01-30 RX ORDER — BUPIVACAINE HYDROCHLORIDE 2.5 MG/ML
1 INJECTION, SOLUTION EPIDURAL; INFILTRATION; INTRACAUDAL
Status: SHIPPED | OUTPATIENT
Start: 2024-01-30

## 2024-01-30 RX ADMIN — TRIAMCINOLONE ACETONIDE 40 MG: 40 INJECTION, SUSPENSION INTRA-ARTICULAR; INTRAMUSCULAR at 09:01

## 2024-01-30 RX ADMIN — BUPIVACAINE HYDROCHLORIDE 1 ML: 2.5 INJECTION, SOLUTION EPIDURAL; INFILTRATION; INTRACAUDAL at 09:01

## 2024-01-30 NOTE — PROGRESS NOTES
CC:   Chief Complaint   Patient presents with    Left Shoulder - Pain          Stephane Mathis is a 78 y.o. male seen today for chronic left shoulder pain.  Patient states that he has been dealing with shoulder pain for several years now.  Worsening over the last month.  He has been to physical therapy and received shots previously, states that that was several years ago.  Patient states he has pain with movement of his shoulder especially when moving his arm above his head.  States it hurts at night.  He has pain radiating down into his arm.  Denies any numbness or tingling.  Denies any fall or injury that he knows of.  No other complaints today.      PAST MEDICAL HISTORY:   Past Medical History:   Diagnosis Date    Acute superficial gastritis without hemorrhage 7/12/2022    Alcoholism     Anemia     AVM (arteriovenous malformation) of colon 7/13/2022    Colon, diverticulosis 7/13/2022    Hypertension     Iron deficiency anemia due to chronic blood loss 7/12/2022    Polyp, sigmoid colon 7/13/2022    Seizures     Stroke     right sided weakness          PAST SURGICAL HISTORY:   Past Surgical History:   Procedure Laterality Date    CATARACT EXTRACTION W/ INTRAOCULAR LENS IMPLANT Right     KNEE SURGERY Right     LUMBAR DISCECTOMY      SCROTAL SURGERY      in february          ALLERGIES:   Review of patient's allergies indicates:   Allergen Reactions    Ace inhibitors Swelling    Codeine Itching        MEDICATIONS :    Current Outpatient Medications:     amLODIPine (NORVASC) 10 MG tablet, Take 1 tablet (10 mg total) by mouth once daily., Disp: 90 tablet, Rfl: 1    aspirin (ECOTRIN) 81 MG EC tablet, Take 1 tablet (81 mg total) by mouth once daily., Disp: 90 tablet, Rfl: 1    atorvastatin (LIPITOR) 40 MG tablet, Take 1 tablet (40 mg total) by mouth every evening., Disp: 90 tablet, Rfl: 1    chlorpheniramine-phenyleph-DM (ED A-HIST DM) 4-10-10 mg Tab, Take 1 tablet by mouth every 8 (eight) hours as  needed (congestion/ears)., Disp: 30 tablet, Rfl: 1    EAR DROPS, CARBAMIDE PEROXIDE, OTIC, Place in ear(s)., Disp: , Rfl:     EPINEPHrine (EPIPEN) 0.3 mg/0.3 mL AtIn, Inject 0.3 mLs (0.3 mg total) into the muscle as needed., Disp: 1 each, Rfl: 0    ergocalciferol (ERGOCALCIFEROL) 50,000 unit Cap, Take 1 capsule (50,000 Units total) by mouth every 7 days., Disp: 12 capsule, Rfl: 0    famotidine (PEPCID) 20 MG tablet, Take 1 tablet (20 mg total) by mouth 2 (two) times daily as needed for Heartburn., Disp: 60 tablet, Rfl: 3    ferrous sulfate 325 (65 FE) MG EC tablet, Take 1 tablet (325 mg total) by mouth once daily., Disp: 30 tablet, Rfl: 3    hydrALAZINE (APRESOLINE) 100 MG tablet, Take 1 tablet (100 mg total) by mouth 2 (two) times a day., Disp: 180 tablet, Rfl: 1    hydroCHLOROthiazide (HYDRODIURIL) 25 MG tablet, Take 1 tablet (25 mg total) by mouth once daily., Disp: 90 tablet, Rfl: 1    HYDROcodone-acetaminophen (NORCO) 5-325 mg per tablet, Take 1 tablet by mouth every 8 (eight) hours as needed for Pain., Disp: 20 tablet, Rfl: 0    ketorolac 0.5% (ACULAR) 0.5 % Drop, Place into both eyes., Disp: , Rfl:     methocarbamoL (ROBAXIN) 500 MG Tab, Take 1 tablet (500 mg total) by mouth 3 (three) times daily as needed (muscle spasm/pain. May cause drowsiness)., Disp: 30 tablet, Rfl: 2    moxifloxacin (VIGAMOX) 0.5 % ophthalmic solution, Place into both eyes., Disp: , Rfl:     olopatadine (PATANOL) 0.1 % ophthalmic solution, Place 1 drop into both eyes 2 (two) times daily as needed for Allergies., Disp: 5 mL, Rfl: 2    prednisoLONE acetate (PRED FORTE) 1 % DrpS, Place into both eyes., Disp: , Rfl:     tadalafiL (CIALIS) 10 MG tablet, Take 1 tablet (10 mg total) by mouth daily as needed for Erectile Dysfunction., Disp: 6 tablet, Rfl: 5    tamsulosin (FLOMAX) 0.4 mg Cap, Take 1 capsule (0.4 mg total) by mouth once daily., Disp: 90 capsule, Rfl: 1     SOCIAL HISTORY:   Social History     Socioeconomic History    Marital  status: Unknown   Tobacco Use    Smoking status: Every Day     Current packs/day: 0.50     Average packs/day: 0.5 packs/day for 58.1 years (29.0 ttl pk-yrs)     Types: Cigarettes     Start date: 1966     Passive exposure: Current    Smokeless tobacco: Never    Tobacco comments:     Tobacco quitline information sheet given    Substance and Sexual Activity    Alcohol use: Yes     Alcohol/week: 6.0 standard drinks of alcohol     Types: 6 Cans of beer per week     Comment: pint of liquor primarly on weekend    Drug use: Yes     Types: Marijuana     Comment: daily    Sexual activity: Yes     Partners: Female     Social Determinants of Health     Financial Resource Strain: Low Risk  (1/25/2024)    Overall Financial Resource Strain (CARDIA)     Difficulty of Paying Living Expenses: Not very hard   Food Insecurity: Food Insecurity Present (1/25/2024)    Hunger Vital Sign     Worried About Running Out of Food in the Last Year: Sometimes true     Ran Out of Food in the Last Year: Sometimes true   Transportation Needs: No Transportation Needs (1/25/2024)    PRAPARE - Transportation     Lack of Transportation (Medical): No     Lack of Transportation (Non-Medical): No   Physical Activity: Insufficiently Active (1/25/2024)    Exercise Vital Sign     Days of Exercise per Week: 3 days     Minutes of Exercise per Session: 10 min   Stress: No Stress Concern Present (1/25/2024)    [employer] [employer] - Occupational Stress Questionnaire     Feeling of Stress : Not at all   Social Connections: Moderately Isolated (1/25/2024)    Social Connection and Isolation Panel [NHANES]     Frequency of Communication with Friends and Family: More than three times a week     Frequency of Social Gatherings with Friends and Family: More than three times a week     Attends Faith Services: 1 to 4 times per year     Active Member of Clubs or Organizations: No     Attends Club or Organization Meetings: Never     Marital Status:  Never    Housing Stability: Low Risk  (1/25/2024)    Housing Stability Vital Sign     Unable to Pay for Housing in the Last Year: No     Number of Places Lived in the Last Year: 1     Unstable Housing in the Last Year: No        FAMILY HISTORY:   Family History   Problem Relation Age of Onset    Diabetes Mother     Hypertension Mother     Diabetes Sister     Diabetes Brother     Hypertension Brother     Cancer Brother     Throat cancer Brother     Alzheimer's disease Brother           PHYSICAL EXAM:      There were no vitals filed for this visit.  There is no height or weight on file to calculate BMI.    GENERAL: Well-developed, well-nourished male . The patient is alert, oriented and cooperative.    HEENT:  Normocephalic, atraumatic.  Extraocular movements are intact bilaterally.     NECK:  Nontender with good range of motion.    LUNGS:  Clear to auscultation bilaterally.    HEART:  Regular rate and rhythm.     ABDOMEN:  Soft, non-tender, non-distended.      EXTREMITIES:  Left shoulder was skin clean dry and intact, no soft tissue swelling, able to actively forward flex at about 160° but not without pain, abduction to 90°, able to abduct but not without pain, positive August, positive empty can      RADIOGRAPHIC FINDINGS:   EXAMINATION:  XR SHOULDER COMPLETE 2 OR MORE VIEWS LEFT     CLINICAL HISTORY:  Pain in left shoulder     COMPARISON:  14 October 2020     TECHNIQUE:  XR SHOULDER 3 VIEWS LEFT     FINDINGS:  No evidence of fracture seen.  The alignment of the joints appears normal.  Mild-to-moderate acromioclavicular joint and glenohumeral joint degenerative change is present.  No soft tissue abnormality is seen.     Impression:     Shoulder osteoarthrosis as described above.        Electronically signed by: Rocky Ward  Date:                                            12/14/2023  Time:                                           16:02     Patient Active Problem List    Diagnosis Date Noted    Mixed  hyperlipidemia 10/04/2023    Primary osteoarthritis involving multiple joints 10/04/2023    Muscle spasm 10/04/2023    Erectile dysfunction 10/04/2023    Benign prostatic hyperplasia without lower urinary tract symptoms 10/04/2023    Vitamin D deficiency 10/04/2023    Colon, diverticulosis 07/13/2022    Polyp, sigmoid colon 07/13/2022    AVM (arteriovenous malformation) of colon 07/13/2022    Essential hypertension 07/12/2022    Alcohol dependence in early full remission 07/12/2022    Continuous dependence on cigarette smoking 07/12/2022    Iron deficiency anemia due to chronic blood loss 07/12/2022    Seizure 06/10/2022     IMPRESSION AND PLAN:  Chronic left shoulder pain.  Suspect impingement syndrome.  Personally reviewed x-rays today with moderate AC and glenohumeral joint osteoarthritis.  Discussed with patient that he has been several years since he participate in physical therapy or had a steroid shot.  Would like to repeat steroid shot today, see procedural note for details.  We will also order physical therapy at since hospitalized he lives in [facility].  Discussed with patient that the Steri-Strips can be repeated every 3-4 months if necessary.  Discussed fails physical therapy we will we will obtain an MRI for further evaluation.  Follow up in 6 weeks after completion of physical therapy, sooner if needed.    No follow-ups on file.       Zora Dunn PA-C      (Subject to voice recognition error, transcription service not allowed)

## 2024-01-30 NOTE — PROCEDURES
Intermediate Joint Aspiration/Injection: L acromioclavicular    Date/Time: 1/30/2024 9:30 AM    Performed by: Zora Dunn PA  Authorized by: Zora Dunn PA    Consent Done?:  Yes (Verbal)  Indications:  Arthritis and pain    Location:  Shoulder  Site:  L acromioclavicular  Ultrasonic Guidance for needle placement: No  Needle size:  25 G  Approach:  Posterolateral  Medications:  1 mL BUPivacaine (PF) 0.25% (2.5 mg/ml) 0.25 % (2.5 mg/mL); 40 mg triamcinolone acetonide 40 mg/mL  Patient tolerance:  Patient tolerated the procedure well with no immediate complications

## 2024-01-30 NOTE — PATIENT INSTRUCTIONS
Chronic left shoulder pain.  Suspect impingement syndrome.  Personally reviewed x-rays today with moderate AC and glenohumeral joint osteoarthritis.  Discussed with patient that he has been several years since he participate in physical therapy or had a steroid shot.  Would like to repeat steroid shot today, see procedural note for details.  We will also order physical therapy at since hospitalized he lives in [facility].  Discussed with patient that the Steri-Strips can be repeated every 3-4 months if necessary.  Discussed fails physical therapy we will we will obtain an MRI for further evaluation.  Follow up in 6 weeks after completion of physical therapy, sooner if needed.

## 2024-01-31 ENCOUNTER — EXTERNAL CHRONIC CARE MANAGEMENT (OUTPATIENT)
Dept: FAMILY MEDICINE | Facility: CLINIC | Age: 79
End: 2024-01-31
Payer: MEDICARE

## 2024-01-31 PROCEDURE — G0511 CCM/BHI BY RHC/FQHC 20MIN MO: HCPCS | Mod: ,,, | Performed by: FAMILY MEDICINE

## 2024-02-06 ENCOUNTER — CLINICAL SUPPORT (OUTPATIENT)
Dept: REHABILITATION | Facility: HOSPITAL | Age: 79
End: 2024-02-06
Payer: MEDICARE

## 2024-02-06 DIAGNOSIS — M19.012 PRIMARY OSTEOARTHRITIS OF LEFT SHOULDER: Primary | ICD-10-CM

## 2024-02-06 PROCEDURE — 97162 PT EVAL MOD COMPLEX 30 MIN: CPT

## 2024-02-06 NOTE — PLAN OF CARE
[facility]   Physical Therapy Initial Evaluation      Name: Stephane Mathis  [facility] Number: [id number]    Therapy Diagnosis:   Encounter Diagnosis   Name Primary?    Primary osteoarthritis of left shoulder Yes        Physician: Zora Dunn PA    Physician Orders: PT Eval and Treat 1-3 times a week for 6 weeks   Medical Diagnosis from Referral: M19.012 (ICD-10-CM) - Primary osteoarthritis of left shoulder  Evaluation Date: 2/6/2024  Authorization Period Expiration: 3/19/24  Plan of Care Expiration: 3/19/24  Progress Note Due: 3/19/24  Visit # / Visits authorized: 12   FOTO: 49/100    Precautions: Standard     Time In: 930  Time Out: 955  Total Appointment Time (timed & untimed codes): 25 minutes    Subjective     Date of onset: n/a    History of current condition - Stephane reports:  he has had pain in his left shoulder for about one year which has gradually increased in intensity with intermittent pain radiating down left arm. He reports the pain has been real bad in last 30 days and he has trouble sleeping    Falls: non    Imaging: bone scan films:   EXAMINATION:  XR SHOULDER COMPLETE 2 OR MORE VIEWS LEFT     CLINICAL HISTORY:  Pain in left shoulder     COMPARISON:  14 October 2020     TECHNIQUE:  XR SHOULDER 3 VIEWS LEFT     FINDINGS:  No evidence of fracture seen.  The alignment of the joints appears normal.  Mild-to-moderate acromioclavicular joint and glenohumeral joint degenerative change is present.  No soft tissue abnormality is seen.     Impression:     Shoulder osteoarthrosis as described above.        Electronically signed by: Rocky Ward  Date:                                            12/14/2023  Time:                                           16:02                     Prior Therapy: yes  Social History:  lives alone  Occupation: retired  Prior Level of Function: independent  Current Level of Function: independent    Pain:  Current 8/10, worst 10/10, best 2/10    Location: left shoulder    Description: Aching, Dull, Throbbing, and Sharp  Aggravating Factors: Sitting, Laying, and Lifting  Easing Factors: pain medication and heating pad    Patients goals: to decrease the pain so I can use my arm     Medical History:   Past Medical History:   Diagnosis Date    Acute superficial gastritis without hemorrhage 7/12/2022    Alcoholism     Anemia     AVM (arteriovenous malformation) of colon 7/13/2022    Colon, diverticulosis 7/13/2022    Hypertension     Iron deficiency anemia due to chronic blood loss 7/12/2022    Polyp, sigmoid colon 7/13/2022    Seizures     Stroke     right sided weakness       Surgical History:   Stephane Mathis  has a past surgical history that includes Scrotal surgery; Knee surgery (Right); Lumbar discectomy; and Cataract extraction w/ intraocular lens implant (Right).    Medications:   Stephane has a current medication list which includes the following prescription(s): amlodipine, aspirin, atorvastatin, ed a-hist dm, carbamide peroxide, epinephrine, ergocalciferol, famotidine, ferrous sulfate, hydralazine, hydrochlorothiazide, hydrocodone-acetaminophen, ketorolac 0.5%, methocarbamol, moxifloxacin, olopatadine, prednisolone acetate, tadalafil, and tamsulosin, and the following Facility-Administered Medications: bupivacaine (pf) 0.25% (2.5 mg/ml) and triamcinolone acetonide.    Allergies:   Review of patient's allergies indicates:   Allergen Reactions    Ace inhibitors Swelling    Codeine Itching        Objective          Posture: rounded shoulders Yes, forward head Yes, increased kyphotic curve No, decreased lordotic curve No    Clear cervical spine: Spurling's test negative    Cervical AROM:  FDB: WNL  BB:25  SBL: 5  SBR: 15  RR:60  LR:40    Range of motion  Motion Right  Left    Shoulder flexion WITHIN FUNCTIONAL LIMITS 85   Shoulder extension WITHIN FUNCTIONAL LIMITS 50   Shoulder abduction WITHIN FUNCTIONAL LIMITS 75   Shoulder internal rotation WITHIN  FUNCTIONAL LIMITS 70   Shoulder external rotation WITHIN FUNCTIONAL LIMITS 78   Elbow flexion WITHIN FUNCTIONAL LIMITS WITHIN FUNCTIONAL LIMITS   Elbow extension WITHIN FUNCTIONAL LIMITS WITHIN FUNCTIONAL LIMITS   Wrist flexion WITHIN FUNCTIONAL LIMITS WITHIN FUNCTIONAL LIMITS   Wrist extension WITHIN FUNCTIONAL LIMITS WITHIN FUNCTIONAL LIMITS   Ulnar deviation WITHIN FUNCTIONAL LIMITS WITHIN FUNCTIONAL LIMITS   Radial deviation WITHIN FUNCTIONAL LIMITS WITHIN FUNCTIONAL LIMITS         MANUAL MUSCLE TEST  Muscle Right  Left    Shoulder flexion MMT strength: 5/5 MMT strength: 2+/5   Shoulder extension MMT strength: 5/5 MMT strength: 2+/5   Shoulder abduction MMT strength: 5/5 MMT strength: 2+/5   Shoulder internal rotation MMT strength: 5/5 MMT strength: 3-/5   Shoulder external rotation MMT strength: 5/5 MMT strength: 3-/5   Elbow flexion MMT strength: 5/5 MMT strength: 4/5   Elbow extension MMT strength: 5/5 MMT strength: 4/5   Wrist flexion MMT strength: 5/5 MMT strength: 4/5   Wrist extension MMT strength: 5/5 MMT strength: 4/5   Ulnar deviation MMT strength: 5/5 MMT strength: 4/5   Radial deviation MMT strength: 5/5 MMT strength: 4/5       Functional ability:  Dressing: AMB PT LEVEL OF ASSISTANCE: independent with pain  Driving: AMB PT LEVEL OF ASSISTANCE: independent pain  Overhead activity: AMB PT LEVEL OF ASSISTANCE: independent with pain  Work/hobbies: AMB PT LEVEL OF ASSISTANCE: independent with pain      Clinical test:  Apprehension test: negative  Neer's test: positive  Scour test: negative  O'milton's test: negative  Drop arm test: negative  Empty can test: negative  Hawkin's viania test: positive      Palpation:     Limitation/Restriction for FOTO shoulder Survey    Therapist reviewed FOTO scores for Stephane Mathis on 2/6/2024.   FOTO documents entered into [employer] - see Media section.    Limitation Score: 49%               Patient Education and Home Exercises     Education provided:   - evaluation  results discussed with patient.    Written Home Exercises Provided:  will be instructed in home exercise program during his first treatment visit to include ROM, strengthening and flexibility exercises for his shoulder and neck. .    Assessment     Stephane is a 78 y.o. male referred to outpatient Physical Therapy with a medical diagnosis of Primary osteoarthritis of left shoulder. Patient presents with pain in left shoulder and neck, muscle weakness, decreased joint ROM, decreased functional mobility of left UE.    Patient prognosis is Good.   Patient will benefit from skilled outpatient Physical Therapy to address the deficits stated above and in the chart below, provide patient /family education, and to maximize patientt's level of independence.     Plan of care discussed with patient: Yes  Patient's spiritual, cultural and educational needs considered and patient is agreeable to the plan of care and goals as stated below:     Anticipated Barriers for therapy: none    Medical Necessity is demonstrated by the following  History  Co-morbidities and personal factors that may impact the plan of care [] LOW: no personal factors / co-morbidities  [x] MODERATE: 1-2 personal factors / co-morbidities  [] HIGH: 3+ personal factors / co-morbidities    Moderate / High Support Documentation:   Co-morbidities affecting plan of care:   Past Medical History:   Diagnosis Date    Acute superficial gastritis without hemorrhage 7/12/2022    Alcoholism     Anemia     AVM (arteriovenous malformation) of colon 7/13/2022    Colon, diverticulosis 7/13/2022    Hypertension     Iron deficiency anemia due to chronic blood loss 7/12/2022    Polyp, sigmoid colon 7/13/2022    Seizures     Stroke     right sided weakness       Personal Factors:   age     Examination  Body Structures and Functions, activity limitations and participation restrictions that may impact the plan of care [] LOW: addressing 1-2 elements  [x] MODERATE: 3+ elements  []  HIGH: 4+ elements (please support below)    Moderate / High Support Documentation: neck, shoulder, ROM, strength, posture     Clinical Presentation [] LOW: stable  [x] MODERATE: Evolving  [] HIGH: Unstable     Decision Making/ Complexity Score: moderate       Goals:    Short Term Goals 3 weeks: patient will:   Be Independent with Home Exercise Program   Increase left Shoulder Flexion and Abduction AROM Range of Motion by 10 degrees to improve functional mobility.  Increase left Shoulder Strength to 3-/5 to improve functional activity for reaching and lifting.  Tolerate 30 minutes of exercise/activity with Decreased complaints of left Shoulder Pain to 5/10.    Long Term Goals 6 weeks: patient will:  1. Increase Active Range of Motion left shoulder flexion and abduction to 130 degrees to improve functional mobility.  3. Increase left Shoulder and Scapular Strength to 3+/5 to improve functional activity with ADL's.  3. Decrease left shoulder pain to 3/10 or less to improve quality of life.      Plan     Discharge Plan: Return to prior level of function in home setting with independent performance of home exercise program.    Plan of care Certification: 2/6/2024 to 3/19/24.     Outpatient Physical Therapy 2 times weekly for 6 weeks to include the following interventions: Electrical Stimulation IFC, Manual Therapy, Moist Heat/ Ice, Patient Education, Therapeutic Exercise, and Ultrasound.     Toño Nelson, PT

## 2024-02-08 ENCOUNTER — CLINICAL SUPPORT (OUTPATIENT)
Dept: REHABILITATION | Facility: HOSPITAL | Age: 79
End: 2024-02-08
Payer: MEDICARE

## 2024-02-08 DIAGNOSIS — M19.012 PRIMARY OSTEOARTHRITIS OF LEFT SHOULDER: Primary | ICD-10-CM

## 2024-02-08 PROCEDURE — 97110 THERAPEUTIC EXERCISES: CPT | Mod: CQ

## 2024-02-08 PROCEDURE — 97140 MANUAL THERAPY 1/> REGIONS: CPT | Mod: CQ

## 2024-02-08 NOTE — PROGRESS NOTES
"[facility]   Physical Therapy Treatment Note      Name: Stephane Mathis  Clinic Number: [id number]    Therapy Diagnosis:   Encounter Diagnosis   Name Primary?    Primary osteoarthritis of left shoulder Yes     Physician: Zora Dunn PA    Visit Date: 2/8/2024    Physician Orders: PT Eval and Treat 1-3 times a week for 6 weeks   Medical Diagnosis from Referral: M19.012 (ICD-10-CM) - Primary osteoarthritis of left shoulder  Evaluation Date: 2/6/2024  Authorization Period Expiration: 3/19/24  Plan of Care Expiration: 3/19/24  Progress Note Due: 3/19/24  Visit # / Visits authorized: 2/12   FOTO: 49/100     Precautions: Standard     PTA Visit #: 1/5     Time In: 908  Time Out: 941  Total Billable Time: 33 minutes    Subjective     Pt reports: "I'm always in pain, but newton work it out".  He  will be  compliant with home exercise program.  Response to previous treatment: evaluation  Functional change: none    Pain: 8/10  Location: left shoulder      Objective    2/6/24 Cervical AROM:  FDB: WNL  BB:25  SBL: 5  SBR: 15  RR:60  LR:40     2/6/24 Range of motion  Motion Right  Left    Shoulder flexion WITHIN FUNCTIONAL LIMITS 85   Shoulder extension WITHIN FUNCTIONAL LIMITS 50   Shoulder abduction WITHIN FUNCTIONAL LIMITS 75   Shoulder internal rotation WITHIN FUNCTIONAL LIMITS 70   Shoulder external rotation WITHIN FUNCTIONAL LIMITS 78   Elbow flexion WITHIN FUNCTIONAL LIMITS WITHIN FUNCTIONAL LIMITS   Elbow extension WITHIN FUNCTIONAL LIMITS WITHIN FUNCTIONAL LIMITS   Wrist flexion WITHIN FUNCTIONAL LIMITS WITHIN FUNCTIONAL LIMITS   Wrist extension WITHIN FUNCTIONAL LIMITS WITHIN FUNCTIONAL LIMITS   Ulnar deviation WITHIN FUNCTIONAL LIMITS WITHIN FUNCTIONAL LIMITS   Radial deviation WITHIN FUNCTIONAL LIMITS WITHIN FUNCTIONAL LIMITS            2/6/24 MANUAL MUSCLE TEST  Muscle Right  Left    Shoulder flexion MMT strength: 5/5 MMT strength: 2+/5   Shoulder extension MMT strength: 5/5 MMT " strength: 2+/5   Shoulder abduction MMT strength: 5/5 MMT strength: 2+/5   Shoulder internal rotation MMT strength: 5/5 MMT strength: 3-/5   Shoulder external rotation MMT strength: 5/5 MMT strength: 3-/5   Elbow flexion MMT strength: 5/5 MMT strength: 4/5   Elbow extension MMT strength: 5/5 MMT strength: 4/5   Wrist flexion MMT strength: 5/5 MMT strength: 4/5   Wrist extension MMT strength: 5/5 MMT strength: 4/5   Ulnar deviation MMT strength: 5/5 MMT strength: 4/5   Radial deviation MMT strength: 5/5 MMT strength: 4/5       Treatment     Stephane received the treatments listed below:      therapeutic exercises to develop strength, ROM, and flexibility for 33 minutes including:  Seated Pulley x 3 minutes   Standing at // bars with red band rows (elbows extended and bended) 2 x 10  Internal rotation and external rotation with red thera band and towel under elbow 2 x 10  Shoulder flexion with tension on red band 2 x 10   bicep curls with 2# weight 2 x 10     manual therapy techniques: Joint mobilizations and Soft tissue Mobilization were applied to the: L shoulder in supine for 10 minutes, including flexion, internal rotation, external rotation, horizontal abduction x 10 each way    Patient Education and Home Exercises       Education provided:   - Body mechanics and posture were enforced to get optimal results from exercises performed    Written Home Exercises Provided: yes. Exercises were reviewed and Stephane was able to demonstrate them prior to the end of the session.  Stephane demonstrated good  understanding of the education provided. See EMR under Patient Instructions for exercises provided during therapy sessions    Assessment     Stephane is a 78 y.o. male referred to outpatient Physical Therapy with a medical diagnosis of Primary osteoarthritis of left shoulder. Patient presents with pain in left shoulder and neck, muscle weakness, decreased joint ROM, decreased functional mobility of left UE.    Stephane tolerated  treatment fair this morning. He was in pain upon entering the gym, so small movements caused him great pain. He did say he took his pain medication right before therapy. PTA offered cryotherapy in the form of a cold pack to help pain and inflammation at end of session, but patient refused. Yellow band may need to be used due to patient pain in next session.    Stephane Is progressing well towards his goals.   Pt prognosis is Good.     Pt will continue to benefit from skilled outpatient physical therapy to address the deficits listed in the problem list box on initial evaluation, provide pt/family education and to maximize pt's level of independence in the home and community environment.     Pt's spiritual, cultural and educational needs considered and pt agreeable to plan of care and goals.     Anticipated barriers to physical therapy: none    Short Term Goals 3 weeks: patient will:   Be Independent with Home Exercise Program   Increase left Shoulder Flexion and Abduction AROM Range of Motion by 10 degrees to improve functional mobility.  Increase left Shoulder Strength to 3-/5 to improve functional activity for reaching and lifting.  Tolerate 30 minutes of exercise/activity with Decreased complaints of left Shoulder Pain to 5/10.     Long Term Goals 6 weeks: patient will:  1. Increase Active Range of Motion left shoulder flexion and abduction to 130 degrees to improve functional mobility.  3. Increase left Shoulder and Scapular Strength to 3+/5 to improve functional activity with ADL's.  3. Decrease left shoulder pain to 3/10 or less to improve quality of life.    Plan     Outpatient Physical Therapy 2 times weekly for 6 weeks to include the following interventions: Electrical Stimulation IFC, Manual Therapy, Moist Heat/ Ice, Patient Education, Therapeutic Exercise, and Ultrasound.        Babita Chaudhari PTA

## 2024-02-13 ENCOUNTER — CLINICAL SUPPORT (OUTPATIENT)
Dept: REHABILITATION | Facility: HOSPITAL | Age: 79
End: 2024-02-13
Payer: MEDICARE

## 2024-02-13 DIAGNOSIS — M19.012 PRIMARY OSTEOARTHRITIS OF LEFT SHOULDER: Primary | ICD-10-CM

## 2024-02-13 PROCEDURE — 97110 THERAPEUTIC EXERCISES: CPT | Mod: CQ

## 2024-02-13 NOTE — PROGRESS NOTES
[facility]   Physical Therapy Treatment Note      Name: Stephane Mathis  Clinic Number: [id number]    Therapy Diagnosis:   Encounter Diagnosis   Name Primary?    Primary osteoarthritis of left shoulder Yes     Physician: Zora Dunn PA    Visit Date: 2/13/2024    Physician Orders: PT Eval and Treat 1-3 times a week for 6 weeks   Medical Diagnosis from Referral: M19.012 (ICD-10-CM) - Primary osteoarthritis of left shoulder  Evaluation Date: 2/6/2024  Authorization Period Expiration: 3/19/24  Plan of Care Expiration: 3/19/24  Progress Note Due: 3/19/24  Visit # / Visits authorized: 3/12   FOTO: 49/100     Precautions: Standard     PTA Visit #: 2/5     Time In: 0931  Time Out: 1022  Total Billable Time: 51 minutes    Subjective     Pt reports: No new complaints.  He  will be  compliant with home exercise program.  Response to previous treatment: fair  Functional change: none    Pain: 8/10  Location: left shoulder      Objective      2/6/24 Cervical AROM:  FDB: WNL  BB:25  SBL: 5  SBR: 15  RR:60  LR:40     2/6/24 Range of motion  Motion Right  Left    Shoulder flexion WITHIN FUNCTIONAL LIMITS 85   Shoulder extension WITHIN FUNCTIONAL LIMITS 50   Shoulder abduction WITHIN FUNCTIONAL LIMITS 75   Shoulder internal rotation WITHIN FUNCTIONAL LIMITS 70   Shoulder external rotation WITHIN FUNCTIONAL LIMITS 78   Elbow flexion WITHIN FUNCTIONAL LIMITS WITHIN FUNCTIONAL LIMITS   Elbow extension WITHIN FUNCTIONAL LIMITS WITHIN FUNCTIONAL LIMITS   Wrist flexion WITHIN FUNCTIONAL LIMITS WITHIN FUNCTIONAL LIMITS   Wrist extension WITHIN FUNCTIONAL LIMITS WITHIN FUNCTIONAL LIMITS   Ulnar deviation WITHIN FUNCTIONAL LIMITS WITHIN FUNCTIONAL LIMITS   Radial deviation WITHIN FUNCTIONAL LIMITS WITHIN FUNCTIONAL LIMITS            2/6/24 MANUAL MUSCLE TEST  Muscle Right  Left    Shoulder flexion MMT strength: 5/5 MMT strength: 2+/5   Shoulder extension MMT strength: 5/5 MMT strength: 2+/5   Shoulder  "abduction MMT strength: 5/5 MMT strength: 2+/5   Shoulder internal rotation MMT strength: 5/5 MMT strength: 3-/5   Shoulder external rotation MMT strength: 5/5 MMT strength: 3-/5   Elbow flexion MMT strength: 5/5 MMT strength: 4/5   Elbow extension MMT strength: 5/5 MMT strength: 4/5   Wrist flexion MMT strength: 5/5 MMT strength: 4/5   Wrist extension MMT strength: 5/5 MMT strength: 4/5   Ulnar deviation MMT strength: 5/5 MMT strength: 4/5   Radial deviation MMT strength: 5/5 MMT strength: 4/5       Treatment     Stephane received the treatments listed below:      therapeutic exercises to develop strength, ROM, and flexibility for 41 minutes including:  Qian's flexion and abduction x 3 minutes each way  Standing shoulder rows, extension, Internal rotation, External rotation yellow tband 2 x 10  Seated chest press with tbar 2 x 10  Seated bicep curls 2# 2 x 10  Finger ladder flexion 10 x 5 s/h    Hot pack applied to pt's left shoulder per his request seated in chair x 10 minutes. No adverse side effects during or after.    NOT TODAY: manual therapy techniques: Joint mobilizations and Soft tissue Mobilization were applied to the: L shoulder in supine for 10 minutes, including flexion, internal rotation, external rotation, horizontal abduction x 10 each way    Patient Education and Home Exercises       Education provided:   - Body mechanics and posture were enforced to get optimal results from exercises performed    Written Home Exercises Provided: yes. Exercises were reviewed and Stephane was able to demonstrate them prior to the end of the session.  Stephane demonstrated good  understanding of the education provided. See EMR under Patient Instructions for exercises provided during therapy sessions    Assessment     Stephane voiced left shoulder pain throughout entire session. He was cued to perform exercises in a comfortable range but he persisted to go past his " pain threshold" per facial observation as well as verbal. " May need to focus on Passive Range of Motion and AAROM until he can tolerate low/light resistive strengthening exercises at this time. He was given a HEP and yellow tband to maintain at home demo understanding of each.     Stephane Is progressing well towards his goals.   Pt prognosis is Good.     Pt will continue to benefit from skilled outpatient physical therapy to address the deficits listed in the problem list box on initial evaluation, provide pt/family education and to maximize pt's level of independence in the home and community environment.     Pt's spiritual, cultural and educational needs considered and pt agreeable to plan of care and goals.     Anticipated barriers to physical therapy: none    Short Term Goals 3 weeks: patient will:   Be Independent with Home Exercise Program   Increase left Shoulder Flexion and Abduction AROM Range of Motion by 10 degrees to improve functional mobility.  Increase left Shoulder Strength to 3-/5 to improve functional activity for reaching and lifting.  Tolerate 30 minutes of exercise/activity with Decreased complaints of left Shoulder Pain to 5/10.     Long Term Goals 6 weeks: patient will:  1. Increase Active Range of Motion left shoulder flexion and abduction to 130 degrees to improve functional mobility.  3. Increase left Shoulder and Scapular Strength to 3+/5 to improve functional activity with ADL's.  3. Decrease left shoulder pain to 3/10 or less to improve quality of life.    Plan     Continue with current Plan of Care.     Plan of care Certification: 2/6/2024 to 3/19/24.    Outpatient Physical Therapy 2 times weekly for 6 weeks to include the following interventions: Electrical Stimulation IFC, Manual Therapy, Moist Heat/ Ice, Patient Education, Therapeutic Exercise, and Ultrasound.        Priti Lau, PTA

## 2024-02-15 ENCOUNTER — CLINICAL SUPPORT (OUTPATIENT)
Dept: REHABILITATION | Facility: HOSPITAL | Age: 79
End: 2024-02-15
Payer: MEDICARE

## 2024-02-15 DIAGNOSIS — M19.012 PRIMARY OSTEOARTHRITIS OF LEFT SHOULDER: Primary | ICD-10-CM

## 2024-02-15 PROCEDURE — 97110 THERAPEUTIC EXERCISES: CPT | Mod: CQ

## 2024-02-15 PROCEDURE — 97014 ELECTRIC STIMULATION THERAPY: CPT | Mod: CQ

## 2024-02-15 NOTE — PROGRESS NOTES
"[facility]   Physical Therapy Treatment Note      Name: Stephane Mathis  Clinic Number: [id number]    Therapy Diagnosis:   Encounter Diagnosis   Name Primary?    Primary osteoarthritis of left shoulder Yes     Physician: Zora Dunn PA    Visit Date: 2/15/2024    Physician Orders: PT Eval and Treat 1-3 times a week for 6 weeks   Medical Diagnosis from Referral: M19.012 (ICD-10-CM) - Primary osteoarthritis of left shoulder  Evaluation Date: 2/6/2024  Authorization Period Expiration: 3/19/24  Plan of Care Expiration: 3/19/24  Progress Note Due: 3/19/24  Visit # / Visits authorized: 4/13   FOTO: 49/100     Precautions: Standard     PTA Visit #: 3/5     Time In: 0931  Time Out: 1013  Total Billable Time: 42 minutes    Subjective     Pt reports: "I used heating pad and some pain meds last night which helped my shoulder."  He  will be  compliant with home exercise program.  Response to previous treatment: fair  Functional change: none    Pain: 6/10 prior to PT  Location: left shoulder      Objective      2/6/24 Cervical AROM:  FDB: WNL  BB:25  SBL: 5  SBR: 15  RR:60  LR:40     2/6/24 Range of motion  Motion Right  Left    Shoulder flexion WITHIN FUNCTIONAL LIMITS 85   Shoulder extension WITHIN FUNCTIONAL LIMITS 50   Shoulder abduction WITHIN FUNCTIONAL LIMITS 75   Shoulder internal rotation WITHIN FUNCTIONAL LIMITS 70   Shoulder external rotation WITHIN FUNCTIONAL LIMITS 78   Elbow flexion WITHIN FUNCTIONAL LIMITS WITHIN FUNCTIONAL LIMITS   Elbow extension WITHIN FUNCTIONAL LIMITS WITHIN FUNCTIONAL LIMITS   Wrist flexion WITHIN FUNCTIONAL LIMITS WITHIN FUNCTIONAL LIMITS   Wrist extension WITHIN FUNCTIONAL LIMITS WITHIN FUNCTIONAL LIMITS   Ulnar deviation WITHIN FUNCTIONAL LIMITS WITHIN FUNCTIONAL LIMITS   Radial deviation WITHIN FUNCTIONAL LIMITS WITHIN FUNCTIONAL LIMITS            2/6/24 MANUAL MUSCLE TEST  Muscle Right  Left    Shoulder flexion MMT strength: 5/5 MMT strength: 2+/5 "   Shoulder extension MMT strength: 5/5 MMT strength: 2+/5   Shoulder abduction MMT strength: 5/5 MMT strength: 2+/5   Shoulder internal rotation MMT strength: 5/5 MMT strength: 3-/5   Shoulder external rotation MMT strength: 5/5 MMT strength: 3-/5   Elbow flexion MMT strength: 5/5 MMT strength: 4/5   Elbow extension MMT strength: 5/5 MMT strength: 4/5   Wrist flexion MMT strength: 5/5 MMT strength: 4/5   Wrist extension MMT strength: 5/5 MMT strength: 4/5   Ulnar deviation MMT strength: 5/5 MMT strength: 4/5   Radial deviation MMT strength: 5/5 MMT strength: 4/5       Treatment     Stephane received the treatments listed below:      therapeutic exercises to develop strength, ROM, and flexibility for 32 minutes including:  Qian's flexion and abduction x 3 minutes each way  Table slides: flexion, scaption, External rotation x 1 min each way   Standing shoulder rows, extension, Internal rotation, External rotation yellow tband 2 x 10-- not today   Seated chest press with tbar 2 x 10-- not today  Seated External rotation and abduction using cane 10 x 10 s/h   Seated bicep curls 2# 2 x 10  Finger ladder flexion 10 x 5 s/h    Stephane received PreMod Electrical Stimulation for pain control applied to the left shoulder area. Pt received stimulation at a Cycle Time of Continuous, Frequency of 80/150 Hz, Amplitude of 4.4 Volts CV for 10 minutes. Stephane tolderated treatment well without any adverse effects during or after application. Hot pack applied to pt's left shoulder per his request seated in chair.     Patient Education and Home Exercises       Education provided:   - Body mechanics and posture were enforced to get optimal results from exercises performed    Written Home Exercises Provided: yes. Exercises were reviewed and Stephane was able to demonstrate them prior to the end of the session.  Stephane demonstrated good  understanding of the education provided. See EMR under Patient Instructions for exercises provided during  therapy sessions    Assessment     Stephane performed new AAROM today and withheld resistive shoulder tband with pt still demo pain through verbal statements even with PTA educating him to perform within a comfortable range. He requires constant vc to slow down his motions and perform with proper technique but pt ,at times, refuses to adhere to instructions from PTA. He reports the e-stim and heating pad helped his shoulder and pain level was a 6 /10 after PT.     Stephane Is progressing well towards his goals.   Pt prognosis is Good.     Pt will continue to benefit from skilled outpatient physical therapy to address the deficits listed in the problem list box on initial evaluation, provide pt/family education and to maximize pt's level of independence in the home and community environment.     Pt's spiritual, cultural and educational needs considered and pt agreeable to plan of care and goals.     Anticipated barriers to physical therapy: none    Short Term Goals 3 weeks: patient will:   Be Independent with Home Exercise Program.   Increase left Shoulder Flexion and Abduction AROM Range of Motion by 10 degrees to improve functional mobility.  Increase left Shoulder Strength to 3-/5 to improve functional activity for reaching and lifting.  Tolerate 30 minutes of exercise/activity with Decreased complaints of left Shoulder Pain to 5/10.     Long Term Goals 6 weeks: patient will:  1. Increase Active Range of Motion left shoulder flexion and abduction to 130 degrees to improve functional mobility.  3. Increase left Shoulder and Scapular Strength to 3+/5 to improve functional activity with ADL's.  3. Decrease left shoulder pain to 3/10 or less to improve quality of life.    Plan     Continue with current Plan of Care.     Plan of care Certification: 2/6/2024 to 3/19/24.    Outpatient Physical Therapy 2 times weekly for 6 weeks to include the following interventions: Electrical Stimulation IFC, Manual Therapy, Moist Heat/  Ice, Patient Education, Therapeutic Exercise, and Ultrasound.        Priti Lau, PTA

## 2024-02-20 ENCOUNTER — CLINICAL SUPPORT (OUTPATIENT)
Dept: REHABILITATION | Facility: HOSPITAL | Age: 79
End: 2024-02-20
Payer: MEDICARE

## 2024-02-20 DIAGNOSIS — M19.012 PRIMARY OSTEOARTHRITIS OF LEFT SHOULDER: Primary | ICD-10-CM

## 2024-02-20 PROCEDURE — 97110 THERAPEUTIC EXERCISES: CPT | Mod: CQ

## 2024-02-20 NOTE — PROGRESS NOTES
"[facility]   Physical Therapy Treatment Note      Name: Stephane Mathis  Clinic Number: [id number]    Therapy Diagnosis:   Encounter Diagnosis   Name Primary?    Primary osteoarthritis of left shoulder Yes     Physician: Zora Dunn PA    Visit Date: 2/20/2024    Physician Orders: PT Eval and Treat 1-3 times a week for 6 weeks   Medical Diagnosis from Referral: M19.012 (ICD-10-CM) - Primary osteoarthritis of left shoulder  Evaluation Date: 2/6/2024  Authorization Period Expiration: 3/19/24  Plan of Care Expiration: 3/19/24  Progress Note Due: 3/19/24  Visit # / Visits authorized: 5/13   FOTO: 49/100     Precautions: Standard     PTA Visit #: 4/5     Time In: 0942  Time Out: 1015  Total Billable Time: 33 minutes    Subjective     Pt reports: "At night, I have this sharp pain in my shoulder."  He  will be  compliant with home exercise program.  Response to previous treatment: fair  Functional change: none    Pain: 4-5/10 prior to PT  Location: left shoulder      Objective      2/6/24 Cervical AROM:  FDB: WNL  BB:25  SBL: 5  SBR: 15  RR:60  LR:40     2/6/24 Range of motion  Motion Right  Left    Shoulder flexion WITHIN FUNCTIONAL LIMITS 85   Shoulder extension WITHIN FUNCTIONAL LIMITS 50   Shoulder abduction WITHIN FUNCTIONAL LIMITS 75   Shoulder internal rotation WITHIN FUNCTIONAL LIMITS 70   Shoulder external rotation WITHIN FUNCTIONAL LIMITS 78   Elbow flexion WITHIN FUNCTIONAL LIMITS WITHIN FUNCTIONAL LIMITS   Elbow extension WITHIN FUNCTIONAL LIMITS WITHIN FUNCTIONAL LIMITS   Wrist flexion WITHIN FUNCTIONAL LIMITS WITHIN FUNCTIONAL LIMITS   Wrist extension WITHIN FUNCTIONAL LIMITS WITHIN FUNCTIONAL LIMITS   Ulnar deviation WITHIN FUNCTIONAL LIMITS WITHIN FUNCTIONAL LIMITS   Radial deviation WITHIN FUNCTIONAL LIMITS WITHIN FUNCTIONAL LIMITS            2/6/24 MANUAL MUSCLE TEST  Muscle Right  Left    Shoulder flexion MMT strength: 5/5 MMT strength: 2+/5   Shoulder extension MMT " strength: 5/5 MMT strength: 2+/5   Shoulder abduction MMT strength: 5/5 MMT strength: 2+/5   Shoulder internal rotation MMT strength: 5/5 MMT strength: 3-/5   Shoulder external rotation MMT strength: 5/5 MMT strength: 3-/5   Elbow flexion MMT strength: 5/5 MMT strength: 4/5   Elbow extension MMT strength: 5/5 MMT strength: 4/5   Wrist flexion MMT strength: 5/5 MMT strength: 4/5   Wrist extension MMT strength: 5/5 MMT strength: 4/5   Ulnar deviation MMT strength: 5/5 MMT strength: 4/5   Radial deviation MMT strength: 5/5 MMT strength: 4/5       Treatment     Stephane received the treatments listed below:      therapeutic exercises to develop strength, ROM, and flexibility for 33 minutes including:  Qian's flexion and abduction x 3 minutes each way  Finger ladder flexion 10 x 5 s/h  Table slides: flexion, scaption, External rotation x 1 min each way   Standing shoulder rows, extension, Internal rotation, External rotation yellow tband 2 x 10-- not today   Seated chest press with tbar 2 x 10  Seated External rotation and abduction using cane 10 x 10 s/h   Seated bicep curls 2# 2 x 10    NOT TODAY: supervised modalities : Stephane received PreMod Electrical Stimulation for pain control applied to the left shoulder area. Pt received stimulation at a Cycle Time of Continuous, Frequency of 80/150 Hz, Amplitude of 4.4 Volts CV for 10 minutes. Stephane tolderated treatment well without any adverse effects during or after application. Hot pack applied to pt's left shoulder per his request seated in chair.     Patient Education and Home Exercises       Education provided:   - Body mechanics and posture were enforced to get optimal results from exercises performed    Written Home Exercises Provided: yes. Exercises were reviewed and Stephane was able to demonstrate them prior to the end of the session.  Stephane demonstrated good  understanding of the education provided. See EMR under Patient Instructions for exercises provided during  therapy sessions    Assessment     Stephane was late for his appt so estim was not admin this session but he tolerated his shoulder rehab fair to good still demo discomfort but tolerable.     Stephane Is progressing well towards his goals.   Pt prognosis is Good.     Pt will continue to benefit from skilled outpatient physical therapy to address the deficits listed in the problem list box on initial evaluation, provide pt/family education and to maximize pt's level of independence in the home and community environment.     Pt's spiritual, cultural and educational needs considered and pt agreeable to plan of care and goals.     Anticipated barriers to physical therapy: none    Short Term Goals 3 weeks: patient will:   Be Independent with Home Exercise Program.   Increase left Shoulder Flexion and Abduction AROM Range of Motion by 10 degrees to improve functional mobility.  Increase left Shoulder Strength to 3-/5 to improve functional activity for reaching and lifting.  Tolerate 30 minutes of exercise/activity with Decreased complaints of left Shoulder Pain to 5/10.     Long Term Goals 6 weeks: patient will:  1. Increase Active Range of Motion left shoulder flexion and abduction to 130 degrees to improve functional mobility.  3. Increase left Shoulder and Scapular Strength to 3+/5 to improve functional activity with ADL's.  3. Decrease left shoulder pain to 3/10 or less to improve quality of life.    Plan     Continue with current Plan of Care.     Plan of care Certification: 2/6/2024 to 3/19/24.    Outpatient Physical Therapy 2 times weekly for 6 weeks to include the following interventions: Electrical Stimulation IFC, Manual Therapy, Moist Heat/ Ice, Patient Education, Therapeutic Exercise, and Ultrasound.        Priti Lau, PTA

## 2024-02-22 ENCOUNTER — CLINICAL SUPPORT (OUTPATIENT)
Dept: REHABILITATION | Facility: HOSPITAL | Age: 79
End: 2024-02-22
Payer: MEDICARE

## 2024-02-22 DIAGNOSIS — M19.012 PRIMARY OSTEOARTHRITIS OF LEFT SHOULDER: Primary | ICD-10-CM

## 2024-02-22 PROCEDURE — 97014 ELECTRIC STIMULATION THERAPY: CPT | Mod: CQ

## 2024-02-22 PROCEDURE — 97110 THERAPEUTIC EXERCISES: CPT | Mod: CQ

## 2024-02-22 NOTE — PROGRESS NOTES
"[facility]   Physical Therapy Treatment Note      Name: Stephane Mathis  Clinic Number: [id number]    Therapy Diagnosis:   Encounter Diagnosis   Name Primary?    Primary osteoarthritis of left shoulder Yes     Physician: Zora Dunn PA    Visit Date: 2/22/2024    Physician Orders: PT Eval and Treat 1-3 times a week for 6 weeks   Medical Diagnosis from Referral: M19.012 (ICD-10-CM) - Primary osteoarthritis of left shoulder  Evaluation Date: 2/6/2024  Authorization Period Expiration: 3/19/24  Plan of Care Expiration: 3/19/24  Progress Note Due: 3/19/24  Visit # / Visits authorized: 6/13   FOTO: 49/100     Precautions: Standard     PTA Visit #: 5/5     Time In: 0840  Time Out: 0925  Total Billable Time: 45 minutes    Subjective     Pt reports: "I've been using my shoulder more at home so it feels better today. I used that band, too. "  He  will be  compliant with home exercise program.  Response to previous treatment: fair  Functional change: none    Pain: 2/10 prior to PT  Location: left shoulder      Objective      2/6/24 Cervical AROM:  FDB: WNL  BB:25  SBL: 5  SBR: 15  RR:60  LR:40     2/22/24 Range of motion  Motion Right  Left    Shoulder flexion WITHIN FUNCTIONAL LIMITS 85   Shoulder extension WITHIN FUNCTIONAL LIMITS 50   Shoulder abduction WITHIN FUNCTIONAL LIMITS 75   Shoulder internal rotation WITHIN FUNCTIONAL LIMITS 70   Shoulder external rotation WITHIN FUNCTIONAL LIMITS 78   Elbow flexion WITHIN FUNCTIONAL LIMITS WITHIN FUNCTIONAL LIMITS   Elbow extension WITHIN FUNCTIONAL LIMITS WITHIN FUNCTIONAL LIMITS   Wrist flexion WITHIN FUNCTIONAL LIMITS WITHIN FUNCTIONAL LIMITS   Wrist extension WITHIN FUNCTIONAL LIMITS WITHIN FUNCTIONAL LIMITS   Ulnar deviation WITHIN FUNCTIONAL LIMITS WITHIN FUNCTIONAL LIMITS   Radial deviation WITHIN FUNCTIONAL LIMITS WITHIN FUNCTIONAL LIMITS            2/6/24 MANUAL MUSCLE TEST  Muscle Right  Left    Shoulder flexion MMT strength: 5/5 MMT " strength: 2+/5   Shoulder extension MMT strength: 5/5 MMT strength: 2+/5   Shoulder abduction MMT strength: 5/5 MMT strength: 2+/5   Shoulder internal rotation MMT strength: 5/5 MMT strength: 3-/5   Shoulder external rotation MMT strength: 5/5 MMT strength: 3-/5   Elbow flexion MMT strength: 5/5 MMT strength: 4/5   Elbow extension MMT strength: 5/5 MMT strength: 4/5   Wrist flexion MMT strength: 5/5 MMT strength: 4/5   Wrist extension MMT strength: 5/5 MMT strength: 4/5   Ulnar deviation MMT strength: 5/5 MMT strength: 4/5   Radial deviation MMT strength: 5/5 MMT strength: 4/5       Treatment     Stephane received the treatments listed below:      therapeutic exercises to develop strength, ROM, and flexibility for 35 minutes including:  Qian's flexion and abduction x 3 minutes each way  Finger ladder flexion 10 x 5 s/h  Table slides: flexion, scaption, External rotation x 1 min each way   Standing shoulder rows and extension yellow tband 2 x 10  Seated chest press with tbar 2 x 10-- not today  Seated External rotation and abduction using cane 2 x 10    Seated bicep curls 2# 2 x 10    supervised modalities : Stephane received IFC electrical stimulation to his left rotator cuff area seated in chair with Frequency of 80/150 Hz, Amplitude of 6.8 Volts CV for 10 minutes. Stephane tolderated treatment well without any adverse effects during or after application.     Patient Education and Home Exercises       Education provided:   - Body mechanics and posture were enforced to get optimal results from exercises performed    Written Home Exercises Provided: yes. Exercises were reviewed and Stephane was able to demonstrate them prior to the end of the session.  Stephane demonstrated good  understanding of the education provided. See EMR under Patient Instructions for exercises provided during therapy sessions    Assessment     Stephane reported low pain level before PT then during his session requiring vc to slow down his motions and  to take breaks due to not keeping up with his reps/overdoing. PTA educated him again to perform within a comfortable range but pt ,at times, does not adhere to what is being asked of him. He was also reeducated on his tband exercises that were provided on his HEP due to incorrect form when showing PTA how he does them at home. He demo understanding of how to properly use the tband for future. Will take measurements of his shoulder next visit in hopes of improved ROM.    Stephane Is progressing well towards his goals.   Pt prognosis is Good.     Pt will continue to benefit from skilled outpatient physical therapy to address the deficits listed in the problem list box on initial evaluation, provide pt/family education and to maximize pt's level of independence in the home and community environment.     Pt's spiritual, cultural and educational needs considered and pt agreeable to plan of care and goals.     Anticipated barriers to physical therapy: none    Short Term Goals 3 weeks: patient will:   Be Independent with Home Exercise Program. Goal Met.  Increase left Shoulder Flexion and Abduction AROM Range of Motion by 10 degrees to improve functional mobility.  Increase left Shoulder Strength to 3-/5 to improve functional activity for reaching and lifting.  Tolerate 30 minutes of exercise/activity with Decreased complaints of left Shoulder Pain to 5/10.     Long Term Goals 6 weeks: patient will:  1. Increase Active Range of Motion left shoulder flexion and abduction to 130 degrees to improve functional mobility.  3. Increase left Shoulder and Scapular Strength to 3+/5 to improve functional activity with ADL's.  3. Decrease left shoulder pain to 3/10 or less to improve quality of life.    Plan     Continue with current Plan of Care.     Plan of care Certification: 2/6/2024 to 3/19/24.    Outpatient Physical Therapy 2 times weekly for 6 weeks to include the following interventions: Electrical Stimulation IFC, Manual  Therapy, Moist Heat/ Ice, Patient Education, Therapeutic Exercise, and Ultrasound.        Priti Lau, PTA

## 2024-03-05 ENCOUNTER — CLINICAL SUPPORT (OUTPATIENT)
Dept: REHABILITATION | Facility: HOSPITAL | Age: 79
End: 2024-03-05
Payer: MEDICARE

## 2024-03-05 DIAGNOSIS — M19.012 PRIMARY OSTEOARTHRITIS OF LEFT SHOULDER: Primary | ICD-10-CM

## 2024-03-05 PROCEDURE — 97110 THERAPEUTIC EXERCISES: CPT

## 2024-03-05 NOTE — PROGRESS NOTES
"[facility]   Physical Therapy Treatment Note      Name: Stephane Mathis  [facility] Number: [id number]    Therapy Diagnosis:   Encounter Diagnosis   Name Primary?    Primary osteoarthritis of left shoulder Yes     Physician: Zora Dunn PA    Visit Date: 3/5/2024    Physician Orders: PT Eval and Treat 1-3 times a week for 6 weeks   Medical Diagnosis from Referral: M19.012 (ICD-10-CM) - Primary osteoarthritis of left shoulder  Evaluation Date: 2/6/2024  Authorization Period Expiration: 3/19/24  Plan of Care Expiration: 3/19/24  Progress Note Due: 3/19/24  Visit # / Visits authorized: 7/13   FOTO: 49/100  FOTO: 56/100 on 3/5/24     Precautions: Standard     PTA Visit #: 0/5     Time In: 0850  Time Out: 0925  Total Billable Time: 35 minutes    Subjective     Pt reports: "My shoulder feels OK right now, hurts when I lay on it. "  He  will be  compliant with home exercise program.  Response to previous treatment: fair  Functional change: none    Pain: 0/10 prior to PT  Location: left shoulder      Objective      2/6/24 Cervical AROM:  FDB: WNL  BB:25  SBL: 5  SBR: 15  RR:60  LR:40     3/5/24 Range of motion  Motion Right  Left    Shoulder flexion WITHIN FUNCTIONAL LIMITS 80   Shoulder extension WITHIN FUNCTIONAL LIMITS 55   Shoulder abduction WITHIN FUNCTIONAL LIMITS 15   Shoulder internal rotation WITHIN FUNCTIONAL LIMITS 80   Shoulder external rotation WITHIN FUNCTIONAL LIMITS 73   Elbow flexion WITHIN FUNCTIONAL LIMITS WITHIN FUNCTIONAL LIMITS   Elbow extension WITHIN FUNCTIONAL LIMITS WITHIN FUNCTIONAL LIMITS   Wrist flexion WITHIN FUNCTIONAL LIMITS WITHIN FUNCTIONAL LIMITS   Wrist extension WITHIN FUNCTIONAL LIMITS WITHIN FUNCTIONAL LIMITS   Ulnar deviation WITHIN FUNCTIONAL LIMITS WITHIN FUNCTIONAL LIMITS   Radial deviation WITHIN FUNCTIONAL LIMITS WITHIN FUNCTIONAL LIMITS            2/6/24 MANUAL MUSCLE TEST  Muscle Right  Left    Shoulder flexion MMT strength: 5/5 MMT strength: 2+/5 "   Shoulder extension MMT strength: 5/5 MMT strength: 4/5   Shoulder abduction MMT strength: 5/5 MMT strength: 3-/5   Shoulder internal rotation MMT strength: 5/5 MMT strength: 3+/5   Shoulder external rotation MMT strength: 5/5 MMT strength: 3-/5   Elbow flexion MMT strength: 5/5 MMT strength: 4/5   Elbow extension MMT strength: 5/5 MMT strength: 4/5   Wrist flexion MMT strength: 5/5 MMT strength: 4/5   Wrist extension MMT strength: 5/5 MMT strength: 4/5   Ulnar deviation MMT strength: 5/5 MMT strength: 4/5   Radial deviation MMT strength: 5/5 MMT strength: 4/5       Treatment     Stephane received the treatments listed below:      therapeutic exercises to develop strength, ROM, and flexibility for 35 minutes including:  Qian's flexion and abduction x 3 minutes each way  Finger ladder flexion 10 x 5 s/h  Table slides: flexion, scaption, External rotation x 1 min each way not today  Standing shoulder rows and extension red tband 2 x 10  Seated chest press with tbar 2 x 10  supine flex red band 2x10  supine bar flexion stretch x10  seated chest press bar 1 x 10    Seated External rotation and abduction using cane 2 x 10  not today  Seated bicep curls 2# 2 x 10 not today    Not today supervised modalities : Stephane received IFC electrical stimulation to his left rotator cuff area seated in chair with Frequency of 80/150 Hz, Amplitude of 6.8 Volts CV for 10 minutes. Stephane tolderated treatment well without any adverse effects during or after application.     Patient Education and Home Exercises       Education provided:   - Body mechanics and posture were enforced to get optimal results from exercises performed    Written Home Exercises Provided: Patient instructed to cont prior HEP.   Assessment     Stephane reported no pain level before PT then during his session he reported pain up to 8/10 with most exercises. He has some improvement with ROM and strength but mobility is impaired due to pain.    Stephane Is progressing  fair towards his goals.   Pt prognosis is Fair.     Pt will continue to benefit from skilled outpatient physical therapy to address the deficits listed in the problem list box on initial evaluation, provide pt/family education and to maximize pt's level of independence in the home and community environment.     Pt's spiritual, cultural and educational needs considered and pt agreeable to plan of care and goals.     Anticipated barriers to physical therapy: chronic pain    Short Term Goals 3 weeks: patient will:   Be Independent with Home Exercise Program. Goal Met.  Increase left Shoulder Flexion and Abduction AROM Range of Motion by 10 degrees to improve functional mobility.  Increase left Shoulder Strength to 3-/5 to improve functional activity for reaching and lifting.  Tolerate 30 minutes of exercise/activity with Decreased complaints of left Shoulder Pain to 5/10.     Long Term Goals 6 weeks: patient will:  1. Increase Active Range of Motion left shoulder flexion and abduction to 130 degrees to improve functional mobility.  3. Increase left Shoulder and Scapular Strength to 3+/5 to improve functional activity with ADL's.  3. Decrease left shoulder pain to 3/10 or less to improve quality of life.    Plan     Continue with current Plan of Care.     Plan of care Certification: 2/6/2024 to 3/19/24.    Outpatient Physical Therapy 2 times weekly for 6 weeks to include the following interventions: Electrical Stimulation IFC, Manual Therapy, Moist Heat/ Ice, Patient Education, Therapeutic Exercise, and Ultrasound.        Toño Nelson, PT

## 2024-03-07 ENCOUNTER — CLINICAL SUPPORT (OUTPATIENT)
Dept: REHABILITATION | Facility: HOSPITAL | Age: 79
End: 2024-03-07
Payer: MEDICARE

## 2024-03-07 DIAGNOSIS — M19.012 PRIMARY OSTEOARTHRITIS OF LEFT SHOULDER: Primary | ICD-10-CM

## 2024-03-07 PROCEDURE — 97110 THERAPEUTIC EXERCISES: CPT | Mod: CQ

## 2024-03-07 NOTE — PROGRESS NOTES
"[facility]   Physical Therapy Treatment Note      Name: Stephane Mathis  Clinic Number: [id number]    Therapy Diagnosis:   Encounter Diagnosis   Name Primary?    Primary osteoarthritis of left shoulder Yes     Physician: Zora Dunn PA    Visit Date: 3/7/2024    Physician Orders: PT Eval and Treat 1-3 times a week for 6 weeks   Medical Diagnosis from Referral: M19.012 (ICD-10-CM) - Primary osteoarthritis of left shoulder  Evaluation Date: 2/6/2024  Authorization Period Expiration: 3/19/24  Plan of Care Expiration: 3/19/24  Progress Note Due: 3/19/24  Visit # / Visits authorized: 8/13   FOTO: 49/100  FOTO: 56/100 on 3/5/24     Precautions: Standard     PTA Visit #: 1/5     Time In: 0843  Time Out: 0918  Total Billable Time: 35 minutes    Subjective     Pt reports: "Everything about me is stiff this morning"  He  will be  compliant with home exercise program.  Response to previous treatment: fair  Functional change: none    Pain: 8/10 prior to PT  Location: left shoulder      Objective      2/6/24 Cervical AROM:  FDB: WNL  BB:25  SBL: 5  SBR: 15  RR:60  LR:40     3/5/24 Range of motion  Motion Right  Left    Shoulder flexion WITHIN FUNCTIONAL LIMITS 80   Shoulder extension WITHIN FUNCTIONAL LIMITS 55   Shoulder abduction WITHIN FUNCTIONAL LIMITS 15   Shoulder internal rotation WITHIN FUNCTIONAL LIMITS 80   Shoulder external rotation WITHIN FUNCTIONAL LIMITS 73   Elbow flexion WITHIN FUNCTIONAL LIMITS WITHIN FUNCTIONAL LIMITS   Elbow extension WITHIN FUNCTIONAL LIMITS WITHIN FUNCTIONAL LIMITS   Wrist flexion WITHIN FUNCTIONAL LIMITS WITHIN FUNCTIONAL LIMITS   Wrist extension WITHIN FUNCTIONAL LIMITS WITHIN FUNCTIONAL LIMITS   Ulnar deviation WITHIN FUNCTIONAL LIMITS WITHIN FUNCTIONAL LIMITS   Radial deviation WITHIN FUNCTIONAL LIMITS WITHIN FUNCTIONAL LIMITS            2/6/24 MANUAL MUSCLE TEST  Muscle Right  Left    Shoulder flexion MMT strength: 5/5 MMT strength: 2+/5   Shoulder " extension MMT strength: 5/5 MMT strength: 4/5   Shoulder abduction MMT strength: 5/5 MMT strength: 3-/5   Shoulder internal rotation MMT strength: 5/5 MMT strength: 3+/5   Shoulder external rotation MMT strength: 5/5 MMT strength: 3-/5   Elbow flexion MMT strength: 5/5 MMT strength: 4/5   Elbow extension MMT strength: 5/5 MMT strength: 4/5   Wrist flexion MMT strength: 5/5 MMT strength: 4/5   Wrist extension MMT strength: 5/5 MMT strength: 4/5   Ulnar deviation MMT strength: 5/5 MMT strength: 4/5   Radial deviation MMT strength: 5/5 MMT strength: 4/5       Treatment     Stephane received the treatments listed below:      therapeutic exercises to develop strength, ROM, and flexibility for 35 minutes including:  Qian's flexion and abduction x 3 minutes each way  Finger ladder flexion 10 x 5 s/h  Table slides: flexion, scaption, External rotation x 1 min each way   Standing shoulder rows and extension red tband 2 x 10  Seated chest press with tbar 2 x 10  supine flex red band 2x10 - not today  supine bar flexion stretch x10  seated chest press bar 1 x 10  Seated External rotation and abduction using cane 2 x 10    Seated bicep curls 2# 2 x 10     Not today supervised modalities : Stephane received IFC electrical stimulation to his left rotator cuff area seated in chair with Frequency of 80/150 Hz, Amplitude of 6.8 Volts CV for 10 minutes. Stephane tolderated treatment well without any adverse effects during or after application.     Patient Education and Home Exercises       Education provided:   - Body mechanics and posture were enforced to get optimal results from exercises performed    Written Home Exercises Provided: Patient instructed to cont prior HEP.   Assessment     Stephane tolerated treatment fairly with moderate pain and difficulty. His pain level is a 8/10 upon entering gym this morning. He says he does not want to have surgery, but thinks that is what's best if therapy is not improving pain. He did show facial  grimacing and demo grunting when performing exercises. Patient does not want ESTIM anymore, stating it made his muscles jump throughout the night.    Stephane Is progressing fair towards his goals.   Pt prognosis is Fair.     Pt will continue to benefit from skilled outpatient physical therapy to address the deficits listed in the problem list box on initial evaluation, provide pt/family education and to maximize pt's level of independence in the home and community environment.     Pt's spiritual, cultural and educational needs considered and pt agreeable to plan of care and goals.     Anticipated barriers to physical therapy: chronic pain    Short Term Goals 3 weeks: patient will:   Be Independent with Home Exercise Program. Goal Met.  Increase left Shoulder Flexion and Abduction AROM Range of Motion by 10 degrees to improve functional mobility.  Increase left Shoulder Strength to 3-/5 to improve functional activity for reaching and lifting.  Tolerate 30 minutes of exercise/activity with Decreased complaints of left Shoulder Pain to 5/10.     Long Term Goals 6 weeks: patient will:  1. Increase Active Range of Motion left shoulder flexion and abduction to 130 degrees to improve functional mobility.  3. Increase left Shoulder and Scapular Strength to 3+/5 to improve functional activity with ADL's.  3. Decrease left shoulder pain to 3/10 or less to improve quality of life.    Plan     Continue with current Plan of Care.     Plan of care Certification: 2/6/2024 to 3/19/24.    Outpatient Physical Therapy 2 times weekly for 6 weeks to include the following interventions: Electrical Stimulation IFC, Manual Therapy, Moist Heat/ Ice, Patient Education, Therapeutic Exercise, and Ultrasound.        Babita Chaudhari, PTA

## 2024-03-10 ENCOUNTER — HOSPITAL ENCOUNTER (EMERGENCY)
Facility: HOSPITAL | Age: 79
Discharge: HOME | End: 2024-03-10
Payer: MEDICARE

## 2024-03-10 VITALS
RESPIRATION RATE: 18 BRPM | BODY MASS INDEX: 22.4 KG/M2 | SYSTOLIC BLOOD PRESSURE: 167 MMHG | HEIGHT: 71 IN | HEART RATE: 79 BPM | WEIGHT: 160 LBS | TEMPERATURE: 98 F | OXYGEN SATURATION: 96 % | DIASTOLIC BLOOD PRESSURE: 70 MMHG

## 2024-03-10 DIAGNOSIS — M75.21 BICIPITAL TENDINITIS OF RIGHT SHOULDER: Primary | ICD-10-CM

## 2024-03-10 DIAGNOSIS — M25.511 ACUTE PAIN OF RIGHT SHOULDER: ICD-10-CM

## 2024-03-10 PROCEDURE — 96372 THER/PROPH/DIAG INJ SC/IM: CPT | Performed by: NURSE PRACTITIONER

## 2024-03-10 PROCEDURE — 99284 EMERGENCY DEPT VISIT MOD MDM: CPT | Mod: 25

## 2024-03-10 PROCEDURE — 63600175 PHARM REV CODE 636 W HCPCS: Performed by: NURSE PRACTITIONER

## 2024-03-10 PROCEDURE — 99284 EMERGENCY DEPT VISIT MOD MDM: CPT | Mod: GF | Performed by: NURSE PRACTITIONER

## 2024-03-10 RX ORDER — KETOROLAC TROMETHAMINE 30 MG/ML
30 INJECTION, SOLUTION INTRAMUSCULAR; INTRAVENOUS
Status: COMPLETED | OUTPATIENT
Start: 2024-03-10 | End: 2024-03-10

## 2024-03-10 RX ORDER — DEXAMETHASONE SODIUM PHOSPHATE 4 MG/ML
10 INJECTION, SOLUTION INTRA-ARTICULAR; INTRALESIONAL; INTRAMUSCULAR; INTRAVENOUS; SOFT TISSUE
Status: COMPLETED | OUTPATIENT
Start: 2024-03-10 | End: 2024-03-10

## 2024-03-10 RX ORDER — METHYLPREDNISOLONE 4 MG/1
TABLET ORAL
Qty: 21 EACH | Refills: 0 | Status: SHIPPED | OUTPATIENT
Start: 2024-03-10 | End: 2024-03-12 | Stop reason: SDUPTHER

## 2024-03-10 RX ADMIN — KETOROLAC TROMETHAMINE 30 MG: 30 INJECTION INTRAMUSCULAR; INTRAVENOUS at 03:03

## 2024-03-10 RX ADMIN — DEXAMETHASONE SODIUM PHOSPHATE 10 MG: 4 INJECTION, SOLUTION INTRA-ARTICULAR; INTRALESIONAL; INTRAMUSCULAR; INTRAVENOUS; SOFT TISSUE at 03:03

## 2024-03-10 NOTE — ED NOTES
Instructed patient to take aleve for pain twice daily.  Have his steroid prescription filled on Monday.  Apply ice packs to right shoulder every 2 hours as needed for pain.  Follow-up with PCP on Monday or Tuesday if not improving.  Return to ED for any new or worsening symptoms that may arise.  Patient verbalized understanding of instructions.

## 2024-03-10 NOTE — DISCHARGE INSTRUCTIONS
Recommend Aleve twice a day for 1 week, start medrol dose wing on 3/11/2024, ice to affected area, and rest. If pain persists, follow up with primary care provider for further care.

## 2024-03-10 NOTE — ED PROVIDER NOTES
"Encounter Date: 3/10/2024       History     Chief Complaint   Patient presents with    Shoulder Pain     Patient states his right arm has been hurting since noon on 03/09/24.  He has a lidoderm patch on his right shoulder.  He says he has had a heating pad on his shoulder.     77 y/o AAM c/o right shoulder pain that started around 1200 noon the previous day. Denies any injury. Has applied "icey hot pain patch" according to daughter. Pain is worse with movement. Does admit to drinking "whiskey" tonight to help with the pain but it hasn't helped.     The history is provided by the patient and a relative.     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    Colon, diverticulosis 7/13/2022    Hypertension     Iron deficiency anemia due to chronic blood loss 7/12/2022    Polyp, sigmoid colon 7/13/2022    Seizures     Stroke     right sided weakness     Past Surgical History:   Procedure Laterality Date    CATARACT EXTRACTION W/ INTRAOCULAR LENS IMPLANT Right     KNEE SURGERY Right     LUMBAR DISCECTOMY      SCROTAL SURGERY      in february     Family History   Problem Relation Age of Onset    Diabetes Mother     Hypertension Mother     Diabetes Sister     Diabetes Brother     Hypertension Brother     Cancer Brother     Throat cancer Brother     Alzheimer's disease Brother      Social History     Tobacco Use    Smoking status: Every Day     Current packs/day: 0.50     Average packs/day: 0.5 packs/day for 58.2 years (29.1 ttl pk-yrs)     Types: Cigarettes     Start date: 1966     Passive exposure: Current    Smokeless tobacco: Never    Tobacco comments:     Tobacco quitline information sheet given    Substance Use Topics    Alcohol use: Yes     Alcohol/week: 6.0 standard drinks of " alcohol     Types: 6 Cans of beer per week     Comment: pint of liquor primarly on weekend    Drug use: Yes     Types: Marijuana     Comment: daily     Review of Systems   Constitutional:  Negative for fatigue, fever and unexpected weight change.   HENT:  Negative for hearing loss.    Respiratory:  Negative for shortness of breath.    Cardiovascular:  Negative for chest pain.   Gastrointestinal:  Negative for abdominal pain, constipation, diarrhea, nausea and vomiting.   Genitourinary:  Negative for dysuria.   Musculoskeletal:  Positive for arthralgias. Negative for back pain, gait problem, joint swelling, myalgias and neck pain.   Skin:  Negative for rash.   Neurological:  Negative for dizziness, weakness and headaches.   Psychiatric/Behavioral:  Negative for confusion and sleep disturbance.        Physical Exam     Initial Vitals [03/10/24 0130]   BP Pulse Resp Temp SpO2   (!) 162/59 78 18 98.1 °F (36.7 °C) 96 %      MAP       --         Physical Exam    Nursing note and vitals reviewed.  Constitutional: He appears well-developed and well-nourished.   Eyes: Pupils are equal, round, and reactive to light.   Neck: Neck supple. No JVD present.   Normal range of motion.  Cardiovascular:  Normal rate and regular rhythm.           Pulmonary/Chest: Breath sounds normal. No respiratory distress.   Abdominal: Abdomen is soft. Bowel sounds are normal. He exhibits no distension. There is no abdominal tenderness.   Musculoskeletal:         General: No edema.      Right shoulder: Tenderness (over bicipital tendon) present. Decreased range of motion. Normal strength. Normal pulse.      Cervical back: Normal range of motion and neck supple.     Neurological: He is alert and oriented to person, place, and time. He has normal strength.   Skin: Skin is warm and dry.         Medical Screening Exam   See Full Note    ED Course   Procedures  Labs Reviewed - No data to display       Imaging Results    None          Medications  "  ketorolac injection 30 mg (30 mg Intramuscular Incomplete 3/10/24 0306)   dexAMETHasone injection 10 mg (has no administration in time range)     Medical Decision Making  77 y/o AAM c/o right shoulder pain that started around 1200 noon the previous day. Denies any injury. Has applied "icey hot pain patch" according to daughter. Pain is worse with movement.     Differential diagnoses include primary osteoarthritis, bicipital tendinitis, other ortho problems.     Will treat with antiinflammatories and ice and have patient follow up with pcp    Risk  Prescription drug management.                                      Clinical Impression:   Final diagnoses:  [M25.511] Acute pain of right shoulder  [M75.21] Bicipital tendinitis of right shoulder (Primary)        ED Disposition Condition    Discharge Stable          ED Prescriptions       Medication Sig Dispense Start Date End Date Auth. Provider    methylPREDNISolone (MEDROL DOSEPACK) 4 mg tablet use as directed 21 each 3/10/2024 3/31/2024 Jaylin Mcguire DNP, EVIE          Follow-up Information       Follow up With Specialties Details Why Contact Info    Sierra Dickerson MD Family Medicine In 2 days  [address]  [facility] [place] [address]  [phone]               Jaylin Mcguire DNP, EVIE  03/10/24 3341    "

## 2024-03-12 ENCOUNTER — OFFICE VISIT (OUTPATIENT)
Dept: FAMILY MEDICINE | Facility: CLINIC | Age: 79
End: 2024-03-12
Payer: MEDICARE

## 2024-03-12 ENCOUNTER — CLINICAL SUPPORT (OUTPATIENT)
Dept: REHABILITATION | Facility: HOSPITAL | Age: 79
End: 2024-03-12
Payer: MEDICARE

## 2024-03-12 VITALS
OXYGEN SATURATION: 97 % | HEIGHT: 71 IN | SYSTOLIC BLOOD PRESSURE: 134 MMHG | BODY MASS INDEX: 22.82 KG/M2 | HEART RATE: 79 BPM | WEIGHT: 163 LBS | TEMPERATURE: 98 F | DIASTOLIC BLOOD PRESSURE: 72 MMHG | RESPIRATION RATE: 15 BRPM

## 2024-03-12 DIAGNOSIS — R53.83 OTHER FATIGUE: ICD-10-CM

## 2024-03-12 DIAGNOSIS — G89.29 CHRONIC PAIN OF BOTH SHOULDERS: Primary | ICD-10-CM

## 2024-03-12 DIAGNOSIS — Z74.09 IMPAIRED MOBILITY: ICD-10-CM

## 2024-03-12 DIAGNOSIS — M25.512 CHRONIC PAIN OF BOTH SHOULDERS: Primary | ICD-10-CM

## 2024-03-12 DIAGNOSIS — M25.511 CHRONIC PAIN OF BOTH SHOULDERS: Primary | ICD-10-CM

## 2024-03-12 DIAGNOSIS — M19.012 PRIMARY OSTEOARTHRITIS OF LEFT SHOULDER: Primary | ICD-10-CM

## 2024-03-12 DIAGNOSIS — Z79.899 OTHER LONG TERM (CURRENT) DRUG THERAPY: ICD-10-CM

## 2024-03-12 DIAGNOSIS — D64.9 ANEMIA, UNSPECIFIED TYPE: ICD-10-CM

## 2024-03-12 DIAGNOSIS — G40.909 SEIZURE DISORDER: ICD-10-CM

## 2024-03-12 PROBLEM — F10.21 ALCOHOL DEPENDENCE IN EARLY FULL REMISSION: Chronic | Status: RESOLVED | Noted: 2022-07-12 | Resolved: 2024-03-12

## 2024-03-12 LAB
25(OH)D3 SERPL-MCNC: 45.9 NG/ML
ALBUMIN SERPL BCP-MCNC: 3 G/DL (ref 3.5–5)
ALBUMIN/GLOB SERPL: 0.6 {RATIO}
ALP SERPL-CCNC: 59 U/L (ref 45–115)
ALT SERPL W P-5'-P-CCNC: 27 U/L (ref 16–61)
ANION GAP SERPL CALCULATED.3IONS-SCNC: 11 MMOL/L (ref 7–16)
AST SERPL W P-5'-P-CCNC: 11 U/L (ref 15–37)
BASOPHILS # BLD AUTO: 0.05 K/UL (ref 0–0.2)
BASOPHILS NFR BLD AUTO: 0.5 % (ref 0–1)
BILIRUB SERPL-MCNC: 0.3 MG/DL (ref ?–1.2)
BUN SERPL-MCNC: 26 MG/DL (ref 7–18)
BUN/CREAT SERPL: 17 (ref 6–20)
CALCIUM SERPL-MCNC: 9.3 MG/DL (ref 8.5–10.1)
CHLORIDE SERPL-SCNC: 101 MMOL/L (ref 98–107)
CO2 SERPL-SCNC: 28 MMOL/L (ref 21–32)
CREAT SERPL-MCNC: 1.49 MG/DL (ref 0.7–1.3)
DIFFERENTIAL METHOD BLD: ABNORMAL
EGFR (NO RACE VARIABLE) (RUSH/TITUS): 48 ML/MIN/1.73M2
EOSINOPHIL # BLD AUTO: 0.05 K/UL (ref 0–0.5)
EOSINOPHIL NFR BLD AUTO: 0.5 % (ref 1–4)
ERYTHROCYTE [DISTWIDTH] IN BLOOD BY AUTOMATED COUNT: 17.9 % (ref 11.5–14.5)
FERRITIN SERPL-MCNC: 13 NG/ML (ref 26–388)
GLOBULIN SER-MCNC: 5.2 G/DL (ref 2–4)
GLUCOSE SERPL-MCNC: 118 MG/DL (ref 74–106)
HCT VFR BLD AUTO: 27.6 % (ref 40–54)
HGB BLD-MCNC: 8 G/DL (ref 13.5–18)
IMM GRANULOCYTES # BLD AUTO: 0.03 K/UL (ref 0–0.04)
IMM GRANULOCYTES NFR BLD: 0.3 % (ref 0–0.4)
IRON SATN MFR SERPL: 4 % (ref 14–50)
IRON SERPL-MCNC: 17 ΜG/DL (ref 65–175)
LYMPHOCYTES # BLD AUTO: 2.22 K/UL (ref 1–4.8)
LYMPHOCYTES NFR BLD AUTO: 21.9 % (ref 27–41)
MCH RBC QN AUTO: 24.8 PG (ref 27–31)
MCHC RBC AUTO-ENTMCNC: 29 G/DL (ref 32–36)
MCV RBC AUTO: 85.4 FL (ref 80–96)
MONOCYTES # BLD AUTO: 1.07 K/UL (ref 0–0.8)
MONOCYTES NFR BLD AUTO: 10.5 % (ref 2–6)
MPC BLD CALC-MCNC: 12.7 FL (ref 9.4–12.4)
NEUTROPHILS # BLD AUTO: 6.73 K/UL (ref 1.8–7.7)
NEUTROPHILS NFR BLD AUTO: 66.3 % (ref 53–65)
NRBC # BLD AUTO: 0 X10E3/UL
NRBC, AUTO (.00): 0 %
PLATELET # BLD AUTO: 271 K/UL (ref 150–400)
POTASSIUM SERPL-SCNC: 3.5 MMOL/L (ref 3.5–5.1)
PROT SERPL-MCNC: 8.2 G/DL (ref 6.4–8.2)
RBC # BLD AUTO: 3.23 M/UL (ref 4.6–6.2)
SODIUM SERPL-SCNC: 136 MMOL/L (ref 136–145)
T4 FREE SERPL-MCNC: 1.27 NG/DL (ref 0.76–1.46)
TIBC SERPL-MCNC: 475 ΜG/DL (ref 250–450)
TSH SERPL DL<=0.005 MIU/L-ACNC: 1.04 UIU/ML (ref 0.36–3.74)
VIT B12 SERPL-MCNC: 857 PG/ML (ref 193–986)
WBC # BLD AUTO: 10.15 K/UL (ref 4.5–11)

## 2024-03-12 PROCEDURE — 82728 ASSAY OF FERRITIN: CPT | Mod: ,,, | Performed by: CLINICAL MEDICAL LABORATORY

## 2024-03-12 PROCEDURE — 84439 ASSAY OF FREE THYROXINE: CPT | Mod: ,,, | Performed by: CLINICAL MEDICAL LABORATORY

## 2024-03-12 PROCEDURE — 83550 IRON BINDING TEST: CPT | Mod: ,,, | Performed by: CLINICAL MEDICAL LABORATORY

## 2024-03-12 PROCEDURE — 99214 OFFICE O/P EST MOD 30 MIN: CPT | Mod: ,,, | Performed by: FAMILY MEDICINE

## 2024-03-12 PROCEDURE — 1125F AMNT PAIN NOTED PAIN PRSNT: CPT | Mod: ,,, | Performed by: FAMILY MEDICINE

## 2024-03-12 PROCEDURE — 80050 GENERAL HEALTH PANEL: CPT | Mod: ,,, | Performed by: CLINICAL MEDICAL LABORATORY

## 2024-03-12 PROCEDURE — 1101F PT FALLS ASSESS-DOCD LE1/YR: CPT | Mod: ,,, | Performed by: FAMILY MEDICINE

## 2024-03-12 PROCEDURE — 82607 VITAMIN B-12: CPT | Mod: ,,, | Performed by: CLINICAL MEDICAL LABORATORY

## 2024-03-12 PROCEDURE — 3288F FALL RISK ASSESSMENT DOCD: CPT | Mod: ,,, | Performed by: FAMILY MEDICINE

## 2024-03-12 PROCEDURE — 83540 ASSAY OF IRON: CPT | Mod: ,,, | Performed by: CLINICAL MEDICAL LABORATORY

## 2024-03-12 PROCEDURE — 1159F MED LIST DOCD IN RCRD: CPT | Mod: ,,, | Performed by: FAMILY MEDICINE

## 2024-03-12 PROCEDURE — 3075F SYST BP GE 130 - 139MM HG: CPT | Mod: ,,, | Performed by: FAMILY MEDICINE

## 2024-03-12 PROCEDURE — 3078F DIAST BP <80 MM HG: CPT | Mod: ,,, | Performed by: FAMILY MEDICINE

## 2024-03-12 PROCEDURE — 97110 THERAPEUTIC EXERCISES: CPT | Mod: CQ

## 2024-03-12 PROCEDURE — 82306 VITAMIN D 25 HYDROXY: CPT | Mod: ,,, | Performed by: CLINICAL MEDICAL LABORATORY

## 2024-03-12 RX ORDER — METHYLPREDNISOLONE 4 MG/1
TABLET ORAL
Qty: 21 EACH | Refills: 0 | Status: SHIPPED | OUTPATIENT
Start: 2024-03-12 | End: 2024-04-02

## 2024-03-12 NOTE — PROGRESS NOTES
"Clinic Note    Patient Name: Stephane Mathis  : 1945  MRN: [id number]    Chief Complaint   Patient presents with    Follow-up     ER Follow up for chest pain and right shoulder pain.     Shoulder Pain     Pt reports left shoulder pain.        HPI:    Mr. Stephane Mathis is a 78 y.o. male who presents to clinic today with CC of ER follow up. Patient was seen in the ER 2 days ago with flare with shoulder pain. He was given steroid and toradol injections in the ER. He was prescribed a medrol dose pack but reports that he has not filled this medication.  BP is elevated today. Repots normal readings at home. Reports medication compliance.  Reports fatigue. Reports he wants to make sure he doesn't "need more blood". Reports h/o anemia.  Reports that shoulder pain is not improved. States he balance is not good. He admits to alcohol use. Requests PT. States he is in PT at [facility] for his shoulder but would like to go for his balance as well.  Patient is, otherwise, without complaints.     Medications:  Medication List with Changes/Refills   Current Medications    AMLODIPINE (NORVASC) 10 MG TABLET    Take 1 tablet (10 mg total) by mouth once daily.    ASPIRIN (ECOTRIN) 81 MG EC TABLET    Take 1 tablet (81 mg total) by mouth once daily.    ATORVASTATIN (LIPITOR) 40 MG TABLET    Take 1 tablet (40 mg total) by mouth every evening.    EAR DROPS, CARBAMIDE PEROXIDE, OTIC    Place in ear(s).    EPINEPHRINE (EPIPEN) 0.3 MG/0.3 ML ATIN    Inject 0.3 mLs (0.3 mg total) into the muscle as needed.    ERGOCALCIFEROL (ERGOCALCIFEROL) 50,000 UNIT CAP    Take 1 capsule (50,000 Units total) by mouth every 7 days.    FAMOTIDINE (PEPCID) 20 MG TABLET    Take 1 tablet (20 mg total) by mouth 2 (two) times daily as needed for Heartburn.    FERROUS SULFATE 325 (65 FE) MG EC TABLET    Take 1 tablet (325 mg total) by mouth once daily.    HYDRALAZINE (APRESOLINE) 100 MG TABLET    Take 1 tablet (100 mg total) by mouth 2 (two) times a day.    " HYDROCHLOROTHIAZIDE (HYDRODIURIL) 25 MG TABLET    Take 1 tablet (25 mg total) by mouth once daily.    HYDROCODONE-ACETAMINOPHEN (NORCO) 5-325 MG PER TABLET    Take 1 tablet by mouth every 8 (eight) hours as needed for Pain.    METHOCARBAMOL (ROBAXIN) 500 MG TAB    Take 1 tablet (500 mg total) by mouth 3 (three) times daily as needed (muscle spasm/pain. May cause drowsiness).    TADALAFIL (CIALIS) 10 MG TABLET    Take 1 tablet (10 mg total) by mouth daily as needed for Erectile Dysfunction.   Changed and/or Refilled Medications    Modified Medication Previous Medication    METHYLPREDNISOLONE (MEDROL DOSEPACK) 4 MG TABLET methylPREDNISolone (MEDROL DOSEPACK) 4 mg tablet       use as directed    use as directed   Discontinued Medications    CHLORPHENIRAMINE-PHENYLEPH-DM (ED A-HIST DM) 4-10-10 MG TAB    Take 1 tablet by mouth every 8 (eight) hours as needed (congestion/ears).    KETOROLAC 0.5% (ACULAR) 0.5 % DROP    Place into both eyes.    MOXIFLOXACIN (VIGAMOX) 0.5 % OPHTHALMIC SOLUTION    Place into both eyes.    OLOPATADINE (PATANOL) 0.1 % OPHTHALMIC SOLUTION    Place 1 drop into both eyes 2 (two) times daily as needed for Allergies.    PREDNISOLONE ACETATE (PRED FORTE) 1 % DRPS    Place into both eyes.    TAMSULOSIN (FLOMAX) 0.4 MG CAP    Take 1 capsule (0.4 mg total) by mouth once daily.        Allergies: Ace inhibitors and Codeine      Past Medical History:    Past Medical History:   Diagnosis Date    Acute superficial gastritis without hemorrhage 7/12/2022    Alcoholism     Anemia     AVM (arteriovenous malformation) of colon 7/13/2022    Colon, diverticulosis 7/13/2022    Hypertension     Iron deficiency anemia due to chronic blood loss 7/12/2022    Polyp, sigmoid colon 7/13/2022    Seizures     Stroke     right sided weakness       Past Surgical History:    Past Surgical History:   Procedure Laterality Date    CATARACT EXTRACTION W/ INTRAOCULAR LENS IMPLANT Right     KNEE SURGERY Right     LUMBAR DISCECTOMY    "   SCROTAL SURGERY      in february         Social History:    Social History     Tobacco Use   Smoking Status Every Day    Current packs/day: 0.50    Average packs/day: 0.5 packs/day for 58.2 years (29.1 ttl pk-yrs)    Types: Cigarettes    Start date: 1966    Passive exposure: Current   Smokeless Tobacco Never   Tobacco Comments    Tobacco quitline information sheet given      Social History     Substance and Sexual Activity   Alcohol Use Yes    Alcohol/week: 6.0 standard drinks of alcohol    Types: 6 Cans of beer per week    Comment: pint of liquor primarly on weekend     Social History     Substance and Sexual Activity   Drug Use Yes    Types: Marijuana    Comment: daily         Family History:    Family History   Problem Relation Age of Onset    Diabetes Mother     Hypertension Mother     Diabetes Sister     Diabetes Brother     Hypertension Brother     Cancer Brother     Throat cancer Brother     Alzheimer's disease Brother        Review of Systems:    Review of Systems   Constitutional:  Positive for fatigue. Negative for appetite change, chills, fever and unexpected weight change.   Eyes:  Negative for visual disturbance.   Respiratory:  Negative for cough and shortness of breath.    Cardiovascular:  Negative for chest pain and leg swelling.   Gastrointestinal:  Negative for abdominal pain, change in bowel habit, constipation, diarrhea, nausea and vomiting.   Musculoskeletal:  Positive for arthralgias.   Integumentary:  Negative for rash.   Neurological:  Negative for dizziness and headaches.   Psychiatric/Behavioral:  The patient is not nervous/anxious.         Vitals:    Vitals:    03/12/24 1029 03/12/24 1129   BP: (!) 173/69 134/72   BP Location: Left arm Left arm   Patient Position: Sitting Sitting   BP Method: Medium (Automatic) Large (Manual)   Pulse: 79    Resp: 15    Temp: 98.2 °F (36.8 °C)    TempSrc: Oral    SpO2: 97%    Weight: 73.9 kg (163 lb)    Height: 5' 11" (1.803 m)        Body mass index is " 22.73 kg/m².    Wt Readings from Last 3 Encounters:   03/12/24 1029 73.9 kg (163 lb)   03/10/24 0130 72.6 kg (160 lb)   01/25/24 1356 75.8 kg (167 lb)        Physical Exam:    Physical Exam  Constitutional:       General: He is not in acute distress.     Appearance: Normal appearance.   HENT:      Nose: Nose normal.      Mouth/Throat:      Mouth: Mucous membranes are moist.      Pharynx: Oropharynx is clear.   Eyes:      Conjunctiva/sclera: Conjunctivae normal.   Cardiovascular:      Rate and Rhythm: Normal rate and regular rhythm.      Heart sounds: Normal heart sounds. No murmur heard.  Pulmonary:      Effort: Pulmonary effort is normal. No respiratory distress.      Breath sounds: Normal breath sounds. No wheezing, rhonchi or rales.   Abdominal:      General: Bowel sounds are normal.      Palpations: Abdomen is soft.      Tenderness: There is no abdominal tenderness.   Musculoskeletal:         General: Tenderness present. No swelling.      Cervical back: Neck supple.      Right lower leg: No edema.      Left lower leg: No edema.   Skin:     Findings: No rash.   Neurological:      General: No focal deficit present.      Mental Status: He is alert. Mental status is at baseline.   Psychiatric:         Mood and Affect: Mood normal.           Assessment/Plan:   1. Chronic pain of both shoulders  -     methylPREDNISolone (MEDROL DOSEPACK) 4 mg tablet; use as directed  Dispense: 21 each; Refill: 0    2. Other fatigue  -     CBC Auto Differential; Future; Expected date: 03/12/2024  -     Comprehensive Metabolic Panel; Future; Expected date: 03/12/2024  -     Vitamin D; Future; Expected date: 03/12/2024  -     Vitamin B12; Future; Expected date: 03/12/2024  -     TSH; Future; Expected date: 03/12/2024  -     T4, Free; Future; Expected date: 03/12/2024    3. Anemia, unspecified type  -     CBC Auto Differential; Future; Expected date: 03/12/2024  -     Comprehensive Metabolic Panel; Future; Expected date: 03/12/2024  -      Vitamin D; Future; Expected date: 03/12/2024  -     Vitamin B12; Future; Expected date: 03/12/2024  -     Ferritin  -     Iron and TIBC    4. Other long term (current) drug therapy  -     Vitamin D; Future; Expected date: 03/12/2024  -     Vitamin B12; Future; Expected date: 03/12/2024    5. Seizure disorder  The current medical regimen is effective;  continue present plan and medications.    6. Impaired mobility  -     PT gait training; Future         Active Problem List with Overview Notes    Diagnosis Date Noted    Essential hypertension 07/12/2022    Seizure 06/10/2022    Iron deficiency anemia due to chronic blood loss 07/12/2022    AVM (arteriovenous malformation) of colon 07/13/2022    Continuous dependence on cigarette smoking 07/12/2022    Colon, diverticulosis 07/13/2022    Polyp, sigmoid colon 07/13/2022    Primary osteoarthritis of left shoulder 02/06/2024    Mixed hyperlipidemia 10/04/2023    Primary osteoarthritis involving multiple joints 10/04/2023    Muscle spasm 10/04/2023    Erectile dysfunction 10/04/2023    Benign prostatic hyperplasia without lower urinary tract symptoms 10/04/2023    Vitamin D deficiency 10/04/2023        RTC as scheduled for chronic follow up. RTC sooner if needed.  Patient voiced understanding and is agreeable to plan.      Pebbles Dickerson MD    Family Medicine

## 2024-03-13 DIAGNOSIS — E61.1 LOW IRON: Primary | ICD-10-CM

## 2024-03-13 NOTE — TELEPHONE ENCOUNTER
----- Message from Sierra Dickerson MD sent at 3/12/2024  5:50 PM CDT -----  Please call patient regarding lab results. He is chronically anemic. I believe he has previously been evaluated by GI. Iron is low. Anemia is improved. Recommend ferrous sulfate 325 mg PO BID. Has he ever had iron infusions. Repeat CBC and iron studies in 4-6 weeks. Patient has CKD but creatinine is stable. Labs, otherwise, ok/stable. Thanks!

## 2024-03-13 NOTE — TELEPHONE ENCOUNTER
Call made to pt. Informed of recent lab results. Voiced understanding. Agreed to ferrous sulfate med and iron infusions. Lab only appt made for 4 weeks for CBC and iron studies.

## 2024-03-14 ENCOUNTER — CLINICAL SUPPORT (OUTPATIENT)
Dept: REHABILITATION | Facility: HOSPITAL | Age: 79
End: 2024-03-14
Payer: MEDICARE

## 2024-03-14 DIAGNOSIS — M19.012 PRIMARY OSTEOARTHRITIS OF LEFT SHOULDER: Primary | ICD-10-CM

## 2024-03-14 PROCEDURE — 97110 THERAPEUTIC EXERCISES: CPT | Mod: CQ

## 2024-03-14 NOTE — PROGRESS NOTES
[facility]   Physical Therapy Treatment Note      Name: Stephane Mathis  Clinic Number: [id number]    Therapy Diagnosis:   Encounter Diagnosis   Name Primary?    Primary osteoarthritis of left shoulder Yes     Physician: Zora Dunn PA    Visit Date: 3/14/2024    Physician Orders: PT Eval and Treat 1-3 times a week for 6 weeks   Medical Diagnosis from Referral: M19.012 (ICD-10-CM) - Primary osteoarthritis of left shoulder  Evaluation Date: 2/6/2024  Authorization Period Expiration: 3/19/24  Plan of Care Expiration: 3/19/24  Progress Note Due: 3/19/24  Visit # / Visits authorized: 10/13   FOTO: 49/100  FOTO: 56/100 on 3/5/24     Precautions: Standard     PTA Visit #: 3/5     Time In: 0845  Time Out: 0921  Total Billable Time: 36 minutes    Subjective     Pt reports: no complaints of pain upon entering gym  He  will be  compliant with home exercise program.  Response to previous treatment: fair  Functional change: none    Pain: 0/10 prior to PT  Location: left shoulder      Objective      2/6/24 Cervical AROM:  FDB: WNL  BB:25  SBL: 5  SBR: 15  RR:60  LR:40     3/5/24 Range of motion  Motion Right  Left    Shoulder flexion WITHIN FUNCTIONAL LIMITS 80   Shoulder extension WITHIN FUNCTIONAL LIMITS 55   Shoulder abduction WITHIN FUNCTIONAL LIMITS 15   Shoulder internal rotation WITHIN FUNCTIONAL LIMITS 80   Shoulder external rotation WITHIN FUNCTIONAL LIMITS 73   Elbow flexion WITHIN FUNCTIONAL LIMITS WITHIN FUNCTIONAL LIMITS   Elbow extension WITHIN FUNCTIONAL LIMITS WITHIN FUNCTIONAL LIMITS   Wrist flexion WITHIN FUNCTIONAL LIMITS WITHIN FUNCTIONAL LIMITS   Wrist extension WITHIN FUNCTIONAL LIMITS WITHIN FUNCTIONAL LIMITS   Ulnar deviation WITHIN FUNCTIONAL LIMITS WITHIN FUNCTIONAL LIMITS   Radial deviation WITHIN FUNCTIONAL LIMITS WITHIN FUNCTIONAL LIMITS            3/12/24 MANUAL MUSCLE TEST  Muscle Right  Left    Shoulder flexion MMT strength: 5/5 MMT strength: 3-/5   Shoulder  extension MMT strength: 5/5 MMT strength: 4/5   Shoulder abduction MMT strength: 5/5 MMT strength: 3-/5   Shoulder internal rotation MMT strength: 5/5 MMT strength: 3+/5   Shoulder external rotation MMT strength: 5/5 MMT strength: 3-/5   Elbow flexion MMT strength: 5/5 MMT strength: 5/5   Elbow extension MMT strength: 5/5 MMT strength: 5/5   Wrist flexion MMT strength: 5/5 MMT strength: 5/5   Wrist extension MMT strength: 5/5 MMT strength: 5/5   Ulnar deviation MMT strength: 5/5 MMT strength: 5/5   Radial deviation MMT strength: 5/5 MMT strength: 5/5       Treatment     Stephane received the treatments listed below:      therapeutic exercises to develop strength, ROM, and flexibility for 36 minutes including:  Qian's flexion and abduction x 3 minutes each way  Finger ladder flexion 10 x 5 s/h  Table slides: flexion, scaption, External rotation x 1 min each way   Armpit stretch in // bars 10 x 5s/h  Standing shoulder rows and extension red tband 2 x 10  Seated chest press with tbar 2 x 10  supine flex red band 2 x 10 - not today  supine bar flexion stretch 2 x 10 - (seated today)   seated chest press bar 1 x 10  Seated External rotation and abduction using cane 2 x 10    Seated bicep curls 3# 3 x 10     ++Take measurements at next visit++    Not today- supervised modalities : Stephane received IFC electrical stimulation to his left rotator cuff area seated in chair with Frequency of 80/150 Hz, Amplitude of 6.8 Volts CV for 10 minutes. Stephane tolderated treatment well without any adverse effects during or after application.     Patient Education and Home Exercises       Education provided:   - Body mechanics and posture were enforced to get optimal results from exercises performed    Written Home Exercises Provided: Patient instructed to cont prior HEP.   Assessment     Stephane continues to tolerate treatment fairly well. He says he wants to start therapy for his balance as soon as he is done with his shoulder. He  received a pain injection, and says it has really helped with the pain during treatment. PT will continue to progress Mr. Calderón as his L shoulder strength and ROM increases.    Stephane Is progressing fair towards his goals.   Pt prognosis is Fair.     Pt will continue to benefit from skilled outpatient physical therapy to address the deficits listed in the problem list box on initial evaluation, provide pt/family education and to maximize pt's level of independence in the home and community environment.     Pt's spiritual, cultural and educational needs considered and pt agreeable to plan of care and goals.     Anticipated barriers to physical therapy: chronic pain    Short Term Goals 3 weeks: patient will:   Be Independent with Home Exercise Program. Goal Met.  Increase left Shoulder Flexion and Abduction AROM Range of Motion by 10 degrees to improve functional mobility.  Increase left Shoulder Strength to 3-/5 to improve functional activity for reaching and lifting.  Tolerate 30 minutes of exercise/activity with Decreased complaints of left Shoulder Pain to 5/10.     Long Term Goals 6 weeks: patient will:  1. Increase Active Range of Motion left shoulder flexion and abduction to 130 degrees to improve functional mobility.  3. Increase left Shoulder and Scapular Strength to 3+/5 to improve functional activity with ADL's.  3. Decrease left shoulder pain to 3/10 or less to improve quality of life.    Plan     Continue with current Plan of Care.     Plan of care Certification: 2/6/2024 to 3/19/24.    Outpatient Physical Therapy 2 times weekly for 6 weeks to include the following interventions: Electrical Stimulation IFC, Manual Therapy, Moist Heat/ Ice, Patient Education, Therapeutic Exercise, and Ultrasound.        Babita Chaudhari, PTA

## 2024-03-18 RX ORDER — FERROUS SULFATE 325(65) MG
325 TABLET ORAL 2 TIMES DAILY
Qty: 180 TABLET | Refills: 1 | Status: SHIPPED | OUTPATIENT
Start: 2024-03-18

## 2024-03-19 ENCOUNTER — DOCUMENTATION ONLY (OUTPATIENT)
Dept: REHABILITATION | Facility: HOSPITAL | Age: 79
End: 2024-03-19
Payer: MEDICARE

## 2024-03-19 ENCOUNTER — CLINICAL SUPPORT (OUTPATIENT)
Dept: REHABILITATION | Facility: HOSPITAL | Age: 79
End: 2024-03-19
Payer: MEDICARE

## 2024-03-19 DIAGNOSIS — M19.012 PRIMARY OSTEOARTHRITIS OF LEFT SHOULDER: Primary | ICD-10-CM

## 2024-03-19 PROCEDURE — 97110 THERAPEUTIC EXERCISES: CPT

## 2024-03-19 NOTE — PROGRESS NOTES
[facility]    Outpatient Therapy Discharge Summary     Name: Stephane Mathis  [facility] Number: [id number]    Therapy Diagnosis:        Encounter Diagnosis   Name Primary?    Primary osteoarthritis of left shoulder Yes      Physician: Zora Dunn PA     Visit Date: 3/19/2024     Physician Orders: PT Eval and Treat 1-3 times a week for 6 weeks   Medical Diagnosis from Referral: M19.012 (ICD-10-CM) - Primary osteoarthritis of left shoulder  Evaluation Date: 2/6/2024  Authorization Period Expiration: 3/19/24  Plan of Care Expiration: 3/19/24  Progress Note Due: 3/19/24  Visit # / Visits authorized: 11/13   FOTO: 49/100  FOTO: 56/100 on 3/5/24  Discharge FOTO: 75/100  Date of Last visit: 3/19/24  Total Visits Received: 11      Objective       3/19/24 Cervical AROM:  FDB: WNL  BB:25  SBL: 10  SBR: 20  RR:60  LR:60     3/19/24 Range of motion  Motion Right  Left    Shoulder flexion WITHIN FUNCTIONAL LIMITS 140   Shoulder extension WITHIN FUNCTIONAL LIMITS 65   Shoulder abduction WITHIN FUNCTIONAL LIMITS 115   Shoulder internal rotation WITHIN FUNCTIONAL LIMITS 80   Shoulder external rotation WITHIN FUNCTIONAL LIMITS 80   Elbow flexion WITHIN FUNCTIONAL LIMITS WITHIN FUNCTIONAL LIMITS   Elbow extension WITHIN FUNCTIONAL LIMITS WITHIN FUNCTIONAL LIMITS   Wrist flexion WITHIN FUNCTIONAL LIMITS WITHIN FUNCTIONAL LIMITS   Wrist extension WITHIN FUNCTIONAL LIMITS WITHIN FUNCTIONAL LIMITS   Ulnar deviation WITHIN FUNCTIONAL LIMITS WITHIN FUNCTIONAL LIMITS   Radial deviation WITHIN FUNCTIONAL LIMITS WITHIN FUNCTIONAL LIMITS            3/12/24 MANUAL MUSCLE TEST Within Available ROM  Muscle Right  Left    Shoulder flexion MMT strength: 5/5 MMT strength: 4/5   Shoulder extension MMT strength: 5/5 MMT strength: 5/5   Shoulder abduction MMT strength: 5/5 MMT strength: 4/5   Shoulder internal rotation MMT strength: 5/5 MMT strength: 4/5   Shoulder external rotation MMT strength: 5/5 MMT strength: 4/5    Elbow flexion MMT strength: 5/5 MMT strength: 5/5   Elbow extension MMT strength: 5/5 MMT strength: 5/5   Wrist flexion MMT strength: 5/5 MMT strength: 5/5   Wrist extension MMT strength: 5/5 MMT strength: 5/5   Ulnar deviation MMT strength: 5/5 MMT strength: 5/5   Radial deviation MMT strength: 5/5 MMT strength: 5/5      Assessment    Stephane Mathis has met all his goals for therapy and has reached maximal ROM and strength of left shoulder. He will discharge from therapy services.     Discharge reason: Patient has met all of his goals    Goals:  Short Term Goals 3 weeks: patient will:   Be Independent with Home Exercise Program. Goal Met.  Increase left Shoulder Flexion and Abduction AROM Range of Motion by 10 degrees to improve functional mobility. Goal Met.  Increase left Shoulder Strength to 3-/5 to improve functional activity for reaching and lifting. Goal Met.  Tolerate 30 minutes of exercise/activity with Decreased complaints of left Shoulder Pain to 5/10. Goal Met.     Long Term Goals 6 weeks: patient will:  1. Increase Active Range of Motion left shoulder flexion and abduction to 130 degrees to improve functional mobility. Goal Met.  3. Increase left Shoulder and Scapular Strength to 3+/5 to improve functional activity with ADL's. Goal Met.  3. Decrease left shoulder pain to 3/10 or less to improve quality of life. Goal Met.       Plan   This patient is discharged from Physical Therapy.      Toño Nelson, PT

## 2024-03-19 NOTE — PROGRESS NOTES
[facility]   Physical Therapy Treatment Note      Name: Stephane Mathis  Clinic Number: [id number]    Therapy Diagnosis:   Encounter Diagnosis   Name Primary?    Primary osteoarthritis of left shoulder Yes     Physician: Zora Dunn PA    Visit Date: 3/19/2024    Physician Orders: PT Eval and Treat 1-3 times a week for 6 weeks   Medical Diagnosis from Referral: M19.012 (ICD-10-CM) - Primary osteoarthritis of left shoulder  Evaluation Date: 2/6/2024  Authorization Period Expiration: 3/19/24  Plan of Care Expiration: 3/19/24  Progress Note Due: 3/19/24  Visit # / Visits authorized: 11/13   FOTO: 49/100  FOTO: 56/100 on 3/5/24     Precautions: Standard     PTA Visit #: 0/5     Time In: 0906  Time Out: 0947  Total Billable Time: 41 minutes    Subjective     Pt reports: no complaints of pain upon entering gym  He  will be  compliant with home exercise program.  Response to previous treatment: fair  Functional change: none    Pain: 0/10 prior to PT  Location: left shoulder      Objective      3/19/24 Cervical AROM:  FDB: WNL  BB:25  SBL: 10  SBR: 20  RR:60  LR:60     3/19/24 Range of motion  Motion Right  Left    Shoulder flexion WITHIN FUNCTIONAL LIMITS 140   Shoulder extension WITHIN FUNCTIONAL LIMITS 65   Shoulder abduction WITHIN FUNCTIONAL LIMITS 115   Shoulder internal rotation WITHIN FUNCTIONAL LIMITS 80   Shoulder external rotation WITHIN FUNCTIONAL LIMITS 80   Elbow flexion WITHIN FUNCTIONAL LIMITS WITHIN FUNCTIONAL LIMITS   Elbow extension WITHIN FUNCTIONAL LIMITS WITHIN FUNCTIONAL LIMITS   Wrist flexion WITHIN FUNCTIONAL LIMITS WITHIN FUNCTIONAL LIMITS   Wrist extension WITHIN FUNCTIONAL LIMITS WITHIN FUNCTIONAL LIMITS   Ulnar deviation WITHIN FUNCTIONAL LIMITS WITHIN FUNCTIONAL LIMITS   Radial deviation WITHIN FUNCTIONAL LIMITS WITHIN FUNCTIONAL LIMITS            3/12/24 MANUAL MUSCLE TEST Within Available ROM  Muscle Right  Left    Shoulder flexion MMT strength: 5/5 MMT  strength: 4/5   Shoulder extension MMT strength: 5/5 MMT strength: 5/5   Shoulder abduction MMT strength: 5/5 MMT strength: 4/5   Shoulder internal rotation MMT strength: 5/5 MMT strength: 4/5   Shoulder external rotation MMT strength: 5/5 MMT strength: 4/5   Elbow flexion MMT strength: 5/5 MMT strength: 5/5   Elbow extension MMT strength: 5/5 MMT strength: 5/5   Wrist flexion MMT strength: 5/5 MMT strength: 5/5   Wrist extension MMT strength: 5/5 MMT strength: 5/5   Ulnar deviation MMT strength: 5/5 MMT strength: 5/5   Radial deviation MMT strength: 5/5 MMT strength: 5/5       Treatment     Stephane received the treatments listed below:      therapeutic exercises to develop strength, ROM, and flexibility for 41 minutes including:  Qian's flexion and abduction x 3 minutes each way  Finger ladder flexion 2 x 10 s/h  Not today-Table slides: flexion, scaption, External rotation x 1 min each way   Armpit stretch in // bars 10 x 5s/h  Standing shoulder rows and extension red tband 2 x 10  Seated chest press with tbar 2 x 10  supine flex red band 2 x 10   supine bar flexion stretch 2 x 10 - (seated today)   Seated External rotation and abduction using cane 2 x 10    Seated bicep curls 3# 3 x 10       Patient Education and Home Exercises       Education provided:   - Body mechanics and posture were enforced to get optimal results from exercises performed    Written Home Exercises Provided: Patient instructed to cont prior HEP.   Assessment     Stephane has met all his goals for therapy and has reached maximal ROM and strength of left shoulder. He will discharge from therapy services.      Pt's spiritual, cultural and educational needs considered and pt agreeable to plan of care and goals.     Anticipated barriers to physical therapy: chronic pain    Short Term Goals 3 weeks: patient will:   Be Independent with Home Exercise Program. Goal Met.  Increase left Shoulder Flexion and Abduction AROM Range of Motion by 10 degrees to  improve functional mobility. Goal Met.  Increase left Shoulder Strength to 3-/5 to improve functional activity for reaching and lifting. Goal Met.  Tolerate 30 minutes of exercise/activity with Decreased complaints of left Shoulder Pain to 5/10. Goal Met.     Long Term Goals 6 weeks: patient will:  1. Increase Active Range of Motion left shoulder flexion and abduction to 130 degrees to improve functional mobility. Goal Met.  3. Increase left Shoulder and Scapular Strength to 3+/5 to improve functional activity with ADL's. Goal Met.  3. Decrease left shoulder pain to 3/10 or less to improve quality of life. Goal Met.    Plan     Discharge therapy services.     Plan of care Certification: 2/6/2024 to 3/19/24.          Toño Nelson, PT

## 2024-04-08 ENCOUNTER — OFFICE VISIT (OUTPATIENT)
Dept: FAMILY MEDICINE | Facility: CLINIC | Age: 79
End: 2024-04-08
Payer: MEDICARE

## 2024-04-08 VITALS
HEIGHT: 71 IN | BODY MASS INDEX: 23.1 KG/M2 | WEIGHT: 165 LBS | RESPIRATION RATE: 18 BRPM | TEMPERATURE: 99 F | OXYGEN SATURATION: 97 % | HEART RATE: 73 BPM | SYSTOLIC BLOOD PRESSURE: 136 MMHG | DIASTOLIC BLOOD PRESSURE: 80 MMHG

## 2024-04-08 DIAGNOSIS — E78.2 MIXED HYPERLIPIDEMIA: ICD-10-CM

## 2024-04-08 DIAGNOSIS — K21.9 GERD WITHOUT ESOPHAGITIS: ICD-10-CM

## 2024-04-08 DIAGNOSIS — D50.0 IRON DEFICIENCY ANEMIA DUE TO CHRONIC BLOOD LOSS: Chronic | ICD-10-CM

## 2024-04-08 DIAGNOSIS — I10 ESSENTIAL HYPERTENSION: Primary | ICD-10-CM

## 2024-04-08 DIAGNOSIS — N18.31 CHRONIC KIDNEY DISEASE, STAGE 3A: ICD-10-CM

## 2024-04-08 DIAGNOSIS — E55.9 VITAMIN D DEFICIENCY: ICD-10-CM

## 2024-04-08 PROCEDURE — 1101F PT FALLS ASSESS-DOCD LE1/YR: CPT | Mod: ,,, | Performed by: FAMILY MEDICINE

## 2024-04-08 PROCEDURE — 1159F MED LIST DOCD IN RCRD: CPT | Mod: ,,, | Performed by: FAMILY MEDICINE

## 2024-04-08 PROCEDURE — 99213 OFFICE O/P EST LOW 20 MIN: CPT | Mod: ,,, | Performed by: FAMILY MEDICINE

## 2024-04-08 PROCEDURE — 3079F DIAST BP 80-89 MM HG: CPT | Mod: ,,, | Performed by: FAMILY MEDICINE

## 2024-04-08 PROCEDURE — 3288F FALL RISK ASSESSMENT DOCD: CPT | Mod: ,,, | Performed by: FAMILY MEDICINE

## 2024-04-08 PROCEDURE — 3075F SYST BP GE 130 - 139MM HG: CPT | Mod: ,,, | Performed by: FAMILY MEDICINE

## 2024-04-08 RX ORDER — HYDROCHLOROTHIAZIDE 25 MG/1
25 TABLET ORAL DAILY
Qty: 90 TABLET | Refills: 1 | Status: SHIPPED | OUTPATIENT
Start: 2024-04-08

## 2024-04-08 RX ORDER — AMLODIPINE BESYLATE 10 MG/1
10 TABLET ORAL DAILY
Qty: 90 TABLET | Refills: 1 | Status: SHIPPED | OUTPATIENT
Start: 2024-04-08

## 2024-04-08 NOTE — PROGRESS NOTES
Clinic Note    Patient Name: Stephane Mathis  : 1945  MRN: [id number]    Chief Complaint   Patient presents with    Medication Refill    Health Maintenance     TETANUS VACCINE Never done  Shingles Vaccine(1 of 2) Never done  RSV Vaccine (Age 60+ and Pregnant patients)(1 - 1-dose 60+ series) Never done  COVID-19 Vaccine(2023-24 season) due on 2023        HPI:    Mr. Stephane Mathis is a 78 y.o. male who presents to clinic today with CC of follow up on chronic disease processes including HTN. Reports BP is well controlled. States he needs refills.   Reports he has recently completed therapy on his L shoulder. Reports pain and ROM are improved.   He has a h/o anemia. He has had EGD and colonoscopy. He has an appt with GI tomorrow for further evaluation/pill endoscopy. Patient reminded of this appt today and provided with address/map/phone number/directions.   Patient reports chronic issues are well controlled on current medication regimen.  Denies problems or side effects with medications.  Patient is, otherwise, without complaints.     Medications:  Medication List with Changes/Refills   Current Medications    ASPIRIN (ECOTRIN) 81 MG EC TABLET    Take 1 tablet (81 mg total) by mouth once daily.    ATORVASTATIN (LIPITOR) 40 MG TABLET    Take 1 tablet (40 mg total) by mouth every evening.    EAR DROPS, CARBAMIDE PEROXIDE, OTIC    Place in ear(s).    EPINEPHRINE (EPIPEN) 0.3 MG/0.3 ML ATIN    Inject 0.3 mLs (0.3 mg total) into the muscle as needed.    ERGOCALCIFEROL (ERGOCALCIFEROL) 50,000 UNIT CAP    Take 1 capsule (50,000 Units total) by mouth every 7 days.    FAMOTIDINE (PEPCID) 20 MG TABLET    Take 1 tablet (20 mg total) by mouth 2 (two) times daily as needed for Heartburn.    FERROUS SULFATE (FEOSOL) 325 MG (65 MG IRON) TAB TABLET    Take 1 tablet (325 mg total) by mouth 2 (two) times daily.    HYDRALAZINE (APRESOLINE) 100 MG TABLET    Take 1 tablet (100 mg total) by mouth 2 (two) times a day.     HYDROCODONE-ACETAMINOPHEN (NORCO) 5-325 MG PER TABLET    Take 1 tablet by mouth every 8 (eight) hours as needed for Pain.    METHOCARBAMOL (ROBAXIN) 500 MG TAB    Take 1 tablet (500 mg total) by mouth 3 (three) times daily as needed (muscle spasm/pain. May cause drowsiness).    TADALAFIL (CIALIS) 10 MG TABLET    Take 1 tablet (10 mg total) by mouth daily as needed for Erectile Dysfunction.   Changed and/or Refilled Medications    Modified Medication Previous Medication    AMLODIPINE (NORVASC) 10 MG TABLET amLODIPine (NORVASC) 10 MG tablet       Take 1 tablet (10 mg total) by mouth once daily.    Take 1 tablet (10 mg total) by mouth once daily.    HYDROCHLOROTHIAZIDE (HYDRODIURIL) 25 MG TABLET hydroCHLOROthiazide (HYDRODIURIL) 25 MG tablet       Take 1 tablet (25 mg total) by mouth once daily.    Take 1 tablet (25 mg total) by mouth once daily.        Allergies: Ace inhibitors and Codeine      Past Medical History:    Past Medical History:   Diagnosis Date    Acute superficial gastritis without hemorrhage 7/12/2022    Alcoholism     Anemia     AVM (arteriovenous malformation) of colon 7/13/2022    Colon, diverticulosis 7/13/2022    Hypertension     Iron deficiency anemia due to chronic blood loss 7/12/2022    Polyp, sigmoid colon 7/13/2022    Seizures     Stroke     right sided weakness       Past Surgical History:    Past Surgical History:   Procedure Laterality Date    CATARACT EXTRACTION W/ INTRAOCULAR LENS IMPLANT Right     KNEE SURGERY Right     LUMBAR DISCECTOMY      SCROTAL SURGERY      in february         Social History:    Social History     Tobacco Use   Smoking Status Every Day    Current packs/day: 0.50    Average packs/day: 0.5 packs/day for 58.3 years (29.1 ttl pk-yrs)    Types: Cigarettes    Start date: 1966    Passive exposure: Current   Smokeless Tobacco Never   Tobacco Comments    Tobacco quitline information sheet given      Social History     Substance and Sexual Activity   Alcohol Use Yes     "Alcohol/week: 6.0 standard drinks of alcohol    Types: 6 Cans of beer per week    Comment: pint of liquor primarly on weekend     Social History     Substance and Sexual Activity   Drug Use Yes    Types: Marijuana    Comment: daily         Family History:    Family History   Problem Relation Age of Onset    Diabetes Mother     Hypertension Mother     Diabetes Sister     Diabetes Brother     Hypertension Brother     Cancer Brother     Throat cancer Brother     Alzheimer's disease Brother        Review of Systems:    Review of Systems   Constitutional:  Negative for appetite change, chills, fatigue, fever and unexpected weight change.   Eyes:  Negative for visual disturbance.   Respiratory:  Negative for cough and shortness of breath.    Cardiovascular:  Negative for chest pain and leg swelling.   Gastrointestinal:  Negative for abdominal pain, change in bowel habit, constipation, diarrhea, nausea and vomiting.   Musculoskeletal:  Positive for arthralgias.   Integumentary:  Negative for rash.   Neurological:  Negative for dizziness and headaches.   Psychiatric/Behavioral:  The patient is not nervous/anxious.         Vitals:    Vitals:    04/08/24 0857   BP: 136/80   BP Location: Left arm   Patient Position: Sitting   BP Method: Medium (Manual)   Pulse: 73   Resp: 18   Temp: 98.5 °F (36.9 °C)   TempSrc: Oral   SpO2: 97%   Weight: 74.8 kg (165 lb)   Height: 5' 11" (1.803 m)       Body mass index is 23.01 kg/m².    Wt Readings from Last 3 Encounters:   04/08/24 0857 74.8 kg (165 lb)   03/12/24 1029 73.9 kg (163 lb)   03/10/24 0130 72.6 kg (160 lb)        Physical Exam:    Physical Exam  Constitutional:       General: He is not in acute distress.     Appearance: Normal appearance.   HENT:      Nose: Nose normal.      Mouth/Throat:      Mouth: Mucous membranes are moist.      Pharynx: Oropharynx is clear.   Eyes:      Conjunctiva/sclera: Conjunctivae normal.   Cardiovascular:      Rate and Rhythm: Normal rate and regular " rhythm.      Heart sounds: Normal heart sounds. No murmur heard.  Pulmonary:      Effort: Pulmonary effort is normal. No respiratory distress.      Breath sounds: Normal breath sounds. No wheezing, rhonchi or rales.   Abdominal:      General: Bowel sounds are normal.      Palpations: Abdomen is soft.      Tenderness: There is no abdominal tenderness.   Musculoskeletal:      Cervical back: Neck supple.      Right lower leg: No edema.      Left lower leg: No edema.   Skin:     Findings: No rash.   Neurological:      General: No focal deficit present.      Mental Status: He is alert. Mental status is at baseline.   Psychiatric:         Mood and Affect: Mood normal.         Assessment/Plan:   1. Essential hypertension  -     hydroCHLOROthiazide (HYDRODIURIL) 25 MG tablet; Take 1 tablet (25 mg total) by mouth once daily.  Dispense: 90 tablet; Refill: 1  -     amLODIPine (NORVASC) 10 MG tablet; Take 1 tablet (10 mg total) by mouth once daily.  Dispense: 90 tablet; Refill: 1    2. GERD without esophagitis  The current medical regimen is effective;  continue present plan and medications.  - Follow up with GI as scheduled tomorrow.     3. Mixed hyperlipidemia  The current medical regimen is effective;  continue present plan and medications.    4. Vitamin D deficiency  The current medical regimen is effective;  continue present plan and medications.    5. Chronic kidney disease, stage 3a  - Avoid NSAIDs. Drink an adequate amount of water.     6. Iron deficiency anemia due to chronic blood loss  The current medical regimen is effective;  continue present plan and medications.  - Follow up with GI as scheduled tomorrow.        Active Problem List with Overview Notes    Diagnosis Date Noted    Essential hypertension 07/12/2022    Seizure 06/10/2022    Iron deficiency anemia due to chronic blood loss 07/12/2022    AVM (arteriovenous malformation) of colon 07/13/2022    Continuous dependence on cigarette smoking 07/12/2022     Colon, diverticulosis 07/13/2022    Polyp, sigmoid colon 07/13/2022    Chronic kidney disease, stage 3a 04/08/2024    Primary osteoarthritis of left shoulder 02/06/2024    Mixed hyperlipidemia 10/04/2023    Primary osteoarthritis involving multiple joints 10/04/2023    Muscle spasm 10/04/2023    Erectile dysfunction 10/04/2023    Benign prostatic hyperplasia without lower urinary tract symptoms 10/04/2023    Vitamin D deficiency 10/04/2023        Health Maintenance:  Health Maintenance   Topic Date Due    TETANUS VACCINE  Never done    Shingles Vaccine (1 of 2) Never done    LDCT Lung Screen  10/19/2024    Lipid Panel  10/04/2028    Hepatitis C Screening  Completed     RTC as scheduled for chronic follow up. RTC sooner if needed.  Patient voiced understanding and is agreeable to plan.      Pebbles Dickerson MD    Family Medicine

## 2024-04-09 ENCOUNTER — OFFICE VISIT (OUTPATIENT)
Dept: GASTROENTEROLOGY | Facility: CLINIC | Age: 79
End: 2024-04-09
Payer: MEDICARE

## 2024-04-09 VITALS
SYSTOLIC BLOOD PRESSURE: 145 MMHG | HEIGHT: 71 IN | HEART RATE: 90 BPM | BODY MASS INDEX: 22.82 KG/M2 | DIASTOLIC BLOOD PRESSURE: 63 MMHG | WEIGHT: 163 LBS

## 2024-04-09 DIAGNOSIS — D50.0 IRON DEFICIENCY ANEMIA DUE TO CHRONIC BLOOD LOSS: Primary | Chronic | ICD-10-CM

## 2024-04-09 PROCEDURE — 1101F PT FALLS ASSESS-DOCD LE1/YR: CPT | Mod: CPTII,,,

## 2024-04-09 PROCEDURE — 3077F SYST BP >= 140 MM HG: CPT | Mod: CPTII,,,

## 2024-04-09 PROCEDURE — 1159F MED LIST DOCD IN RCRD: CPT | Mod: CPTII,,,

## 2024-04-09 PROCEDURE — 3288F FALL RISK ASSESSMENT DOCD: CPT | Mod: CPTII,,,

## 2024-04-09 PROCEDURE — 3078F DIAST BP <80 MM HG: CPT | Mod: CPTII,,,

## 2024-04-09 PROCEDURE — 99214 OFFICE O/P EST MOD 30 MIN: CPT | Mod: S$PBB,,,

## 2024-04-09 PROCEDURE — 99214 OFFICE O/P EST MOD 30 MIN: CPT | Mod: PBBFAC

## 2024-04-12 ENCOUNTER — TELEPHONE (OUTPATIENT)
Dept: FAMILY MEDICINE | Facility: CLINIC | Age: 79
End: 2024-04-12
Payer: MEDICARE

## 2024-04-12 DIAGNOSIS — D50.0 IRON DEFICIENCY ANEMIA DUE TO CHRONIC BLOOD LOSS: Primary | ICD-10-CM

## 2024-04-12 NOTE — TELEPHONE ENCOUNTER
Call made to pt. No answer. Left message for pt to call back.     Call made to pt daughter Cindy. She verified pt . Informed of lab results and advised on if any s/s occur to have pt go to ER. She states she will let him know, and that he is non compliant with all meds and won't stop drinking. Advised the importance of needing to stop. Repeat lab in 2 week.

## 2024-04-12 NOTE — TELEPHONE ENCOUNTER
----- Message from Sierra Dickerson MD sent at 4/11/2024  4:38 PM CDT -----  Please call patient regarding lab results. Patient is anemic. This is a chronic issue for him. He is borderline needing a blood transfusion. Is he symptomatic - chest pain, SOB, dizziness, weakness, etc? If he becomes symptomatic he needs to go to the ER. Please make sure he is taking iron supplementation - ferrous sulfate 325 mg PO BID. I was going to refer him to hematology to see if he was a candidate for iron infusions. He ha previously had an EGD and colonoscopy but when I talked to GI he had not yet had a pill cam/endoscopy. He saw GI this week but I can't tell yet from their note if this has been set up. Please ask him what the plan for GI testing/follow up is. Recommend repeat CBC/iron studies in 2 weeks. Go to ER if becomes symptomatic. Thanks!

## 2024-04-12 NOTE — PROGRESS NOTES
Gastroenterology Clinic Note    Patient ID: [id number]   Referring MD: No ref. provider found   Chief Complaint:   Chief Complaint   Patient presents with    Establish Care       History of Present Illness   Stephane Mathis is an 78 y.o. AAM who is referred for MUSA. Patient with chronic history of MUSA. He has been on PO iron replacement therapy for several years with no improvement. He underwent EGD and colonoscopy for MUSA in July 2022 with Dr. Mahajan. There was no etiology of MUSA found on these exams. He denies any hematochezia or melena. He does have trouble with constipation when taking the iron. He treats this with stool softeners and Miralax as needed.     Previous workup:EGD    Last colonoscopy was 07/13/2022 w/ 10 yr recall for screening purposes.    Review of Systems   Constitutional:  Negative for weight loss.   Gastrointestinal:  Positive for constipation. Negative for abdominal pain, blood in stool, diarrhea, melena, nausea and vomiting.       Past Medical History      Past Medical History:   Diagnosis Date    Acute superficial gastritis without hemorrhage 7/12/2022    Alcoholism     Anemia     AVM (arteriovenous malformation) of colon 7/13/2022    Colon, diverticulosis 7/13/2022    Hypertension     Iron deficiency anemia due to chronic blood loss 7/12/2022    Polyp, sigmoid colon 7/13/2022    Seizures     Stroke     right sided weakness       Past Surgical History     Past Surgical History:   Procedure Laterality Date    CATARACT EXTRACTION W/ INTRAOCULAR LENS IMPLANT Right     KNEE SURGERY Right     LUMBAR DISCECTOMY      SCROTAL SURGERY      in february       Allergies     Review of patient's allergies indicates:   Allergen Reactions    Ace inhibitors Swelling    Codeine Itching       Immunization History     Immunization History   Administered Date(s) Administered    COVID-19, MRNA, LN-S, PF (Pfizer) (Purple Cap) 01/22/2021, 02/22/2021, 11/16/2021, 10/05/2023    COVID-19, mRNA, LNP-S, bivalent booster,  PF (PFIZER OMICRON) 12/09/2022    Influenza (FLUAD) - Quadrivalent - Adjuvanted - PF *Preferred* (65+) 12/12/2022, 10/04/2023    Influenza - Quadrivalent - High Dose - PF (65 years and older) 01/26/2022    Pneumococcal Conjugate - 20 Valent 09/29/2022    Pneumococcal Polysaccharide - 23 Valent 10/23/2018       Past Family History      Family History   Problem Relation Age of Onset    Diabetes Mother     Hypertension Mother     Diabetes Sister     Diabetes Brother     Hypertension Brother     Cancer Brother     Throat cancer Brother     Alzheimer's disease Brother        Past Social History      Social History     Socioeconomic History    Marital status: Unknown   Tobacco Use    Smoking status: Every Day     Current packs/day: 0.50     Average packs/day: 0.5 packs/day for 58.3 years (29.1 ttl pk-yrs)     Types: Cigarettes     Start date: 1966     Passive exposure: Current    Smokeless tobacco: Never    Tobacco comments:     Tobacco quitline information sheet given    Substance and Sexual Activity    Alcohol use: Yes     Alcohol/week: 6.0 standard drinks of alcohol     Types: 6 Cans of beer per week     Comment: pint of liquor primarly on weekend    Drug use: Yes     Types: Marijuana     Comment: daily    Sexual activity: Yes     Partners: Female     Social Determinants of Health     Financial Resource Strain: Low Risk  (1/25/2024)    Overall Financial Resource Strain (CARDIA)     Difficulty of Paying Living Expenses: Not very hard   Food Insecurity: Food Insecurity Present (1/25/2024)    Hunger Vital Sign     Worried About Running Out of Food in the Last Year: Sometimes true     Ran Out of Food in the Last Year: Sometimes true   Transportation Needs: No Transportation Needs (1/25/2024)    PRAPARE - Transportation     Lack of Transportation (Medical): No     Lack of Transportation (Non-Medical): No   Physical Activity: Insufficiently Active (1/25/2024)    Exercise Vital Sign     Days of Exercise per Week: 3 days      Minutes of Exercise per Session: 10 min   Stress: No Stress Concern Present (1/25/2024)    [employer] - Occupational Stress Questionnaire     Feeling of Stress : Not at all   Social Connections: Moderately Isolated (1/25/2024)    Social Connection and Isolation Panel [NHANES]     Frequency of Communication with Friends and Family: More than three times a week     Frequency of Social Gatherings with Friends and Family: More than three times a week     Attends Temple Services: 1 to 4 times per year     Active Member of Clubs or Organizations: No     Attends Club or Organization Meetings: Never     Marital Status: Never    Housing Stability: Low Risk  (1/25/2024)    Housing Stability Vital Sign     Unable to Pay for Housing in the Last Year: No     Number of Places Lived in the Last Year: 1     Unstable Housing in the Last Year: No       Current Medications     Outpatient Medications Marked as Taking for the 4/9/24 encounter (Office Visit) with Marleen Hernandez FNP   Medication Sig Dispense Refill    amLODIPine (NORVASC) 10 MG tablet Take 1 tablet (10 mg total) by mouth once daily. 90 tablet 1    aspirin (ECOTRIN) 81 MG EC tablet Take 1 tablet (81 mg total) by mouth once daily. 90 tablet 1    atorvastatin (LIPITOR) 40 MG tablet Take 1 tablet (40 mg total) by mouth every evening. 90 tablet 1    EAR DROPS, CARBAMIDE PEROXIDE, OTIC Place in ear(s).      ergocalciferol (ERGOCALCIFEROL) 50,000 unit Cap Take 1 capsule (50,000 Units total) by mouth every 7 days. 12 capsule 0    famotidine (PEPCID) 20 MG tablet Take 1 tablet (20 mg total) by mouth 2 (two) times daily as needed for Heartburn. 60 tablet 3    ferrous sulfate (FEOSOL) 325 mg (65 mg iron) Tab tablet Take 1 tablet (325 mg total) by mouth 2 (two) times daily. 180 tablet 1    hydrALAZINE (APRESOLINE) 100 MG tablet Take 1 tablet (100 mg total) by mouth 2 (two) times a day. 180 tablet 1    hydroCHLOROthiazide (HYDRODIURIL) 25 MG  "tablet Take 1 tablet (25 mg total) by mouth once daily. 90 tablet 1    HYDROcodone-acetaminophen (NORCO) 5-325 mg per tablet Take 1 tablet by mouth every 8 (eight) hours as needed for Pain. 20 tablet 0    methocarbamoL (ROBAXIN) 500 MG Tab Take 1 tablet (500 mg total) by mouth 3 (three) times daily as needed (muscle spasm/pain. May cause drowsiness). 30 tablet 2    tadalafiL (CIALIS) 10 MG tablet Take 1 tablet (10 mg total) by mouth daily as needed for Erectile Dysfunction. 6 tablet 5        I have reviewed the current medications, allergies, vital signs, past medical and surgical history, family medical history, and social history for this encounter and agree with all findings.    OBJECTIVE    Physical Exam    BP (!) 145/63   Pulse 90   Ht 5' 11" (1.803 m)   Wt 73.9 kg (163 lb)   BMI 22.73 kg/m²   GEN: Well appearing, cooperative, NAD  NECK: Supple, no LAD  CV: Normal rate  RESP: Unlabored  ABD: ND, no guarding  EXT: No clubbing, cyanosis, or edema  SKIN: Warm and dry  NEURO: AAO x4.     LABS    CBC (with or without Differential):   Lab Results   Component Value Date    WBC 7.48 04/10/2024    HGB 7.2 (L) 04/10/2024    HCT 25.7 (L) 04/10/2024    MCV 87.4 04/10/2024    MCH 24.5 (L) 04/10/2024    MCHC 28.0 (L) 04/10/2024    RDW 19.4 (H) 04/10/2024     04/10/2024    MPV 12.0 04/10/2024    NEUTOPHILPCT 62.6 04/10/2024    DIFFTYPE Auto 04/10/2024     BMP/CMP:   Lab Results   Component Value Date     03/12/2024    K 3.5 03/12/2024     03/12/2024    CO2 28 03/12/2024    BUN 26 (H) 03/12/2024    CREATININE 1.49 (H) 03/12/2024     (H) 03/12/2024    CALCIUM 9.3 03/12/2024    ALBUMIN 3.0 (L) 03/12/2024    AST 11 (L) 03/12/2024    ALT 27 03/12/2024    ALKPHOS 59 03/12/2024    MG 2.3 07/12/2022        IMAGING  No pertinent imaging.    ASSESSMENT  Stephane Mathis is a 78 y.o. AAM with history of MUSA, HTN, Hyperlipidemia, CKD, and AVM of colon who is referred for MUSA.    1. Iron deficiency anemia due " to chronic blood loss           PLAN    - Small bowel evaluation for MUSA with capsule endoscopy   - Consider iron infusion since patient has shown no improvement from PO iron replacement  - May also benefit from referral to nephrology given his CKD    There are no Patient Instructions on file for this visit.      No orders of the defined types were placed in this encounter.        The risks and benefits of my recommendations, as well as other treatment options were discussed with the patient today. All questions were answered.    35 minutes of total time spent on the encounter, which includes face to face time and non-face to face time preparing to see the patient (eg, review of tests), obtaining and/or reviewing separately obtained history, documenting clinical information in the electronic or other health record, Independently interpreting results (not separately reported) and communicating results to the patient/family/caregiver, or care coordination (not separately reported).        Marleen Hernandez, ALLIEP/ACNP  [facility]

## 2024-04-12 NOTE — TELEPHONE ENCOUNTER
Pt returned phone call from earlier. Informed of lab results. Voiced understanding and advised on s/s and need to go to ER.

## 2024-04-16 ENCOUNTER — HOSPITAL ENCOUNTER (EMERGENCY)
Facility: HOSPITAL | Age: 79
Discharge: HOME | End: 2024-04-16
Payer: MEDICARE

## 2024-04-16 ENCOUNTER — HOSPITAL ENCOUNTER (OUTPATIENT)
Dept: GASTROENTEROLOGY | Facility: HOSPITAL | Age: 79
Discharge: HOME | End: 2024-04-16
Payer: MEDICARE

## 2024-04-16 VITALS
TEMPERATURE: 98 F | RESPIRATION RATE: 17 BRPM | SYSTOLIC BLOOD PRESSURE: 132 MMHG | HEIGHT: 71 IN | WEIGHT: 165 LBS | OXYGEN SATURATION: 99 % | BODY MASS INDEX: 23.1 KG/M2 | DIASTOLIC BLOOD PRESSURE: 75 MMHG | HEART RATE: 72 BPM

## 2024-04-16 DIAGNOSIS — D50.0 IRON DEFICIENCY ANEMIA DUE TO CHRONIC BLOOD LOSS: Chronic | ICD-10-CM

## 2024-04-16 DIAGNOSIS — R42 DIZZINESS: ICD-10-CM

## 2024-04-16 DIAGNOSIS — R53.1 WEAKNESS: ICD-10-CM

## 2024-04-16 DIAGNOSIS — D50.9 IRON DEFICIENCY ANEMIA, UNSPECIFIED IRON DEFICIENCY ANEMIA TYPE: Primary | ICD-10-CM

## 2024-04-16 LAB
ALBUMIN SERPL BCP-MCNC: 3.4 G/DL (ref 3.5–5)
ALBUMIN/GLOB SERPL: 0.8 {RATIO}
ALP SERPL-CCNC: 62 U/L (ref 45–115)
ALT SERPL W P-5'-P-CCNC: 23 U/L (ref 16–61)
ANION GAP SERPL CALCULATED.3IONS-SCNC: 14 MMOL/L (ref 7–16)
AST SERPL W P-5'-P-CCNC: 17 U/L (ref 15–37)
BASOPHILS # BLD AUTO: 0.06 K/UL (ref 0–0.2)
BASOPHILS NFR BLD AUTO: 0.8 % (ref 0–1)
BILIRUB SERPL-MCNC: 0.3 MG/DL (ref ?–1.2)
BUN SERPL-MCNC: 16 MG/DL (ref 7–18)
BUN/CREAT SERPL: 11 (ref 6–20)
CALCIUM SERPL-MCNC: 8.2 MG/DL (ref 8.5–10.1)
CHLORIDE SERPL-SCNC: 103 MMOL/L (ref 98–107)
CO2 SERPL-SCNC: 28 MMOL/L (ref 21–32)
CREAT SERPL-MCNC: 1.44 MG/DL (ref 0.7–1.3)
DIFFERENTIAL METHOD BLD: ABNORMAL
EGFR (NO RACE VARIABLE) (RUSH/TITUS): 50 ML/MIN/1.73M2
EOSINOPHIL # BLD AUTO: 0.15 K/UL (ref 0–0.5)
EOSINOPHIL NFR BLD AUTO: 1.9 % (ref 1–4)
ERYTHROCYTE [DISTWIDTH] IN BLOOD BY AUTOMATED COUNT: 18.4 % (ref 11.5–14.5)
GLOBULIN SER-MCNC: 4.3 G/DL (ref 2–4)
GLUCOSE SERPL-MCNC: 113 MG/DL (ref 74–106)
GROUP & RH: NORMAL
HCT VFR BLD AUTO: 23.6 % (ref 40–54)
HCT VFR BLD AUTO: 28.6 % (ref 40–54)
HGB BLD-MCNC: 6.9 G/DL (ref 13.5–18)
HGB BLD-MCNC: 8.5 G/DL (ref 13.5–18)
INDIRECT COOMBS: NORMAL
LYMPHOCYTES # BLD AUTO: 1.63 K/UL (ref 1–4.8)
LYMPHOCYTES NFR BLD AUTO: 20.8 % (ref 27–41)
MCH RBC QN AUTO: 24.4 PG (ref 27–31)
MCHC RBC AUTO-ENTMCNC: 29.2 G/DL (ref 32–36)
MCV RBC AUTO: 83.4 FL (ref 80–96)
MONOCYTES # BLD AUTO: 1.23 K/UL (ref 0–0.8)
MONOCYTES NFR BLD AUTO: 15.7 % (ref 2–6)
MPC BLD CALC-MCNC: 10.8 FL (ref 9.4–12.4)
NEUTROPHILS # BLD AUTO: 4.77 K/UL (ref 1.8–7.7)
NEUTROPHILS NFR BLD AUTO: 60.8 % (ref 53–65)
PLATELET # BLD AUTO: 326 K/UL (ref 150–400)
POTASSIUM SERPL-SCNC: 3.6 MMOL/L (ref 3.5–5.1)
PROT SERPL-MCNC: 7.7 G/DL (ref 6.4–8.2)
RBC # BLD AUTO: 2.83 M/UL (ref 4.6–6.2)
SODIUM SERPL-SCNC: 141 MMOL/L (ref 136–145)
SPECIMEN OUTDATE: NORMAL
TROPONIN I SERPL DL<=0.01 NG/ML-MCNC: 10 PG/ML
WBC # BLD AUTO: 7.84 K/UL (ref 4.5–11)

## 2024-04-16 PROCEDURE — 86900 BLOOD TYPING SEROLOGIC ABO: CPT

## 2024-04-16 PROCEDURE — 93005 ELECTROCARDIOGRAM TRACING: CPT

## 2024-04-16 PROCEDURE — 36430 TRANSFUSION BLD/BLD COMPNT: CPT

## 2024-04-16 PROCEDURE — 85018 HEMOGLOBIN: CPT

## 2024-04-16 PROCEDURE — 91110 GI TRC IMG INTRAL ESOPH-ILE: CPT | Mod: 26,,, | Performed by: INTERNAL MEDICINE

## 2024-04-16 PROCEDURE — 86850 RBC ANTIBODY SCREEN: CPT

## 2024-04-16 PROCEDURE — 84484 ASSAY OF TROPONIN QUANT: CPT

## 2024-04-16 PROCEDURE — 85025 COMPLETE CBC W/AUTO DIFF WBC: CPT

## 2024-04-16 PROCEDURE — 86901 BLOOD TYPING SEROLOGIC RH(D): CPT

## 2024-04-16 PROCEDURE — 27201423 OPTIME MED/SURG SUP & DEVICES STERILE SUPPLY

## 2024-04-16 PROCEDURE — 99285 EMERGENCY DEPT VISIT HI MDM: CPT | Mod: GF,25

## 2024-04-16 PROCEDURE — P9016 RBC LEUKOCYTES REDUCED: HCPCS

## 2024-04-16 PROCEDURE — 93010 ELECTROCARDIOGRAM REPORT: CPT | Performed by: FAMILY MEDICINE

## 2024-04-16 PROCEDURE — 80053 COMPREHEN METABOLIC PANEL: CPT

## 2024-04-16 PROCEDURE — 99285 EMERGENCY DEPT VISIT HI MDM: CPT | Mod: 25

## 2024-04-16 PROCEDURE — 91110 GI TRC IMG INTRAL ESOPH-ILE: CPT | Mod: TC | Performed by: INTERNAL MEDICINE

## 2024-04-16 RX ORDER — HYDROCODONE BITARTRATE AND ACETAMINOPHEN 500; 5 MG/1; MG/1
TABLET ORAL
Status: DISCONTINUED | OUTPATIENT
Start: 2024-04-16 | End: 2024-04-17 | Stop reason: HOSPADM

## 2024-04-16 NOTE — H&P
Gastroenterology Pre-procedure H&P    History of Present Illness    Stephane Mathis is a 78 y.o. male that  has a past medical history of Acute superficial gastritis without hemorrhage (7/12/2022), Alcoholism, Anemia, AVM (arteriovenous malformation) of colon (7/13/2022), Colon, diverticulosis (7/13/2022), Hypertension, Iron deficiency anemia due to chronic blood loss (7/12/2022), Polyp, sigmoid colon (7/13/2022), Seizures, and Stroke.     Patient with worsening MUSA. Has h/o normal EGD and colonsocopy with 2 AVM treated with APC with Dr. Mahajan. Here today for VCE.       Past Medical History:   Diagnosis Date    Acute superficial gastritis without hemorrhage 7/12/2022    Alcoholism     Anemia     AVM (arteriovenous malformation) of colon 7/13/2022    Colon, diverticulosis 7/13/2022    Hypertension     Iron deficiency anemia due to chronic blood loss 7/12/2022    Polyp, sigmoid colon 7/13/2022    Seizures     Stroke     right sided weakness       Past Surgical History:   Procedure Laterality Date    CATARACT EXTRACTION W/ INTRAOCULAR LENS IMPLANT Right     KNEE SURGERY Right     LUMBAR DISCECTOMY      SCROTAL SURGERY      in february       Family History   Problem Relation Name Age of Onset    Diabetes Mother      Hypertension Mother      Diabetes Sister      Diabetes Brother      Hypertension Brother      Cancer Brother      Throat cancer Brother      Alzheimer's disease Brother         Social History     Socioeconomic History    Marital status: Unknown   Tobacco Use    Smoking status: Every Day     Current packs/day: 0.50     Average packs/day: 0.5 packs/day for 58.3 years (29.1 ttl pk-yrs)     Types: Cigarettes     Start date: 1966     Passive exposure: Current    Smokeless tobacco: Never    Tobacco comments:     Tobacco quitline information sheet given    Substance and Sexual Activity    Alcohol use: Yes     Alcohol/week: 6.0 standard drinks of alcohol     Types: 6 Cans of beer per week     Comment: eulalio teresa  liquor primarly on weekend    Drug use: Yes     Types: Marijuana     Comment: daily    Sexual activity: Yes     Partners: Female     Social Determinants of Health     Financial Resource Strain: Low Risk  (1/25/2024)    Overall Financial Resource Strain (CARDIA)     Difficulty of Paying Living Expenses: Not very hard   Food Insecurity: Food Insecurity Present (1/25/2024)    Hunger Vital Sign     Worried About Running Out of Food in the Last Year: Sometimes true     Ran Out of Food in the Last Year: Sometimes true   Transportation Needs: No Transportation Needs (1/25/2024)    PRAPARE - Transportation     Lack of Transportation (Medical): No     Lack of Transportation (Non-Medical): No   Physical Activity: Insufficiently Active (1/25/2024)    Exercise Vital Sign     Days of Exercise per Week: 3 days     Minutes of Exercise per Session: 10 min   Stress: No Stress Concern Present (1/25/2024)    [employer] [place] [employer] - Occupational Stress Questionnaire     Feeling of Stress : Not at all   Social Connections: Moderately Isolated (1/25/2024)    Social Connection and Isolation Panel [NHANES]     Frequency of Communication with Friends and Family: More than three times a week     Frequency of Social Gatherings with Friends and Family: More than three times a week     Attends Jewish Services: 1 to 4 times per year     Active Member of Clubs or Organizations: No     Attends Club or Organization Meetings: Never     Marital Status: Never    Housing Stability: Low Risk  (1/25/2024)    Housing Stability Vital Sign     Unable to Pay for Housing in the Last Year: No     Number of Places Lived in the Last Year: 1     Unstable Housing in the Last Year: No       Current Outpatient Medications   Medication Sig Dispense Refill    amLODIPine (NORVASC) 10 MG tablet Take 1 tablet (10 mg total) by mouth once daily. 90 tablet 1    aspirin (ECOTRIN) 81 MG EC tablet Take 1 tablet (81 mg total) by mouth once daily. 90  tablet 1    atorvastatin (LIPITOR) 40 MG tablet Take 1 tablet (40 mg total) by mouth every evening. 90 tablet 1    EAR DROPS, CARBAMIDE PEROXIDE, OTIC Place in ear(s).      EPINEPHrine (EPIPEN) 0.3 mg/0.3 mL AtIn Inject 0.3 mLs (0.3 mg total) into the muscle as needed. 1 each 0    ergocalciferol (ERGOCALCIFEROL) 50,000 unit Cap Take 1 capsule (50,000 Units total) by mouth every 7 days. 12 capsule 0    famotidine (PEPCID) 20 MG tablet Take 1 tablet (20 mg total) by mouth 2 (two) times daily as needed for Heartburn. 60 tablet 3    ferrous sulfate (FEOSOL) 325 mg (65 mg iron) Tab tablet Take 1 tablet (325 mg total) by mouth 2 (two) times daily. 180 tablet 1    hydrALAZINE (APRESOLINE) 100 MG tablet Take 1 tablet (100 mg total) by mouth 2 (two) times a day. 180 tablet 1    hydroCHLOROthiazide (HYDRODIURIL) 25 MG tablet Take 1 tablet (25 mg total) by mouth once daily. 90 tablet 1    HYDROcodone-acetaminophen (NORCO) 5-325 mg per tablet Take 1 tablet by mouth every 8 (eight) hours as needed for Pain. 20 tablet 0    methocarbamoL (ROBAXIN) 500 MG Tab Take 1 tablet (500 mg total) by mouth 3 (three) times daily as needed (muscle spasm/pain. May cause drowsiness). 30 tablet 2    tadalafiL (CIALIS) 10 MG tablet Take 1 tablet (10 mg total) by mouth daily as needed for Erectile Dysfunction. 6 tablet 5     No current facility-administered medications for this encounter.     Facility-Administered Medications Ordered in Other Encounters   Medication Dose Route Frequency Provider Last Rate Last Admin    BUPivacaine (PF) 0.25% (2.5 mg/ml) injection 1 mL  1 mL   Zora Dunn PA   1 mL at 01/30/24 0930    triamcinolone acetonide injection 40 mg  40 mg Intra-articular  Zora Dunn PA   40 mg at 01/30/24 0930       Review of patient's allergies indicates:   Allergen Reactions    Ace inhibitors Swelling    Codeine Itching       Objective:  There were no vitals filed for this visit.     GEN: normal appearing, NAD, AAO x3  HENT:  NCAT, anicteric, OP benign  CV: normal rate, regular rhythm  RESP: CTA, symmetric rise, unlabored  ABD: soft, ND, no guarding or TTP  SKIN: warm and dry  NEURO: grossly afocal    Assessment and Plan:    Proceed with:    Capsule endoscopy for iron deficiency anemia       Cristobal Franklin MD  Gastroenterology

## 2024-04-17 ENCOUNTER — OFFICE VISIT (OUTPATIENT)
Dept: FAMILY MEDICINE | Facility: CLINIC | Age: 79
End: 2024-04-17
Payer: MEDICARE

## 2024-04-17 VITALS
HEIGHT: 71 IN | SYSTOLIC BLOOD PRESSURE: 136 MMHG | RESPIRATION RATE: 18 BRPM | OXYGEN SATURATION: 97 % | BODY MASS INDEX: 22.31 KG/M2 | WEIGHT: 159.38 LBS | DIASTOLIC BLOOD PRESSURE: 61 MMHG | HEART RATE: 73 BPM | TEMPERATURE: 98 F

## 2024-04-17 DIAGNOSIS — F10.10 ALCOHOL ABUSE: ICD-10-CM

## 2024-04-17 DIAGNOSIS — D64.9 ANEMIA, UNSPECIFIED TYPE: ICD-10-CM

## 2024-04-17 DIAGNOSIS — Z09 HOSPITAL DISCHARGE FOLLOW-UP: Primary | ICD-10-CM

## 2024-04-17 DIAGNOSIS — N18.31 CHRONIC KIDNEY DISEASE, STAGE 3A: ICD-10-CM

## 2024-04-17 LAB
ALBUMIN SERPL BCP-MCNC: 3.5 G/DL (ref 3.5–5)
ALBUMIN/GLOB SERPL: 0.8 {RATIO}
ALP SERPL-CCNC: 67 U/L (ref 45–115)
ALT SERPL W P-5'-P-CCNC: 16 U/L (ref 16–61)
ANION GAP SERPL CALCULATED.3IONS-SCNC: 12 MMOL/L (ref 7–16)
AST SERPL W P-5'-P-CCNC: 26 U/L (ref 15–37)
BASOPHILS # BLD AUTO: 0.05 K/UL (ref 0–0.2)
BASOPHILS NFR BLD AUTO: 0.6 % (ref 0–1)
BILIRUB SERPL-MCNC: 0.4 MG/DL (ref ?–1.2)
BUN SERPL-MCNC: 16 MG/DL (ref 7–18)
BUN/CREAT SERPL: 14 (ref 6–20)
CALCIUM SERPL-MCNC: 9.4 MG/DL (ref 8.5–10.1)
CHLORIDE SERPL-SCNC: 100 MMOL/L (ref 98–107)
CO2 SERPL-SCNC: 27 MMOL/L (ref 21–32)
CREAT SERPL-MCNC: 1.16 MG/DL (ref 0.7–1.3)
DIFFERENTIAL METHOD BLD: ABNORMAL
EGFR (NO RACE VARIABLE) (RUSH/TITUS): 65 ML/MIN/1.73M2
EOSINOPHIL # BLD AUTO: 0.17 K/UL (ref 0–0.5)
EOSINOPHIL NFR BLD AUTO: 1.9 % (ref 1–4)
ERYTHROCYTE [DISTWIDTH] IN BLOOD BY AUTOMATED COUNT: 18.6 % (ref 11.5–14.5)
GLOBULIN SER-MCNC: 4.2 G/DL (ref 2–4)
GLUCOSE SERPL-MCNC: 131 MG/DL (ref 74–106)
HCT VFR BLD AUTO: 32.4 % (ref 40–54)
HGB BLD-MCNC: 9.2 G/DL (ref 13.5–18)
IMM GRANULOCYTES # BLD AUTO: 0.02 K/UL (ref 0–0.04)
IMM GRANULOCYTES NFR BLD: 0.2 % (ref 0–0.4)
LYMPHOCYTES # BLD AUTO: 1.68 K/UL (ref 1–4.8)
LYMPHOCYTES NFR BLD AUTO: 18.6 % (ref 27–41)
MCH RBC QN AUTO: 24.5 PG (ref 27–31)
MCHC RBC AUTO-ENTMCNC: 28.4 G/DL (ref 32–36)
MCV RBC AUTO: 86.2 FL (ref 80–96)
MONOCYTES # BLD AUTO: 0.98 K/UL (ref 0–0.8)
MONOCYTES NFR BLD AUTO: 10.8 % (ref 2–6)
MPC BLD CALC-MCNC: 11.7 FL (ref 9.4–12.4)
NEUTROPHILS # BLD AUTO: 6.14 K/UL (ref 1.8–7.7)
NEUTROPHILS NFR BLD AUTO: 67.9 % (ref 53–65)
NRBC # BLD AUTO: 0 X10E3/UL
NRBC, AUTO (.00): 0 %
PLATELET # BLD AUTO: 341 K/UL (ref 150–400)
POTASSIUM SERPL-SCNC: 3.9 MMOL/L (ref 3.5–5.1)
PROT SERPL-MCNC: 7.7 G/DL (ref 6.4–8.2)
RBC # BLD AUTO: 3.76 M/UL (ref 4.6–6.2)
SODIUM SERPL-SCNC: 135 MMOL/L (ref 136–145)
WBC # BLD AUTO: 9.04 K/UL (ref 4.5–11)

## 2024-04-17 PROCEDURE — 3078F DIAST BP <80 MM HG: CPT | Mod: ,,, | Performed by: FAMILY MEDICINE

## 2024-04-17 PROCEDURE — 80053 COMPREHEN METABOLIC PANEL: CPT | Mod: ,,, | Performed by: CLINICAL MEDICAL LABORATORY

## 2024-04-17 PROCEDURE — 99213 OFFICE O/P EST LOW 20 MIN: CPT | Mod: ,,, | Performed by: FAMILY MEDICINE

## 2024-04-17 PROCEDURE — 1159F MED LIST DOCD IN RCRD: CPT | Mod: ,,, | Performed by: FAMILY MEDICINE

## 2024-04-17 PROCEDURE — 3288F FALL RISK ASSESSMENT DOCD: CPT | Mod: ,,, | Performed by: FAMILY MEDICINE

## 2024-04-17 PROCEDURE — 1126F AMNT PAIN NOTED NONE PRSNT: CPT | Mod: ,,, | Performed by: FAMILY MEDICINE

## 2024-04-17 PROCEDURE — 85025 COMPLETE CBC W/AUTO DIFF WBC: CPT | Mod: ,,, | Performed by: CLINICAL MEDICAL LABORATORY

## 2024-04-17 PROCEDURE — 3075F SYST BP GE 130 - 139MM HG: CPT | Mod: ,,, | Performed by: FAMILY MEDICINE

## 2024-04-17 PROCEDURE — 1101F PT FALLS ASSESS-DOCD LE1/YR: CPT | Mod: ,,, | Performed by: FAMILY MEDICINE

## 2024-04-17 NOTE — DISCHARGE INSTRUCTIONS
Follow up with Dr. Pak in the am. Please inform her that you received a blood transfusion in the ER.  You also need to let Dr. Franklin your GI doctor know this as well.  Read the discharge information concerning iron deficiency anemia.  Return to the ED for any new or worsening symptoms.

## 2024-04-17 NOTE — ED PROVIDER NOTES
Encounter Date: 4/16/2024       History     Chief Complaint   Patient presents with    Dizziness     78-year-old  or black male presents to the ED complaining of dizziness times 2-3 days.  He was at [facility] today for capsule enteroscopy by Dr. Rigoberto PRATT and did not report these symptoms.  He reports being called by his primary care provider Dr. Penelope Pak on Friday 04/12/2024 regarding lab results.  He states he was told told he is borderline needing a blood transfusion and if symptomatic to report to the ER.  Denies shortness of breath, chest pain, abdominal pain, hematemesis, hematochezia, melena, or hematuria.  He has a history of chronic MUSA and has been on p.o. iron replacement therapy for several years with no improvement.  He had an EGD and colonoscopy for MUSA in July 2022 with Dr. Mahajan at which time there was no etiology of MUSA found on these exams.  Reports issues with constipation and needing to take stool softeners and MiraLax when taking his iron replacement. During exam he declined FOBT.       The history is provided by the patient.     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    Colon, diverticulosis 7/13/2022    Hypertension     Iron deficiency anemia due to chronic blood loss 7/12/2022    Polyp, sigmoid colon 7/13/2022    Seizures     Stroke     right sided weakness     Past Surgical History:   Procedure Laterality Date    CATARACT EXTRACTION W/ INTRAOCULAR LENS IMPLANT Right     KNEE SURGERY Right     LUMBAR DISCECTOMY      SCROTAL SURGERY      in february     Family History   Problem Relation Name Age of Onset    Diabetes Mother      Hypertension Mother      Diabetes Sister      Diabetes Brother      Hypertension  "Brother      Cancer Brother      Throat cancer Brother      Alzheimer's disease Brother       Social History     Tobacco Use    Smoking status: Every Day     Current packs/day: 0.50     Average packs/day: 0.5 packs/day for 58.3 years (29.1 ttl pk-yrs)     Types: Cigarettes     Start date: 1966     Passive exposure: Current    Smokeless tobacco: Never    Tobacco comments:     Tobacco quitline information sheet given    Substance Use Topics    Alcohol use: Yes     Alcohol/week: 12.0 standard drinks of alcohol     Types: 6 Cans of beer, 6 Shots of liquor per week     Comment: vodka/gray goose preference. " couple of drinks a day or two or sometimes on weekends. "    Drug use: Yes     Types: Marijuana     Comment: daily     Review of Systems   Constitutional:  Negative for appetite change, chills and fever.   HENT:  Negative for congestion, rhinorrhea and sore throat.    Eyes:  Negative for visual disturbance.   Respiratory:  Negative for cough and shortness of breath.    Cardiovascular:  Negative for chest pain.   Gastrointestinal:  Negative for abdominal distention, abdominal pain, blood in stool, constipation, diarrhea, nausea and vomiting.   Genitourinary:  Negative for dysuria, flank pain and hematuria.   Musculoskeletal:  Negative for neck pain and neck stiffness.   Skin:  Negative for rash.   Neurological:  Positive for dizziness. Negative for syncope and headaches.   Psychiatric/Behavioral:  Negative for suicidal ideas.        Physical Exam     Initial Vitals   BP Pulse Resp Temp SpO2   04/16/24 1800 04/16/24 1800 04/16/24 1800 04/16/24 1808 04/16/24 1800   (!) 150/61 80 18 98.9 °F (37.2 °C) 99 %      MAP       --                Physical Exam    Nursing note and vitals reviewed.  Constitutional: He appears well-developed and well-nourished. No distress.   HENT:   Head: Normocephalic and atraumatic.   Right Ear: External ear normal.   Left Ear: External ear normal.   Mouth/Throat: Oropharynx is clear and moist. "   Eyes: Conjunctivae and EOM are normal. Pupils are equal, round, and reactive to light.   Neck:   Normal range of motion.  Cardiovascular:  Normal rate, regular rhythm, normal heart sounds and intact distal pulses.           No murmur heard.  Pulmonary/Chest: Breath sounds normal. No respiratory distress.   Abdominal: Abdomen is soft. Bowel sounds are normal. He exhibits no distension. There is no abdominal tenderness. There is no guarding.   Musculoskeletal:         General: Normal range of motion.      Cervical back: Normal range of motion.     Lymphadenopathy:     He has no cervical adenopathy.   Neurological: He is alert and oriented to person, place, and time. He has normal strength. GCS score is 15. GCS eye subscore is 4. GCS verbal subscore is 5. GCS motor subscore is 6.   Skin: Skin is warm and dry. Capillary refill takes less than 2 seconds.   Psychiatric: He has a normal mood and affect.         Medical Screening Exam   See Full Note    ED Course   Procedures  Labs Reviewed   COMPREHENSIVE METABOLIC PANEL - Abnormal; Notable for the following components:       Result Value    Glucose 113 (*)     Creatinine 1.44 (*)     Calcium 8.2 (*)     Albumin 3.4 (*)     Globulin 4.3 (*)     eGFR 50 (*)     All other components within normal limits   CBC WITH DIFFERENTIAL - Abnormal; Notable for the following components:    RBC 2.83 (*)     Hemoglobin 6.9 (*)     Hematocrit 23.6 (*)     MCH 24.4 (*)     MCHC 29.2 (*)     RDW 18.4 (*)     Lymphocytes % 20.8 (*)     Monocytes % 15.7 (*)     Monocytes, Absolute 1.23 (*)     All other components within normal limits   HEMOGLOBIN AND HEMATOCRIT, BLOOD - Abnormal; Notable for the following components:    Hemoglobin 8.5 (*)     Hematocrit 28.6 (*)     All other components within normal limits   TROPONIN I - Normal   CBC W/ AUTO DIFFERENTIAL    Narrative:     The following orders were created for panel order CBC auto differential.  Procedure                                Abnormality         Status                     ---------                               -----------         ------                     CBC with Differential[[id number]]       Abnormal            Final result                 Please view results for these tests on the individual orders.   TYPE & SCREEN          Imaging Results              X-Ray Chest AP Portable (Final result)  Result time 04/16/24 19:47:26      Final result by Tru Guo MD (04/16/24 19:47:26)                   Impression:      No acute cardiopulmonary process.    Place of service: [facility]      Electronically signed by: Tru Guo  Date:    04/16/2024  Time:    19:47               Narrative:    EXAMINATION:  XR CHEST AP PORTABLE    CLINICAL HISTORY:  Weakness    TECHNIQUE:  AP portable    COMPARISON:  Prior radiograph 10/04/2017    FINDINGS:  The cardiomediastinal silhouette is within normal limits. The lungs are clear. There is no pneumothorax or pleural effusion.    There is no acute osseous or soft tissue abnormality.                                       Medications - No data to display    Medical Decision Making  [facility]    Patient presents for emergent evaluation of acute dizziness and abnormal labs that poses a threat to life and/or bodily function.    In the ED patient found to have acute anemia.    I ordered labs and personally reviewed them.  Labs significant for H&H 6.9/23.6   I ordered EKG and personally reviewed it.  EKG significant for normal sinus rhythm rate of 69 no STEMI.      Discharge [facility]    Patient was managed in the ED with IV PRBC.  Repeat H&H 8.5/28.6.  The response to treatment was improved.  Patient states is no longer dizzy and feeling better.    Patient was discharged in stable condition.  Detailed return precautions discussed including calling Dr. Pak his primary care provider in the a.m. to arrange for strict follow-up.  Informed needs to also contact Dr. Franklin's office for GI to let them  know about his blood transfusion in case they want to see him earlier than possible scheduled future follow-up.    Amount and/or Complexity of Data Reviewed  Independent Historian:      Details: 78-year-old  or black male presents to the ED complaining of dizziness times 2-3 days.  He was at [facility] today for capsule enteroscopy by Dr. Rigoberto PRATT and did not report these symptoms.  He reports being called by his primary care provider Dr. Penelope Pak on Friday 04/12/2024 regarding lab results.  He states he was told told he is borderline needing a blood transfusion and if symptomatic to report to the ER.  Denies shortness of breath, chest pain, abdominal pain, hematemesis, hematochezia, melena, or hematuria.  He has a history of chronic MUSA and has been on p.o. iron replacement therapy for several years with no improvement.  He had an EGD and colonoscopy for MUSA in July 2022 with Dr. Mahajan at which time there was no etiology of MUSA found on these exams.  Reports issues with constipation and needing to take stool softeners and MiraLax when taking his iron replacement. During exam he declined FOBT.   Labs: ordered.     Details: CBC H&H 6.9/23.6; repeat H&H 8.5/28.6  Troponin 10  Radiology: ordered.     Details: Chest x-ray-No acute cardiopulmonary process.  ECG/medicine tests:      Details: Sinus rhythm rate of 69 no STEMI    Risk  Prescription drug management.               ED Course as of 04/17/24 1721   Tue Apr 16, 2024   2328 Repeat H&H 8.5/28.6 with reports feeling better.  Is no longer dizzy [KT]      ED Course User Index  [KT] Nessa Angel NP                           Clinical Impression:   Final diagnoses:  [R42] Dizziness  [D50.9] Iron deficiency anemia, unspecified iron deficiency anemia type (Primary)        ED Disposition Condition    Discharge Stable          ED Prescriptions    None       Follow-up Information    None          Nessa Angel NP  04/17/24  2040

## 2024-04-17 NOTE — PROGRESS NOTES
"Clinic Note    Patient Name: Stephane Mathis  : 1945  MRN: [id number]    Chief Complaint   Patient presents with    Follow-up     Went to ER yesterday evening after GI appt; states " wasn't feeling good. "    Alcohol Problem     Admits to drinking. Ram Goose is preference, but admits to trying to stop.        HPI:    Mr. Stephane Mathis is a 78 y.o. male who presents to clinic today with CC of ER follow up. Patient has a h/o chronic anemia. He has seen GI. EGD and colonoscopy did not reveal source of bleed. He went for capsule endoscopy yesterday. He reports after he left GI visit he went to a family gathering. He admits to alcohol use. Reports he later was not "feeling good" and went to the ER. HE did get transfused 2 units of blood. Reports he feels improved. Denies vomiting. Denies blood in stool. Denies pain. Admits stool is "a little dark" sometimes.  States he drinks 1/2 pint of liquor on the weekends. Reports he has cut back and does not drink as much as he used too. Patient told me he has not drank since Saturday but he told the nurse he drank alcohol yesterday. Denies interest in assistance with cessation. States quitting alcohol is easy compared to cigarettes.   Reports dull headache and chronic issues with OA related shoulder pain.   States, otherwise, he feels improved today.   Patient is, otherwise, without complaints.     Medications:  Medication List with Changes/Refills   Current Medications    AMLODIPINE (NORVASC) 10 MG TABLET    Take 1 tablet (10 mg total) by mouth once daily.    ASPIRIN (ECOTRIN) 81 MG EC TABLET    Take 1 tablet (81 mg total) by mouth once daily.    ATORVASTATIN (LIPITOR) 40 MG TABLET    Take 1 tablet (40 mg total) by mouth every evening.    EAR DROPS, CARBAMIDE PEROXIDE, OTIC    Place in ear(s).    EPINEPHRINE (EPIPEN) 0.3 MG/0.3 ML ATIN    Inject 0.3 mLs (0.3 mg total) into the muscle as needed.    ERGOCALCIFEROL (ERGOCALCIFEROL) 50,000 UNIT CAP    Take 1 capsule (50,000 " Units total) by mouth every 7 days.    FAMOTIDINE (PEPCID) 20 MG TABLET    Take 1 tablet (20 mg total) by mouth 2 (two) times daily as needed for Heartburn.    FERROUS SULFATE (FEOSOL) 325 MG (65 MG IRON) TAB TABLET    Take 1 tablet (325 mg total) by mouth 2 (two) times daily.    HYDRALAZINE (APRESOLINE) 100 MG TABLET    Take 1 tablet (100 mg total) by mouth 2 (two) times a day.    HYDROCHLOROTHIAZIDE (HYDRODIURIL) 25 MG TABLET    Take 1 tablet (25 mg total) by mouth once daily.    HYDROCODONE-ACETAMINOPHEN (NORCO) 5-325 MG PER TABLET    Take 1 tablet by mouth every 8 (eight) hours as needed for Pain.    METHOCARBAMOL (ROBAXIN) 500 MG TAB    Take 1 tablet (500 mg total) by mouth 3 (three) times daily as needed (muscle spasm/pain. May cause drowsiness).    TADALAFIL (CIALIS) 10 MG TABLET    Take 1 tablet (10 mg total) by mouth daily as needed for Erectile Dysfunction.        Allergies: Ace inhibitors and Codeine      Past Medical History:    Past Medical History:   Diagnosis Date    Acute superficial gastritis without hemorrhage 7/12/2022    Alcoholism     Anemia     AVM (arteriovenous malformation) of colon 7/13/2022    Colon, diverticulosis 7/13/2022    Hypertension     Iron deficiency anemia due to chronic blood loss 7/12/2022    Polyp, sigmoid colon 7/13/2022    Seizures     Stroke     right sided weakness       Past Surgical History:    Past Surgical History:   Procedure Laterality Date    CATARACT EXTRACTION W/ INTRAOCULAR LENS IMPLANT Right     KNEE SURGERY Right     LUMBAR DISCECTOMY      SCROTAL SURGERY      in february         Social History:    Social History     Tobacco Use   Smoking Status Every Day    Current packs/day: 0.50    Average packs/day: 0.5 packs/day for 58.3 years (29.1 ttl pk-yrs)    Types: Cigarettes    Start date: 1966    Passive exposure: Current   Smokeless Tobacco Never   Tobacco Comments    Tobacco quitline information sheet given      Social History     Substance and Sexual Activity  "  Alcohol Use Yes    Alcohol/week: 12.0 standard drinks of alcohol    Types: 6 Cans of beer, 6 Shots of liquor per week    Comment: vodka/gray goose preference. " couple of drinks a day or two or sometimes on weekends. "     Social History     Substance and Sexual Activity   Drug Use Yes    Types: Marijuana    Comment: daily         Family History:    Family History   Problem Relation Name Age of Onset    Diabetes Mother      Hypertension Mother      Diabetes Sister      Diabetes Brother      Hypertension Brother      Cancer Brother      Throat cancer Brother      Alzheimer's disease Brother         Review of Systems:    Review of Systems   Constitutional:  Negative for appetite change, chills, fatigue, fever and unexpected weight change.   Eyes:  Negative for visual disturbance.   Respiratory:  Negative for cough and shortness of breath.    Cardiovascular:  Negative for chest pain and leg swelling.   Gastrointestinal:  Negative for abdominal pain, change in bowel habit, constipation, diarrhea, nausea and vomiting.   Musculoskeletal:  Positive for arthralgias.   Integumentary:  Negative for rash.   Neurological:  Positive for headaches. Negative for dizziness.   Psychiatric/Behavioral:  The patient is not nervous/anxious.         Vitals:    Vitals:    04/17/24 0816   BP: 136/61   BP Location: Left arm   Patient Position: Sitting   BP Method: Small (Automatic)   Pulse: 73   Resp: 18   Temp: 98.4 °F (36.9 °C)   TempSrc: Oral   SpO2: 97%   Weight: 72.3 kg (159 lb 6.4 oz)   Height: 5' 11" (1.803 m)       Body mass index is 22.23 kg/m².    Wt Readings from Last 3 Encounters:   04/17/24 0816 72.3 kg (159 lb 6.4 oz)   04/16/24 1800 74.8 kg (165 lb)   04/09/24 1033 73.9 kg (163 lb)        Physical Exam:    Physical Exam  Constitutional:       General: He is not in acute distress.     Appearance: Normal appearance.   HENT:      Nose: Nose normal.      Mouth/Throat:      Mouth: Mucous membranes are moist.      Pharynx: " Oropharynx is clear.   Eyes:      Conjunctiva/sclera: Conjunctivae normal.   Cardiovascular:      Rate and Rhythm: Normal rate and regular rhythm.      Heart sounds: Normal heart sounds. No murmur heard.  Pulmonary:      Effort: Pulmonary effort is normal. No respiratory distress.      Breath sounds: Normal breath sounds. No wheezing, rhonchi or rales.   Abdominal:      General: Bowel sounds are normal.      Palpations: Abdomen is soft.      Tenderness: There is no abdominal tenderness.   Musculoskeletal:      Cervical back: Neck supple.      Right lower leg: No edema.      Left lower leg: No edema.   Skin:     Findings: No rash.   Neurological:      General: No focal deficit present.      Mental Status: He is alert. Mental status is at baseline.   Psychiatric:         Mood and Affect: Mood normal.         Assessment/Plan:   1. Hospital discharge follow-up  - Records reviewed    2. Anemia, unspecified type  -     CBC Auto Differential; Future; Expected date: 04/17/2024  -     Occult blood x 1, stool; Future; Expected date: 04/17/2024  - Follow up with GI as scheduled.   - May need evaluation by Hematology for iron infusions if capsule endoscopy negative.    3. Chronic kidney disease, stage 3a  -     Comprehensive Metabolic Panel; Future; Expected date: 04/17/2024    4. Alcohol abuse  -  Encouraged cessation. Offered assistance. Patient declined.     Active Problem List with Overview Notes    Diagnosis Date Noted    Essential hypertension 07/12/2022    Seizure 06/10/2022    Iron deficiency anemia due to chronic blood loss 07/12/2022    AVM (arteriovenous malformation) of colon 07/13/2022    Continuous dependence on cigarette smoking 07/12/2022    Colon, diverticulosis 07/13/2022    Polyp, sigmoid colon 07/13/2022    Chronic kidney disease, stage 3a 04/08/2024    Primary osteoarthritis of left shoulder 02/06/2024    Mixed hyperlipidemia 10/04/2023    Primary osteoarthritis involving multiple joints 10/04/2023     Muscle spasm 10/04/2023    Erectile dysfunction 10/04/2023    Benign prostatic hyperplasia without lower urinary tract symptoms 10/04/2023    Vitamin D deficiency 10/04/2023      RTC as scheduled for chronic follow up. RTC sooner if needed.  Patient voiced understanding and is agreeable to plan.      Pebbles Dickerson MD    Family Medicine

## 2024-04-17 NOTE — ED NOTES
"Pt refuses for ED provider to to do rectal exam for OCB. Pt states "I get things up my butt all the time and they can't ever find anything." Provider encouraged pt to allow test and pt cont to refuse. Pt was seen in GI lab today and capsule placed per GI.   "

## 2024-04-18 NOTE — ADDENDUM NOTE
Encounter addended by: Chelo Harding RN on: 4/18/2024 1:22 PM   Actions taken: Contacts section saved

## 2024-04-22 ENCOUNTER — TELEPHONE (OUTPATIENT)
Dept: FAMILY MEDICINE | Facility: CLINIC | Age: 79
End: 2024-04-22
Payer: MEDICARE

## 2024-04-22 NOTE — TELEPHONE ENCOUNTER
Contacted pt daughter in regards to lab results. Informed daughter of results. Daughter voiced understanding and had no further questions.      ----- Message from Sierra Dickerson MD sent at 4/18/2024  8:21 AM CDT -----  Please call patient regarding lab results. Patient has a chronic h/o anemia. However, it is improved since blood transfusion. He is currently having evaluation with GI. Follow up with GI as scheduled. Continue iron supplementation. Labs, otherwise, stable. Thanks!

## 2024-04-27 DIAGNOSIS — D50.0 IRON DEFICIENCY ANEMIA DUE TO CHRONIC BLOOD LOSS: Primary | ICD-10-CM

## 2024-04-27 DIAGNOSIS — K55.20 AVM (ARTERIOVENOUS MALFORMATION) OF COLON: Chronic | ICD-10-CM

## 2024-04-27 NOTE — PROGRESS NOTES
Capsule endoscopy revealed several AVM throughout the small bowel, several in the proximal half of the small intestine. Also had a polyp vs AVM in the colon. 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 I called and discussed with the patient - he underwent EGD and colonoscopy with Dr. Mahajan in 2022. Based on findings, I'm inclined to offer push enteroscopy and colonoscopy - patient would like to schedule endoscopy. Since we are doing a push enteroscopy, will schedule separately. He is in agreement with plan.     Sincerely,  Cristobal Franklin MD  Gastroenterology

## 2024-04-29 DIAGNOSIS — Z12.11 SCREENING FOR COLON CANCER: Primary | ICD-10-CM

## 2024-04-29 RX ORDER — POLYETHYLENE GLYCOL 3350, SODIUM SULFATE ANHYDROUS, SODIUM BICARBONATE, SODIUM CHLORIDE, POTASSIUM CHLORIDE 236; 22.74; 6.74; 5.86; 2.97 G/4L; G/4L; G/4L; G/4L; G/4L
4 POWDER, FOR SOLUTION ORAL ONCE
Qty: 4000 ML | Refills: 0 | Status: SHIPPED | OUTPATIENT
Start: 2024-04-29 | End: 2024-04-29

## 2024-05-29 LAB
OHS QRS DURATION: 98 MS
OHS QTC CALCULATION: 430 MS

## 2024-06-04 ENCOUNTER — OFFICE VISIT (OUTPATIENT)
Dept: FAMILY MEDICINE | Facility: CLINIC | Age: 79
End: 2024-06-04
Payer: MEDICARE

## 2024-06-04 VITALS
BODY MASS INDEX: 22.43 KG/M2 | DIASTOLIC BLOOD PRESSURE: 80 MMHG | HEIGHT: 71 IN | OXYGEN SATURATION: 98 % | WEIGHT: 160.19 LBS | HEART RATE: 67 BPM | TEMPERATURE: 98 F | RESPIRATION RATE: 20 BRPM | SYSTOLIC BLOOD PRESSURE: 140 MMHG

## 2024-06-04 DIAGNOSIS — M25.512 ACUTE PAIN OF LEFT SHOULDER: ICD-10-CM

## 2024-06-04 DIAGNOSIS — M15.9 PRIMARY OSTEOARTHRITIS INVOLVING MULTIPLE JOINTS: Chronic | ICD-10-CM

## 2024-06-04 DIAGNOSIS — I10 ESSENTIAL HYPERTENSION: Primary | ICD-10-CM

## 2024-06-04 PROCEDURE — 99213 OFFICE O/P EST LOW 20 MIN: CPT | Mod: ,,, | Performed by: FAMILY MEDICINE

## 2024-06-04 PROCEDURE — 3079F DIAST BP 80-89 MM HG: CPT | Mod: ,,, | Performed by: FAMILY MEDICINE

## 2024-06-04 PROCEDURE — 1101F PT FALLS ASSESS-DOCD LE1/YR: CPT | Mod: ,,, | Performed by: FAMILY MEDICINE

## 2024-06-04 PROCEDURE — 3288F FALL RISK ASSESSMENT DOCD: CPT | Mod: ,,, | Performed by: FAMILY MEDICINE

## 2024-06-04 PROCEDURE — 3077F SYST BP >= 140 MM HG: CPT | Mod: ,,, | Performed by: FAMILY MEDICINE

## 2024-06-04 RX ORDER — HYDROCODONE BITARTRATE AND ACETAMINOPHEN 5; 325 MG/1; MG/1
1 TABLET ORAL EVERY 8 HOURS PRN
Qty: 30 TABLET | Refills: 0 | Status: SHIPPED | OUTPATIENT
Start: 2024-06-04

## 2024-06-04 NOTE — PROGRESS NOTES
Clinic Note    Patient Name: Stephane Mathis  : 1945  MRN: [id number]    Chief Complaint   Patient presents with    Hypertension    Health Maintenance     TETANUS VACCINE Never done  Shingles Vaccine(1 of 2) Never done  RSV Vaccine (Age 60+ and Pregnant patients)(1 - 1-dose 60+ series) Never done  COVID-19 Vaccine(2023-24 season) due on 2023     Shoulder Pain     Left shoulder pain       HPI:    Mr. Stephane Mathis is a 78 y.o. male who presents to clinic today with CC of follow up on chronic disease processes including HTN.  BP is elevated today. Reports BP is normal at home. States he does not know why it is elevated other than flare with shoulder pain. He has previously seen Ortho (Shaun) and has done PT at [facility]. Reports ROM in shoulder is much improved but he is still having significant pain.    Patient reports chronic issues are, otherwise, well controlled on current medication regimen.  Denies problems or side effects with medications.  Patient is, otherwise, without complaints.     Medications:  Medication List with Changes/Refills   Current Medications    AMLODIPINE (NORVASC) 10 MG TABLET    Take 1 tablet (10 mg total) by mouth once daily.    ASPIRIN (ECOTRIN) 81 MG EC TABLET    Take 1 tablet (81 mg total) by mouth once daily.    ATORVASTATIN (LIPITOR) 40 MG TABLET    Take 1 tablet (40 mg total) by mouth every evening.    EAR DROPS, CARBAMIDE PEROXIDE, OTIC    Place in ear(s).    EPINEPHRINE (EPIPEN) 0.3 MG/0.3 ML ATIN    Inject 0.3 mLs (0.3 mg total) into the muscle as needed.    ERGOCALCIFEROL (ERGOCALCIFEROL) 50,000 UNIT CAP    Take 1 capsule (50,000 Units total) by mouth every 7 days.    FAMOTIDINE (PEPCID) 20 MG TABLET    Take 1 tablet (20 mg total) by mouth 2 (two) times daily as needed for Heartburn.    FERROUS SULFATE (FEOSOL) 325 MG (65 MG IRON) TAB TABLET    Take 1 tablet (325 mg total) by mouth 2 (two) times daily.    HYDRALAZINE (APRESOLINE) 100 MG TABLET    Take 1 tablet (100  mg total) by mouth 2 (two) times a day.    HYDROCHLOROTHIAZIDE (HYDRODIURIL) 25 MG TABLET    Take 1 tablet (25 mg total) by mouth once daily.    METHOCARBAMOL (ROBAXIN) 500 MG TAB    Take 1 tablet (500 mg total) by mouth 3 (three) times daily as needed (muscle spasm/pain. May cause drowsiness).    TADALAFIL (CIALIS) 10 MG TABLET    Take 1 tablet (10 mg total) by mouth daily as needed for Erectile Dysfunction.   Changed and/or Refilled Medications    Modified Medication Previous Medication    HYDROCODONE-ACETAMINOPHEN (NORCO) 5-325 MG PER TABLET HYDROcodone-acetaminophen (NORCO) 5-325 mg per tablet       Take 1 tablet by mouth every 8 (eight) hours as needed for Pain.    Take 1 tablet by mouth every 8 (eight) hours as needed for Pain.        Allergies: Ace inhibitors and Codeine      Past Medical History:    Past Medical History:   Diagnosis Date    Acute superficial gastritis without hemorrhage 7/12/2022    Alcoholism     Anemia     AVM (arteriovenous malformation) of colon 7/13/2022    Colon, diverticulosis 7/13/2022    Hypertension     Iron deficiency anemia due to chronic blood loss 7/12/2022    Polyp, sigmoid colon 7/13/2022    Seizures     Stroke     right sided weakness       Past Surgical History:    Past Surgical History:   Procedure Laterality Date    CATARACT EXTRACTION W/ INTRAOCULAR LENS IMPLANT Right     KNEE SURGERY Right     LUMBAR DISCECTOMY      SCROTAL SURGERY      in february         Social History:    Social History     Tobacco Use   Smoking Status Every Day    Current packs/day: 0.50    Average packs/day: 0.5 packs/day for 58.4 years (29.2 ttl pk-yrs)    Types: Cigarettes    Start date: 1966    Passive exposure: Current   Smokeless Tobacco Never   Tobacco Comments    Tobacco quitline information sheet given      Social History     Substance and Sexual Activity   Alcohol Use Yes    Alcohol/week: 12.0 standard drinks of alcohol    Types: 6 Cans of beer, 6 Shots of liquor per week    Comment:  "vodka/gray goose preference. " couple of drinks a day or two or sometimes on weekends. "     Social History     Substance and Sexual Activity   Drug Use Yes    Types: Marijuana    Comment: daily         Family History:    Family History   Problem Relation Name Age of Onset    Diabetes Mother      Hypertension Mother      Diabetes Sister      Diabetes Brother      Hypertension Brother      Cancer Brother      Throat cancer Brother      Alzheimer's disease Brother         Review of Systems:    Review of Systems   Constitutional:  Negative for appetite change, chills, fatigue, fever and unexpected weight change.   Eyes:  Negative for visual disturbance.   Respiratory:  Negative for cough and shortness of breath.    Cardiovascular:  Negative for chest pain and leg swelling.   Gastrointestinal:  Negative for abdominal pain, change in bowel habit, constipation, diarrhea, nausea and vomiting.   Musculoskeletal:  Positive for arthralgias.   Integumentary:  Negative for rash.   Neurological:  Negative for dizziness and headaches.   Psychiatric/Behavioral:  The patient is not nervous/anxious.         Vitals:    Vitals:    06/04/24 1437 06/04/24 1520   BP: (!) 160/78 (!) 140/80   BP Location: Left arm Left arm   Patient Position: Sitting Sitting   BP Method: Large (Manual) Large (Manual)   Pulse: 67    Resp: 20    Temp: 98.2 °F (36.8 °C)    TempSrc: Oral    SpO2: 98%    Weight: 72.7 kg (160 lb 3.2 oz)    Height: 5' 11" (1.803 m)        Body mass index is 22.34 kg/m².    Wt Readings from Last 3 Encounters:   06/04/24 1437 72.7 kg (160 lb 3.2 oz)   04/17/24 0816 72.3 kg (159 lb 6.4 oz)   04/16/24 1800 74.8 kg (165 lb)        Physical Exam:    Physical Exam  Constitutional:       General: He is not in acute distress.     Appearance: Normal appearance.   HENT:      Nose: Nose normal.      Mouth/Throat:      Mouth: Mucous membranes are moist.      Pharynx: Oropharynx is clear.   Eyes:      Conjunctiva/sclera: Conjunctivae normal. "   Cardiovascular:      Rate and Rhythm: Normal rate and regular rhythm.      Heart sounds: Normal heart sounds. No murmur heard.  Pulmonary:      Effort: Pulmonary effort is normal. No respiratory distress.      Breath sounds: Normal breath sounds. No wheezing, rhonchi or rales.   Abdominal:      General: Bowel sounds are normal.      Palpations: Abdomen is soft.      Tenderness: There is no abdominal tenderness.   Musculoskeletal:         General: Tenderness present. No swelling. Normal range of motion.      Cervical back: Neck supple.      Right lower leg: No edema.      Left lower leg: No edema.   Skin:     Findings: No rash.   Neurological:      General: No focal deficit present.      Mental Status: He is alert. Mental status is at baseline.   Psychiatric:         Mood and Affect: Mood normal.           Assessment/Plan:   1. Essential hypertension  - BP is elevated in clinic today. Patient reports normal readings at home. Will have nurse follow up with phone call with home BP reading in 1-2 weeks. If remains elevated patient will need to RTC for medication adjustments.  - DASH diet and exercise  - Encouraged medication compliance  - Continue home blood pressure monitoring. Call/RTC for persistently elevated readings.    2. Primary osteoarthritis involving multiple joints  -     HYDROcodone-acetaminophen (NORCO) 5-325 mg per tablet; Take 1 tablet by mouth every 8 (eight) hours as needed for Pain.  Dispense: 30 tablet; Refill: 0  -     Ambulatory referral/consult to Orthopedics; Future; Expected date: 06/11/2024  -  reviewed and appropriate    3. Acute pain of left shoulder  -     HYDROcodone-acetaminophen (NORCO) 5-325 mg per tablet; Take 1 tablet by mouth every 8 (eight) hours as needed for Pain.  Dispense: 30 tablet; Refill: 0  -     Ambulatory referral/consult to Orthopedics; Future; Expected date: 06/11/2024  -  reviewed and appropriate.          Active Problem List with Overview Notes    Diagnosis Date  Noted    Essential hypertension 07/12/2022    Seizure 06/10/2022    Iron deficiency anemia due to chronic blood loss 07/12/2022    AVM (arteriovenous malformation) of colon 07/13/2022    Continuous dependence on cigarette smoking 07/12/2022    Colon, diverticulosis 07/13/2022    Polyp, sigmoid colon 07/13/2022    Chronic kidney disease, stage 3a 04/08/2024    Primary osteoarthritis of left shoulder 02/06/2024    Mixed hyperlipidemia 10/04/2023    Primary osteoarthritis involving multiple joints 10/04/2023    Muscle spasm 10/04/2023    Erectile dysfunction 10/04/2023    Benign prostatic hyperplasia without lower urinary tract symptoms 10/04/2023    Vitamin D deficiency 10/04/2023        Health Maintenance:  Health Maintenance   Topic Date Due    TETANUS VACCINE  Never done    Shingles Vaccine (1 of 2) Never done    LDCT Lung Screen  10/19/2024    Lipid Panel  10/04/2028    Hepatitis C Screening  Completed       RTC in 4 months for chronic follow up.  RTC sooner if needed.   Patient voiced understanding and is agreeable to plan.      Pebbles Dickerson MD    Family Medicine

## 2024-06-30 ENCOUNTER — EXTERNAL CHRONIC CARE MANAGEMENT (OUTPATIENT)
Dept: FAMILY MEDICINE | Facility: CLINIC | Age: 79
End: 2024-06-30
Payer: MEDICARE

## 2024-06-30 PROCEDURE — G0511 CCM/BHI BY RHC/FQHC 20MIN MO: HCPCS | Mod: ,,, | Performed by: FAMILY MEDICINE

## 2024-07-03 ENCOUNTER — HOSPITAL ENCOUNTER (OUTPATIENT)
Dept: GASTROENTEROLOGY | Facility: HOSPITAL | Age: 79
Discharge: HOME | End: 2024-07-03
Attending: INTERNAL MEDICINE | Admitting: INTERNAL MEDICINE
Payer: MEDICARE

## 2024-07-03 ENCOUNTER — ANESTHESIA EVENT (OUTPATIENT)
Dept: GASTROENTEROLOGY | Facility: HOSPITAL | Age: 79
End: 2024-07-03
Payer: MEDICARE

## 2024-07-03 ENCOUNTER — ANESTHESIA (OUTPATIENT)
Dept: GASTROENTEROLOGY | Facility: HOSPITAL | Age: 79
End: 2024-07-03
Payer: MEDICARE

## 2024-07-03 VITALS
SYSTOLIC BLOOD PRESSURE: 155 MMHG | RESPIRATION RATE: 12 BRPM | OXYGEN SATURATION: 98 % | TEMPERATURE: 98 F | HEIGHT: 71 IN | DIASTOLIC BLOOD PRESSURE: 78 MMHG | BODY MASS INDEX: 22.4 KG/M2 | HEART RATE: 69 BPM | WEIGHT: 160 LBS

## 2024-07-03 DIAGNOSIS — M25.512 ACUTE PAIN OF LEFT SHOULDER: Primary | ICD-10-CM

## 2024-07-03 DIAGNOSIS — K55.20 AVM (ARTERIOVENOUS MALFORMATION) OF COLON: ICD-10-CM

## 2024-07-03 DIAGNOSIS — D50.0 IRON DEFICIENCY ANEMIA DUE TO CHRONIC BLOOD LOSS: ICD-10-CM

## 2024-07-03 PROCEDURE — 88305 TISSUE EXAM BY PATHOLOGIST: CPT | Mod: TC,SUR | Performed by: INTERNAL MEDICINE

## 2024-07-03 PROCEDURE — 25000003 PHARM REV CODE 250: Performed by: NURSE ANESTHETIST, CERTIFIED REGISTERED

## 2024-07-03 PROCEDURE — 63600175 PHARM REV CODE 636 W HCPCS: Performed by: NURSE ANESTHETIST, CERTIFIED REGISTERED

## 2024-07-03 RX ORDER — SODIUM CHLORIDE 0.9 % (FLUSH) 0.9 %
10 SYRINGE (ML) INJECTION
Status: DISCONTINUED | OUTPATIENT
Start: 2024-07-03 | End: 2024-07-04 | Stop reason: HOSPADM

## 2024-07-03 RX ORDER — PROPOFOL 10 MG/ML
VIAL (ML) INTRAVENOUS
Status: DISCONTINUED | OUTPATIENT
Start: 2024-07-03 | End: 2024-07-03

## 2024-07-03 RX ORDER — LIDOCAINE HYDROCHLORIDE 20 MG/ML
INJECTION, SOLUTION EPIDURAL; INFILTRATION; INTRACAUDAL; PERINEURAL
Status: DISCONTINUED | OUTPATIENT
Start: 2024-07-03 | End: 2024-07-03

## 2024-07-03 RX ADMIN — PROPOFOL 30 MG: 10 INJECTION, EMULSION INTRAVENOUS at 11:07

## 2024-07-03 RX ADMIN — LIDOCAINE HYDROCHLORIDE 80 MG: 20 INJECTION, SOLUTION INTRAVENOUS at 11:07

## 2024-07-03 RX ADMIN — PROPOFOL 100 MG: 10 INJECTION, EMULSION INTRAVENOUS at 11:07

## 2024-07-03 RX ADMIN — SODIUM CHLORIDE: 9 INJECTION, SOLUTION INTRAVENOUS at 11:07

## 2024-07-03 NOTE — DISCHARGE INSTRUCTIONS
Procedure Date  7/3/24     Impression  Overall Impression:   Two small angioectasias in the cecum and ascending colon; induced coagulation and hemostasis achieved with argon plasma coagulation  Subcentimeter serrated polyp in the sigmoid colon was removed with cold snare  The terminal ileum, ileocecal valve, transverse colon and descending colon appeared normal.  (grade 2) hemorrhoids        Recommendation  Await pathology results  I suspect MUSA is due to recurrent AVM's given history; agree with continued plans for push enteroscopy    No further screening colonoscopies necessary given age and high quality exam today       Outcome of procedure: successful Colonoscopy  Disposition: patient to recovery following procedure; discharge to home when appropriate parameters met  Provisions for follow up: please call my office for any unexpected symptoms like chest or abdominal pain or bleeding following your procedure.  Final Diagnosis: MUSA, AVM's     NO DRIVING, OPERATING EQUIPMENT, OR SIGNING LEGAL DOCUMENTS FOR 24 HOURS.  THE NURSE WILL CALL YOU WITH YOUR BIOPSY RESULTS IN A FEW DAYS. IF YOU HAVE  [facility] MYCHART YOUR RESULTS WILL APPEAR THERE AS WELL.  Please call the GI Lab if you have any nausea, vomiting, or abdominal pain.

## 2024-07-03 NOTE — TRANSFER OF CARE
"Anesthesia Transfer of Care Note    Patient: Stephane Mathis    Procedure(s) Performed: * No procedures listed *    Patient location: PACU    Anesthesia Type: general    Transport from OR: Transported from OR on room air with adequate spontaneous ventilation    Post pain: adequate analgesia    Post assessment: no apparent anesthetic complications and tolerated procedure well    Post vital signs: stable    Level of consciousness: responds to stimulation    Nausea/Vomiting: no nausea/vomiting    Complications: none    Transfer of care protocol was followedComments: Report Given to PACU rn VSS      Last vitals: Visit Vitals  BP (!) 105/50 (BP Location: Right arm, Patient Position: Lying)   Pulse 75   Temp 36.6 °C (97.8 °F) (Oral)   Resp 15   Ht 5' 11" (1.803 m)   Wt 72.6 kg (160 lb)   SpO2 98%   BMI 22.32 kg/m²     "

## 2024-07-03 NOTE — ANESTHESIA POSTPROCEDURE EVALUATION
Anesthesia Post Evaluation    Patient: Stephane Mathis    Procedure(s) Performed: Colonoscopy    Final Anesthesia Type: general      Patient location during evaluation: GI PACU  Patient participation: Yes- Able to Participate  Level of consciousness: awake and alert and oriented  Post-procedure vital signs: reviewed and stable  Pain management: adequate  Airway patency: patent    PONV status at discharge: No PONV  Anesthetic complications: no      Cardiovascular status: blood pressure returned to baseline and stable  Respiratory status: unassisted, spontaneous ventilation and room air  Hydration status: euvolemic  Follow-up not needed.  Comments: Refer to nursing note for pain/priscilla score upon discharge from recovery.               Vitals Value Taken Time   /50 07/03/24 1152   Temp 36.6 °C (97.8 °F) 07/03/24 1152   Pulse 64 07/03/24 1154   Resp 11 07/03/24 1154   SpO2 95 % 07/03/24 1154   Vitals shown include unfiled device data.      No case tracking events are documented in the log.      Pain/Priscilla Score: Priscilla Score: 8 (7/3/2024 11:54 AM)

## 2024-07-03 NOTE — H&P
Gastroenterology Pre-procedure H&P    History of Present Illness    Stephane Mathis is a 78 y.o. male that  has a past medical history of Acute superficial gastritis without hemorrhage (7/12/2022), Alcoholism, Anemia, AVM (arteriovenous malformation) of colon (7/13/2022), Colon, diverticulosis (7/13/2022), Hypertension, Iron deficiency anemia due to chronic blood loss (7/12/2022), Polyp, sigmoid colon (7/13/2022), Seizures, and Stroke.     Patient with MUSA and h/o AVM here for colonoscopy. Last colonoscopy in 2022 with AVMs ablated in the cecum/ascending colon      Past Medical History:   Diagnosis Date    Acute superficial gastritis without hemorrhage 7/12/2022    Alcoholism     Anemia     AVM (arteriovenous malformation) of colon 7/13/2022    Colon, diverticulosis 7/13/2022    Hypertension     Iron deficiency anemia due to chronic blood loss 7/12/2022    Polyp, sigmoid colon 7/13/2022    Seizures     Stroke     right sided weakness       Past Surgical History:   Procedure Laterality Date    CATARACT EXTRACTION W/ INTRAOCULAR LENS IMPLANT Right     KNEE SURGERY Right     LUMBAR DISCECTOMY      SCROTAL SURGERY      in february       Family History   Problem Relation Name Age of Onset    Diabetes Mother      Hypertension Mother      Diabetes Sister      Diabetes Brother      Hypertension Brother      Cancer Brother      Throat cancer Brother      Alzheimer's disease Brother         Social History     Socioeconomic History    Marital status: Unknown   Tobacco Use    Smoking status: Every Day     Current packs/day: 0.50     Average packs/day: 0.5 packs/day for 58.5 years (29.3 ttl pk-yrs)     Types: Cigarettes     Start date: 1966     Passive exposure: Current    Smokeless tobacco: Never    Tobacco comments:     Tobacco quitline information sheet given    Substance and Sexual Activity    Alcohol use: Yes     Alcohol/week: 12.0 standard drinks of alcohol     Types: 6 Cans of beer, 6 Shots of liquor per week     Comment:  "vodka/gray goose preference. " couple of drinks a day or two or sometimes on weekends. "    Drug use: Yes     Types: Marijuana     Comment: daily    Sexual activity: Yes     Partners: Female     Social Determinants of Health     Financial Resource Strain: Low Risk  (1/25/2024)    Overall Financial Resource Strain (CARDIA)     Difficulty of Paying Living Expenses: Not very hard   Food Insecurity: Food Insecurity Present (1/25/2024)    Hunger Vital Sign     Worried About Running Out of Food in the Last Year: Sometimes true     Ran Out of Food in the Last Year: Sometimes true   Transportation Needs: No Transportation Needs (1/25/2024)    PRAPARE - Transportation     Lack of Transportation (Medical): No     Lack of Transportation (Non-Medical): No   Physical Activity: Insufficiently Active (1/25/2024)    Exercise Vital Sign     Days of Exercise per Week: 3 days     Minutes of Exercise per Session: 10 min   Stress: No Stress Concern Present (1/25/2024)    [employer] - Occupational Stress Questionnaire     Feeling of Stress : Not at all   Housing Stability: Low Risk  (1/25/2024)    Housing Stability Vital Sign     Unable to Pay for Housing in the Last Year: No     Number of Places Lived in the Last Year: 1     Unstable Housing in the Last Year: No       Current Outpatient Medications   Medication Sig Dispense Refill    amLODIPine (NORVASC) 10 MG tablet Take 1 tablet (10 mg total) by mouth once daily. 90 tablet 1    aspirin (ECOTRIN) 81 MG EC tablet Take 1 tablet (81 mg total) by mouth once daily. 90 tablet 1    atorvastatin (LIPITOR) 40 MG tablet Take 1 tablet (40 mg total) by mouth every evening. 90 tablet 1    EAR DROPS, CARBAMIDE PEROXIDE, OTIC Place in ear(s).      EPINEPHrine (EPIPEN) 0.3 mg/0.3 mL AtIn Inject 0.3 mLs (0.3 mg total) into the muscle as needed. 1 each 0    ergocalciferol (ERGOCALCIFEROL) 50,000 unit Cap Take 1 capsule (50,000 Units total) by mouth every 7 days. 12 capsule " 0    famotidine (PEPCID) 20 MG tablet Take 1 tablet (20 mg total) by mouth 2 (two) times daily as needed for Heartburn. 60 tablet 3    ferrous sulfate (FEOSOL) 325 mg (65 mg iron) Tab tablet Take 1 tablet (325 mg total) by mouth 2 (two) times daily. 180 tablet 1    hydrALAZINE (APRESOLINE) 100 MG tablet Take 1 tablet (100 mg total) by mouth 2 (two) times a day. 180 tablet 1    hydroCHLOROthiazide (HYDRODIURIL) 25 MG tablet Take 1 tablet (25 mg total) by mouth once daily. 90 tablet 1    HYDROcodone-acetaminophen (NORCO) 5-325 mg per tablet Take 1 tablet by mouth every 8 (eight) hours as needed for Pain. 30 tablet 0    methocarbamoL (ROBAXIN) 500 MG Tab Take 1 tablet (500 mg total) by mouth 3 (three) times daily as needed (muscle spasm/pain. May cause drowsiness). 30 tablet 2    tadalafiL (CIALIS) 10 MG tablet Take 1 tablet (10 mg total) by mouth daily as needed for Erectile Dysfunction. 6 tablet 5     No current facility-administered medications for this encounter.     Facility-Administered Medications Ordered in Other Encounters   Medication Dose Route Frequency Provider Last Rate Last Admin    BUPivacaine (PF) 0.25% (2.5 mg/ml) injection 1 mL  1 mL   Zora Dunn PA   1 mL at 01/30/24 0930    triamcinolone acetonide injection 40 mg  40 mg Intra-articular  Zora Dunn PA   40 mg at 01/30/24 0930       Review of patient's allergies indicates:   Allergen Reactions    Ace inhibitors Swelling    Codeine Itching       Objective:  There were no vitals filed for this visit.     GEN: normal appearing, NAD, AAO x3  HENT: NCAT, anicteric, OP benign  CV: normal rate, regular rhythm  RESP: NABS, symmetric rise, unlabored  ABD: soft, ND, no guarding or TTP  SKIN: warm and dry  NEURO: grossly afocal    Assessment and Plan:    Proceed with:    Colonoscopy for iron deficiency anemia    Cristobal Franklin MD  Gastroenterology

## 2024-07-03 NOTE — ANESTHESIA PREPROCEDURE EVALUATION
07/03/2024  Stephane Mathis is a 78 y.o., male.      Pre-op Assessment    I have reviewed the Patient Summary Reports.     I have reviewed the Nursing Notes. I have reviewed the NPO Status.   I have reviewed the Medications.     Review of Systems  Anesthesia Hx:  No problems with previous Anesthesia             Denies Family Hx of Anesthesia complications.    Denies Personal Hx of Anesthesia complications.                    Social:  Smoker       Hematology/Oncology:  Hematology Normal   Oncology Normal                                   EENT/Dental:  EENT/Dental Normal           Cardiovascular:     Hypertension              ECG has been reviewed.                    Hypertension         Pulmonary:  Pulmonary Normal                       Renal/:  Chronic Renal Disease, CKD        Kidney Function/Disease             Hepatic/GI:  Bowel Prep.                Musculoskeletal:  Arthritis        Arthritis          Neurological:   CVA    Seizures        Arthritis      Seizure Disorder             CVA - Cerebrovasular Accident                 Dermatological:  Skin Normal    Psych:  Psychiatric Normal                Physical Exam  General: Well nourished    Airway:  Mallampati: II   Mouth Opening: Normal  TM Distance: Normal  Tongue: Normal  Neck ROM: Normal ROM    Dental:  Intact    Anesthesia Plan  Type of Anesthesia, risks & benefits discussed:    Anesthesia Type: Gen Natural Airway  Intra-op Monitoring Plan: Standard ASA Monitors  Post Op Pain Control Plan: multimodal analgesia  Induction:  IV  Informed Consent: Informed consent signed with the Patient and all parties understand the risks and agree with anesthesia plan.  All questions answered. Patient consented to blood products? Yes  ASA Score: 3  Day of Surgery Review of History & Physical: H&P Update referred to the surgeon/provider.I have interviewed and  examined the patient. I have reviewed the patient's H&P dated: There are no significant changes.     Ready For Surgery From Anesthesia Perspective.   .

## 2024-07-05 LAB
ESTROGEN SERPL-MCNC: NORMAL PG/ML
INSULIN SERPL-ACNC: NORMAL U[IU]/ML
LAB AP GROSS DESCRIPTION: NORMAL
LAB AP LABORATORY NOTES: NORMAL
T3RU NFR SERPL: NORMAL %

## 2024-07-08 ENCOUNTER — HOSPITAL ENCOUNTER (OUTPATIENT)
Dept: RADIOLOGY | Facility: HOSPITAL | Age: 79
Discharge: HOME | End: 2024-07-08
Attending: NURSE PRACTITIONER
Payer: MEDICARE

## 2024-07-08 ENCOUNTER — OFFICE VISIT (OUTPATIENT)
Dept: ORTHOPEDICS | Facility: CLINIC | Age: 79
End: 2024-07-08
Payer: MEDICARE

## 2024-07-08 DIAGNOSIS — M25.512 ACUTE PAIN OF LEFT SHOULDER: ICD-10-CM

## 2024-07-08 DIAGNOSIS — M15.9 PRIMARY OSTEOARTHRITIS INVOLVING MULTIPLE JOINTS: Chronic | ICD-10-CM

## 2024-07-08 PROCEDURE — 99213 OFFICE O/P EST LOW 20 MIN: CPT | Mod: PBBFAC,25 | Performed by: NURSE PRACTITIONER

## 2024-07-08 PROCEDURE — 20610 DRAIN/INJ JOINT/BURSA W/O US: CPT | Mod: PBBFAC | Performed by: NURSE PRACTITIONER

## 2024-07-08 PROCEDURE — 73030 X-RAY EXAM OF SHOULDER: CPT | Mod: 26,LT,, | Performed by: RADIOLOGY

## 2024-07-08 PROCEDURE — 99999PBSHW PR PBB SHADOW TECHNICAL ONLY FILED TO HB: Mod: PBBFAC,,,

## 2024-07-08 PROCEDURE — 73030 X-RAY EXAM OF SHOULDER: CPT | Mod: TC,LT

## 2024-07-08 PROCEDURE — 99999 PR PBB SHADOW E&M-EST. PATIENT-LVL III: CPT | Mod: PBBFAC,,, | Performed by: NURSE PRACTITIONER

## 2024-07-08 RX ORDER — MELOXICAM 7.5 MG/1
7.5 TABLET ORAL DAILY
Qty: 30 TABLET | Refills: 2 | Status: SHIPPED | OUTPATIENT
Start: 2024-07-08

## 2024-07-08 RX ORDER — TRIAMCINOLONE ACETONIDE 40 MG/ML
80 INJECTION, SUSPENSION INTRA-ARTICULAR; INTRAMUSCULAR
Status: DISCONTINUED | OUTPATIENT
Start: 2024-07-08 | End: 2024-07-08 | Stop reason: HOSPADM

## 2024-07-08 RX ADMIN — TRIAMCINOLONE ACETONIDE 80 MG: 40 INJECTION, SUSPENSION INTRA-ARTICULAR; INTRAMUSCULAR at 01:07

## 2024-07-08 NOTE — PROGRESS NOTES
HPI:   Stephane Mathis is a pleasant 78 y.o. patient who reports to clinic for evaluation of left shoulder pain.     Injury onset and description: Pain has been present for several years and has progressively gotten worse.  Reports he had an injection back in January which did not help much.  He would like to try another injection at this time.  We did discuss possibility of MRI, he would like to hold off and avoid any type of surgery if possible.  No specific injury.  Patient's occupation:   This is not a work related injury.   he has had formal physical therapy. He states that the therapy helped his ROM, but not the pain.  he has had previous shoulder injections.   he has not had advanced imaging such as MRI.   The shoulder pain worsens with activity and overhead motion. Pain is disruptive to sleep at night.   The pain is better with rest. Treatment thus far has included rest and activity modification. Here today to discuss diagnosis and treatment options.   VAS Pain Scale:  10  SANE Score: 0    PAST MEDICAL HISTORY:   Past Medical History:   Diagnosis Date    Acute superficial gastritis without hemorrhage 7/12/2022    Alcoholism     Anemia     AVM (arteriovenous malformation) of colon 7/13/2022    Colon, diverticulosis 7/13/2022    Hypertension     Iron deficiency anemia due to chronic blood loss 7/12/2022    Polyp, sigmoid colon 7/13/2022    Seizures     Stroke     right sided weakness     PAST SURGICAL HISTORY:   Past Surgical History:   Procedure Laterality Date    CATARACT EXTRACTION W/ INTRAOCULAR LENS IMPLANT Right     KNEE SURGERY Right     LUMBAR DISCECTOMY      SCROTAL SURGERY      in february    TONSILLECTOMY       MEDICATIONS:    Current Outpatient Medications:     amLODIPine (NORVASC) 10 MG tablet, Take 1 tablet (10 mg total) by mouth once daily., Disp: 90 tablet, Rfl: 1    aspirin (ECOTRIN) 81 MG EC tablet, Take 1 tablet (81 mg total) by mouth once daily., Disp: 90 tablet, Rfl: 1    atorvastatin  (LIPITOR) 40 MG tablet, Take 1 tablet (40 mg total) by mouth every evening., Disp: 90 tablet, Rfl: 1    EAR DROPS, CARBAMIDE PEROXIDE, OTIC, Place in ear(s)., Disp: , Rfl:     EPINEPHrine (EPIPEN) 0.3 mg/0.3 mL AtIn, Inject 0.3 mLs (0.3 mg total) into the muscle as needed., Disp: 1 each, Rfl: 0    ergocalciferol (ERGOCALCIFEROL) 50,000 unit Cap, Take 1 capsule (50,000 Units total) by mouth every 7 days., Disp: 12 capsule, Rfl: 0    famotidine (PEPCID) 20 MG tablet, Take 1 tablet (20 mg total) by mouth 2 (two) times daily as needed for Heartburn., Disp: 60 tablet, Rfl: 3    ferrous sulfate (FEOSOL) 325 mg (65 mg iron) Tab tablet, Take 1 tablet (325 mg total) by mouth 2 (two) times daily., Disp: 180 tablet, Rfl: 1    hydrALAZINE (APRESOLINE) 100 MG tablet, Take 1 tablet (100 mg total) by mouth 2 (two) times a day., Disp: 180 tablet, Rfl: 1    hydroCHLOROthiazide (HYDRODIURIL) 25 MG tablet, Take 1 tablet (25 mg total) by mouth once daily., Disp: 90 tablet, Rfl: 1    HYDROcodone-acetaminophen (NORCO) 5-325 mg per tablet, Take 1 tablet by mouth every 8 (eight) hours as needed for Pain., Disp: 30 tablet, Rfl: 0    methocarbamoL (ROBAXIN) 500 MG Tab, Take 1 tablet (500 mg total) by mouth 3 (three) times daily as needed (muscle spasm/pain. May cause drowsiness)., Disp: 30 tablet, Rfl: 2    tadalafiL (CIALIS) 10 MG tablet, Take 1 tablet (10 mg total) by mouth daily as needed for Erectile Dysfunction., Disp: 6 tablet, Rfl: 5  No current facility-administered medications for this visit.    Facility-Administered Medications Ordered in Other Visits:     BUPivacaine (PF) 0.25% (2.5 mg/ml) injection 1 mL, 1 mL, , , Zora Dunn PA, 1 mL at 01/30/24 0930    triamcinolone acetonide injection 40 mg, 40 mg, Intra-articular, , Zora Dunn PA, 40 mg at 01/30/24 0958  ALLERGIES:   Review of patient's allergies indicates:   Allergen Reactions    Ace inhibitors Swelling    Codeine Itching     REVIEW OF SYSTEMS:  Constitution:  Negative. Negative for chills, fever and night sweats. HENT: Negative for congestion and headaches.  Eyes: Negative for blurred vision, left vision loss and right vision loss. Cardiovascular: Negative for chest pain and syncope. Respiratory: Negative for cough and shortness of breath.       PHYSICAL EXAM:  VITAL SIGNS: There were no vitals taken for this visit.  General: Well-developed well-nourished 78 y.o. malein no acute distress;Cardiovascular: Regular rhythm by palpation of distal pulse, normal color and temperature, no concerning varicosities on symptomatic side Lungs: No labored breathing or wheezing appreciated Neuro: Alert and oriented ×3 Psychiatric: well oriented to person, place and time, demonstrates normal mood and affect Skin: No rashes, lesions or ulcers, normal temperature, turgor, and texture on uninvolved extremity    General    Constitutional: He is oriented to person, place, and time. He appears well-developed and well-nourished.   HENT:   Head: Normocephalic and atraumatic.   Eyes: Pupils are equal, round, and reactive to light.   Neck: Neck supple.   Cardiovascular:  Normal rate, regular rhythm and intact distal pulses.            Pulmonary/Chest: Effort normal. No respiratory distress. He exhibits no tenderness.   Abdominal: Soft. He exhibits no distension. There is no abdominal tenderness.   Neurological: He is alert and oriented to person, place, and time. He has normal reflexes. He displays normal reflexes. He exhibits normal muscle tone. Coordination normal.   Psychiatric: He has a normal mood and affect. His behavior is normal. Judgment and thought content normal.         Right Shoulder Exam   Right shoulder exam is normal.    Left Shoulder Exam     Inspection/Observation   Swelling: absent  Bruising: absent    Tenderness   The patient is tender to palpation of the acromioclavicular joint and biceps tendon.    Crepitus   The patient has crepitus of the AC joint and greater  tuberosity    Range of Motion   Active abduction:  abnormal   Passive abduction:  abnormal   Forward Flexion:  abnormal   Forward Elevation: abnormal    Tests & Signs   Cross arm: positive  August test: positive  Impingement: positive  Rotator Cuff Painful Arc/Range: mild  Lift Off Sign: positive  Belly Press: positive  Active Compression Test (Bollinger's Sign): positive  Anterior Drawer Test: 0  Posterior Drawer Test: 0  Bear Hug: negative    Other   Sensation: normal     Comments:  + Dynamic Labral Shear test  + Whipple Test that does not correct with scapular stabilization      Muscle Strength   Left Upper Extremity  Shoulder Internal Rotation: 4/5   Shoulder External Rotation: 4/5   Supraspinatus: 4/5   Subscapularis: 4/5   Biceps: 4/5     Vascular Exam       Left Pulses      Radial:                    2+      IMAGING:      ASSESSMENT:      ICD-10-CM ICD-9-CM   1. Primary osteoarthritis involving multiple joints  M15.9 715.98   2. Acute pain of left shoulder  M25.512 719.41       PLAN:     -Findings and treatment options were discussed with the patient  -All questions answered  We will start him on Mobic 7.5 mg p.o. daily.  Left shoulder injection today.  Return to clinic in 3 months for recheck.    There are no Patient Instructions on file for this visit.  No orders of the defined types were placed in this encounter.    Large Joint Aspiration/Injection: L subacromial bursa    Date/Time: 7/8/2024 1:00 PM    Performed by: Mary Gay FNP  Authorized by: Mary Gay FNP    Consent Done?:  Yes (Verbal)  Indications:  Pain  Site marked: the procedure site was marked    Local anesthetic:  Bupivacaine 0.25% without epinephrine    Details:  Needle Size:  22 G  Approach:  Posterior  Location:  Shoulder  Site:  L subacromial bursa  Medications:  80 mg triamcinolone acetonide 40 mg/mL  Patient tolerance:  Patient tolerated the procedure well with no immediate  complications

## 2024-07-10 ENCOUNTER — TELEPHONE (OUTPATIENT)
Dept: FAMILY MEDICINE | Facility: CLINIC | Age: 79
End: 2024-07-10
Payer: MEDICARE

## 2024-07-10 NOTE — TELEPHONE ENCOUNTER
Attempted to contact pt in regards to BP. Pt was not available, was able to leave message for pt to return phone call.     ----- Message from Sierra Dickerson MD sent at 6/6/2024  4:55 PM CDT -----  BP elevated in clinic. Patient reports normal readings at home. Please call to record home blood pressure reading. If remains elevated or if patient has not been monitoring they will need to RTC for follow up within 2 weeks of phone call. Thanks!

## 2024-07-12 ENCOUNTER — OFFICE VISIT (OUTPATIENT)
Dept: GASTROENTEROLOGY | Facility: CLINIC | Age: 79
End: 2024-07-12
Payer: MEDICARE

## 2024-07-12 VITALS
DIASTOLIC BLOOD PRESSURE: 66 MMHG | HEIGHT: 71 IN | WEIGHT: 161.38 LBS | BODY MASS INDEX: 22.59 KG/M2 | SYSTOLIC BLOOD PRESSURE: 154 MMHG | HEART RATE: 75 BPM

## 2024-07-12 DIAGNOSIS — D50.0 IRON DEFICIENCY ANEMIA DUE TO CHRONIC BLOOD LOSS: Primary | ICD-10-CM

## 2024-07-12 PROCEDURE — 1100F PTFALLS ASSESS-DOCD GE2>/YR: CPT | Mod: CPTII,,,

## 2024-07-12 PROCEDURE — 3077F SYST BP >= 140 MM HG: CPT | Mod: CPTII,,,

## 2024-07-12 PROCEDURE — 3288F FALL RISK ASSESSMENT DOCD: CPT | Mod: CPTII,,,

## 2024-07-12 PROCEDURE — 1159F MED LIST DOCD IN RCRD: CPT | Mod: CPTII,,,

## 2024-07-12 PROCEDURE — 3078F DIAST BP <80 MM HG: CPT | Mod: CPTII,,,

## 2024-07-12 PROCEDURE — 99214 OFFICE O/P EST MOD 30 MIN: CPT | Mod: PBBFAC

## 2024-07-12 PROCEDURE — 99999 PR PBB SHADOW E&M-EST. PATIENT-LVL IV: CPT | Mod: PBBFAC,,,

## 2024-07-12 PROCEDURE — 99214 OFFICE O/P EST MOD 30 MIN: CPT | Mod: S$PBB,,,

## 2024-07-14 NOTE — PROGRESS NOTES
Gastroenterology Clinic Note    Patient ID: [id number]   Referring MD: No ref. provider found   Chief Complaint:   Chief Complaint   Patient presents with    Follow-up     No issues        History of Present Illness   Stephane Mathis is an 78 y.o. AAM who is referred for MUSA. Patient with chronic history of MUSA. He has been on PO iron replacement therapy for several years with no improvement. He underwent EGD and colonoscopy for MUSA in July 2022 with Dr. Mahajan. There was no etiology of MUSA found on these exams. He denies any hematochezia or melena. He does have trouble with constipation when taking the iron. He treats this with stool softeners and Miralax as needed.     Previous workup:EGD    Last colonoscopy was 07/03/2024 with no further screening colonoscopies necessary given age.    Interval  - no complaints at follow-up  - colonoscopy 07/03/2024 revealed 2 small angioectasias in the cecum and ascending colon  - suspect ideas due to recurrent AVMs; patient is scheduled for a push enteroscopy next week    Review of Systems   Constitutional:  Negative for weight loss.   Gastrointestinal:  Positive for constipation. Negative for abdominal pain, blood in stool, diarrhea, melena, nausea and vomiting.       Past Medical History      Past Medical History:   Diagnosis Date    Acute superficial gastritis without hemorrhage 7/12/2022    Alcoholism     Anemia     AVM (arteriovenous malformation) of colon 7/13/2022    Colon, diverticulosis 7/13/2022    Hypertension     Iron deficiency anemia due to chronic blood loss 7/12/2022    Polyp, sigmoid colon 7/13/2022    Seizures     Stroke     right sided weakness       Past Surgical History     Past Surgical History:   Procedure Laterality Date    CATARACT EXTRACTION W/ INTRAOCULAR LENS IMPLANT Right     KNEE SURGERY Right     LUMBAR DISCECTOMY      SCROTAL SURGERY      in february    TONSILLECTOMY         Allergies     Review of patient's allergies indicates:   Allergen Reactions  "   Ace inhibitors Swelling    Codeine Itching       Immunization History     Immunization History   Administered Date(s) Administered    COVID-19, MRNA, LN-S, PF (Pfizer) (Purple Cap) 01/22/2021, 02/22/2021, 11/16/2021, 10/05/2023    COVID-19, mRNA, LNP-S, bivalent booster, PF (PFIZER OMICRON) 12/09/2022    Influenza (FLUAD) - Quadrivalent - Adjuvanted - PF *Preferred* (65+) 12/12/2022, 10/04/2023    Influenza - Quadrivalent - High Dose - PF (65 years and older) 01/26/2022    Pneumococcal Conjugate - 20 Valent 09/29/2022    Pneumococcal Polysaccharide - 23 Valent 10/23/2018       Past Family History      Family History   Problem Relation Name Age of Onset    Diabetes Mother      Hypertension Mother      Diabetes Sister      Diabetes Brother      Hypertension Brother      Cancer Brother      Throat cancer Brother      Alzheimer's disease Brother         Past Social History      Social History     Socioeconomic History    Marital status: Unknown   Tobacco Use    Smoking status: Every Day     Current packs/day: 0.50     Average packs/day: 0.5 packs/day for 58.5 years (29.3 ttl pk-yrs)     Types: Cigarettes     Start date: 1966     Passive exposure: Current    Smokeless tobacco: Never    Tobacco comments:     Tobacco quitline information sheet given    Substance and Sexual Activity    Alcohol use: Yes     Alcohol/week: 12.0 standard drinks of alcohol     Types: 6 Cans of beer, 6 Shots of liquor per week     Comment: vodka/gray goose preference. " couple of drinks a day or two or sometimes on weekends. "    Drug use: Yes     Types: Marijuana     Comment: daily    Sexual activity: Yes     Partners: Female     Social Determinants of Health     Financial Resource Strain: Low Risk  (1/25/2024)    Overall Financial Resource Strain (CARDIA)     Difficulty of Paying Living Expenses: Not very hard   Food Insecurity: Food Insecurity Present (1/25/2024)    Hunger Vital Sign     Worried About Running Out of Food in the Last Year: " Sometimes true     Ran Out of Food in the Last Year: Sometimes true   Transportation Needs: No Transportation Needs (1/25/2024)    PRAPARE - Transportation     Lack of Transportation (Medical): No     Lack of Transportation (Non-Medical): No   Physical Activity: Insufficiently Active (1/25/2024)    Exercise Vital Sign     Days of Exercise per Week: 3 days     Minutes of Exercise per Session: 10 min   Stress: No Stress Concern Present (1/25/2024)    [employer] - Occupational Stress Questionnaire     Feeling of Stress : Not at all   Housing Stability: Low Risk  (1/25/2024)    Housing Stability Vital Sign     Unable to Pay for Housing in the Last Year: No     Number of Places Lived in the Last Year: 1     Unstable Housing in the Last Year: No       Current Medications     Outpatient Medications Marked as Taking for the 7/12/24 encounter (Office Visit) with Marleen Hernandez FNP   Medication Sig Dispense Refill    amLODIPine (NORVASC) 10 MG tablet Take 1 tablet (10 mg total) by mouth once daily. 90 tablet 1    aspirin (ECOTRIN) 81 MG EC tablet Take 1 tablet (81 mg total) by mouth once daily. 90 tablet 1    atorvastatin (LIPITOR) 40 MG tablet Take 1 tablet (40 mg total) by mouth every evening. 90 tablet 1    EAR DROPS, CARBAMIDE PEROXIDE, OTIC Place in ear(s).      ergocalciferol (ERGOCALCIFEROL) 50,000 unit Cap Take 1 capsule (50,000 Units total) by mouth every 7 days. 12 capsule 0    famotidine (PEPCID) 20 MG tablet Take 1 tablet (20 mg total) by mouth 2 (two) times daily as needed for Heartburn. 60 tablet 3    ferrous sulfate (FEOSOL) 325 mg (65 mg iron) Tab tablet Take 1 tablet (325 mg total) by mouth 2 (two) times daily. 180 tablet 1    hydrALAZINE (APRESOLINE) 100 MG tablet Take 1 tablet (100 mg total) by mouth 2 (two) times a day. 180 tablet 1    hydroCHLOROthiazide (HYDRODIURIL) 25 MG tablet Take 1 tablet (25 mg total) by mouth once daily. 90 tablet 1    HYDROcodone-acetaminophen  "(NORCO) 5-325 mg per tablet Take 1 tablet by mouth every 8 (eight) hours as needed for Pain. 30 tablet 0    meloxicam (MOBIC) 7.5 MG tablet Take 1 tablet (7.5 mg total) by mouth once daily. 30 tablet 2    methocarbamoL (ROBAXIN) 500 MG Tab Take 1 tablet (500 mg total) by mouth 3 (three) times daily as needed (muscle spasm/pain. May cause drowsiness). 30 tablet 2    tadalafiL (CIALIS) 10 MG tablet Take 1 tablet (10 mg total) by mouth daily as needed for Erectile Dysfunction. 6 tablet 5        I have reviewed the current medications, allergies, vital signs, past medical and surgical history, family medical history, and social history for this encounter and agree with all findings.    OBJECTIVE    Physical Exam    BP (!) 154/66   Pulse 75   Ht 5' 11" (1.803 m)   Wt 73.2 kg (161 lb 6.4 oz)   BMI 22.51 kg/m²   GEN: Well appearing, cooperative, NAD  NECK: Supple, no LAD  CV: Normal rate  RESP: Unlabored  ABD: ND, no guarding  EXT: No clubbing, cyanosis, or edema  SKIN: Warm and dry  NEURO: AAO x4.     LABS    CBC (with or without Differential):   Lab Results   Component Value Date    WBC 11.33 (H) 07/12/2024    HGB 11.0 (L) 07/12/2024    HCT 35.4 (L) 07/12/2024    MCV 95.2 07/12/2024    MCH 29.6 07/12/2024    MCHC 31.1 (L) 07/12/2024    RDW 18.6 (H) 07/12/2024     07/12/2024    MPV 11.8 07/12/2024    NEUTOPHILPCT 73.2 (H) 07/12/2024    DIFFTYPE Manual 07/12/2024     BMP/CMP:   Lab Results   Component Value Date     (L) 04/17/2024    K 3.9 04/17/2024     04/17/2024    CO2 27 04/17/2024    BUN 16 04/17/2024    CREATININE 1.16 04/17/2024     (H) 04/17/2024    CALCIUM 9.4 04/17/2024    ALBUMIN 3.5 04/17/2024    AST 26 04/17/2024    ALT 16 04/17/2024    ALKPHOS 67 04/17/2024    MG 2.3 07/12/2022        IMAGING  No pertinent imaging.    ASSESSMENT  Stephane Mathis is a 78 y.o. AAM with history of MUSA, HTN, Hyperlipidemia, CKD, and AVM of colon who is referred for MUSA.    1. Iron deficiency anemia " due to chronic blood loss           PLAN    - repeat iron studies today  - keep plans for push enteroscopy 7/18    There are no Patient Instructions on file for this visit.      Orders Placed This Encounter   Procedures    CBC Auto Differential     Standing Status:   Future     Number of Occurrences:   1     Standing Expiration Date:   9/10/2025    Ferritin     Standing Status:   Future     Number of Occurrences:   1     Standing Expiration Date:   9/12/2025    Iron and TIBC     Standing Status:   Future     Number of Occurrences:   1     Standing Expiration Date:   9/12/2025         The risks and benefits of my recommendations, as well as other treatment options were discussed with the patient today. All questions were answered.    35 minutes of total time spent on the encounter, which includes face to face time and non-face to face time preparing to see the patient (eg, review of tests), obtaining and/or reviewing separately obtained history, documenting clinical information in the electronic or other health record, Independently interpreting results (not separately reported) and communicating results to the patient/family/caregiver, or care coordination (not separately reported).        Marleen Hernandez, ALLIEP/ACNP  [facility]

## 2024-07-15 ENCOUNTER — TELEPHONE (OUTPATIENT)
Dept: GASTROENTEROLOGY | Facility: CLINIC | Age: 79
End: 2024-07-15
Payer: MEDICARE

## 2024-07-15 NOTE — TELEPHONE ENCOUNTER
----- Message from EVIE Alves sent at 7/14/2024  9:12 AM CDT -----  H&H and iron studies are improved. Can consider trial off of oral iron following push enteroscopy depending on findings.

## 2024-07-18 ENCOUNTER — HOSPITAL ENCOUNTER (OUTPATIENT)
Dept: GASTROENTEROLOGY | Facility: HOSPITAL | Age: 79
Discharge: HOME | End: 2024-07-18
Attending: INTERNAL MEDICINE
Payer: MEDICARE

## 2024-07-18 DIAGNOSIS — K55.20 AVM (ARTERIOVENOUS MALFORMATION) OF COLON: Chronic | ICD-10-CM

## 2024-07-18 DIAGNOSIS — D50.0 IRON DEFICIENCY ANEMIA DUE TO CHRONIC BLOOD LOSS: ICD-10-CM

## 2024-07-18 RX ORDER — SODIUM CHLORIDE 0.9 % (FLUSH) 0.9 %
10 SYRINGE (ML) INJECTION
OUTPATIENT
Start: 2024-07-18

## 2024-07-18 NOTE — H&P
"Gastroenterology Pre-procedure H&P    History of Present Illness    Stephane Mathis is a 78 y.o. male that  has a past medical history of Acute superficial gastritis without hemorrhage (7/12/2022), Alcoholism, Anemia, AVM (arteriovenous malformation) of colon (7/13/2022), Colon, diverticulosis (7/13/2022), Hypertension, Iron deficiency anemia due to chronic blood loss (7/12/2022), Polyp, sigmoid colon (7/13/2022), Seizures, and Stroke.     Patient with recurrent anemia, small bowel AVM's here for enteroscopy      Past Medical History:   Diagnosis Date    Acute superficial gastritis without hemorrhage 7/12/2022    Alcoholism     Anemia     AVM (arteriovenous malformation) of colon 7/13/2022    Colon, diverticulosis 7/13/2022    Hypertension     Iron deficiency anemia due to chronic blood loss 7/12/2022    Polyp, sigmoid colon 7/13/2022    Seizures     Stroke     right sided weakness       Past Surgical History:   Procedure Laterality Date    CATARACT EXTRACTION W/ INTRAOCULAR LENS IMPLANT Right     KNEE SURGERY Right     LUMBAR DISCECTOMY      SCROTAL SURGERY      in february    TONSILLECTOMY         Family History   Problem Relation Name Age of Onset    Diabetes Mother      Hypertension Mother      Diabetes Sister      Diabetes Brother      Hypertension Brother      Cancer Brother      Throat cancer Brother      Alzheimer's disease Brother         Social History     Socioeconomic History    Marital status: Unknown   Tobacco Use    Smoking status: Every Day     Current packs/day: 0.50     Average packs/day: 0.5 packs/day for 58.5 years (29.3 ttl pk-yrs)     Types: Cigarettes     Start date: 1966     Passive exposure: Current    Smokeless tobacco: Never    Tobacco comments:     Tobacco quitline information sheet given    Substance and Sexual Activity    Alcohol use: Yes     Alcohol/week: 12.0 standard drinks of alcohol     Types: 6 Cans of beer, 6 Shots of liquor per week     Comment: vodka/gray goose preference. " " "couple of drinks a day or two or sometimes on weekends. "    Drug use: Yes     Types: Marijuana     Comment: daily    Sexual activity: Yes     Partners: Female     Social Determinants of Health     Financial Resource Strain: Low Risk  (1/25/2024)    Overall Financial Resource Strain (CARDIA)     Difficulty of Paying Living Expenses: Not very hard   Food Insecurity: Food Insecurity Present (1/25/2024)    Hunger Vital Sign     Worried About Running Out of Food in the Last Year: Sometimes true     Ran Out of Food in the Last Year: Sometimes true   Transportation Needs: No Transportation Needs (1/25/2024)    PRAPARE - Transportation     Lack of Transportation (Medical): No     Lack of Transportation (Non-Medical): No   Physical Activity: Insufficiently Active (1/25/2024)    Exercise Vital Sign     Days of Exercise per Week: 3 days     Minutes of Exercise per Session: 10 min   Stress: No Stress Concern Present (1/25/2024)    [employer] [place] [employer] - Occupational Stress Questionnaire     Feeling of Stress : Not at all   Housing Stability: Low Risk  (1/25/2024)    Housing Stability Vital Sign     Unable to Pay for Housing in the Last Year: No     Number of Places Lived in the Last Year: 1     Unstable Housing in the Last Year: No       Current Outpatient Medications   Medication Sig Dispense Refill    amLODIPine (NORVASC) 10 MG tablet Take 1 tablet (10 mg total) by mouth once daily. 90 tablet 1    aspirin (ECOTRIN) 81 MG EC tablet Take 1 tablet (81 mg total) by mouth once daily. 90 tablet 1    atorvastatin (LIPITOR) 40 MG tablet Take 1 tablet (40 mg total) by mouth every evening. 90 tablet 1    EAR DROPS, CARBAMIDE PEROXIDE, OTIC Place in ear(s).      EPINEPHrine (EPIPEN) 0.3 mg/0.3 mL AtIn Inject 0.3 mLs (0.3 mg total) into the muscle as needed. 1 each 0    ergocalciferol (ERGOCALCIFEROL) 50,000 unit Cap Take 1 capsule (50,000 Units total) by mouth every 7 days. 12 capsule 0    famotidine (PEPCID) 20 MG " tablet Take 1 tablet (20 mg total) by mouth 2 (two) times daily as needed for Heartburn. 60 tablet 3    ferrous sulfate (FEOSOL) 325 mg (65 mg iron) Tab tablet Take 1 tablet (325 mg total) by mouth 2 (two) times daily. 180 tablet 1    hydrALAZINE (APRESOLINE) 100 MG tablet Take 1 tablet (100 mg total) by mouth 2 (two) times a day. 180 tablet 1    hydroCHLOROthiazide (HYDRODIURIL) 25 MG tablet Take 1 tablet (25 mg total) by mouth once daily. 90 tablet 1    HYDROcodone-acetaminophen (NORCO) 5-325 mg per tablet Take 1 tablet by mouth every 8 (eight) hours as needed for Pain. 30 tablet 0    meloxicam (MOBIC) 7.5 MG tablet Take 1 tablet (7.5 mg total) by mouth once daily. 30 tablet 2    methocarbamoL (ROBAXIN) 500 MG Tab Take 1 tablet (500 mg total) by mouth 3 (three) times daily as needed (muscle spasm/pain. May cause drowsiness). 30 tablet 2    tadalafiL (CIALIS) 10 MG tablet Take 1 tablet (10 mg total) by mouth daily as needed for Erectile Dysfunction. 6 tablet 5     No current facility-administered medications for this encounter.     Facility-Administered Medications Ordered in Other Encounters   Medication Dose Route Frequency Provider Last Rate Last Admin    BUPivacaine (PF) 0.25% (2.5 mg/ml) injection 1 mL  1 mL   Zora Dunn PA   1 mL at 01/30/24 0930    triamcinolone acetonide injection 40 mg  40 mg Intra-articular  Zora Dunn PA   40 mg at 01/30/24 0930       Review of patient's allergies indicates:   Allergen Reactions    Ace inhibitors Swelling    Codeine Itching       Objective:  There were no vitals filed for this visit.     GEN: normal appearing, NAD, AAO x3  HENT: NCAT, anicteric, OP benign  CV: normal rate, regular rhythm  RESP: CTA, symmetric rise, unlabored  ABD: soft, ND, no guarding or TTP  SKIN: warm and dry  NEURO: grossly afocal    Assessment and Plan:    Proceed with:    Push Enteroscopy for recurrent anemia, small bowel AVM's      Cristobal Franklin MD  Gastroenterology

## 2024-08-06 ENCOUNTER — ANESTHESIA EVENT (OUTPATIENT)
Dept: GASTROENTEROLOGY | Facility: HOSPITAL | Age: 79
End: 2024-08-06
Payer: MEDICARE

## 2024-08-06 ENCOUNTER — HOSPITAL ENCOUNTER (OUTPATIENT)
Dept: GASTROENTEROLOGY | Facility: HOSPITAL | Age: 79
Discharge: HOME | End: 2024-08-06
Attending: INTERNAL MEDICINE
Payer: MEDICARE

## 2024-08-06 ENCOUNTER — ANESTHESIA (OUTPATIENT)
Dept: GASTROENTEROLOGY | Facility: HOSPITAL | Age: 79
End: 2024-08-06
Payer: MEDICARE

## 2024-08-06 VITALS
HEIGHT: 71 IN | BODY MASS INDEX: 22.54 KG/M2 | WEIGHT: 161 LBS | RESPIRATION RATE: 15 BRPM | TEMPERATURE: 97 F | SYSTOLIC BLOOD PRESSURE: 140 MMHG | DIASTOLIC BLOOD PRESSURE: 74 MMHG | HEART RATE: 67 BPM | OXYGEN SATURATION: 96 %

## 2024-08-06 DIAGNOSIS — K55.20 AVM (ARTERIOVENOUS MALFORMATION) OF COLON: Primary | ICD-10-CM

## 2024-08-06 DIAGNOSIS — D50.0 IRON DEFICIENCY ANEMIA DUE TO CHRONIC BLOOD LOSS: ICD-10-CM

## 2024-08-06 PROCEDURE — 37000009 HC ANESTHESIA EA ADD 15 MINS

## 2024-08-06 PROCEDURE — 25000003 PHARM REV CODE 250: Performed by: ANESTHESIOLOGY

## 2024-08-06 PROCEDURE — 37000008 HC ANESTHESIA 1ST 15 MINUTES

## 2024-08-06 PROCEDURE — 27201423 OPTIME MED/SURG SUP & DEVICES STERILE SUPPLY

## 2024-08-06 PROCEDURE — 88305 TISSUE EXAM BY PATHOLOGIST: CPT | Mod: TC,SUR | Performed by: INTERNAL MEDICINE

## 2024-08-06 PROCEDURE — 63600175 PHARM REV CODE 636 W HCPCS: Performed by: ANESTHESIOLOGY

## 2024-08-06 RX ORDER — PROPOFOL 10 MG/ML
VIAL (ML) INTRAVENOUS
Status: DISCONTINUED | OUTPATIENT
Start: 2024-08-06 | End: 2024-08-06

## 2024-08-06 RX ORDER — LIDOCAINE HYDROCHLORIDE 20 MG/ML
INJECTION, SOLUTION EPIDURAL; INFILTRATION; INTRACAUDAL; PERINEURAL
Status: DISCONTINUED | OUTPATIENT
Start: 2024-08-06 | End: 2024-08-06

## 2024-08-06 RX ORDER — SODIUM CHLORIDE 9 MG/ML
INJECTION, SOLUTION INTRAVENOUS CONTINUOUS PRN
Status: DISCONTINUED | OUTPATIENT
Start: 2024-08-06 | End: 2024-08-06

## 2024-08-06 RX ADMIN — LIDOCAINE HYDROCHLORIDE 100 MG: 20 INJECTION, SOLUTION INTRAVENOUS at 04:08

## 2024-08-06 RX ADMIN — PROPOFOL 50 MG: 10 INJECTION, EMULSION INTRAVENOUS at 04:08

## 2024-08-06 RX ADMIN — SODIUM CHLORIDE: 9 INJECTION, SOLUTION INTRAVENOUS at 04:08

## 2024-08-06 RX ADMIN — PROPOFOL 100 MG: 10 INJECTION, EMULSION INTRAVENOUS at 04:08

## 2024-08-06 RX ADMIN — PROPOFOL 20 MG: 10 INJECTION, EMULSION INTRAVENOUS at 04:08

## 2024-08-09 DIAGNOSIS — B37.81 CANDIDA ESOPHAGITIS: Primary | ICD-10-CM

## 2024-08-09 RX ORDER — FLUCONAZOLE 200 MG/1
TABLET ORAL
Qty: 15 TABLET | Refills: 0 | Status: SHIPPED | OUTPATIENT
Start: 2024-08-09 | End: 2024-08-22

## 2024-08-16 ENCOUNTER — TELEPHONE (OUTPATIENT)
Dept: GASTROENTEROLOGY | Facility: CLINIC | Age: 79
End: 2024-08-16
Payer: MEDICARE

## 2024-08-16 NOTE — TELEPHONE ENCOUNTER
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 I have sent an antibiotic to his pharmacy for treatment. I recommend he be screened for HIV and have ordered that lab - he can have it drawn at any [facility] lab at his convenience. Thank you!

## 2024-08-30 ENCOUNTER — PATIENT OUTREACH (OUTPATIENT)
Facility: HOSPITAL | Age: 79
End: 2024-08-30
Payer: MEDICARE

## 2024-09-09 ENCOUNTER — OFFICE VISIT (OUTPATIENT)
Dept: ORTHOPEDICS | Facility: CLINIC | Age: 79
End: 2024-09-09
Payer: MEDICARE

## 2024-09-09 DIAGNOSIS — M19.012 PRIMARY OSTEOARTHRITIS OF LEFT SHOULDER: ICD-10-CM

## 2024-09-09 DIAGNOSIS — M25.512 ACUTE PAIN OF LEFT SHOULDER: Primary | ICD-10-CM

## 2024-09-09 PROCEDURE — 99999 PR PBB SHADOW E&M-EST. PATIENT-LVL III: CPT | Mod: PBBFAC,,, | Performed by: NURSE PRACTITIONER

## 2024-09-09 PROCEDURE — 1159F MED LIST DOCD IN RCRD: CPT | Mod: CPTII,,, | Performed by: NURSE PRACTITIONER

## 2024-09-09 PROCEDURE — 99213 OFFICE O/P EST LOW 20 MIN: CPT | Mod: S$PBB,,, | Performed by: NURSE PRACTITIONER

## 2024-09-09 PROCEDURE — 99213 OFFICE O/P EST LOW 20 MIN: CPT | Mod: PBBFAC | Performed by: NURSE PRACTITIONER

## 2024-09-09 NOTE — PROGRESS NOTES
Stephane Mathis returns to clinic for a follow up visit regarding     ICD-10-CM ICD-9-CM   1. Acute pain of left shoulder  M25.512 719.41   2. Primary osteoarthritis of left shoulder  M19.012 715.11       Patient had an injection 7/8.   He reports the injection did give him some pain relief, but feels it is starting to wear off.  Reports he had a lot of pain starting last night.  He had an injection in January as well which lasted longer.  It is painful to even lift his arm to 90°.  He has not improved with shot anti-inflammatories and home exercise program        PAST MEDICAL HISTORY:   Past Medical History:   Diagnosis Date    Acute superficial gastritis without hemorrhage 7/12/2022    Alcoholism     Anemia     AVM (arteriovenous malformation) of colon 7/13/2022    Colon, diverticulosis 7/13/2022    Hypertension     Iron deficiency anemia due to chronic blood loss 7/12/2022    Polyp, sigmoid colon 7/13/2022    Seizures     Stroke     right sided weakness     PAST SURGICAL HISTORY:   Past Surgical History:   Procedure Laterality Date    CATARACT EXTRACTION W/ INTRAOCULAR LENS IMPLANT Right     KNEE SURGERY Right     LUMBAR DISCECTOMY      SCROTAL SURGERY      in february    TONSILLECTOMY           PHYSICAL EXAMINATION:  General    Constitutional: He is oriented to person, place, and time. He appears well-nourished.   HENT:   Head: Normocephalic and atraumatic.   Eyes: Pupils are equal, round, and reactive to light.   Neck: Neck supple.   Cardiovascular:  Normal rate and regular rhythm.            Pulmonary/Chest: Effort normal. No respiratory distress.   Abdominal: There is no abdominal tenderness. There is no guarding.   Neurological: He is alert and oriented to person, place, and time. He has normal reflexes.   Psychiatric: He has a normal mood and affect. His behavior is normal. Judgment and thought content normal.             Left Shoulder Exam     Inspection/Observation   Swelling: absent  Bruising:  absent  Deformity: absent    Tenderness   The patient is tender to palpation of the supraspinatus, acromioclavicular joint and biceps tendon.    Range of Motion   Active abduction:  abnormal   Passive abduction:  abnormal   Extension:  normal     Tests & Signs   Cross arm: positive  Impingement: positive  Belly Press: positive  Active Compression Test ([place]'s Sign): positive    Other   Sensation: normal       Vascular Exam       Left Pulses      Radial:                    2+      IMAGING:  No results found.       ASSESSMENT:      ICD-10-CM ICD-9-CM   1. Acute pain of left shoulder  M25.512 719.41   2. Primary osteoarthritis of left shoulder  M19.012 715.11       PLAN:     -Findings and treatment options were discussed with the patient  -All questions answered  Natural history and expected course discussed. Questions answered.  Educational material distributed.  Reduction in offending activity.  NSAIDs per medication orders.  Has failed conservative treatment, injection anti-inflammatories and home exercise program.  We will order MRI of left shoulder  There are no Patient Instructions on file for this visit.    No orders of the defined types were placed in this encounter.      Procedures

## 2024-09-24 ENCOUNTER — TELEPHONE (OUTPATIENT)
Dept: ORTHOPEDICS | Facility: CLINIC | Age: 79
End: 2024-09-24
Payer: MEDICARE

## 2024-09-24 NOTE — TELEPHONE ENCOUNTER
----- Message from Charley Mccarty sent at 9/24/2024  8:35 AM CDT -----  Regarding: MRI  Who Called: Stephane Mathis      Who Left Message for Patient:  Does the patient know what this is regarding?:YES      Preferred Method of Contact: Phone Call  Patient's Preferred Phone Number on File: [phone]   Best Call Back Number, if different:  Additional Information: PATIENT NEEDS TO RESCHEDULE THE MRI SCHEDULED FOR TODAY.

## 2024-09-30 ENCOUNTER — EXTERNAL CHRONIC CARE MANAGEMENT (OUTPATIENT)
Dept: FAMILY MEDICINE | Facility: CLINIC | Age: 79
End: 2024-09-30
Payer: MEDICARE

## 2024-09-30 PROCEDURE — G0511 CCM/BHI BY RHC/FQHC 20MIN MO: HCPCS | Mod: ,,, | Performed by: FAMILY MEDICINE

## 2024-10-03 ENCOUNTER — OFFICE VISIT (OUTPATIENT)
Dept: FAMILY MEDICINE | Facility: CLINIC | Age: 79
End: 2024-10-03
Payer: MEDICARE

## 2024-10-03 VITALS
SYSTOLIC BLOOD PRESSURE: 166 MMHG | RESPIRATION RATE: 17 BRPM | HEIGHT: 71 IN | OXYGEN SATURATION: 96 % | HEART RATE: 74 BPM | WEIGHT: 160 LBS | TEMPERATURE: 99 F | DIASTOLIC BLOOD PRESSURE: 69 MMHG | BODY MASS INDEX: 22.4 KG/M2

## 2024-10-03 DIAGNOSIS — Z01.812 ENCOUNTER FOR PREPROCEDURAL LABORATORY EXAMINATION: ICD-10-CM

## 2024-10-03 DIAGNOSIS — H57.89 SWELLING OF RIGHT EYE: ICD-10-CM

## 2024-10-03 DIAGNOSIS — R29.6 FALL IN ELDERLY PATIENT: Primary | ICD-10-CM

## 2024-10-03 NOTE — PROGRESS NOTES
Clinic Note    Patient Name: Stephane Mathis  : 1945  MRN: [id number]    Chief Complaint   Patient presents with    Bleeding/Bruising     Pt reports bruising around the right eye.        HPI:    Mr. Stephane Mathis is a 78 y.o. male who presents to clinic today with CC of fall last night. States he tripped over a trailer hitch and fell into gravel hitting the R side of his face/head in gravel. Reports abrasion. Denies LOC. Denies headaches. Denies blurry vision. Denies nausea/vomiting.   Reports he wasn't going to come but his daughter got on him about not getting checked out.   Blood pressure is elevated today. Reports he has not had his medication this morning. States he left home early to come up her but had a flat tire he had to deal with and that delayed him in getting here. States he left home without taking his medication.  Patient refused ER. He is not on any blood thinners.   Patient is, otherwise, without complaints.     Medications:  Medication List with Changes/Refills   Current Medications    AMLODIPINE (NORVASC) 10 MG TABLET    Take 1 tablet (10 mg total) by mouth once daily.    ASPIRIN (ECOTRIN) 81 MG EC TABLET    Take 1 tablet (81 mg total) by mouth once daily.    ATORVASTATIN (LIPITOR) 40 MG TABLET    Take 1 tablet (40 mg total) by mouth every evening.    EAR DROPS, CARBAMIDE PEROXIDE, OTIC    Place in ear(s).    EPINEPHRINE (EPIPEN) 0.3 MG/0.3 ML ATIN    Inject 0.3 mLs (0.3 mg total) into the muscle as needed.    ERGOCALCIFEROL (ERGOCALCIFEROL) 50,000 UNIT CAP    Take 1 capsule (50,000 Units total) by mouth every 7 days.    FAMOTIDINE (PEPCID) 20 MG TABLET    Take 1 tablet (20 mg total) by mouth 2 (two) times daily as needed for Heartburn.    FERROUS SULFATE (FEOSOL) 325 MG (65 MG IRON) TAB TABLET    Take 1 tablet (325 mg total) by mouth 2 (two) times daily.    HYDRALAZINE (APRESOLINE) 100 MG TABLET    Take 1 tablet (100 mg total) by mouth 2 (two) times a day.    HYDROCHLOROTHIAZIDE  "(HYDRODIURIL) 25 MG TABLET    Take 1 tablet (25 mg total) by mouth once daily.    HYDROCODONE-ACETAMINOPHEN (NORCO) 5-325 MG PER TABLET    Take 1 tablet by mouth every 8 (eight) hours as needed for Pain.    MELOXICAM (MOBIC) 7.5 MG TABLET    Take 1 tablet (7.5 mg total) by mouth once daily.    METHOCARBAMOL (ROBAXIN) 500 MG TAB    Take 1 tablet (500 mg total) by mouth 3 (three) times daily as needed (muscle spasm/pain. May cause drowsiness).    TADALAFIL (CIALIS) 10 MG TABLET    Take 1 tablet (10 mg total) by mouth daily as needed for Erectile Dysfunction.        Allergies: Ace inhibitors and Codeine      Past Medical History:    Past Medical History:   Diagnosis Date    Acute superficial gastritis without hemorrhage 7/12/2022    Alcoholism     Anemia     AVM (arteriovenous malformation) of colon 7/13/2022    Colon, diverticulosis 7/13/2022    Hypertension     Iron deficiency anemia due to chronic blood loss 7/12/2022    Polyp, sigmoid colon 7/13/2022    Seizures     Stroke     right sided weakness       Past Surgical History:    Past Surgical History:   Procedure Laterality Date    CATARACT EXTRACTION W/ INTRAOCULAR LENS IMPLANT Right     KNEE SURGERY Right     LUMBAR DISCECTOMY      SCROTAL SURGERY      in february    TONSILLECTOMY           Social History:    Social History     Tobacco Use   Smoking Status Every Day    Current packs/day: 0.50    Average packs/day: 0.5 packs/day for 58.8 years (29.4 ttl pk-yrs)    Types: Cigarettes    Start date: 1966    Passive exposure: Current   Smokeless Tobacco Never   Tobacco Comments    Tobacco quitline information sheet given      Social History     Substance and Sexual Activity   Alcohol Use Yes    Alcohol/week: 12.0 standard drinks of alcohol    Types: 6 Cans of beer, 6 Shots of liquor per week    Comment: vodka/gray goose preference. " couple of drinks a day or two or sometimes on weekends. "     Social History     Substance and Sexual Activity   Drug Use Yes    Types: " "Marijuana    Comment: daily         Family History:    Family History   Problem Relation Name Age of Onset    Diabetes Mother      Hypertension Mother      Diabetes Sister      Diabetes Brother      Hypertension Brother      Cancer Brother      Throat cancer Brother      Alzheimer's disease Brother         Review of Systems:    Review of Systems   Constitutional:  Negative for appetite change, chills, fatigue, fever and unexpected weight change.   Eyes:  Negative for visual disturbance.   Respiratory:  Negative for cough and shortness of breath.    Cardiovascular:  Negative for chest pain and leg swelling.   Gastrointestinal:  Negative for abdominal pain, change in bowel habit, constipation, diarrhea, nausea and vomiting.   Musculoskeletal:  Negative for arthralgias.   Integumentary:  Positive for wound. Negative for rash.   Neurological:  Negative for dizziness and headaches.   Psychiatric/Behavioral:  The patient is not nervous/anxious.         Vitals:    Vitals:    10/03/24 1059   BP: (!) 191/85   BP Location: Left arm   Patient Position: Sitting   Pulse: 74   Resp: 17   Temp: 98.6 °F (37 °C)   TempSrc: Oral   SpO2: 96%   Weight: 72.6 kg (160 lb)   Height: 5' 11" (1.803 m)       Body mass index is 22.32 kg/m².    Wt Readings from Last 3 Encounters:   10/03/24 1059 72.6 kg (160 lb)   08/06/24 1518 73 kg (161 lb)   07/12/24 1011 73.2 kg (161 lb 6.4 oz)        Physical Exam:    Physical Exam  Constitutional:       General: He is not in acute distress.     Appearance: Normal appearance.   HENT:      Nose: Nose normal.      Mouth/Throat:      Mouth: Mucous membranes are moist.      Pharynx: Oropharynx is clear.   Eyes:      Conjunctiva/sclera: Conjunctivae normal.   Cardiovascular:      Rate and Rhythm: Normal rate and regular rhythm.      Heart sounds: Normal heart sounds. No murmur heard.  Pulmonary:      Effort: Pulmonary effort is normal. No respiratory distress.      Breath sounds: Normal breath sounds. No " wheezing, rhonchi or rales.   Abdominal:      General: Bowel sounds are normal.      Palpations: Abdomen is soft.      Tenderness: There is no abdominal tenderness.   Musculoskeletal:      Cervical back: Neck supple.      Right lower leg: No edema.      Left lower leg: No edema.   Skin:     Findings: Bruising present. No rash.      Comments: + bruising, abrasions, swelling around R eye   Neurological:      General: No focal deficit present.      Mental Status: He is alert and oriented to person, place, and time. Mental status is at baseline.      Cranial Nerves: No cranial nerve deficit.      Motor: No weakness.      Gait: Gait normal.   Psychiatric:         Mood and Affect: Mood normal.         Assessment/Plan:   1. Fall in elderly patient  -     CT Maxillofacial W WO Contrast; Future; Expected date: 10/03/2024  -     CT Head Without Contrast; Future; Expected date: 10/03/2024    2. Swelling of right eye  -     CT Maxillofacial W WO Contrast; Future; Expected date: 10/03/2024  -     CT Head Without Contrast; Future; Expected date: 10/03/2024    3. Encounter for preprocedural laboratory examination  -     Creatinine, Serum; Future; Expected date: 10/03/2024     Refused to go to ER. States he could not go to get any imaging done today because he is going to get his tire fixed. CT ordered as above. Advised if symptoms worsen or new symptoms develop in interim to go to ER. Voiced understanding.    Active Problem List with Overview Notes    Diagnosis Date Noted    Essential hypertension 07/12/2022    Seizure 06/10/2022    Iron deficiency anemia due to chronic blood loss 07/12/2022    AVM (arteriovenous malformation) of colon 07/13/2022    Continuous dependence on cigarette smoking 07/12/2022    Colon, diverticulosis 07/13/2022    Polyp, sigmoid colon 07/13/2022    Chronic kidney disease, stage 3a 04/08/2024    Primary osteoarthritis of left shoulder 02/06/2024    Mixed hyperlipidemia 10/04/2023    Primary osteoarthritis  involving multiple joints 10/04/2023    Muscle spasm 10/04/2023    Erectile dysfunction 10/04/2023    Benign prostatic hyperplasia without lower urinary tract symptoms 10/04/2023    Vitamin D deficiency 10/04/2023        RTC as scheduled for chronic follow up. RTC sooner if needed.  Patient voiced understanding and is agreeable to plan.      Pebbles Dickerson MD    Family Medicine

## 2024-10-08 ENCOUNTER — OFFICE VISIT (OUTPATIENT)
Dept: FAMILY MEDICINE | Facility: CLINIC | Age: 79
End: 2024-10-08
Payer: MEDICARE

## 2024-10-08 VITALS
BODY MASS INDEX: 21.84 KG/M2 | TEMPERATURE: 98 F | HEIGHT: 71 IN | OXYGEN SATURATION: 98 % | DIASTOLIC BLOOD PRESSURE: 72 MMHG | HEART RATE: 67 BPM | SYSTOLIC BLOOD PRESSURE: 149 MMHG | RESPIRATION RATE: 18 BRPM | WEIGHT: 156 LBS

## 2024-10-08 DIAGNOSIS — E78.2 MIXED HYPERLIPIDEMIA: ICD-10-CM

## 2024-10-08 DIAGNOSIS — I10 ESSENTIAL HYPERTENSION: Primary | ICD-10-CM

## 2024-10-08 DIAGNOSIS — Z01.812 ENCOUNTER FOR PREPROCEDURAL LABORATORY EXAMINATION: ICD-10-CM

## 2024-10-08 DIAGNOSIS — Z23 IMMUNIZATION DUE: ICD-10-CM

## 2024-10-08 DIAGNOSIS — Z23 FLU VACCINE NEED: ICD-10-CM

## 2024-10-08 DIAGNOSIS — R56.9 SEIZURE: Chronic | ICD-10-CM

## 2024-10-08 DIAGNOSIS — E55.9 VITAMIN D DEFICIENCY: Chronic | ICD-10-CM

## 2024-10-08 DIAGNOSIS — N18.31 CHRONIC KIDNEY DISEASE, STAGE 3A: ICD-10-CM

## 2024-10-08 DIAGNOSIS — D50.0 IRON DEFICIENCY ANEMIA DUE TO CHRONIC BLOOD LOSS: ICD-10-CM

## 2024-10-08 PROCEDURE — G0008 ADMIN INFLUENZA VIRUS VAC: HCPCS | Mod: ,,, | Performed by: FAMILY MEDICINE

## 2024-10-08 PROCEDURE — 99214 OFFICE O/P EST MOD 30 MIN: CPT | Mod: 25,,, | Performed by: FAMILY MEDICINE

## 2024-10-08 PROCEDURE — 1159F MED LIST DOCD IN RCRD: CPT | Mod: ,,, | Performed by: FAMILY MEDICINE

## 2024-10-08 PROCEDURE — 85025 COMPLETE CBC W/AUTO DIFF WBC: CPT | Mod: ,,, | Performed by: CLINICAL MEDICAL LABORATORY

## 2024-10-08 PROCEDURE — 1126F AMNT PAIN NOTED NONE PRSNT: CPT | Mod: ,,, | Performed by: FAMILY MEDICINE

## 2024-10-08 PROCEDURE — 83540 ASSAY OF IRON: CPT | Mod: ,,, | Performed by: CLINICAL MEDICAL LABORATORY

## 2024-10-08 PROCEDURE — 82043 UR ALBUMIN QUANTITATIVE: CPT | Mod: ,,, | Performed by: CLINICAL MEDICAL LABORATORY

## 2024-10-08 PROCEDURE — 3078F DIAST BP <80 MM HG: CPT | Mod: ,,, | Performed by: FAMILY MEDICINE

## 2024-10-08 PROCEDURE — 1100F PTFALLS ASSESS-DOCD GE2>/YR: CPT | Mod: ,,, | Performed by: FAMILY MEDICINE

## 2024-10-08 PROCEDURE — 82306 VITAMIN D 25 HYDROXY: CPT | Mod: ,,, | Performed by: CLINICAL MEDICAL LABORATORY

## 2024-10-08 PROCEDURE — 83550 IRON BINDING TEST: CPT | Mod: ,,, | Performed by: CLINICAL MEDICAL LABORATORY

## 2024-10-08 PROCEDURE — 3288F FALL RISK ASSESSMENT DOCD: CPT | Mod: ,,, | Performed by: FAMILY MEDICINE

## 2024-10-08 PROCEDURE — 3077F SYST BP >= 140 MM HG: CPT | Mod: ,,, | Performed by: FAMILY MEDICINE

## 2024-10-08 PROCEDURE — 82728 ASSAY OF FERRITIN: CPT | Mod: ,,, | Performed by: CLINICAL MEDICAL LABORATORY

## 2024-10-08 PROCEDURE — 90653 IIV ADJUVANT VACCINE IM: CPT | Mod: ,,, | Performed by: FAMILY MEDICINE

## 2024-10-08 PROCEDURE — 82570 ASSAY OF URINE CREATININE: CPT | Mod: ,,, | Performed by: CLINICAL MEDICAL LABORATORY

## 2024-10-08 PROCEDURE — 80053 COMPREHEN METABOLIC PANEL: CPT | Mod: ,,, | Performed by: CLINICAL MEDICAL LABORATORY

## 2024-10-08 PROCEDURE — 80061 LIPID PANEL: CPT | Mod: ,,, | Performed by: CLINICAL MEDICAL LABORATORY

## 2024-10-08 NOTE — PROGRESS NOTES
Clinic Note    Patient Name: Stephane Mathis  : 1945  MRN: [id number]    Chief Complaint   Patient presents with    Follow-up     4 month; HTN    Health Maintenance     TETANUS VACCINE Never done  Shingles Vaccine(1 of 2) Never done  RSV Vaccine (Age 60+ and Pregnant patients)(1 - 1-dose 75+ series) Never done  Influenza Vaccine(1) due on 2024  COVID-19 Vaccine( season) due on 2024         HPI:    Mr. Stephane Mathis is a 78 y.o. male who presents to clinic today with CC of follow up on chronic disease processes including HTN, seizure DO, CKD stage 3a, HLD, iron deficiency anemia, OA, BPH, erectile dysfunction, and vitamin D deficiency.   Patient fell recently. Imaging ordered and scheduled. Patient refused ER. Today, he reports she feels like he is healing up well from his fall.  BP is elevated today. He feels like his blood pressure is well controlled. He declined medication adjustments. Agreeable to home health.  Patient reports chronic issues are well controlled on current medication regimen.  Denies problems or side effects with medications.  Patient is, otherwise, without complaints.     Medications:  Medication List with Changes/Refills   Current Medications    AMLODIPINE (NORVASC) 10 MG TABLET    Take 1 tablet (10 mg total) by mouth once daily.    ASPIRIN (ECOTRIN) 81 MG EC TABLET    Take 1 tablet (81 mg total) by mouth once daily.    ATORVASTATIN (LIPITOR) 40 MG TABLET    Take 1 tablet (40 mg total) by mouth every evening.    EAR DROPS, CARBAMIDE PEROXIDE, OTIC    Place in ear(s).    EPINEPHRINE (EPIPEN) 0.3 MG/0.3 ML ATIN    Inject 0.3 mLs (0.3 mg total) into the muscle as needed.    ERGOCALCIFEROL (ERGOCALCIFEROL) 50,000 UNIT CAP    Take 1 capsule (50,000 Units total) by mouth every 7 days.    FAMOTIDINE (PEPCID) 20 MG TABLET    Take 1 tablet (20 mg total) by mouth 2 (two) times daily as needed for Heartburn.    FERROUS SULFATE (FEOSOL) 325 MG (65 MG IRON) TAB TABLET    Take  1 tablet (325 mg total) by mouth 2 (two) times daily.    HYDRALAZINE (APRESOLINE) 100 MG TABLET    Take 1 tablet (100 mg total) by mouth 2 (two) times a day.    HYDROCHLOROTHIAZIDE (HYDRODIURIL) 25 MG TABLET    Take 1 tablet (25 mg total) by mouth once daily.    HYDROCODONE-ACETAMINOPHEN (NORCO) 5-325 MG PER TABLET    Take 1 tablet by mouth every 8 (eight) hours as needed for Pain.    MELOXICAM (MOBIC) 7.5 MG TABLET    Take 1 tablet (7.5 mg total) by mouth once daily.    METHOCARBAMOL (ROBAXIN) 500 MG TAB    Take 1 tablet (500 mg total) by mouth 3 (three) times daily as needed (muscle spasm/pain. May cause drowsiness).    TADALAFIL (CIALIS) 10 MG TABLET    Take 1 tablet (10 mg total) by mouth daily as needed for Erectile Dysfunction.        Allergies: Ace inhibitors and Codeine      Past Medical History:    Past Medical History:   Diagnosis Date    Acute superficial gastritis without hemorrhage 7/12/2022    Alcoholism     Anemia     AVM (arteriovenous malformation) of colon 7/13/2022    Colon, diverticulosis 7/13/2022    Hypertension     Iron deficiency anemia due to chronic blood loss 7/12/2022    Polyp, sigmoid colon 7/13/2022    Seizures     Stroke     right sided weakness       Past Surgical History:    Past Surgical History:   Procedure Laterality Date    CATARACT EXTRACTION W/ INTRAOCULAR LENS IMPLANT Right     KNEE SURGERY Right     LUMBAR DISCECTOMY      SCROTAL SURGERY      in february    TONSILLECTOMY           Social History:    Social History     Tobacco Use   Smoking Status Every Day    Current packs/day: 0.50    Average packs/day: 0.5 packs/day for 58.8 years (29.4 ttl pk-yrs)    Types: Cigarettes    Start date: 1966    Passive exposure: Current   Smokeless Tobacco Never   Tobacco Comments    Tobacco quitline information sheet given      Social History     Substance and Sexual Activity   Alcohol Use Yes    Alcohol/week: 12.0 standard drinks of alcohol    Types: 6 Cans of beer, 6 Shots of liquor per  "week    Comment: vodka/gray goose preference. " couple of drinks a day or two or sometimes on weekends. "     Social History     Substance and Sexual Activity   Drug Use Yes    Types: Marijuana    Comment: daily         Family History:    Family History   Problem Relation Name Age of Onset    Diabetes Mother      Hypertension Mother      Diabetes Sister      Diabetes Brother      Hypertension Brother      Cancer Brother      Throat cancer Brother      Alzheimer's disease Brother         Review of Systems:    Review of Systems   Constitutional:  Negative for appetite change, chills, fatigue, fever and unexpected weight change.   Eyes:  Negative for visual disturbance.   Respiratory:  Negative for cough and shortness of breath.    Cardiovascular:  Negative for chest pain and leg swelling.   Gastrointestinal:  Negative for abdominal pain, change in bowel habit, constipation, diarrhea, nausea and vomiting.   Musculoskeletal:  Negative for arthralgias.   Integumentary:  Negative for rash.   Neurological:  Negative for dizziness and headaches.   Psychiatric/Behavioral:  The patient is not nervous/anxious.         Vitals:    Vitals:    10/08/24 1332 10/08/24 1403   BP: (!) 149/73 (!) 149/72   BP Location: Left arm Left arm   Patient Position: Sitting Sitting   Pulse: 67    Resp: 18    Temp: 98.1 °F (36.7 °C)    TempSrc: Oral    SpO2: 98%    Weight: 70.8 kg (156 lb)    Height: 5' 11" (1.803 m)        Body mass index is 21.76 kg/m².    Wt Readings from Last 3 Encounters:   10/08/24 1332 70.8 kg (156 lb)   10/03/24 1059 72.6 kg (160 lb)   08/06/24 1518 73 kg (161 lb)        Physical Exam:    Physical Exam  Constitutional:       General: He is not in acute distress.     Appearance: Normal appearance.   HENT:      Nose: Nose normal.      Mouth/Throat:      Mouth: Mucous membranes are moist.      Pharynx: Oropharynx is clear.   Eyes:      Conjunctiva/sclera: Conjunctivae normal.   Cardiovascular:      Rate and Rhythm: Normal " rate and regular rhythm.      Heart sounds: Normal heart sounds. No murmur heard.  Pulmonary:      Effort: Pulmonary effort is normal. No respiratory distress.      Breath sounds: Normal breath sounds. No wheezing, rhonchi or rales.   Abdominal:      General: Bowel sounds are normal.      Palpations: Abdomen is soft.      Tenderness: There is no abdominal tenderness.   Musculoskeletal:      Cervical back: Neck supple.      Right lower leg: No edema.      Left lower leg: No edema.   Skin:     Findings: No rash.   Neurological:      General: No focal deficit present.      Mental Status: He is alert. Mental status is at baseline.   Psychiatric:         Mood and Affect: Mood normal.       Assessment/Plan:   1. Essential hypertension  -     Ambulatory referral/consult to Home Health; Future; Expected date: 10/09/2024  -     CBC Auto Differential; Future; Expected date: 10/08/2024  -     Comprehensive Metabolic Panel; Future; Expected date: 10/08/2024  -     Lipid Panel; Future; Expected date: 10/08/2024  -     Microalbumin/Creatinine Ratio, Urine  - DASH diet and exercise    2. Flu vaccine need  -     influenza (adjuvanted) (Fluad) 45 mcg/0.5 mL IM vaccine (> or = 66 yo) 0.5 mL    3. Immunization due  - Flu vaccine given    4. Seizure  - Continue current medications and plan. Denies any recent seizures    5. Iron deficiency anemia due to chronic blood loss  -     CBC Auto Differential; Future; Expected date: 10/08/2024  -     Iron and TIBC  -     Ferritin; Future; Expected date: 10/08/2024    6. Mixed hyperlipidemia  -     CBC Auto Differential; Future; Expected date: 10/08/2024  -     Comprehensive Metabolic Panel; Future; Expected date: 10/08/2024  -     Lipid Panel; Future; Expected date: 10/08/2024    7. Chronic kidney disease, stage 3a  -     CBC Auto Differential; Future; Expected date: 10/08/2024  -     Comprehensive Metabolic Panel; Future; Expected date: 10/08/2024  -     Microalbumin/Creatinine Ratio,  Urine    8. Vitamin D deficiency  -     Vitamin D; Future; Expected date: 10/08/2024    9. Encounter for preprocedural laboratory examination  -    Creatinine, Serum         Active Problem List with Overview Notes    Diagnosis Date Noted    Essential hypertension 07/12/2022    Seizure 06/10/2022    Iron deficiency anemia due to chronic blood loss 07/12/2022    AVM (arteriovenous malformation) of colon 07/13/2022    Continuous dependence on cigarette smoking 07/12/2022    Colon, diverticulosis 07/13/2022    Polyp, sigmoid colon 07/13/2022    Chronic kidney disease, stage 3a 04/08/2024    Primary osteoarthritis of left shoulder 02/06/2024    Mixed hyperlipidemia 10/04/2023    Primary osteoarthritis involving multiple joints 10/04/2023    Muscle spasm 10/04/2023    Erectile dysfunction 10/04/2023    Benign prostatic hyperplasia without lower urinary tract symptoms 10/04/2023    Vitamin D deficiency 10/04/2023        Health Maintenance:  Health Maintenance   Topic Date Due    TETANUS VACCINE  Never done    Shingles Vaccine (1 of 2) Never done    LDCT Lung Screen  10/19/2024    Lipid Panel  10/04/2028    Hepatitis C Screening  Completed       RTC in 4 months for chronic follow up.  RTC sooner if needed.   Patient voiced understanding and is agreeable to plan.      Pebbles Dickerson MD    Family Medicine

## 2024-10-09 ENCOUNTER — HOSPITAL ENCOUNTER (OUTPATIENT)
Dept: RADIOLOGY | Facility: HOSPITAL | Age: 79
Discharge: HOME | End: 2024-10-09
Attending: NURSE PRACTITIONER
Payer: MEDICARE

## 2024-10-09 DIAGNOSIS — M25.512 ACUTE PAIN OF LEFT SHOULDER: ICD-10-CM

## 2024-10-09 LAB
25(OH)D3 SERPL-MCNC: 28.7 NG/ML
ALBUMIN SERPL BCP-MCNC: 3.4 G/DL (ref 3.5–5)
ALBUMIN/GLOB SERPL: 0.8 {RATIO}
ALP SERPL-CCNC: 72 U/L (ref 45–115)
ALT SERPL W P-5'-P-CCNC: 22 U/L (ref 16–61)
ANION GAP SERPL CALCULATED.3IONS-SCNC: 9 MMOL/L (ref 7–16)
AST SERPL W P-5'-P-CCNC: 20 U/L (ref 15–37)
BASOPHILS # BLD AUTO: 0.05 K/UL (ref 0–0.2)
BASOPHILS NFR BLD AUTO: 0.6 % (ref 0–1)
BILIRUB SERPL-MCNC: 0.3 MG/DL (ref ?–1.2)
BUN SERPL-MCNC: 14 MG/DL (ref 7–18)
BUN/CREAT SERPL: 11 (ref 6–20)
CALCIUM SERPL-MCNC: 9.2 MG/DL (ref 8.5–10.1)
CHLORIDE SERPL-SCNC: 101 MMOL/L (ref 98–107)
CHOLEST SERPL-MCNC: 193 MG/DL (ref 0–200)
CHOLEST/HDLC SERPL: 2.2 {RATIO}
CO2 SERPL-SCNC: 28 MMOL/L (ref 21–32)
CREAT SERPL-MCNC: 1.28 MG/DL (ref 0.7–1.3)
CREAT UR-MCNC: 238 MG/DL (ref 39–259)
DIFFERENTIAL METHOD BLD: ABNORMAL
EGFR (NO RACE VARIABLE) (RUSH/TITUS): 58 ML/MIN/1.73M2
EOSINOPHIL # BLD AUTO: 0.11 K/UL (ref 0–0.5)
EOSINOPHIL NFR BLD AUTO: 1.4 % (ref 1–4)
ERYTHROCYTE [DISTWIDTH] IN BLOOD BY AUTOMATED COUNT: 15.6 % (ref 11.5–14.5)
FERRITIN SERPL-MCNC: 14 NG/ML (ref 26–388)
GLOBULIN SER-MCNC: 4.4 G/DL (ref 2–4)
GLUCOSE SERPL-MCNC: 77 MG/DL (ref 74–106)
HCT VFR BLD AUTO: 29 % (ref 40–54)
HDLC SERPL-MCNC: 88 MG/DL (ref 40–60)
HGB BLD-MCNC: 8.9 G/DL (ref 13.5–18)
IMM GRANULOCYTES # BLD AUTO: 0.03 K/UL (ref 0–0.04)
IMM GRANULOCYTES NFR BLD: 0.4 % (ref 0–0.4)
IRON SATN MFR SERPL: 6 % (ref 14–50)
IRON SERPL-MCNC: 21 ΜG/DL (ref 65–175)
LDLC SERPL CALC-MCNC: 79 MG/DL
LYMPHOCYTES # BLD AUTO: 1.66 K/UL (ref 1–4.8)
LYMPHOCYTES NFR BLD AUTO: 21.3 % (ref 27–41)
MCH RBC QN AUTO: 30.2 PG (ref 27–31)
MCHC RBC AUTO-ENTMCNC: 30.7 G/DL (ref 32–36)
MCV RBC AUTO: 98.3 FL (ref 80–96)
MICROALBUMIN UR-MCNC: 2.1 MG/DL (ref 0–2.8)
MICROALBUMIN/CREAT RATIO PNL UR: 8.8 MG/G (ref 0–30)
MONOCYTES # BLD AUTO: 0.79 K/UL (ref 0–0.8)
MONOCYTES NFR BLD AUTO: 10.1 % (ref 2–6)
MPC BLD CALC-MCNC: 11.9 FL (ref 9.4–12.4)
NEUTROPHILS # BLD AUTO: 5.17 K/UL (ref 1.8–7.7)
NEUTROPHILS NFR BLD AUTO: 66.2 % (ref 53–65)
NONHDLC SERPL-MCNC: 105 MG/DL
NRBC # BLD AUTO: 0 X10E3/UL
NRBC, AUTO (.00): 0 %
PLATELET # BLD AUTO: 322 K/UL (ref 150–400)
POTASSIUM SERPL-SCNC: 3.3 MMOL/L (ref 3.5–5.1)
PROT SERPL-MCNC: 7.8 G/DL (ref 6.4–8.2)
RBC # BLD AUTO: 2.95 M/UL (ref 4.6–6.2)
SODIUM SERPL-SCNC: 135 MMOL/L (ref 136–145)
TIBC SERPL-MCNC: 374 ΜG/DL (ref 250–450)
TRIGL SERPL-MCNC: 128 MG/DL (ref 35–150)
VLDLC SERPL-MCNC: 26 MG/DL
WBC # BLD AUTO: 7.81 K/UL (ref 4.5–11)

## 2024-10-09 PROCEDURE — 73221 MRI JOINT UPR EXTREM W/O DYE: CPT | Mod: TC,LT

## 2024-10-09 PROCEDURE — 73221 MRI JOINT UPR EXTREM W/O DYE: CPT | Mod: 26,LT,, | Performed by: RADIOLOGY

## 2024-10-10 ENCOUNTER — HOSPITAL ENCOUNTER (OUTPATIENT)
Dept: RADIOLOGY | Facility: HOSPITAL | Age: 79
Discharge: HOME | End: 2024-10-10
Attending: FAMILY MEDICINE
Payer: MEDICARE

## 2024-10-10 ENCOUNTER — TELEPHONE (OUTPATIENT)
Dept: FAMILY MEDICINE | Facility: CLINIC | Age: 79
End: 2024-10-10
Payer: MEDICARE

## 2024-10-10 DIAGNOSIS — H57.89 SWELLING OF RIGHT EYE: ICD-10-CM

## 2024-10-10 DIAGNOSIS — R29.6 FALL IN ELDERLY PATIENT: ICD-10-CM

## 2024-10-10 DIAGNOSIS — I10 ESSENTIAL HYPERTENSION: Primary | ICD-10-CM

## 2024-10-10 PROCEDURE — 25500020 PHARM REV CODE 255: Performed by: FAMILY MEDICINE

## 2024-10-10 PROCEDURE — 70450 CT HEAD/BRAIN W/O DYE: CPT | Mod: 26,,, | Performed by: RADIOLOGY

## 2024-10-10 PROCEDURE — 70488 CT MAXILLOFACIAL W/O & W/DYE: CPT | Mod: TC

## 2024-10-10 PROCEDURE — 70450 CT HEAD/BRAIN W/O DYE: CPT | Mod: TC

## 2024-10-10 RX ORDER — IOPAMIDOL 755 MG/ML
100 INJECTION, SOLUTION INTRAVASCULAR
Status: COMPLETED | OUTPATIENT
Start: 2024-10-10 | End: 2024-10-10

## 2024-10-10 RX ADMIN — IOPAMIDOL 85 ML: 755 INJECTION, SOLUTION INTRAVENOUS at 08:10

## 2024-10-11 DIAGNOSIS — E87.6 HYPOKALEMIA: Primary | ICD-10-CM

## 2024-10-11 RX ORDER — ERGOCALCIFEROL 1.25 MG/1
50000 CAPSULE ORAL
Qty: 12 CAPSULE | Refills: 0 | Status: SHIPPED | OUTPATIENT
Start: 2024-10-11

## 2024-10-11 RX ORDER — POTASSIUM CHLORIDE 750 MG/1
10 TABLET, EXTENDED RELEASE ORAL DAILY
Qty: 90 TABLET | Refills: 0 | Status: SHIPPED | OUTPATIENT
Start: 2024-10-11

## 2024-10-11 NOTE — TELEPHONE ENCOUNTER
Call made to pt daughter/caregiver regarding lab results. Voiced understanding and states she will let him know

## 2024-10-11 NOTE — TELEPHONE ENCOUNTER
----- Message from EVIE Rivera sent at 10/11/2024  8:16 AM CDT -----  Iron is low. Significant decrease from previous level. Is he taking iron supplement as ordered?  Potassium mildly low. Add micro k 10 meq po daily. Increase potassium in diet. Repeat bmp lab only in 2-3 weeks.  Vit d is low. Add vit d 50,000 weekly X 12 weeks

## 2024-10-15 ENCOUNTER — TELEPHONE (OUTPATIENT)
Dept: FAMILY MEDICINE | Facility: CLINIC | Age: 79
End: 2024-10-15
Payer: MEDICARE

## 2024-10-15 NOTE — TELEPHONE ENCOUNTER
----- Message from Sierra Dickerson MD sent at 10/15/2024 12:09 PM CDT -----  CT head is ok without evidence of bleed from recent fall. No acute findings. Thanks!

## 2024-10-15 NOTE — TELEPHONE ENCOUNTER
Call made to pt regarding CT results. Informed of pt results. Pt voiced understanding. Pt states [facility] came out and tried to admit him this past Saturday. Informed pt that the referral was placed for [facility]. Pt states [facility] is the home health agency that he signed up for.

## 2024-10-29 ENCOUNTER — OFFICE VISIT (OUTPATIENT)
Dept: ORTHOPEDICS | Facility: CLINIC | Age: 79
End: 2024-10-29
Payer: MEDICARE

## 2024-10-29 DIAGNOSIS — M75.122 COMPLETE TEAR OF LEFT ROTATOR CUFF, UNSPECIFIED WHETHER TRAUMATIC: Primary | ICD-10-CM

## 2024-10-29 DIAGNOSIS — Z01.811 PRE-OPERATIVE RESPIRATORY EXAMINATION: ICD-10-CM

## 2024-10-29 DIAGNOSIS — Z01.810 PRE-OPERATIVE CARDIOVASCULAR EXAMINATION: ICD-10-CM

## 2024-10-29 DIAGNOSIS — Z01.812 PRE-OPERATIVE LABORATORY EXAMINATION: ICD-10-CM

## 2024-10-29 PROCEDURE — 99999 PR PBB SHADOW E&M-EST. PATIENT-LVL III: CPT | Mod: PBBFAC,,, | Performed by: ORTHOPAEDIC SURGERY

## 2024-10-29 PROCEDURE — 99213 OFFICE O/P EST LOW 20 MIN: CPT | Mod: PBBFAC | Performed by: ORTHOPAEDIC SURGERY

## 2024-10-30 ENCOUNTER — TELEPHONE (OUTPATIENT)
Dept: FAMILY MEDICINE | Facility: CLINIC | Age: 79
End: 2024-10-30
Payer: MEDICARE

## 2024-11-07 DIAGNOSIS — M25.512 ACUTE PAIN OF LEFT SHOULDER: ICD-10-CM

## 2024-11-07 DIAGNOSIS — M15.0 PRIMARY OSTEOARTHRITIS INVOLVING MULTIPLE JOINTS: Chronic | ICD-10-CM

## 2024-11-07 RX ORDER — HYDROCODONE BITARTRATE AND ACETAMINOPHEN 5; 325 MG/1; MG/1
1 TABLET ORAL EVERY 8 HOURS PRN
Qty: 30 TABLET | Refills: 0 | OUTPATIENT
Start: 2024-11-07

## 2024-11-07 NOTE — TELEPHONE ENCOUNTER
----- Message from Tayler sent at 11/7/2024  3:39 PM CST -----  Regarding: Refill  This pt is wanting a refill on his HYDROcodone-acetaminophen (NORCO) 5-325 mg per tablet called in to [facility] pharmacy. I told the pt that he may need an appt before that could be called in since it is a narcotic but I would check.

## 2024-11-12 ENCOUNTER — CLINICAL SUPPORT (OUTPATIENT)
Dept: CARDIOLOGY | Facility: CLINIC | Age: 79
End: 2024-11-12
Payer: MEDICARE

## 2024-11-12 ENCOUNTER — HOSPITAL ENCOUNTER (OUTPATIENT)
Dept: RADIOLOGY | Facility: HOSPITAL | Age: 79
Discharge: HOME | End: 2024-11-12
Attending: ORTHOPAEDIC SURGERY
Payer: MEDICARE

## 2024-11-12 DIAGNOSIS — Z01.810 PRE-OPERATIVE CARDIOVASCULAR EXAMINATION: ICD-10-CM

## 2024-11-12 DIAGNOSIS — Z01.811 PRE-OPERATIVE RESPIRATORY EXAMINATION: ICD-10-CM

## 2024-11-12 PROCEDURE — 99212 OFFICE O/P EST SF 10 MIN: CPT | Mod: PBBFAC,25

## 2024-11-12 PROCEDURE — 71046 X-RAY EXAM CHEST 2 VIEWS: CPT | Mod: TC

## 2024-11-12 PROCEDURE — 99999 PR PBB SHADOW E&M-EST. PATIENT-LVL II: CPT | Mod: PBBFAC,,,

## 2024-11-12 PROCEDURE — 93010 ELECTROCARDIOGRAM REPORT: CPT | Mod: S$PBB,,, | Performed by: STUDENT IN AN ORGANIZED HEALTH CARE EDUCATION/TRAINING PROGRAM

## 2024-11-12 PROCEDURE — 93005 ELECTROCARDIOGRAM TRACING: CPT | Mod: PBBFAC | Performed by: STUDENT IN AN ORGANIZED HEALTH CARE EDUCATION/TRAINING PROGRAM

## 2024-11-12 PROCEDURE — 71046 X-RAY EXAM CHEST 2 VIEWS: CPT | Mod: 26,,, | Performed by: RADIOLOGY

## 2024-11-13 ENCOUNTER — EXTERNAL HOME HEALTH (OUTPATIENT)
Dept: HOME HEALTH SERVICES | Facility: HOSPITAL | Age: 79
End: 2024-11-13
Payer: MEDICARE

## 2024-11-13 ENCOUNTER — OFFICE VISIT (OUTPATIENT)
Dept: FAMILY MEDICINE | Facility: CLINIC | Age: 79
End: 2024-11-13
Payer: MEDICARE

## 2024-11-13 VITALS
WEIGHT: 158.63 LBS | DIASTOLIC BLOOD PRESSURE: 73 MMHG | RESPIRATION RATE: 20 BRPM | BODY MASS INDEX: 22.21 KG/M2 | HEIGHT: 71 IN | TEMPERATURE: 98 F | OXYGEN SATURATION: 98 % | SYSTOLIC BLOOD PRESSURE: 149 MMHG | HEART RATE: 64 BPM

## 2024-11-13 DIAGNOSIS — I10 ESSENTIAL HYPERTENSION: ICD-10-CM

## 2024-11-13 DIAGNOSIS — M15.0 PRIMARY OSTEOARTHRITIS INVOLVING MULTIPLE JOINTS: Chronic | ICD-10-CM

## 2024-11-13 DIAGNOSIS — M25.512 ACUTE PAIN OF LEFT SHOULDER: ICD-10-CM

## 2024-11-13 PROCEDURE — 1101F PT FALLS ASSESS-DOCD LE1/YR: CPT | Mod: ,,, | Performed by: FAMILY MEDICINE

## 2024-11-13 PROCEDURE — 3077F SYST BP >= 140 MM HG: CPT | Mod: ,,, | Performed by: FAMILY MEDICINE

## 2024-11-13 PROCEDURE — 3078F DIAST BP <80 MM HG: CPT | Mod: ,,, | Performed by: FAMILY MEDICINE

## 2024-11-13 PROCEDURE — 99213 OFFICE O/P EST LOW 20 MIN: CPT | Mod: ,,, | Performed by: FAMILY MEDICINE

## 2024-11-13 PROCEDURE — 1159F MED LIST DOCD IN RCRD: CPT | Mod: ,,, | Performed by: FAMILY MEDICINE

## 2024-11-13 PROCEDURE — 3288F FALL RISK ASSESSMENT DOCD: CPT | Mod: ,,, | Performed by: FAMILY MEDICINE

## 2024-11-13 RX ORDER — HYDROCHLOROTHIAZIDE 25 MG/1
25 TABLET ORAL DAILY
Qty: 90 TABLET | Refills: 1 | Status: SHIPPED | OUTPATIENT
Start: 2024-11-13

## 2024-11-13 RX ORDER — HYDROCODONE BITARTRATE AND ACETAMINOPHEN 5; 325 MG/1; MG/1
1 TABLET ORAL EVERY 8 HOURS PRN
Qty: 30 TABLET | Refills: 0 | Status: SHIPPED | OUTPATIENT
Start: 2024-11-13

## 2024-11-13 RX ORDER — AMLODIPINE BESYLATE 10 MG/1
10 TABLET ORAL DAILY
Qty: 90 TABLET | Refills: 1 | Status: SHIPPED | OUTPATIENT
Start: 2024-11-13

## 2024-11-13 RX ORDER — MELOXICAM 7.5 MG/1
7.5 TABLET ORAL DAILY
Qty: 30 TABLET | Refills: 2 | Status: SHIPPED | OUTPATIENT
Start: 2024-11-13

## 2024-11-13 NOTE — PROGRESS NOTES
Clinic Note    Patient Name: Stephane Mathis  : 1945  MRN: [id number]    Chief Complaint   Patient presents with    Health Maintenance     TETANUS VACCINE Never done  Shingles Vaccine(1 of 2) Never done  RSV Vaccine (Age 60+ and Pregnant patients)(1 - 1-dose 75+ series) Never done  COVID-19 Vaccine( season) due on 2024  LDCT Lung Screen due on 10/19/2024     Medication Refill       HPI:    Mr. Stephane Mathis is a 79 y.o. male who presents to clinic today with CC of L shoulder pain. He is scheduled for surgery 24 with Dr. Fenton. Patient reports shoulder pain is severe and he has not been able to sleep at night. Requests refill on medication for pain to help until he can have his surgery.   BP is elevated today. He has a h/o HTN. Reports he has not had his blood pressure medication this morning. He requests medication refills today. Reports blood pressure is typically well controlled on current regimen.  Patient is, otherwise, without complaints.     Medications:  Medication List with Changes/Refills   Current Medications    ASPIRIN (ECOTRIN) 81 MG EC TABLET    Take 1 tablet (81 mg total) by mouth once daily.    ATORVASTATIN (LIPITOR) 40 MG TABLET    Take 1 tablet (40 mg total) by mouth every evening.    EAR DROPS, CARBAMIDE PEROXIDE, OTIC    Place in ear(s).    EPINEPHRINE (EPIPEN) 0.3 MG/0.3 ML ATIN    Inject 0.3 mLs (0.3 mg total) into the muscle as needed.    ERGOCALCIFEROL (ERGOCALCIFEROL) 50,000 UNIT CAP    Take 1 capsule (50,000 Units total) by mouth every 7 days.    FAMOTIDINE (PEPCID) 20 MG TABLET    Take 1 tablet (20 mg total) by mouth 2 (two) times daily as needed for Heartburn.    FERROUS SULFATE (FEOSOL) 325 MG (65 MG IRON) TAB TABLET    Take 1 tablet (325 mg total) by mouth 2 (two) times daily.    HYDRALAZINE (APRESOLINE) 100 MG TABLET    Take 1 tablet (100 mg total) by mouth 2 (two) times a day.    METHOCARBAMOL (ROBAXIN) 500 MG TAB    Take 1 tablet (500 mg total) by  mouth 3 (three) times daily as needed (muscle spasm/pain. May cause drowsiness).    POTASSIUM CHLORIDE SA (K-DUR,KLOR-CON M) 10 MEQ TABLET    Take 1 tablet (10 mEq total) by mouth once daily.    TADALAFIL (CIALIS) 10 MG TABLET    Take 1 tablet (10 mg total) by mouth daily as needed for Erectile Dysfunction.   Changed and/or Refilled Medications    Modified Medication Previous Medication    AMLODIPINE (NORVASC) 10 MG TABLET amLODIPine (NORVASC) 10 MG tablet       Take 1 tablet (10 mg total) by mouth once daily.    Take 1 tablet (10 mg total) by mouth once daily.    HYDROCHLOROTHIAZIDE (HYDRODIURIL) 25 MG TABLET hydroCHLOROthiazide (HYDRODIURIL) 25 MG tablet       Take 1 tablet (25 mg total) by mouth once daily.    Take 1 tablet (25 mg total) by mouth once daily.    HYDROCODONE-ACETAMINOPHEN (NORCO) 5-325 MG PER TABLET HYDROcodone-acetaminophen (NORCO) 5-325 mg per tablet       Take 1 tablet by mouth every 8 (eight) hours as needed for Pain.    Take 1 tablet by mouth every 8 (eight) hours as needed for Pain.    MELOXICAM (MOBIC) 7.5 MG TABLET meloxicam (MOBIC) 7.5 MG tablet       Take 1 tablet (7.5 mg total) by mouth once daily.    Take 1 tablet (7.5 mg total) by mouth once daily.        Allergies: Ace inhibitors and Codeine      Past Medical History:    Past Medical History:   Diagnosis Date    Acute superficial gastritis without hemorrhage 7/12/2022    Alcoholism     Anemia     AVM (arteriovenous malformation) of colon 7/13/2022    Colon, diverticulosis 7/13/2022    Hypertension     Iron deficiency anemia due to chronic blood loss 7/12/2022    Polyp, sigmoid colon 7/13/2022    Seizures     Stroke     right sided weakness       Past Surgical History:    Past Surgical History:   Procedure Laterality Date    CATARACT EXTRACTION W/ INTRAOCULAR LENS IMPLANT Right     KNEE SURGERY Right     LUMBAR DISCECTOMY      SCROTAL SURGERY      in february    TONSILLECTOMY           Social History:    Social History     Tobacco Use  "  Smoking Status Every Day    Current packs/day: 0.50    Average packs/day: 0.5 packs/day for 58.9 years (29.4 ttl pk-yrs)    Types: Cigarettes    Start date: 1966    Passive exposure: Current   Smokeless Tobacco Never   Tobacco Comments    Tobacco quitline information sheet given      Social History     Substance and Sexual Activity   Alcohol Use Yes    Alcohol/week: 12.0 standard drinks of alcohol    Types: 6 Cans of beer, 6 Shots of liquor per week    Comment: vodka/gray goose preference. " couple of drinks a day or two or sometimes on weekends. "     Social History     Substance and Sexual Activity   Drug Use Yes    Types: Marijuana    Comment: daily         Family History:    Family History   Problem Relation Name Age of Onset    Diabetes Mother      Hypertension Mother      Diabetes Sister      Diabetes Brother      Hypertension Brother      Cancer Brother      Throat cancer Brother      Alzheimer's disease Brother         Review of Systems:    Review of Systems   Constitutional:  Negative for appetite change, chills, fatigue, fever and unexpected weight change.   Eyes:  Negative for visual disturbance.   Respiratory:  Negative for cough and shortness of breath.    Cardiovascular:  Negative for chest pain.        Reports occasional swelling in legs that resolve with elevation overnight   Gastrointestinal:  Negative for abdominal pain, change in bowel habit, constipation, diarrhea, nausea and vomiting.   Musculoskeletal:  Positive for arthralgias.   Integumentary:  Negative for rash.   Neurological:  Negative for dizziness and headaches.   Psychiatric/Behavioral:  The patient is not nervous/anxious.         Vitals:    Vitals:    11/13/24 0823 11/13/24 0901   BP: (!) 150/66 (!) 149/73   BP Location: Left arm Left arm   Patient Position: Sitting Sitting   Pulse: 64    Resp: 20    Temp: 98 °F (36.7 °C)    TempSrc: Oral    SpO2: 98%    Weight: 71.9 kg (158 lb 9.6 oz)    Height: 5' 11" (1.803 m)        Body mass " index is 22.12 kg/m².    Wt Readings from Last 3 Encounters:   11/13/24 0823 71.9 kg (158 lb 9.6 oz)   10/08/24 1332 70.8 kg (156 lb)   10/03/24 1059 72.6 kg (160 lb)        Physical Exam:    Physical Exam  Constitutional:       General: He is not in acute distress.     Appearance: Normal appearance.   HENT:      Nose: Nose normal.      Mouth/Throat:      Mouth: Mucous membranes are moist.      Pharynx: Oropharynx is clear.   Eyes:      Conjunctiva/sclera: Conjunctivae normal.   Cardiovascular:      Rate and Rhythm: Normal rate and regular rhythm.      Heart sounds: Normal heart sounds. No murmur heard.  Pulmonary:      Effort: Pulmonary effort is normal. No respiratory distress.      Breath sounds: Normal breath sounds. No wheezing, rhonchi or rales.   Abdominal:      General: Bowel sounds are normal.      Palpations: Abdomen is soft.      Tenderness: There is no abdominal tenderness.   Musculoskeletal:         General: Tenderness present. No swelling.      Cervical back: Neck supple.      Right lower leg: No edema.      Left lower leg: No edema.   Skin:     Findings: No rash.   Neurological:      General: No focal deficit present.      Mental Status: He is alert. Mental status is at baseline.   Psychiatric:         Mood and Affect: Mood normal.         Assessment/Plan:   1. Primary osteoarthritis involving multiple joints  -     HYDROcodone-acetaminophen (NORCO) 5-325 mg per tablet; Take 1 tablet by mouth every 8 (eight) hours as needed for Pain.  Dispense: 30 tablet; Refill: 0  -     meloxicam (MOBIC) 7.5 MG tablet; Take 1 tablet (7.5 mg total) by mouth once daily.  Dispense: 30 tablet; Refill: 2  -  reviewed and appropriate. UDS deferred as this medication is for short term use only.     2. Acute pain of left shoulder  -     HYDROcodone-acetaminophen (NORCO) 5-325 mg per tablet; Take 1 tablet by mouth every 8 (eight) hours as needed for Pain.  Dispense: 30 tablet; Refill: 0  -     meloxicam (MOBIC) 7.5 MG  tablet; Take 1 tablet (7.5 mg total) by mouth once daily.  Dispense: 30 tablet; Refill: 2  -  reviewed and appropriate. UDS deferred as this medication is for short term use only.     3. Essential hypertension  -     hydroCHLOROthiazide (HYDRODIURIL) 25 MG tablet; Take 1 tablet (25 mg total) by mouth once daily.  Dispense: 90 tablet; Refill: 1  -     amLODIPine (NORVASC) 10 MG tablet; Take 1 tablet (10 mg total) by mouth once daily.  Dispense: 90 tablet; Refill: 1  - Patient has not had his medication today.  - BP is elevated in clinic today. Patient reports normal readings at home. Will have nurse follow up with phone call with home BP reading in 1-2 weeks. If remains elevated patient will need to RTC for medication adjustments.  - DASH diet and exercise  - Encouraged medication compliance  - Continue home blood pressure monitoring. Call/RTC for persistently elevated readings.         Active Problem List with Overview Notes    Diagnosis Date Noted    Essential hypertension 07/12/2022    Seizure 06/10/2022    Iron deficiency anemia due to chronic blood loss 07/12/2022    AVM (arteriovenous malformation) of colon 07/13/2022    Continuous dependence on cigarette smoking 07/12/2022    Colon, diverticulosis 07/13/2022    Polyp, sigmoid colon 07/13/2022    Chronic kidney disease, stage 3a 04/08/2024    Primary osteoarthritis of left shoulder 02/06/2024    Mixed hyperlipidemia 10/04/2023    Primary osteoarthritis involving multiple joints 10/04/2023    Muscle spasm 10/04/2023    Erectile dysfunction 10/04/2023    Benign prostatic hyperplasia without lower urinary tract symptoms 10/04/2023    Vitamin D deficiency 10/04/2023        RTC as scheduled for chronic follow up. RTC sooner if needed.  Patient voiced understanding and is agreeable to plan.      Pebbles Dickerson MD    Family Medicine

## 2024-11-16 LAB
OHS QRS DURATION: 82 MS
OHS QTC CALCULATION: 426 MS

## 2024-11-18 ENCOUNTER — TELEPHONE (OUTPATIENT)
Dept: ORTHOPEDICS | Facility: CLINIC | Age: 79
End: 2024-11-18
Payer: MEDICARE

## 2024-11-18 NOTE — TELEPHONE ENCOUNTER
TRIED TO REACH PATIENT WITH NO ANSWER  PATIENT NEEDS CARDIAC CLEARANCE PRIOR TO SURGERY - NEED TO SPEAK TO PATIENT

## 2024-11-21 ENCOUNTER — TELEPHONE (OUTPATIENT)
Dept: ORTHOPEDICS | Facility: CLINIC | Age: 79
End: 2024-11-21
Payer: MEDICARE

## 2024-11-21 ENCOUNTER — PATIENT OUTREACH (OUTPATIENT)
Facility: HOSPITAL | Age: 79
End: 2024-11-21
Payer: MEDICARE

## 2024-11-21 ENCOUNTER — TELEPHONE (OUTPATIENT)
Dept: CARDIOLOGY | Facility: CLINIC | Age: 79
End: 2024-11-21
Payer: MEDICARE

## 2024-11-21 VITALS — SYSTOLIC BLOOD PRESSURE: 144 MMHG | DIASTOLIC BLOOD PRESSURE: 76 MMHG

## 2024-11-21 NOTE — Clinical Note
Please see my note in Care Mgmt. Just FYI. Bp still elevated. However, patient is being referred to cardiology by ortho.

## 2024-11-21 NOTE — PROGRESS NOTES
Population Health Chart Review & Patient Outreach Details    Blood Pressure Check     Health Maintenance Topics Addressed and Outreach Outcomes / Actions Taken:  Blood Pressure Control  [x] Spoke with Shekhar at [facility], reports that Bp today was 144/76.     [x] Patient is being referred to cardiology by ortho. Shoulder surgery being postponed until cardiology clears patient.

## 2024-11-21 NOTE — TELEPHONE ENCOUNTER
Appt. 12-19-24 with Dr. Barahona.----- Message from Tech Archana sent at 11/20/2024  4:11 PM CST -----  Hey this patient is needing a cardiac clearance prior to his shoulder surgery. We are going to postpone until cleared. He doesn't have an established cardiologist. When do you think yalindsay could possibly see him?

## 2024-12-12 ENCOUNTER — PATIENT OUTREACH (OUTPATIENT)
Facility: HOSPITAL | Age: 79
End: 2024-12-12
Payer: MEDICARE

## 2024-12-12 NOTE — PROGRESS NOTES
12/12/2024   --Chart accessed for: Care gaps / [employer]  --Care Gaps addressed: BP  Pt BP is uncontrolled

## 2024-12-19 ENCOUNTER — OFFICE VISIT (OUTPATIENT)
Dept: CARDIOLOGY | Facility: CLINIC | Age: 79
End: 2024-12-19
Payer: MEDICARE

## 2024-12-19 VITALS
HEIGHT: 71 IN | BODY MASS INDEX: 22.12 KG/M2 | RESPIRATION RATE: 18 BRPM | WEIGHT: 158 LBS | DIASTOLIC BLOOD PRESSURE: 70 MMHG | HEART RATE: 65 BPM | SYSTOLIC BLOOD PRESSURE: 138 MMHG

## 2024-12-19 DIAGNOSIS — I10 PRIMARY HYPERTENSION: Primary | ICD-10-CM

## 2024-12-19 DIAGNOSIS — I10 ESSENTIAL HYPERTENSION: ICD-10-CM

## 2024-12-19 DIAGNOSIS — Z01.810 ENCOUNTER FOR PRE-OPERATIVE CARDIOVASCULAR CLEARANCE: ICD-10-CM

## 2024-12-19 DIAGNOSIS — Z71.6 ENCOUNTER FOR SMOKING CESSATION COUNSELING: ICD-10-CM

## 2024-12-19 PROCEDURE — 99999 PR PBB SHADOW E&M-EST. PATIENT-LVL IV: CPT | Mod: PBBFAC,,, | Performed by: STUDENT IN AN ORGANIZED HEALTH CARE EDUCATION/TRAINING PROGRAM

## 2024-12-19 PROCEDURE — 99214 OFFICE O/P EST MOD 30 MIN: CPT | Mod: PBBFAC | Performed by: STUDENT IN AN ORGANIZED HEALTH CARE EDUCATION/TRAINING PROGRAM

## 2024-12-19 RX ORDER — NICOTINE 7MG/24HR
1 PATCH, TRANSDERMAL 24 HOURS TRANSDERMAL DAILY
Qty: 30 PATCH | Refills: 0 | Status: SHIPPED | OUTPATIENT
Start: 2024-12-19 | End: 2025-01-18

## 2024-12-19 NOTE — PROGRESS NOTES
PCP: Sierra Dickerson MD    Referring Provider:     Subjective:   Stephane Mathis is a 79 y.o. male with hx of smoking, HTN, OA who presents for pre-op evaluation    Patient being worked up for shoulder surgery  Denies any symptoms. Able to do 4 METs  Denies any CP, SOB, Leg swelling    Fhx: non-contributary  Shx: Smoker, etoh - tequila/beer (daily), smokes marijuana    EKG - 12/12/24 - NSR, [facility]         Lab Results   Component Value Date     11/12/2024    K 3.7 11/12/2024     11/12/2024    CO2 30 11/12/2024    BUN 14 11/12/2024    CREATININE 1.18 11/12/2024    CALCIUM 9.3 11/12/2024    ANIONGAP 6 (L) 11/12/2024    ESTGFRAFRICA 94 07/26/2021    EGFRNONAA 73 07/26/2022       Lab Results   Component Value Date    CHOL 193 10/08/2024    CHOL 179 10/04/2023    CHOL 266 (H) 12/12/2022     Lab Results   Component Value Date    HDL 88 (H) 10/08/2024    HDL 92 (H) 10/04/2023    HDL 87 (H) 12/12/2022     Lab Results   Component Value Date    LDLCALC 79 10/08/2024    LDLCALC 72 10/04/2023    LDLCALC 165 12/12/2022     Lab Results   Component Value Date    TRIG 128 10/08/2024    TRIG 74 10/04/2023    TRIG 70 12/12/2022     Lab Results   Component Value Date    CHOLHDL 2.2 10/08/2024    CHOLHDL 1.9 10/04/2023    CHOLHDL 3.1 12/12/2022       Lab Results   Component Value Date    WBC 7.14 11/12/2024    HGB 11.1 (L) 11/12/2024    HCT 35.2 (L) 11/12/2024    MCV 99.7 (H) 11/12/2024     11/12/2024           Current Outpatient Medications:     amLODIPine (NORVASC) 10 MG tablet, Take 1 tablet (10 mg total) by mouth once daily., Disp: 90 tablet, Rfl: 1    aspirin (ECOTRIN) 81 MG EC tablet, Take 1 tablet (81 mg total) by mouth once daily., Disp: 90 tablet, Rfl: 1    atorvastatin (LIPITOR) 40 MG tablet, Take 1 tablet (40 mg total) by mouth every evening., Disp: 90 tablet, Rfl: 1    EAR DROPS, CARBAMIDE PEROXIDE, OTIC, Place in ear(s)., Disp: , Rfl:     EPINEPHrine (EPIPEN) 0.3 mg/0.3 mL AtIn, Inject 0.3 mLs (0.3  mg total) into the muscle as needed., Disp: 1 each, Rfl: 0    ergocalciferol (ERGOCALCIFEROL) 50,000 unit Cap, Take 1 capsule (50,000 Units total) by mouth every 7 days., Disp: 12 capsule, Rfl: 0    famotidine (PEPCID) 20 MG tablet, Take 1 tablet (20 mg total) by mouth 2 (two) times daily as needed for Heartburn., Disp: 60 tablet, Rfl: 3    ferrous sulfate (FEOSOL) 325 mg (65 mg iron) Tab tablet, Take 1 tablet (325 mg total) by mouth 2 (two) times daily., Disp: 180 tablet, Rfl: 1    hydrALAZINE (APRESOLINE) 100 MG tablet, Take 1 tablet (100 mg total) by mouth 2 (two) times a day., Disp: 180 tablet, Rfl: 1    hydroCHLOROthiazide (HYDRODIURIL) 25 MG tablet, Take 1 tablet (25 mg total) by mouth once daily., Disp: 90 tablet, Rfl: 1    HYDROcodone-acetaminophen (NORCO) 5-325 mg per tablet, Take 1 tablet by mouth every 8 (eight) hours as needed for Pain., Disp: 30 tablet, Rfl: 0    meloxicam (MOBIC) 7.5 MG tablet, Take 1 tablet (7.5 mg total) by mouth once daily., Disp: 30 tablet, Rfl: 2    methocarbamoL (ROBAXIN) 500 MG Tab, Take 1 tablet (500 mg total) by mouth 3 (three) times daily as needed (muscle spasm/pain. May cause drowsiness)., Disp: 30 tablet, Rfl: 2    nicotine (NICODERM CQ) 7 mg/24 hr, Place 1 patch onto the skin once daily., Disp: 30 patch, Rfl: 0    potassium chloride SA (K-DUR,KLOR-CON M) 10 MEQ tablet, Take 1 tablet (10 mEq total) by mouth once daily., Disp: 90 tablet, Rfl: 0    tadalafiL (CIALIS) 10 MG tablet, Take 1 tablet (10 mg total) by mouth daily as needed for Erectile Dysfunction., Disp: 6 tablet, Rfl: 5  No current facility-administered medications for this visit.    Facility-Administered Medications Ordered in Other Visits:     BUPivacaine (PF) 0.25% (2.5 mg/ml) injection 1 mL, 1 mL, , , DunnZora PA, 1 mL at 01/30/24 0930    triamcinolone acetonide injection 40 mg, 40 mg, Intra-articular, , Zora Dunn PA, 40 mg at 01/30/24 0930    Review of Systems   Respiratory:  Negative for  "cough and shortness of breath.    Cardiovascular:  Negative for chest pain, palpitations, orthopnea, claudication, leg swelling and PND.         Objective:   /70   Pulse 65   Resp 18   Ht 5' 11" (1.803 m)   Wt 71.7 kg (158 lb)   BMI 22.04 kg/m²     Physical Exam  Vitals and nursing note reviewed.   Constitutional:       Appearance: Normal appearance.   Cardiovascular:      Rate and Rhythm: Normal rate and regular rhythm.      Pulses: Normal pulses.      Heart sounds: Normal heart sounds.   Pulmonary:      Breath sounds: Normal breath sounds.   Neurological:      Mental Status: He is alert and oriented to person, place, and time.           Assessment:     1. Primary hypertension  Echo      2. Essential hypertension        3. Encounter for pre-operative cardiovascular clearance        4. Encounter for smoking cessation counseling              Plan:   Essential hypertension  Controlled  EKG with [facility]   ECHO       Encounter for pre-operative cardiovascular clearance  Able to do 4 METs  Low risk for surgery     Encounter for smoking cessation counseling  Smoking counselling done  Nicotine patches prescribed          "

## 2024-12-26 ENCOUNTER — PATIENT MESSAGE (OUTPATIENT)
Dept: ORTHOPEDICS | Facility: CLINIC | Age: 79
End: 2024-12-26
Payer: MEDICARE

## 2024-12-31 ENCOUNTER — EXTERNAL CHRONIC CARE MANAGEMENT (OUTPATIENT)
Dept: FAMILY MEDICINE | Facility: CLINIC | Age: 79
End: 2024-12-31
Payer: MEDICARE

## 2024-12-31 PROCEDURE — G0511 CCM/BHI BY RHC/FQHC 20MIN MO: HCPCS | Mod: ,,, | Performed by: FAMILY MEDICINE

## 2025-01-13 ENCOUNTER — OFFICE VISIT (OUTPATIENT)
Dept: GASTROENTEROLOGY | Facility: CLINIC | Age: 80
End: 2025-01-13
Payer: MEDICARE

## 2025-01-13 VITALS
DIASTOLIC BLOOD PRESSURE: 64 MMHG | HEIGHT: 71 IN | WEIGHT: 164 LBS | BODY MASS INDEX: 22.96 KG/M2 | SYSTOLIC BLOOD PRESSURE: 152 MMHG | HEART RATE: 70 BPM | OXYGEN SATURATION: 100 %

## 2025-01-13 DIAGNOSIS — K55.20 AVM (ARTERIOVENOUS MALFORMATION) OF COLON: Primary | ICD-10-CM

## 2025-01-13 DIAGNOSIS — D50.0 IRON DEFICIENCY ANEMIA DUE TO CHRONIC BLOOD LOSS: ICD-10-CM

## 2025-01-13 DIAGNOSIS — B37.81 CANDIDA ESOPHAGITIS: ICD-10-CM

## 2025-01-13 PROCEDURE — 99214 OFFICE O/P EST MOD 30 MIN: CPT | Mod: PBBFAC

## 2025-01-13 PROCEDURE — 1159F MED LIST DOCD IN RCRD: CPT | Mod: CPTII,,,

## 2025-01-13 PROCEDURE — 3077F SYST BP >= 140 MM HG: CPT | Mod: CPTII,,,

## 2025-01-13 PROCEDURE — 3078F DIAST BP <80 MM HG: CPT | Mod: CPTII,,,

## 2025-01-13 PROCEDURE — 1101F PT FALLS ASSESS-DOCD LE1/YR: CPT | Mod: CPTII,,,

## 2025-01-13 PROCEDURE — 3288F FALL RISK ASSESSMENT DOCD: CPT | Mod: CPTII,,,

## 2025-01-13 PROCEDURE — 99214 OFFICE O/P EST MOD 30 MIN: CPT | Mod: S$PBB,,,

## 2025-01-13 PROCEDURE — 99999 PR PBB SHADOW E&M-EST. PATIENT-LVL IV: CPT | Mod: PBBFAC,,,

## 2025-01-13 NOTE — PROGRESS NOTES
Gastroenterology Clinic Note    Patient ID: [id number]   Referring MD: No ref. provider found   Chief Complaint:   Chief Complaint   Patient presents with    Follow-up     No problems       History of Present Illness   Stephane Mathis is an 79 y.o. AAM who is referred for MUSA. Patient with chronic history of MUSA. He has been on PO iron replacement therapy for several years with no improvement. He underwent EGD and colonoscopy for MUSA in July 2022 with Dr. Mahajan. There was no etiology of MUSA found on these exams. He denies any hematochezia or melena. He does have trouble with constipation when taking the iron. He treats this with stool softeners and Miralax as needed.     Previous workup:EGD    Last colonoscopy was 07/03/2024 with no further screening colonoscopies necessary given age.    Interval  - push endoscopy 08/2024 revealed Candida esophagitis along with an angio ectasia in the body of the stomach, 4th part of the duodenum and proximal jejunum; treated with APC  - most recent labs show improvement in H&H to 11.1/35.2  - he is on an oral iron supplement  - he reports he took the Diflucan as prescribed by Dr. Franklin  - no hematochezia or melena reported; no GI related complaints on exam    Review of Systems   Constitutional:  Negative for weight loss.   Gastrointestinal:  Negative for abdominal pain, blood in stool, constipation, diarrhea, heartburn, melena, nausea and vomiting.       Past Medical History      Past Medical History:   Diagnosis Date    Acute superficial gastritis without hemorrhage 7/12/2022    Alcoholism     Anemia     AVM (arteriovenous malformation) of colon 7/13/2022    Colon, diverticulosis 7/13/2022    Hypertension     Iron deficiency anemia due to chronic blood loss 7/12/2022    Polyp, sigmoid colon 7/13/2022    Seizures     Stroke     right sided weakness       Past Surgical History     Past Surgical History:   Procedure Laterality Date    CATARACT EXTRACTION W/ INTRAOCULAR LENS IMPLANT  "Right     KNEE SURGERY Right     LUMBAR DISCECTOMY      SCROTAL SURGERY      in february    TONSILLECTOMY         Allergies     Review of patient's allergies indicates:   Allergen Reactions    Ace inhibitors Swelling    Codeine Itching       Immunization History     Immunization History   Administered Date(s) Administered    COVID-19, MRNA, LN-S, PF (Pfizer) (Purple Cap) 01/22/2021, 02/22/2021, 11/16/2021, 10/05/2023    COVID-19, mRNA, LNP-S, bivalent booster, PF (PFIZER OMICRON) 12/09/2022    Influenza (FLUAD) - Quadrivalent - Adjuvanted - PF *Preferred* (65+) 12/12/2022, 10/04/2023    Influenza - Quadrivalent - High Dose - PF (65 years and older) 01/26/2022    Influenza - Trivalent - Fluad - Adjuvanted - PF (65 years and older 10/08/2024    Pneumococcal Conjugate - 20 Valent 09/29/2022    Pneumococcal Polysaccharide - 23 Valent 10/23/2018       Past Family History      Family History   Problem Relation Name Age of Onset    Diabetes Mother      Hypertension Mother      Diabetes Sister      Diabetes Brother      Hypertension Brother      Cancer Brother      Throat cancer Brother      Alzheimer's disease Brother         Past Social History      Social History     Socioeconomic History    Marital status: Unknown   Tobacco Use    Smoking status: Every Day     Current packs/day: 0.50     Average packs/day: 0.5 packs/day for 59.0 years (29.5 ttl pk-yrs)     Types: Cigarettes     Start date: 1966     Passive exposure: Current    Smokeless tobacco: Never    Tobacco comments:     Tobacco quitline information sheet given    Substance and Sexual Activity    Alcohol use: Yes     Alcohol/week: 12.0 standard drinks of alcohol     Types: 6 Cans of beer, 6 Shots of liquor per week     Comment: vodka/gray goose preference. " couple of drinks a day or two or sometimes on weekends. "    Drug use: Yes     Types: Marijuana     Comment: daily    Sexual activity: Yes     Partners: Female     Social Drivers of Health     Financial " Resource Strain: Low Risk  (12/12/2024)    Overall Financial Resource Strain (CARDIA)     Difficulty of Paying Living Expenses: Not very hard   Food Insecurity: No Food Insecurity (12/12/2024)    Hunger Vital Sign     Worried About Running Out of Food in the Last Year: Never true     Ran Out of Food in the Last Year: Never true   Transportation Needs: No Transportation Needs (1/25/2024)    PRAPARE - Transportation     Lack of Transportation (Medical): No     Lack of Transportation (Non-Medical): No   Physical Activity: Inactive (12/12/2024)    Exercise Vital Sign     Days of Exercise per Week: 0 days     Minutes of Exercise per Session: 0 min   Stress: Stress Concern Present (12/12/2024)    [facility] - Occupational Stress Questionnaire     Feeling of Stress : To some extent   Housing Stability: Low Risk  (1/25/2024)    Housing Stability Vital Sign     Unable to Pay for Housing in the Last Year: No     Number of Places Lived in the Last Year: 1     Unstable Housing in the Last Year: No       Current Medications     Outpatient Medications Marked as Taking for the 1/13/25 encounter (Office Visit) with Marleen Hernandez FNP   Medication Sig Dispense Refill    amLODIPine (NORVASC) 10 MG tablet Take 1 tablet (10 mg total) by mouth once daily. 90 tablet 1    aspirin (ECOTRIN) 81 MG EC tablet Take 1 tablet (81 mg total) by mouth once daily. 90 tablet 1    atorvastatin (LIPITOR) 40 MG tablet Take 1 tablet (40 mg total) by mouth every evening. 90 tablet 1    EAR DROPS, CARBAMIDE PEROXIDE, OTIC Place in ear(s).      ergocalciferol (ERGOCALCIFEROL) 50,000 unit Cap Take 1 capsule (50,000 Units total) by mouth every 7 days. 12 capsule 0    famotidine (PEPCID) 20 MG tablet Take 1 tablet (20 mg total) by mouth 2 (two) times daily as needed for Heartburn. 60 tablet 3    ferrous sulfate (FEOSOL) 325 mg (65 mg iron) Tab tablet Take 1 tablet (325 mg total) by mouth 2 (two) times daily. 180 tablet 1     "hydrALAZINE (APRESOLINE) 100 MG tablet Take 1 tablet (100 mg total) by mouth 2 (two) times a day. 180 tablet 1    hydroCHLOROthiazide (HYDRODIURIL) 25 MG tablet Take 1 tablet (25 mg total) by mouth once daily. 90 tablet 1    HYDROcodone-acetaminophen (NORCO) 5-325 mg per tablet Take 1 tablet by mouth every 8 (eight) hours as needed for Pain. 30 tablet 0    meloxicam (MOBIC) 7.5 MG tablet Take 1 tablet (7.5 mg total) by mouth once daily. 30 tablet 2    methocarbamoL (ROBAXIN) 500 MG Tab Take 1 tablet (500 mg total) by mouth 3 (three) times daily as needed (muscle spasm/pain. May cause drowsiness). 30 tablet 2    nicotine (NICODERM CQ) 7 mg/24 hr Place 1 patch onto the skin once daily. 30 patch 0    potassium chloride SA (K-DUR,KLOR-CON M) 10 MEQ tablet Take 1 tablet (10 mEq total) by mouth once daily. 90 tablet 0        I have reviewed the current medications, allergies, vital signs, past medical and surgical history, family medical history, and social history for this encounter and agree with all findings.    OBJECTIVE    Physical Exam    BP (!) 152/64   Pulse 70   Ht 5' 11" (1.803 m)   Wt 74.4 kg (164 lb)   SpO2 100%   BMI 22.87 kg/m²   GEN: Well appearing, cooperative, NAD  NECK: Supple, no LAD  CV: Normal rate  RESP: Unlabored  ABD: ND, no guarding  EXT: No clubbing, cyanosis, or edema  SKIN: Warm and dry  NEURO: AAO x4.     LABS    CBC (with or without Differential):   Lab Results   Component Value Date    WBC 7.14 11/12/2024    HGB 11.1 (L) 11/12/2024    HCT 35.2 (L) 11/12/2024    MCV 99.7 (H) 11/12/2024    MCH 31.4 (H) 11/12/2024    MCHC 31.5 (L) 11/12/2024    RDW 18.2 (H) 11/12/2024     11/12/2024    MPV 12.2 11/12/2024    NEUTOPHILPCT 63.7 11/12/2024    DIFFTYPE Auto 11/12/2024     BMP/CMP:   Lab Results   Component Value Date     11/12/2024    K 3.7 11/12/2024     11/12/2024    CO2 30 11/12/2024    BUN 14 11/12/2024    CREATININE 1.18 11/12/2024     (H) 11/12/2024    CALCIUM " 9.3 11/12/2024    ALBUMIN 3.4 (L) 11/12/2024    AST 31 11/12/2024    ALT 24 11/12/2024    ALKPHOS 68 11/12/2024    MG 2.3 07/12/2022        IMAGING  No pertinent imaging.    ASSESSMENT  Stephane Mathis is a 79 y.o. AAM with history of MUSA, HTN, Hyperlipidemia, CKD, and AVM of colon who is referred for follow-up of MUSA.    1. AVM (arteriovenous malformation) of colon [K55.20]    2. Candida esophagitis    3. Iron deficiency anemia due to chronic blood loss [D50.0]             PLAN    - continue oral iron supplement  - HIV screening as previously ordered by Dr. Franklin   - return to GI clinic for follow-up as needed     There are no Patient Instructions on file for this visit.      No orders of the defined types were placed in this encounter.        The risks and benefits of my recommendations, as well as other treatment options were discussed with the patient today. All questions were answered.    30 minutes of total time spent on the encounter, which includes face to face time and non-face to face time preparing to see the patient (eg, review of tests), obtaining and/or reviewing separately obtained history, documenting clinical information in the electronic or other health record, Independently interpreting results (not separately reported) and communicating results to the patient/family/caregiver, or care coordination (not separately reported).        Marleen Hernandez, ALLIEP/ACNP  [facility]

## 2025-01-14 ENCOUNTER — TELEPHONE (OUTPATIENT)
Dept: CARDIOLOGY | Facility: CLINIC | Age: 80
End: 2025-01-14
Payer: MEDICARE

## 2025-01-14 ENCOUNTER — OFFICE VISIT (OUTPATIENT)
Dept: FAMILY MEDICINE | Facility: CLINIC | Age: 80
End: 2025-01-14
Payer: MEDICARE

## 2025-01-14 VITALS
DIASTOLIC BLOOD PRESSURE: 77 MMHG | OXYGEN SATURATION: 98 % | RESPIRATION RATE: 19 BRPM | HEART RATE: 68 BPM | HEIGHT: 71 IN | WEIGHT: 164 LBS | BODY MASS INDEX: 22.96 KG/M2 | TEMPERATURE: 98 F | SYSTOLIC BLOOD PRESSURE: 155 MMHG

## 2025-01-14 DIAGNOSIS — M25.512 ACUTE PAIN OF LEFT SHOULDER: Primary | ICD-10-CM

## 2025-01-14 DIAGNOSIS — M15.0 PRIMARY OSTEOARTHRITIS INVOLVING MULTIPLE JOINTS: Chronic | ICD-10-CM

## 2025-01-14 DIAGNOSIS — I10 ESSENTIAL HYPERTENSION: ICD-10-CM

## 2025-01-14 DIAGNOSIS — Z79.899 LONG TERM USE OF DRUG: ICD-10-CM

## 2025-01-14 LAB

## 2025-01-14 PROCEDURE — 1125F AMNT PAIN NOTED PAIN PRSNT: CPT | Mod: ,,, | Performed by: FAMILY MEDICINE

## 2025-01-14 PROCEDURE — 3078F DIAST BP <80 MM HG: CPT | Mod: ,,, | Performed by: FAMILY MEDICINE

## 2025-01-14 PROCEDURE — 99213 OFFICE O/P EST LOW 20 MIN: CPT | Mod: ,,, | Performed by: FAMILY MEDICINE

## 2025-01-14 PROCEDURE — 1159F MED LIST DOCD IN RCRD: CPT | Mod: ,,, | Performed by: FAMILY MEDICINE

## 2025-01-14 PROCEDURE — 3288F FALL RISK ASSESSMENT DOCD: CPT | Mod: ,,, | Performed by: FAMILY MEDICINE

## 2025-01-14 PROCEDURE — 80305 DRUG TEST PRSMV DIR OPT OBS: CPT | Mod: QW,,, | Performed by: FAMILY MEDICINE

## 2025-01-14 PROCEDURE — 3077F SYST BP >= 140 MM HG: CPT | Mod: ,,, | Performed by: FAMILY MEDICINE

## 2025-01-14 PROCEDURE — 1101F PT FALLS ASSESS-DOCD LE1/YR: CPT | Mod: ,,, | Performed by: FAMILY MEDICINE

## 2025-01-14 RX ORDER — HYDROCODONE BITARTRATE AND ACETAMINOPHEN 5; 325 MG/1; MG/1
1 TABLET ORAL EVERY 8 HOURS PRN
Qty: 30 TABLET | Refills: 0 | Status: SHIPPED | OUTPATIENT
Start: 2025-01-14

## 2025-01-14 NOTE — TELEPHONE ENCOUNTER
----- Pt. Missed appt. For echo r/s 1-29-25@ 7:00 a.m. Pt. Daughter (Cindy) voiced understanding.Message from Med Assistant Acosta sent at 1/14/2025  2:47 PM CST -----  Who Called: Cindy (daughter)    Caller is requesting assistance/information from provider's office.      Preferred Method of Contact: Phone Call  Patient's Preferred Phone Number on File: [phone]   Best Call Back Number, if different: [phone]  Additional Information: reschedule echo appt

## 2025-01-14 NOTE — PROGRESS NOTES
Clinic Note    Patient Name: Stephane Mathis  : 1945  MRN: [id number]    Chief Complaint   Patient presents with    Medication Refill     Stephane Vicente 79 y.o male present to clinic med refills, pt stated he has pain in left shoulder and cannot sleep, symptoms present for awhile. No other known issues present.        HPI:    Mr. Stephane Mathis is a 79 y.o. male who presents to clinic today with CC of L shoulder pain. Reports this is a chronic, intermittent issue. Reports it is so severe he cannot sleep at night.  Requests refill on norco for flare with chronic pain. He was scheduled for surgery on his shoulder but is awaiting clearance from Cardiology. Surgery is to be rescheduled once he attains cardiac clearance. Patient is scheduled to follow up with Dr. Barahona (Cardiology) later this month.  BP is elevated today. Patient has previously attributed elevation to pain. However, BP has been persistently elevated at past several visits. BP was normal at Cardiology appt last month. Today, patient reports medication compliance. States BP is typically only elevated when he is having a lot of issues with his shoulder pain. States it is really bothering him today.  Patient reports chronic issues are well controlled on current medication regimen.  Denies problems or side effects with medications.  Patient is, otherwise, without complaints.     Medications:  Medication List with Changes/Refills   Current Medications    AMLODIPINE (NORVASC) 10 MG TABLET    Take 1 tablet (10 mg total) by mouth once daily.    ASPIRIN (ECOTRIN) 81 MG EC TABLET    Take 1 tablet (81 mg total) by mouth once daily.    ATORVASTATIN (LIPITOR) 40 MG TABLET    Take 1 tablet (40 mg total) by mouth every evening.    EAR DROPS, CARBAMIDE PEROXIDE, OTIC    Place in ear(s).    EPINEPHRINE (EPIPEN) 0.3 MG/0.3 ML ATIN    Inject 0.3 mLs (0.3 mg total) into the muscle as needed.    ERGOCALCIFEROL (ERGOCALCIFEROL) 50,000 UNIT CAP    Take 1 capsule  (50,000 Units total) by mouth every 7 days.    FAMOTIDINE (PEPCID) 20 MG TABLET    Take 1 tablet (20 mg total) by mouth 2 (two) times daily as needed for Heartburn.    FERROUS SULFATE (FEOSOL) 325 MG (65 MG IRON) TAB TABLET    Take 1 tablet (325 mg total) by mouth 2 (two) times daily.    HYDRALAZINE (APRESOLINE) 100 MG TABLET    Take 1 tablet (100 mg total) by mouth 2 (two) times a day.    HYDROCHLOROTHIAZIDE (HYDRODIURIL) 25 MG TABLET    Take 1 tablet (25 mg total) by mouth once daily.    HYDROCODONE-ACETAMINOPHEN (NORCO) 5-325 MG PER TABLET    Take 1 tablet by mouth every 8 (eight) hours as needed for Pain.    MELOXICAM (MOBIC) 7.5 MG TABLET    Take 1 tablet (7.5 mg total) by mouth once daily.    METHOCARBAMOL (ROBAXIN) 500 MG TAB    Take 1 tablet (500 mg total) by mouth 3 (three) times daily as needed (muscle spasm/pain. May cause drowsiness).    NICOTINE (NICODERM CQ) 7 MG/24 HR    Place 1 patch onto the skin once daily.    POTASSIUM CHLORIDE SA (K-DUR,KLOR-CON M) 10 MEQ TABLET    Take 1 tablet (10 mEq total) by mouth once daily.    TADALAFIL (CIALIS) 10 MG TABLET    Take 1 tablet (10 mg total) by mouth daily as needed for Erectile Dysfunction.        Allergies: Ace inhibitors and Codeine      Past Medical History:    Past Medical History:   Diagnosis Date    Acute superficial gastritis without hemorrhage 7/12/2022    Alcoholism     Anemia     AVM (arteriovenous malformation) of colon 7/13/2022    Colon, diverticulosis 7/13/2022    Hypertension     Iron deficiency anemia due to chronic blood loss 7/12/2022    Polyp, sigmoid colon 7/13/2022    Seizures     Stroke     right sided weakness       Past Surgical History:    Past Surgical History:   Procedure Laterality Date    CATARACT EXTRACTION W/ INTRAOCULAR LENS IMPLANT Right     KNEE SURGERY Right     LUMBAR DISCECTOMY      SCROTAL SURGERY      in february    TONSILLECTOMY           Social History:    Social History     Tobacco Use   Smoking Status Every Day     "Current packs/day: 0.50    Average packs/day: 0.5 packs/day for 59.0 years (29.5 ttl pk-yrs)    Types: Cigarettes    Start date: 1966    Passive exposure: Current   Smokeless Tobacco Never   Tobacco Comments    Tobacco quitline information sheet given      Social History     Substance and Sexual Activity   Alcohol Use Yes    Alcohol/week: 12.0 standard drinks of alcohol    Types: 6 Cans of beer, 6 Shots of liquor per week    Comment: vodka/gray goose preference. " couple of drinks a day or two or sometimes on weekends. "     Social History     Substance and Sexual Activity   Drug Use Yes    Types: Marijuana    Comment: daily         Family History:    Family History   Problem Relation Name Age of Onset    Diabetes Mother      Hypertension Mother      Diabetes Sister      Diabetes Brother      Hypertension Brother      Cancer Brother      Throat cancer Brother      Alzheimer's disease Brother         Review of Systems:    Review of Systems   Constitutional:  Negative for appetite change, chills, fatigue, fever and unexpected weight change.   Eyes:  Negative for visual disturbance.   Respiratory:  Negative for cough and shortness of breath.    Cardiovascular:  Negative for chest pain and leg swelling.   Gastrointestinal:  Negative for abdominal pain, change in bowel habit, constipation, diarrhea, nausea and vomiting.   Musculoskeletal:  Positive for arthralgias.   Integumentary:  Negative for rash.   Neurological:  Negative for dizziness and headaches.   Psychiatric/Behavioral:  The patient is not nervous/anxious.         Vitals:    Vitals:    01/14/25 1323 01/14/25 1346   BP: (!) 159/72 (!) 155/77   BP Location: Left arm Left arm   Patient Position: Sitting Sitting   Pulse: 68    Resp: 19    Temp: 97.6 °F (36.4 °C)    TempSrc: Oral    SpO2: 98%    Weight: 74.4 kg (164 lb)    Height: 5' 11" (1.803 m)        Body mass index is 22.87 kg/m².    Wt Readings from Last 3 Encounters:   01/14/25 1323 74.4 kg (164 lb) "   01/13/25 0912 74.4 kg (164 lb)   12/19/24 1019 71.7 kg (158 lb)        Physical Exam:    Physical Exam  Constitutional:       General: He is not in acute distress.     Appearance: Normal appearance.   HENT:      Nose: Nose normal.      Mouth/Throat:      Mouth: Mucous membranes are moist.      Pharynx: Oropharynx is clear.   Eyes:      Conjunctiva/sclera: Conjunctivae normal.   Cardiovascular:      Rate and Rhythm: Normal rate and regular rhythm.      Heart sounds: Normal heart sounds. No murmur heard.  Pulmonary:      Effort: Pulmonary effort is normal. No respiratory distress.      Breath sounds: Normal breath sounds. No wheezing, rhonchi or rales.   Abdominal:      General: Bowel sounds are normal.      Palpations: Abdomen is soft.      Tenderness: There is no abdominal tenderness.   Musculoskeletal:         General: Tenderness present.      Cervical back: Neck supple.      Right lower leg: No edema.      Left lower leg: No edema.      Comments: + decreased ROM L shoulder   Skin:     Findings: No rash.   Neurological:      General: No focal deficit present.      Mental Status: He is alert. Mental status is at baseline.   Psychiatric:         Mood and Affect: Mood normal.           Assessment/Plan:   1. Acute pain of left shoulder  -     HYDROcodone-acetaminophen (NORCO) 5-325 mg per tablet; Take 1 tablet by mouth every 8 (eight) hours as needed for Pain.  Dispense: 30 tablet; Refill: 0  -  reviewed and appropriate. UDS positive for marijuana. Discussed with patient. Advised I can only do Norco for short term use to get him by until his shoulder surgery. Voiced understanding.     2. Long term use of drug  -     POCT Urine Drug Screen Presump    3. Primary osteoarthritis involving multiple joints  -     HYDROcodone-acetaminophen (NORCO) 5-325 mg per tablet; Take 1 tablet by mouth every 8 (eight) hours as needed for Pain.  Dispense: 30 tablet; Refill: 0  -  reviewed and appropriate. UDS positive for  marijuana. Discussed with patient. Advised I can only do Norco for short term use to get him by until his shoulder surgery. Voiced understanding.     4. Essential hypertension  - BP is elevated in clinic today. Patient reports normal readings at home. Will have nurse follow up with phone call with home BP reading in 1-2 weeks. If remains elevated patient will need to RTC for medication adjustments.  - DASH diet and exercise  - Encouraged medication compliance  - Continue home blood pressure monitoring. Call/RTC for persistently elevated readings.       Active Problem List with Overview Notes    Diagnosis Date Noted    Essential hypertension 07/12/2022    Seizure 06/10/2022    Iron deficiency anemia due to chronic blood loss 07/12/2022    AVM (arteriovenous malformation) of colon 07/13/2022    Continuous dependence on cigarette smoking 07/12/2022    Colon, diverticulosis 07/13/2022    Polyp, sigmoid colon 07/13/2022    Encounter for pre-operative cardiovascular clearance 12/19/2024    Encounter for smoking cessation counseling 12/19/2024    Chronic kidney disease, stage 3a 04/08/2024    Primary osteoarthritis of left shoulder 02/06/2024    Mixed hyperlipidemia 10/04/2023    Primary osteoarthritis involving multiple joints 10/04/2023    Muscle spasm 10/04/2023    Erectile dysfunction 10/04/2023    Benign prostatic hyperplasia without lower urinary tract symptoms 10/04/2023    Vitamin D deficiency 10/04/2023        Health Maintenance:  Health Maintenance   Topic Date Due    TETANUS VACCINE  Never done    Shingles Vaccine (1 of 2) Never done    RSV Vaccine (Age 60+ and Pregnant patients) (1 - 1-dose 75+ series) Never done    COVID-19 Vaccine (6 - 2024-25 season) 09/01/2024    LDCT Lung Screen  10/19/2024    Lipid Panel  10/08/2029    Hepatitis C Screening  Completed    Influenza Vaccine  Completed    Pneumococcal Vaccines (Age 50+)  Completed       RTC as scheduled for chronic follow up. RTC sooner if needed.  Patient  voiced understanding and is agreeable to plan.      Pebbles Dickerson MD    Family Medicine

## 2025-01-23 ENCOUNTER — PATIENT OUTREACH (OUTPATIENT)
Facility: HOSPITAL | Age: 80
End: 2025-01-23
Payer: MEDICARE

## 2025-01-23 NOTE — PROGRESS NOTES
Population Health Chart Review & Patient Outreach Details    Blood Pressure Check    Health Maintenance Topics Addressed and Outreach Outcomes / Actions Taken:  Blood Pressure Control  [x] Spoke with pt via phone, pt does check Bp at home. Pt reports that he will check his Bp when he gets home this evening and call me back with reading.

## 2025-01-27 ENCOUNTER — PATIENT OUTREACH (OUTPATIENT)
Facility: HOSPITAL | Age: 80
End: 2025-01-27
Payer: MEDICARE

## 2025-01-27 NOTE — PROGRESS NOTES
Population Health Chart Review & Patient Outreach Details    Blood Pressure Check     Health Maintenance Topics Addressed and Outreach Outcomes / Actions Taken:  Blood Pressure Control  [x] Called pt. No answer. L/m. Need remote Bp reading.

## 2025-01-28 NOTE — PROGRESS NOTES
Stephane Mathis presented for a follow-up Medicare AWV today. The following components were reviewed and updated:    Medical history  Family History  Social history  Allergies and Current Medications  Health Risk Assessment  Health Maintenance  Care Team    78 yo AAM presents to clinic today with CC of Medicare AWV.  Patient has a PMH significant for HTN, HLD, seizure DO, iron deficiency anemia, OA, erectile dysfunction, BPH, OA, CKD, and vitamin D deficiency.  Patient reports he is scheduled for shoulder surgery.  Reports chronic issues are well controlled on current medication regimen.  Denies problems or side effects with medications.  Patient is, otherwise, without complaints.     **See Completed Assessments for Annual Wellness visit with in the encounter summary    The following assessments were completed:  Depression Screening  Cognitive function Screening  Timed Get Up Test  Whisper Test      Opioid documentation:      Patient does have a current opioid prescription.      Patient accepted further discussion regarding opioid medication use.      Patient is currently taking hydrocodone narcotic for shoulder and knee pain.        Pain level today is 10/10.       In addition to narcotic pain medications, patient is also using topical analgesic and marijuana for pain control.       Patient is not followed by a specialist currently for their pain and will not be referred today.       Patient's opioid risk potential based on ORT-OUD tool:       Ezequiel each box that applies   No   Yes     Family history of substance abuse   Alcohol [x] []   Illegal drugs [x] []   Rx drugs [x] []     Personal history of substance abuse   Alcohol [] [x]   Illegal drugs [] [x]   Rx drugs [x] []     Age between 16-45 years   [x]   []     Patient with ADD, OCD, Bipolar disorder, schizoprenia   [x]   []     Patient with depression   []   [x]                         Scoring total                                                    3      "        Non-opioid treatment options have been discussed today and added to the patient's after visit summary.          Vitals:    01/30/25 1341   BP: 130/68   BP Location: Left forearm   Patient Position: Sitting   Pulse: 63   Resp: 14   Temp: 98.3 °F (36.8 °C)   TempSrc: Oral   SpO2: 98%   Weight: 73.4 kg (161 lb 12.8 oz)   Height: 5' 11" (1.803 m)     Body mass index is 22.57 kg/m².       Physical Exam  Constitutional:       General: He is not in acute distress.     Appearance: Normal appearance.   HENT:      Head: Normocephalic and atraumatic.      Nose: Nose normal.      Mouth/Throat:      Mouth: Mucous membranes are moist.      Pharynx: Oropharynx is clear.   Eyes:      Extraocular Movements: Extraocular movements intact.      Conjunctiva/sclera: Conjunctivae normal.   Cardiovascular:      Rate and Rhythm: Normal rate and regular rhythm.      Heart sounds: Normal heart sounds. No murmur heard.     No friction rub. No gallop.   Pulmonary:      Effort: Pulmonary effort is normal. No respiratory distress.      Breath sounds: Normal breath sounds. No stridor. No wheezing, rhonchi or rales.   Abdominal:      General: Abdomen is flat. Bowel sounds are normal. There is no distension.      Palpations: Abdomen is soft.      Tenderness: There is no abdominal tenderness.   Musculoskeletal:      Cervical back: Neck supple.      Right lower leg: No edema.      Left lower leg: No edema.   Skin:     Findings: No rash.   Neurological:      General: No focal deficit present.      Mental Status: He is alert. Mental status is at baseline.   Psychiatric:         Mood and Affect: Mood normal.           Diagnoses and health risks identified today and associated recommendations/orders:  1. Encounter for subsequent annual wellness visit (AWV) in Medicare patient    2. Primary osteoarthritis involving multiple joints  The current medical regimen is effective;  continue present plan and medications.    3. Mixed hyperlipidemia  The " current medical regimen is effective;  continue present plan and medications.  - Low cholesterol diet/exercise    4. Chronic kidney disease, stage 3a  - Avoid NSAIDs and drink an adequate amount of water.     5. Encounter for Medicare annual wellness exam    6. Smoking greater than 30 pack years  - CT Chest Lung Screening Low Dose; Future    7. BMI 22.0-22.9, adult    8. Screening for lung cancer  - CT Chest Lung Screening Low Dose; Future    Provided Stephane with a 5-10 year written screening schedule and personal prevention plan. Recommendations were developed using the USPSTF age appropriate recommendations. Education, counseling, and referrals were provided as needed.  After Visit Summary printed and given to patient which includes a list of additional screenings\tests needed.    Referral to pharmacy for tetanus, shingle and rsv vaccine  [facility] pharmacy for covid vaccine record    Follow up in about 1 year (around 2/3/2026) for in 1 year for annual wellness visit at 2:00 pm..RTC as scheduled for chronic follow up. RTC sooner if needed.      Sierra Dickerson MD      I offered to discuss advanced care planning, including how to pick a person who would make decisions for you if you were unable to make them for yourself, called a health care power of , and what kind of decisions you might make such as use of life sustaining treatments such as ventilators and tube feeding when faced with a life limiting illness recorded on a living will that they will need to know. (How you want to be cared for as you near the end of your natural life)     X Patient is interested in learning more about how to make advanced directives.  I provided them paperwork and offered to discuss this with them.

## 2025-01-29 ENCOUNTER — PATIENT OUTREACH (OUTPATIENT)
Facility: HOSPITAL | Age: 80
End: 2025-01-29
Payer: MEDICARE

## 2025-01-29 ENCOUNTER — HOSPITAL ENCOUNTER (OUTPATIENT)
Dept: CARDIOLOGY | Facility: HOSPITAL | Age: 80
Discharge: HOME | End: 2025-01-29
Attending: STUDENT IN AN ORGANIZED HEALTH CARE EDUCATION/TRAINING PROGRAM
Payer: MEDICARE

## 2025-01-29 ENCOUNTER — OFFICE VISIT (OUTPATIENT)
Dept: CARDIOLOGY | Facility: CLINIC | Age: 80
End: 2025-01-29
Payer: MEDICARE

## 2025-01-29 VITALS
WEIGHT: 164 LBS | RESPIRATION RATE: 18 BRPM | HEART RATE: 64 BPM | HEIGHT: 71 IN | DIASTOLIC BLOOD PRESSURE: 70 MMHG | SYSTOLIC BLOOD PRESSURE: 160 MMHG | BODY MASS INDEX: 22.96 KG/M2

## 2025-01-29 DIAGNOSIS — I10 PRIMARY HYPERTENSION: ICD-10-CM

## 2025-01-29 DIAGNOSIS — I10 ESSENTIAL HYPERTENSION: ICD-10-CM

## 2025-01-29 DIAGNOSIS — Z01.810 ENCOUNTER FOR PRE-OPERATIVE CARDIOVASCULAR CLEARANCE: Primary | ICD-10-CM

## 2025-01-29 LAB
AORTIC ROOT ANNULUS: 1.7 CM
AORTIC VALVE CUSP SEPERATION: 1.64 CM
ASCENDING AORTA: 2.21 CM
AV INDEX (PROSTH): 0.75
AV MEAN GRADIENT: 2 MMHG
AV PEAK GRADIENT: 4 MMHG
AV VALVE AREA BY VELOCITY RATIO: 3.1 CM²
AV VALVE AREA: 2.4 CM²
AV VELOCITY RATIO: 1
CV ECHO LV RWT: 0.6 CM
DOP CALC AO PEAK VEL: 1 M/S
DOP CALC AO VTI: 27.3 CM
DOP CALC LVOT AREA: 3.1 CM2
DOP CALC LVOT DIAMETER: 2 CM
DOP CALC LVOT PEAK VEL: 1 M/S
DOP CALC LVOT STROKE VOLUME: 64.7 CM3
DOP CALC MV VTI: 30.6 CM
DOP CALC RVOT PEAK VEL: 0.52 M/S
DOP CALC RVOT VTI: 12.4 CM
DOP CALCLVOT PEAK VEL VTI: 20.6 CM
E WAVE DECELERATION TIME: 194 MSEC
E/A RATIO: 0.89
E/E' RATIO: 10 M/S
ECHO LV POSTERIOR WALL: 1.2 CM (ref 0.6–1.1)
FRACTIONAL SHORTENING: 47.5 % (ref 28–44)
INTERVENTRICULAR SEPTUM: 1 CM (ref 0.6–1.1)
IVC DIAMETER: 1.36 CM
IVRT: 61 MSEC
LEFT ATRIUM AREA SYSTOLIC (APICAL 2 CHAMBER): 18.7 CM2
LEFT ATRIUM AREA SYSTOLIC (APICAL 4 CHAMBER): 21.16 CM2
LEFT ATRIUM VOLUME MOD: 59 ML
LEFT INTERNAL DIMENSION IN SYSTOLE: 2.1 CM (ref 2.1–4)
LEFT VENTRICLE DIASTOLIC VOLUME: 69.51 ML
LEFT VENTRICLE END SYSTOLIC VOLUME APICAL 2 CHAMBER: 54.12 ML
LEFT VENTRICLE END SYSTOLIC VOLUME APICAL 4 CHAMBER: 60.85 ML
LEFT VENTRICLE SYSTOLIC VOLUME: 14.38 ML
LEFT VENTRICULAR INTERNAL DIMENSION IN DIASTOLE: 4 CM (ref 3.5–6)
LEFT VENTRICULAR MASS: 145.6 G
LV LATERAL E/E' RATIO: 7.8 M/S
LV SEPTAL E/E' RATIO: 14 M/S
LVED V (TEICH): 69.51 ML
LVES V (TEICH): 14.38 ML
LVOT MG: 1.89 MMHG
LVOT MV: 0.65 CM/S
MV MEAN GRADIENT: 1 MMHG
MV PEAK A VEL: 0.79 M/S
MV PEAK E VEL: 0.7 M/S
MV PEAK GRADIENT: 2 MMHG
MV STENOSIS PRESSURE HALF TIME: 56.3 MS
MV VALVE AREA BY CONTINUITY EQUATION: 2.11 CM2
MV VALVE AREA P 1/2 METHOD: 3.91 CM2
OHS CV RV/LV RATIO: 0.68 CM
PISA MRMAX VEL: 3.96 M/S
PISA TR MAX VEL: 2.6 M/S
PV MEAN GRADIENT: 1 MMHG
PV MV: 0.55 M/S
PV PEAK GRADIENT: 2 MMHG
PV PEAK VELOCITY: 0.77 M/S
RA PRESSURE ESTIMATED: 3 MMHG
RA VOL SYS: 51.3 ML
RIGHT ATRIAL AREA: 17.1 CM2
RIGHT ATRIUM VOLUME AREA LENGTH APICAL 4 CHAMBER: 48.35 ML
RIGHT VENTRICLE DIASTOLIC BASEL DIMENSION: 2.7 CM
RIGHT VENTRICLE DIASTOLIC LENGTH: 7 CM
RIGHT VENTRICLE DIASTOLIC MID DIMENSION: 2.1 CM
RIGHT VENTRICULAR END-DIASTOLIC DIMENSION: 2.72 CM
RIGHT VENTRICULAR LENGTH IN DIASTOLE (APICAL 4-CHAMBER VIEW): 6.97 CM
RV MID DIAMA: 2.07 CM
RV TB RVSP: 6 MMHG
STJ: 2.13 CM
TDI LATERAL: 0.09 M/S
TDI SEPTAL: 0.05 M/S
TDI: 0.07 M/S
TR MAX PG: 27 MMHG
TRICUSPID ANNULAR PLANE SYSTOLIC EXCURSION: 2.21 CM
TV REST PULMONARY ARTERY PRESSURE: 30 MMHG

## 2025-01-29 PROCEDURE — 99999 PR PBB SHADOW E&M-EST. PATIENT-LVL IV: CPT | Mod: PBBFAC,,, | Performed by: STUDENT IN AN ORGANIZED HEALTH CARE EDUCATION/TRAINING PROGRAM

## 2025-01-29 PROCEDURE — 99214 OFFICE O/P EST MOD 30 MIN: CPT | Mod: PBBFAC,25 | Performed by: STUDENT IN AN ORGANIZED HEALTH CARE EDUCATION/TRAINING PROGRAM

## 2025-01-29 PROCEDURE — 3078F DIAST BP <80 MM HG: CPT | Mod: CPTII,,, | Performed by: STUDENT IN AN ORGANIZED HEALTH CARE EDUCATION/TRAINING PROGRAM

## 2025-01-29 PROCEDURE — 93306 TTE W/DOPPLER COMPLETE: CPT

## 2025-01-29 PROCEDURE — 93306 TTE W/DOPPLER COMPLETE: CPT | Mod: 26,,, | Performed by: STUDENT IN AN ORGANIZED HEALTH CARE EDUCATION/TRAINING PROGRAM

## 2025-01-29 PROCEDURE — 3077F SYST BP >= 140 MM HG: CPT | Mod: CPTII,,, | Performed by: STUDENT IN AN ORGANIZED HEALTH CARE EDUCATION/TRAINING PROGRAM

## 2025-01-29 PROCEDURE — 99214 OFFICE O/P EST MOD 30 MIN: CPT | Mod: S$PBB,,, | Performed by: STUDENT IN AN ORGANIZED HEALTH CARE EDUCATION/TRAINING PROGRAM

## 2025-01-29 PROCEDURE — 1159F MED LIST DOCD IN RCRD: CPT | Mod: CPTII,,, | Performed by: STUDENT IN AN ORGANIZED HEALTH CARE EDUCATION/TRAINING PROGRAM

## 2025-01-29 RX ORDER — HYDRALAZINE HYDROCHLORIDE 100 MG/1
100 TABLET, FILM COATED ORAL 2 TIMES DAILY
Qty: 180 TABLET | Refills: 3 | Status: SHIPPED | OUTPATIENT
Start: 2025-01-29 | End: 2026-01-29

## 2025-01-29 NOTE — PROGRESS NOTES
PCP: Sierra Dickerson MD    Referring Provider:     Subjective:   Stephane Mathis is a 79 y.o. male with hx of smoking, HTN, OA who presents for pre-op evaluation    1/29/25 - Continues to have shoulder pain and not sleeping bc of that. Is likely hypertensive secondary to that. His ECHO is largely normal. Patient is low risk for surgery    12/2024 - Patient being worked up for shoulder surgery  Denies any symptoms. Able to do 4 METs  Denies any CP, SOB, Leg swelling    Fhx: non-contributary  Shx: Smoker, etoh - tequila/beer (daily), smokes marijuana    EKG - 12/12/24 - NSR, LVH   ECHO Results for orders placed during the hospital encounter of 01/29/25    Echo    Interpretation Summary    Left Ventricle: The left ventricle is normal in size. Mildly increased wall thickness. There is mild concentric hypertrophy. There is normal systolic function with a visually estimated ejection fraction of 55 - 60%. Grade I diastolic dysfunction.    Right Ventricle: Normal right ventricular cavity size. Systolic function is normal.    Mitral Valve: There is mild regurgitation.    Pulmonary Artery: The estimated pulmonary artery systolic pressure is 30 mmHg.    IVC/SVC: Normal venous pressure at 3 mmHg.        Lab Results   Component Value Date     11/12/2024    K 3.7 11/12/2024     11/12/2024    CO2 30 11/12/2024    BUN 14 11/12/2024    CREATININE 1.18 11/12/2024    CALCIUM 9.3 11/12/2024    ANIONGAP 6 (L) 11/12/2024    ESTGFRAFRICA 94 07/26/2021    EGFRNONAA 73 07/26/2022       Lab Results   Component Value Date    CHOL 193 10/08/2024    CHOL 179 10/04/2023    CHOL 266 (H) 12/12/2022     Lab Results   Component Value Date    HDL 88 (H) 10/08/2024    HDL 92 (H) 10/04/2023    HDL 87 (H) 12/12/2022     Lab Results   Component Value Date    LDLCALC 79 10/08/2024    LDLCALC 72 10/04/2023    LDLCALC 165 12/12/2022     Lab Results   Component Value Date    TRIG 128 10/08/2024    TRIG 74 10/04/2023    TRIG 70  12/12/2022     Lab Results   Component Value Date    CHOLHDL 2.2 10/08/2024    CHOLHDL 1.9 10/04/2023    CHOLHDL 3.1 12/12/2022       Lab Results   Component Value Date    WBC 7.14 11/12/2024    HGB 11.1 (L) 11/12/2024    HCT 35.2 (L) 11/12/2024    MCV 99.7 (H) 11/12/2024     11/12/2024           Current Outpatient Medications:     amLODIPine (NORVASC) 10 MG tablet, Take 1 tablet (10 mg total) by mouth once daily., Disp: 90 tablet, Rfl: 1    ferrous sulfate (FEOSOL) 325 mg (65 mg iron) Tab tablet, Take 1 tablet (325 mg total) by mouth 2 (two) times daily., Disp: 180 tablet, Rfl: 1    hydroCHLOROthiazide (HYDRODIURIL) 25 MG tablet, Take 1 tablet (25 mg total) by mouth once daily., Disp: 90 tablet, Rfl: 1    HYDROcodone-acetaminophen (NORCO) 5-325 mg per tablet, Take 1 tablet by mouth every 8 (eight) hours as needed for Pain., Disp: 30 tablet, Rfl: 0    meloxicam (MOBIC) 7.5 MG tablet, Take 1 tablet (7.5 mg total) by mouth once daily., Disp: 30 tablet, Rfl: 2    potassium chloride SA (K-DUR,KLOR-CON M) 10 MEQ tablet, Take 1 tablet (10 mEq total) by mouth once daily., Disp: 90 tablet, Rfl: 0    aspirin (ECOTRIN) 81 MG EC tablet, Take 1 tablet (81 mg total) by mouth once daily. (Patient not taking: Reported on 1/29/2025), Disp: 90 tablet, Rfl: 1    atorvastatin (LIPITOR) 40 MG tablet, Take 1 tablet (40 mg total) by mouth every evening. (Patient not taking: Reported on 1/29/2025), Disp: 90 tablet, Rfl: 1    EAR DROPS, CARBAMIDE PEROXIDE, OTIC, Place in ear(s). (Patient not taking: Reported on 1/29/2025), Disp: , Rfl:     EPINEPHrine (EPIPEN) 0.3 mg/0.3 mL AtIn, Inject 0.3 mLs (0.3 mg total) into the muscle as needed., Disp: 1 each, Rfl: 0    ergocalciferol (ERGOCALCIFEROL) 50,000 unit Cap, Take 1 capsule (50,000 Units total) by mouth every 7 days. (Patient not taking: Reported on 1/29/2025), Disp: 12 capsule, Rfl: 0    famotidine (PEPCID) 20 MG tablet, Take 1 tablet (20 mg total) by mouth 2 (two) times daily as  "needed for Heartburn. (Patient not taking: Reported on 1/29/2025), Disp: 60 tablet, Rfl: 3    hydrALAZINE (APRESOLINE) 100 MG tablet, Take 1 tablet (100 mg total) by mouth 2 (two) times a day., Disp: 180 tablet, Rfl: 3    methocarbamoL (ROBAXIN) 500 MG Tab, Take 1 tablet (500 mg total) by mouth 3 (three) times daily as needed (muscle spasm/pain. May cause drowsiness). (Patient not taking: Reported on 1/29/2025), Disp: 30 tablet, Rfl: 2    tadalafiL (CIALIS) 10 MG tablet, Take 1 tablet (10 mg total) by mouth daily as needed for Erectile Dysfunction., Disp: 6 tablet, Rfl: 5  No current facility-administered medications for this visit.    Facility-Administered Medications Ordered in Other Visits:     BUPivacaine (PF) 0.25% (2.5 mg/ml) injection 1 mL, 1 mL, , , Zora Dunn PA, 1 mL at 01/30/24 0930    triamcinolone acetonide injection 40 mg, 40 mg, Intra-articular, , Zora Dunn PA, 40 mg at 01/30/24 0930    Review of Systems   Respiratory:  Negative for cough and shortness of breath.    Cardiovascular:  Negative for chest pain, palpitations, orthopnea, claudication, leg swelling and PND.         Objective:   BP (!) 160/70   Pulse 64   Resp 18   Ht 5' 11" (1.803 m)   Wt 74.4 kg (164 lb)   BMI 22.87 kg/m²     Physical Exam  Vitals and nursing note reviewed.   Constitutional:       Appearance: Normal appearance.   Cardiovascular:      Rate and Rhythm: Normal rate and regular rhythm.      Pulses: Normal pulses.      Heart sounds: Normal heart sounds.   Pulmonary:      Breath sounds: Normal breath sounds.   Neurological:      Mental Status: He is alert and oriented to person, place, and time.           Assessment:     1. Encounter for pre-operative cardiovascular clearance        2. Essential hypertension  hydrALAZINE (APRESOLINE) 100 MG tablet            Plan:   Encounter for pre-operative cardiovascular clearance  Able to do 4 METs  Low risk for surgery     Essential hypertension  Uncontrolled - likely " due to pain and lack of sleep  EKG with LVH   ECHO - mild LVH  Restart hdyralazine 100mg twice a day

## 2025-01-29 NOTE — PROGRESS NOTES
Population Health Chart Review & Patient Outreach Details    Blood Pressure Check    Health Maintenance Topics Addressed and Outreach Outcomes / Actions Taken:  Blood Pressure Control  [x] Spoke with pt via phone, he can not talk right now due to he is at the hospital. Informed pt that I would call him back tomorrow. Patient voiced understanding.

## 2025-01-29 NOTE — ASSESSMENT & PLAN NOTE
Uncontrolled - likely due to pain and lack of sleep  EKG with LVH   ECHO - mild LVH  Restart hdyralazine 100mg twice a day

## 2025-01-30 ENCOUNTER — OFFICE VISIT (OUTPATIENT)
Dept: FAMILY MEDICINE | Facility: CLINIC | Age: 80
End: 2025-01-30
Payer: MEDICARE

## 2025-01-30 ENCOUNTER — PATIENT MESSAGE (OUTPATIENT)
Dept: ORTHOPEDICS | Facility: CLINIC | Age: 80
End: 2025-01-30
Payer: MEDICARE

## 2025-01-30 VITALS
RESPIRATION RATE: 14 BRPM | BODY MASS INDEX: 22.65 KG/M2 | HEIGHT: 71 IN | SYSTOLIC BLOOD PRESSURE: 130 MMHG | TEMPERATURE: 98 F | DIASTOLIC BLOOD PRESSURE: 68 MMHG | OXYGEN SATURATION: 98 % | WEIGHT: 161.81 LBS | HEART RATE: 63 BPM

## 2025-01-30 DIAGNOSIS — N18.31 CHRONIC KIDNEY DISEASE, STAGE 3A: ICD-10-CM

## 2025-01-30 DIAGNOSIS — E78.2 MIXED HYPERLIPIDEMIA: ICD-10-CM

## 2025-01-30 DIAGNOSIS — Z00.00 ENCOUNTER FOR SUBSEQUENT ANNUAL WELLNESS VISIT (AWV) IN MEDICARE PATIENT: Primary | ICD-10-CM

## 2025-01-30 DIAGNOSIS — Z00.00 ENCOUNTER FOR MEDICARE ANNUAL WELLNESS EXAM: ICD-10-CM

## 2025-01-30 DIAGNOSIS — F17.210 SMOKING GREATER THAN 30 PACK YEARS: ICD-10-CM

## 2025-01-30 DIAGNOSIS — M75.122 COMPLETE TEAR OF LEFT ROTATOR CUFF, UNSPECIFIED WHETHER TRAUMATIC: ICD-10-CM

## 2025-01-30 DIAGNOSIS — Z12.2 SCREENING FOR LUNG CANCER: ICD-10-CM

## 2025-01-30 DIAGNOSIS — Z01.812 PRE-OPERATIVE LABORATORY EXAMINATION: Primary | ICD-10-CM

## 2025-01-30 DIAGNOSIS — M15.0 PRIMARY OSTEOARTHRITIS INVOLVING MULTIPLE JOINTS: ICD-10-CM

## 2025-01-30 PROCEDURE — 1158F ADVNC CARE PLAN TLK DOCD: CPT | Mod: ,,, | Performed by: FAMILY MEDICINE

## 2025-01-30 PROCEDURE — 3075F SYST BP GE 130 - 139MM HG: CPT | Mod: ,,, | Performed by: FAMILY MEDICINE

## 2025-01-30 PROCEDURE — G0439 PPPS, SUBSEQ VISIT: HCPCS | Mod: ,,, | Performed by: FAMILY MEDICINE

## 2025-01-30 PROCEDURE — 1100F PTFALLS ASSESS-DOCD GE2>/YR: CPT | Mod: ,,, | Performed by: FAMILY MEDICINE

## 2025-01-30 PROCEDURE — 1159F MED LIST DOCD IN RCRD: CPT | Mod: ,,, | Performed by: FAMILY MEDICINE

## 2025-01-30 PROCEDURE — 1125F AMNT PAIN NOTED PAIN PRSNT: CPT | Mod: ,,, | Performed by: FAMILY MEDICINE

## 2025-01-30 PROCEDURE — 3078F DIAST BP <80 MM HG: CPT | Mod: ,,, | Performed by: FAMILY MEDICINE

## 2025-01-30 PROCEDURE — 3288F FALL RISK ASSESSMENT DOCD: CPT | Mod: ,,, | Performed by: FAMILY MEDICINE

## 2025-01-30 PROCEDURE — 1170F FXNL STATUS ASSESSED: CPT | Mod: ,,, | Performed by: FAMILY MEDICINE

## 2025-01-30 NOTE — PATIENT INSTRUCTIONS
Counseling and Referral of Other Preventative  (Italic type indicates deductible and co-insurance are waived)    Patient Name: Stephane Mathis  Today's Date: 1/30/2025    Health Maintenance       Date Due Completion Date    TETANUS VACCINE Never done ---    Shingles Vaccine (1 of 2) Never done ---    RSV Vaccine (Age 60+ and Pregnant patients) (1 - 1-dose 75+ series) Never done ---    COVID-19 Vaccine (6 - 2024-25 season) 09/01/2024 10/5/2023    LDCT Lung Screen 10/19/2024 10/19/2023    Lipid Panel 10/08/2029 10/8/2024        No orders of the defined types were placed in this encounter.      The following information is provided to all patients.  This information is to help you find resources for any of the problems found today that may be affecting your health:                  Living healthy guide: ms.gov    Understanding Diabetes: www.diabetes.org      Eating healthy: www.cdc.gov/healthyweight      CDC home safety checklist: www.cdc.gov/steadi/patient.html      Agency on Aging: ms.gov    Alcoholics anonymous (AA): www.aa.org      Physical Activity: www.anisha.nih.gov/se9omlr      Tobacco use: ms.gov

## 2025-02-13 DIAGNOSIS — Z01.810 PRE-OPERATIVE CARDIOVASCULAR EXAMINATION: Primary | ICD-10-CM

## 2025-02-19 ENCOUNTER — ANESTHESIA EVENT (OUTPATIENT)
Dept: SURGERY | Facility: HOSPITAL | Age: 80
End: 2025-02-19
Payer: MEDICARE

## 2025-02-19 ENCOUNTER — ANESTHESIA (OUTPATIENT)
Dept: SURGERY | Facility: HOSPITAL | Age: 80
End: 2025-02-19
Payer: MEDICARE

## 2025-02-19 ENCOUNTER — HOSPITAL ENCOUNTER (OUTPATIENT)
Facility: HOSPITAL | Age: 80
Discharge: HOME | End: 2025-02-19
Attending: ORTHOPAEDIC SURGERY | Admitting: ORTHOPAEDIC SURGERY
Payer: MEDICARE

## 2025-02-19 VITALS
TEMPERATURE: 98 F | HEIGHT: 71 IN | WEIGHT: 160 LBS | BODY MASS INDEX: 22.4 KG/M2 | HEART RATE: 75 BPM | RESPIRATION RATE: 16 BRPM | DIASTOLIC BLOOD PRESSURE: 68 MMHG | OXYGEN SATURATION: 95 % | SYSTOLIC BLOOD PRESSURE: 107 MMHG

## 2025-02-19 DIAGNOSIS — M75.100 ROTATOR CUFF TEAR: Primary | ICD-10-CM

## 2025-02-19 PROCEDURE — 27201423 OPTIME MED/SURG SUP & DEVICES STERILE SUPPLY: Performed by: ORTHOPAEDIC SURGERY

## 2025-02-19 PROCEDURE — 37000008 HC ANESTHESIA 1ST 15 MINUTES: Performed by: ORTHOPAEDIC SURGERY

## 2025-02-19 PROCEDURE — C1713 ANCHOR/SCREW BN/BN,TIS/BN: HCPCS | Performed by: ORTHOPAEDIC SURGERY

## 2025-02-19 PROCEDURE — 63600175 PHARM REV CODE 636 W HCPCS: Performed by: NURSE ANESTHETIST, CERTIFIED REGISTERED

## 2025-02-19 PROCEDURE — 27000450 HC CEREBRAL OXIMETER PROBE: Performed by: ANESTHESIOLOGY

## 2025-02-19 PROCEDURE — 27000655: Performed by: ANESTHESIOLOGY

## 2025-02-19 PROCEDURE — 27000689 HC BLADE LARYNGOSCOPE ANY SIZE: Performed by: ANESTHESIOLOGY

## 2025-02-19 PROCEDURE — D9220A PRA ANESTHESIA: Mod: CRNA,,, | Performed by: NURSE ANESTHETIST, CERTIFIED REGISTERED

## 2025-02-19 PROCEDURE — 97161 PT EVAL LOW COMPLEX 20 MIN: CPT

## 2025-02-19 PROCEDURE — 71000016 HC POSTOP RECOV ADDL HR: Performed by: ORTHOPAEDIC SURGERY

## 2025-02-19 PROCEDURE — 27000716 HC OXISENSOR PROBE, ANY SIZE: Performed by: ANESTHESIOLOGY

## 2025-02-19 PROCEDURE — 71000033 HC RECOVERY, INTIAL HOUR: Performed by: ORTHOPAEDIC SURGERY

## 2025-02-19 PROCEDURE — 27000165 HC TUBE, ETT CUFFED: Performed by: ANESTHESIOLOGY

## 2025-02-19 PROCEDURE — D9220A PRA ANESTHESIA: Mod: ANES,,, | Performed by: ANESTHESIOLOGY

## 2025-02-19 PROCEDURE — 71000015 HC POSTOP RECOV 1ST HR: Performed by: ORTHOPAEDIC SURGERY

## 2025-02-19 PROCEDURE — 36000710: Performed by: ORTHOPAEDIC SURGERY

## 2025-02-19 PROCEDURE — 29826 SHO ARTHRS SRG DECOMPRESSION: CPT | Mod: LT,,, | Performed by: ORTHOPAEDIC SURGERY

## 2025-02-19 PROCEDURE — 63600175 PHARM REV CODE 636 W HCPCS: Performed by: ANESTHESIOLOGY

## 2025-02-19 PROCEDURE — 64415 NJX AA&/STRD BRCH PLXS IMG: CPT | Mod: 59,LT,, | Performed by: ANESTHESIOLOGY

## 2025-02-19 PROCEDURE — 25000003 PHARM REV CODE 250: Performed by: ORTHOPAEDIC SURGERY

## 2025-02-19 PROCEDURE — 29827 SHO ARTHRS SRG RT8TR CUF RPR: CPT | Mod: LT,,, | Performed by: ORTHOPAEDIC SURGERY

## 2025-02-19 PROCEDURE — 25000003 PHARM REV CODE 250: Performed by: NURSE ANESTHETIST, CERTIFIED REGISTERED

## 2025-02-19 PROCEDURE — 63600175 PHARM REV CODE 636 W HCPCS: Mod: JZ,TB | Performed by: ANESTHESIOLOGY

## 2025-02-19 PROCEDURE — 37000009 HC ANESTHESIA EA ADD 15 MINS: Performed by: ORTHOPAEDIC SURGERY

## 2025-02-19 PROCEDURE — 27000510 HC BLANKET BAIR HUGGER ANY SIZE: Performed by: ANESTHESIOLOGY

## 2025-02-19 PROCEDURE — 36000711: Performed by: ORTHOPAEDIC SURGERY

## 2025-02-19 DEVICE — BIO-COMP CRKSCRW FT VENT 5.5X14.7MM
Type: IMPLANTABLE DEVICE | Site: SHOULDER | Status: FUNCTIONAL
Brand: ARTHREX®

## 2025-02-19 DEVICE — SUTURE ANCHOR, BIO- COMPOSITE PUSHLOCK
Type: IMPLANTABLE DEVICE | Site: SHOULDER | Status: FUNCTIONAL
Brand: ARTHREX®

## 2025-02-19 RX ORDER — DIPHENHYDRAMINE HYDROCHLORIDE 50 MG/ML
25 INJECTION INTRAMUSCULAR; INTRAVENOUS EVERY 6 HOURS PRN
Status: DISCONTINUED | OUTPATIENT
Start: 2025-02-19 | End: 2025-02-19 | Stop reason: HOSPADM

## 2025-02-19 RX ORDER — EPHEDRINE SULFATE 50 MG/ML
INJECTION, SOLUTION INTRAVENOUS
Status: DISCONTINUED | OUTPATIENT
Start: 2025-02-19 | End: 2025-02-19

## 2025-02-19 RX ORDER — ONDANSETRON 4 MG/1
8 TABLET, ORALLY DISINTEGRATING ORAL EVERY 8 HOURS PRN
Status: DISCONTINUED | OUTPATIENT
Start: 2025-02-19 | End: 2025-02-19 | Stop reason: HOSPADM

## 2025-02-19 RX ORDER — MEPERIDINE HYDROCHLORIDE 25 MG/ML
25 INJECTION INTRAMUSCULAR; INTRAVENOUS; SUBCUTANEOUS EVERY 10 MIN PRN
Status: DISCONTINUED | OUTPATIENT
Start: 2025-02-19 | End: 2025-02-19 | Stop reason: HOSPADM

## 2025-02-19 RX ORDER — GLUCAGON 1 MG
1 KIT INJECTION
Status: DISCONTINUED | OUTPATIENT
Start: 2025-02-19 | End: 2025-02-19 | Stop reason: HOSPADM

## 2025-02-19 RX ORDER — ROCURONIUM BROMIDE 10 MG/ML
INJECTION, SOLUTION INTRAVENOUS
Status: DISCONTINUED | OUTPATIENT
Start: 2025-02-19 | End: 2025-02-19

## 2025-02-19 RX ORDER — PROPOFOL 10 MG/ML
VIAL (ML) INTRAVENOUS
Status: DISCONTINUED | OUTPATIENT
Start: 2025-02-19 | End: 2025-02-19

## 2025-02-19 RX ORDER — ONDANSETRON HYDROCHLORIDE 2 MG/ML
4 INJECTION, SOLUTION INTRAVENOUS DAILY PRN
Status: DISCONTINUED | OUTPATIENT
Start: 2025-02-19 | End: 2025-02-19 | Stop reason: HOSPADM

## 2025-02-19 RX ORDER — PHENYLEPHRINE HYDROCHLORIDE 10 MG/ML
INJECTION INTRAVENOUS
Status: DISCONTINUED | OUTPATIENT
Start: 2025-02-19 | End: 2025-02-19

## 2025-02-19 RX ORDER — ROPIVACAINE HYDROCHLORIDE 7.5 MG/ML
INJECTION, SOLUTION EPIDURAL; PERINEURAL
Status: COMPLETED | OUTPATIENT
Start: 2025-02-19 | End: 2025-02-19

## 2025-02-19 RX ORDER — FENTANYL CITRATE 50 UG/ML
INJECTION, SOLUTION INTRAMUSCULAR; INTRAVENOUS
Status: DISCONTINUED | OUTPATIENT
Start: 2025-02-19 | End: 2025-02-19

## 2025-02-19 RX ORDER — CEFAZOLIN SODIUM 1 G/3ML
INJECTION, POWDER, FOR SOLUTION INTRAMUSCULAR; INTRAVENOUS
Status: DISCONTINUED | OUTPATIENT
Start: 2025-02-19 | End: 2025-02-19

## 2025-02-19 RX ORDER — MORPHINE SULFATE 10 MG/ML
4 INJECTION INTRAMUSCULAR; INTRAVENOUS; SUBCUTANEOUS EVERY 5 MIN PRN
Status: DISCONTINUED | OUTPATIENT
Start: 2025-02-19 | End: 2025-02-19 | Stop reason: HOSPADM

## 2025-02-19 RX ORDER — HYDROCODONE BITARTRATE AND ACETAMINOPHEN 5; 325 MG/1; MG/1
1 TABLET ORAL EVERY 4 HOURS PRN
Status: DISCONTINUED | OUTPATIENT
Start: 2025-02-19 | End: 2025-02-19 | Stop reason: HOSPADM

## 2025-02-19 RX ORDER — TRANEXAMIC ACID 100 MG/ML
INJECTION, SOLUTION INTRAVENOUS
Status: DISCONTINUED | OUTPATIENT
Start: 2025-02-19 | End: 2025-02-19

## 2025-02-19 RX ORDER — HYDROMORPHONE HYDROCHLORIDE 2 MG/ML
0.5 INJECTION, SOLUTION INTRAMUSCULAR; INTRAVENOUS; SUBCUTANEOUS EVERY 5 MIN PRN
Status: DISCONTINUED | OUTPATIENT
Start: 2025-02-19 | End: 2025-02-19 | Stop reason: HOSPADM

## 2025-02-19 RX ORDER — HYDROCODONE BITARTRATE AND ACETAMINOPHEN 7.5; 325 MG/1; MG/1
1 TABLET ORAL EVERY 6 HOURS PRN
Qty: 20 TABLET | Refills: 0 | Status: SHIPPED | OUTPATIENT
Start: 2025-02-19

## 2025-02-19 RX ORDER — SODIUM CHLORIDE 9 MG/ML
INJECTION, SOLUTION INTRAVENOUS CONTINUOUS
Status: DISCONTINUED | OUTPATIENT
Start: 2025-02-19 | End: 2025-02-19 | Stop reason: HOSPADM

## 2025-02-19 RX ORDER — ONDANSETRON HYDROCHLORIDE 2 MG/ML
INJECTION, SOLUTION INTRAVENOUS
Status: DISCONTINUED | OUTPATIENT
Start: 2025-02-19 | End: 2025-02-19

## 2025-02-19 RX ADMIN — HYDROCODONE BITARTRATE AND ACETAMINOPHEN 1 TABLET: 5; 325 TABLET ORAL at 02:02

## 2025-02-19 RX ADMIN — ROCURONIUM BROMIDE 50 MG: 10 INJECTION, SOLUTION INTRAVENOUS at 11:02

## 2025-02-19 RX ADMIN — EPHEDRINE SULFATE 15 MG: 50 INJECTION INTRAVENOUS at 12:02

## 2025-02-19 RX ADMIN — CEFAZOLIN 2 G: 1 INJECTION, POWDER, FOR SOLUTION INTRAMUSCULAR; INTRAVENOUS; PARENTERAL at 11:02

## 2025-02-19 RX ADMIN — EPHEDRINE SULFATE 25 MG: 50 INJECTION INTRAVENOUS at 11:02

## 2025-02-19 RX ADMIN — SODIUM CHLORIDE: 9 INJECTION, SOLUTION INTRAVENOUS at 10:02

## 2025-02-19 RX ADMIN — FENTANYL CITRATE 100 MCG: 50 INJECTION, SOLUTION INTRAMUSCULAR; INTRAVENOUS at 11:02

## 2025-02-19 RX ADMIN — ONDANSETRON 4 MG: 2 INJECTION INTRAMUSCULAR; INTRAVENOUS at 12:02

## 2025-02-19 RX ADMIN — EPHEDRINE SULFATE 10 MG: 50 INJECTION INTRAVENOUS at 12:02

## 2025-02-19 RX ADMIN — HYDROMORPHONE HYDROCHLORIDE 0.5 MG: 2 INJECTION, SOLUTION INTRAMUSCULAR; INTRAVENOUS; SUBCUTANEOUS at 01:02

## 2025-02-19 RX ADMIN — PHENYLEPHRINE HYDROCHLORIDE 200 MCG: 10 INJECTION INTRAVENOUS at 11:02

## 2025-02-19 RX ADMIN — SUGAMMADEX 200 MG: 100 INJECTION, SOLUTION INTRAVENOUS at 01:02

## 2025-02-19 RX ADMIN — ROPIVACAINE HYDROCHLORIDE 30 ML: 7.5 INJECTION, SOLUTION EPIDURAL; PERINEURAL at 11:02

## 2025-02-19 RX ADMIN — TRANEXAMIC ACID 1000 MG: 100 INJECTION, SOLUTION INTRAVENOUS at 11:02

## 2025-02-19 RX ADMIN — PROPOFOL 200 MG: 10 INJECTION, EMULSION INTRAVENOUS at 11:02

## 2025-02-19 NOTE — OP NOTE
Department of Orthopedic Surgery    Operative Note      Surgery Date: 2/19/2025     PREOPERATIVE DIAGNOSIS:  Left shoulder rotator cuff tear    POSTOPERATIVE DIAGNOSIS:  Left shoulder rotator cuff tear  Anterior labrum tear    PROCEDURE:  Left shoulder arthroscopy with extensive debridement of anterior labrum tear debridement of rotator cuff tear   Open releasing the coracoacromial ligament with a acromioplasty and rotator cuff repair  One bio corkscrew 2 push locks    IMPANTS USED:   Implant Name Type Inv. Item Serial No.  Lot No. LRB No. Used Action   ANCHOR SUT FT BIOCRKSCR 5.5MM - [id number]  ANCHOR SUT FT BIOCRKSCR 5.5MM  [employer] [id number] Left 1 Implanted   ANCHOR BIO-PUSHLOCK 3.5X19.5MM - [id number]  ANCHOR BIO-PUSHLOCK 3.5X19.5MM  [employer] [id number] Left 1 Implanted   ANCHOR BIO-PUSHLOCK 3.5X19.5MM - [id number]  ANCHOR BIO-PUSHLOCK 3.5X19.5MM  [employer] [id number] Left 1 Implanted       SURGEON: Dr. Fenton     ASSISTANT:  Jose    ANESTHESIA:  Interscalene block and general    ESTIMATED BLOOD LOSS:  10 cc    COMPLICATIONS:  None    INDICATION:   Stephane Mathis is a 79 y.o. patient had chronic left shoulder pain MRI showed a rotator cuff tear he had failed conservative therapy desires surgical individual of the long rehab course discussed the importance rehab discussed    PROCEDURE: The patient was brought to the operating room.  General anesthesia and interscalene block were administered per Anesthesia.  The patient was positioned in a beach chair position.  All bony prominences were well padded.  The left arm and shoulder were prepped and draped in normal sterile fashion.  A spinal needle was introduced posteriorly and the shoulder was injected with Saline with good return.  The skin was cut.  Trocar for the camera was introduced.  A second portal was made anteriorly using a spinal needle going lateral to the coracoid above the subscapularis.  Disposable trocar was then introduced.  The shoulder  was scoped through all compartments.  Patient had marked anterior labrum tearing that was shaved stable surface articular cartilage was in good condition there was some wear of the glenoid anteriorly the the humeral head was in good condition there was marked tearing of the rotator cuff laterally the addition this was shaved but not complete tear the biceps was gone it obviously torn and was not in the joint    Camera was then removed oblique incision with the tip of the acromion dissection was carried down level of the fascia skin was undermined deltoid was then split laterally elevated off the anterior acromion patient has a large bony spur that was obviously digging in saw was used to resect this level with the anterior clavicle osteotome was used to remove the undersurface it was rasped smooth large thickened bursa was removed there was complete tear of the rotator cuff cartilage was roughen there bio corkscrew tap and the bio corkscrew itself was placed both sets of a sutures were used to repair the cuff down and then using 2 push locks on the outer cortices a double row repair was performed a large amount of bursa almost like gristle was removed from the anterior shoulder the build up from the spur rubbing show was irrigated out copious      Two bony tunnels were made in the acromion repairing the deltoid back to the acromion and to itself with 2. Tycron.  2-0 Vicryl and 4-0 Prolene were used on the skin.  A sterile dressing and sling were applied.  The patient tolerated the procedure well without complications.                  Rigo Fenton III, MD

## 2025-02-19 NOTE — ANESTHESIA PROCEDURE NOTES
Intubation    Date/Time: 2/19/2025 11:17 AM    Performed by: Rufina Martinez CRNA  Authorized by: Igor Ogden MD    Intubation:     Induction:  Intravenous    Mask Ventilation:  Easy mask    Attempts:  1    Attempted By:  CRNA    Method of Intubation:  Direct    Blade:  Joao 3    Laryngeal View Grade: Grade IIA - cords partially seen      Difficult Airway Encountered?: No      Complications:  None    Airway Device:  Oral endotracheal tube    Airway Device Size:  7.5    Style/Cuff Inflation:  Cuffed (inflated to minimal occlusive pressure)    Tube secured:  21    Secured at:  The lips    Placement Verified By:  Capnometry    Complicating Factors:  None    Findings Post-Intubation:  BS equal bilateral

## 2025-02-19 NOTE — H&P
CC:      PREVIOUS INFO:  Mary Gay 09/09/2024     Stephane Mathis returns to clinic for a follow up visit regarding       ICD-10-CM ICD-9-CM   1. Acute pain of left shoulder  M25.512 719.41   2. Primary osteoarthritis of left shoulder  M19.012 715.11         Patient had an injection 7/8.   He reports the injection did give him some pain relief, but feels it is starting to wear off.  Reports he had a lot of pain starting last night.  He had an injection in January as well which lasted longer.  It is painful to even lift his arm to 90°.  He has not improved with shot anti-inflammatories and home exercise program       HISTORY:   2/19/2025    Stephane Mathis  is a 79 y.o. previous injection January had another injection July 8th that has been on Mobic in addition shoulder continues to bother him this is his left shoulder he is right-handed has weakness and pain pain at night can not get overhead      PAST MEDICAL HISTORY:   Past Medical History:   Diagnosis Date    Acute superficial gastritis without hemorrhage 7/12/2022    Alcoholism     Anemia     AVM (arteriovenous malformation) of colon 7/13/2022    Colon, diverticulosis 7/13/2022    Hypertension     Iron deficiency anemia due to chronic blood loss 7/12/2022    Polyp, sigmoid colon 7/13/2022    Seizures     Stroke     right sided weakness          PAST SURGICAL HISTORY:   Past Surgical History:   Procedure Laterality Date    CATARACT EXTRACTION W/ INTRAOCULAR LENS IMPLANT Right     KNEE SURGERY Right     LUMBAR DISCECTOMY      SCROTAL SURGERY      in february    TONSILLECTOMY            ALLERGIES:   Review of patient's allergies indicates:   Allergen Reactions    Ace inhibitors Swelling    Codeine Itching        MEDICATIONS :    Current Facility-Administered Medications:     0.9% NaCl infusion, , Intravenous, Continuous, Rigo Fenton III, MD    Facility-Administered Medications Ordered in Other Encounters:     BUPivacaine (PF) 0.25% (2.5  "mg/ml) injection 1 mL, 1 mL, , , Zora Dunn PA, 1 mL at 01/30/24 0930    triamcinolone acetonide injection 40 mg, 40 mg, Intra-articular, , Zora Dunn PA, 40 mg at 01/30/24 0930     SOCIAL HISTORY:   Social History     Socioeconomic History    Marital status: Unknown   Tobacco Use    Smoking status: Every Day     Current packs/day: 1.00     Average packs/day: 1 pack/day for 59.1 years (59.1 ttl pk-yrs)     Types: Cigarettes     Start date: 1966     Passive exposure: Current    Smokeless tobacco: Never    Tobacco comments:     Tobacco quitline information sheet given    Substance and Sexual Activity    Alcohol use: Yes     Alcohol/week: 12.0 standard drinks of alcohol     Types: 6 Cans of beer, 6 Shots of liquor per week     Comment: vodka/gray goose preference. " couple of drinks a day or two or sometimes on weekends. "    Drug use: Yes     Types: Marijuana     Comment: daily    Sexual activity: Yes     Partners: Female     Social Drivers of Health     Financial Resource Strain: Low Risk  (1/30/2025)    Overall Financial Resource Strain (CARDIA)     Difficulty of Paying Living Expenses: Not very hard   Food Insecurity: No Food Insecurity (1/30/2025)    Hunger Vital Sign     Worried About Running Out of Food in the Last Year: Never true     Ran Out of Food in the Last Year: Never true   Transportation Needs: No Transportation Needs (1/30/2025)    PRAPARE - Transportation     Lack of Transportation (Medical): No     Lack of Transportation (Non-Medical): No   Physical Activity: Insufficiently Active (1/30/2025)    Exercise Vital Sign     Days of Exercise per Week: 7 days     Minutes of Exercise per Session: 20 min   Stress: Stress Concern Present (1/30/2025)    [employer] - Occupational Stress Questionnaire     Feeling of Stress : To some extent   Housing Stability: Unknown (1/30/2025)    Housing Stability Vital Sign     Unable to Pay for Housing in the Last Year: No     " Homeless in the Last Year: No        ROS    FAMILY HISTORY:   Family History   Problem Relation Name Age of Onset    Diabetes Mother      Hypertension Mother      Diabetes Sister      Diabetes Brother      Hypertension Brother      Cancer Brother      Throat cancer Brother      Alzheimer's disease Brother            PHYSICAL EXAM:   Vitals:    02/19/25 1034   BP: (!) 158/70   Pulse:    Resp:    Temp:                Body mass index is 22.32 kg/m².     In general, this is a well-developed, well-nourished male . The patient is alert, oriented and cooperative.      HEENT:  Normocephalic, atraumatic.  Extraocular movements are intact bilaterally.  The oropharynx is benign.       NECK:  Nontender with good range of motion.      PULMONARY: Respirations are even and non-labored.       CARDIOVASCULAR: Pulses regular by peripheral palpation.       ABDOMEN:  Soft, non-tender, non-distended.        EXTREMITIES:  Left upper extremity forward flexion is 100 abduction is about 90 both cause pain he has decreased strength on Mario's test and external rotation testing    Ortho Exam      RADIOGRAPHIC FINDINGS:  10/09/2024 left shoulder MRI   Agree with complete tear rotator cuff tear with retraction    Impression:     1. 11 mm AP diameter full-thickness retracted insertional tear of the anterior fibers of the supraspinatus tendon with 19 mm of proximal retraction of the torn supraspinatus tendon.  There is associated subacromial/subdeltoid bursal fluid.  2. High-grade partial-thicknessobliquely oriented articular surface tearing of the anterior fibers of the infraspinatus tendon.  3. Biceps tendonitis  4. Moderate shoulder joint effusion and synovitis        Electronically signed by:Juvencio Bellamy MD  Date:                                            10/10/2024  Time:                                           17:55    .      IMPRESSION:  Left shoulder rotator cuff tear complete with retraction    PLAN:  Left shoulder arthroscopy with  open acromioplasty rotator cuff repair  Long rehab course discussed on      I had a long discussion with the patient about treatment options, including operative and nonoperative treatments. We discussed pros and cons of each including risks pertinent to surgery including pain, infection, bleeding, damage to adjacent structures like nerves and blood vessels, failure to heal, need for future surgeries, stiffness, instability, loss of limb, anesthesia risks like stroke, blood clot, loss of life. We discussed the possibility of need for later hardware removal in the case that hardware was used. We discussed common and uncommon risks, and discussed patient specific factors that may increase the risks present with surgery. All questions were answered. The patient expressed understanding of the pros and cons of surgery and wanted to proceed with surgical treatment.               No follow-ups on file.         Rigo Fenton III      (Subject to voice recognition error, transcription service not allowed)

## 2025-02-19 NOTE — ANESTHESIA PROCEDURE NOTES
Peripheral Block    Patient location during procedure: OR   Block not for primary anesthetic.  Reason for block: at surgeon's request and post-op pain management   Post-op Pain Location: Left   Start time: 2/19/2025 11:18 AM  Timeout: 2/19/2025 11:17 AM   End time: 2/19/2025 11:22 AM    Staffing  Authorizing Provider: Igor Ogden MD  Performing Provider: Igor Ogden MD    Staffing  Performed by: Igor Ogden MD  Authorized by: Igor Ogden MD    Preanesthetic Checklist  Completed: patient identified, risks and benefits discussed, pre-op evaluation and timeout performed  Peripheral Block  Patient position: supine  Block type: interscalene  Laterality: left  Injection technique: single shot  Needle  Needle localization: nerve stimulator and ultrasound guidance     Assessment  Injection assessment: negative aspiration    Medications:    Medications: ROPIvacaine (NAROPIN) injection 0.75% - Perineural   30 mL - 2/19/2025 11:22:00 AM    Additional Notes  No complications.

## 2025-02-19 NOTE — ANESTHESIA PREPROCEDURE EVALUATION
02/19/2025  Stephane Mathis is a 79 y.o., male.      Pre-op Assessment    I have reviewed the Patient Summary Reports.    I have reviewed the NPO Status.   I have reviewed the Medications.     Review of Systems         Anesthesia Plan  Type of Anesthesia, risks & benefits discussed:    Anesthesia Type: Gen ETT, Regional  Intra-op Monitoring Plan: Standard ASA Monitors  Post Op Pain Control Plan: multimodal analgesia  Induction:  IV  Informed Consent: Informed consent signed with the Patient and all parties understand the risks and agree with anesthesia plan.  All questions answered.   ASA Score: 3    Ready For Surgery From Anesthesia Perspective.     .  NPO greater than 8 hours  No anesthetic complications  Allergic to codeine and ACE inhibitors    Hct 37    HTN  H/o seizure  GERD  CKD  Advanced age    MP II; adequate ROM at neck

## 2025-02-19 NOTE — BRIEF OP NOTE
[facility] - Orthopedic Periop Services  Brief Operative Note           Surgery Date: 2/19/2025     PREOPERATIVE DIAGNOSIS:  Left shoulder rotator cuff tear    POSTOPERATIVE DIAGNOSIS:  Left shoulder rotator cuff tear  Anterior labrum tear    PROCEDURE:  Left shoulder arthroscopy with extensive debridement of anterior labrum tear debridement of rotator cuff tear   Open releasing the coracoacromial ligament with a acromioplasty and rotator cuff repair  One bio corkscrew 2 push locks    IMPANTS USED:   Implant Name Type Inv. Item Serial No.  Lot No. LRB No. Used Action   ANCHOR SUT FT BIOCRKSCR 5.5MM - [id number]  ANCHOR SUT FT BIOCRKSCR 5.5MM  [employer] [id number] Left 1 Implanted   ANCHOR BIO-PUSHLOCK 3.5X19.5MM - [id number]  ANCHOR BIO-PUSHLOCK 3.5X19.5MM  [employer] [id number] Left 1 Implanted   ANCHOR BIO-PUSHLOCK 3.5X19.5MM - [id number]  ANCHOR BIO-PUSHLOCK 3.5X19.5MM  [employer] [id number] Left 1 Implanted       SURGEON: Dr. Fenton     ASSISTANT:  Jose    ANESTHESIA:  Interscalene block and general    ESTIMATED BLOOD LOSS:  10 cc    COMPLICATIONS:  None    Specimens:   Specimen (24h ago, onward)      None              Discharge Note    OUTCOME: Patient tolerated treatment/procedure well without complication and is now ready for discharge.    DISPOSITION: Home or Self Care    FINAL DIAGNOSIS:  Rotator cuff tear    FOLLOWUP: In clinic    DISCHARGE INSTRUCTIONS:    Discharge Procedure Orders   Diet general     Call MD for:  temperature >100.4     Call MD for:  redness, tenderness, or signs of infection (pain, swelling, redness, odor or green/yellow discharge around incision site)     Remove dressing in 72 hours   Order Comments: Apply Op site        Clinical Reference Documents Added to Patient Instructions         Document    [facility] OPIOID AVS FACTSHEET    SHOULDER ARTHROSCOPY DISCHARGE INSTRUCTIONS (ENGLISH)            Rigo Fenton III  [facility] - Discharge Note    OUTCOME: Patient tolerated  treatment/procedure well without complication and is now ready for discharge.    DISPOSITION: Home or Self Care    FINAL DIAGNOSIS:  Rotator cuff tear    FOLLOWUP: In clinic    DISCHARGE INSTRUCTIONS:    Discharge Procedure Orders   Diet general     Call MD for:  temperature >100.4     Call MD for:  redness, tenderness, or signs of infection (pain, swelling, redness, odor or green/yellow discharge around incision site)     Remove dressing in 72 hours   Order Comments: Apply Op site        Clinical Reference Documents Added to Patient Instructions

## 2025-02-19 NOTE — TRANSFER OF CARE
"Anesthesia Transfer of Care Note    Patient: Stephane Mathis    Procedure(s) Performed: Procedure(s) (LRB):  DEBRIDEMENT, SHOULDER, ARTHROSCOPIC (Left)  OPEN REPAIR, ROTATOR CUFF (Left)  OPEN ACROMIOPLASTY (Left)    Patient location: PACU    Anesthesia Type: general    Transport from OR: Transported from OR on room air with adequate spontaneous ventilation    Post pain: adequate analgesia    Post assessment: no apparent anesthetic complications    Post vital signs: stable    Level of consciousness: responds to stimulation and oriented    Nausea/Vomiting: no nausea/vomiting    Complications: none    Transfer of care protocol was followed      Last vitals: Visit Vitals  /64   Pulse 73   Temp 36.5 °C (97.7 °F) (Oral)   Resp 16   Ht 5' 11" (1.803 m)   Wt 72.6 kg (160 lb)   SpO2 95%   BMI 22.32 kg/m²     "

## 2025-02-19 NOTE — OR NURSING
1307 Rec'd pt to PACU awake and alert with no distress noted, respirations even and unlabored. VSS. Left shoulder dressing C/D/I with immobilizer in place. No needs. Will continue to monitor.     1340 20g to right wrist infiltrated. PIV removed, catheter intact. 22g PIV inserted into right AC. Pt tolerated well.     1342 IV dilaudid given for pain, see MAR for admin.     1356 Out of PACU. VSS. No signs of bleeding/distress noted.     1400 Pt to ASC 23 awake and alert with no distress noted, respirations even and unlabored. Family at bedside. Bedside report given to MOLINA Brady RN. Left shoulder dressing C/D/I with immobilizer in place. States pain improving/denies other needs. /60, P 68, R 16, O2 95% RA.

## 2025-02-20 ENCOUNTER — TELEPHONE (OUTPATIENT)
Dept: ORTHOPEDICS | Facility: CLINIC | Age: 80
End: 2025-02-20
Payer: MEDICARE

## 2025-02-20 RX ORDER — METAXALONE 800 MG/1
800 TABLET ORAL 3 TIMES DAILY PRN
Qty: 20 TABLET | Refills: 0 | Status: SHIPPED | OUTPATIENT
Start: 2025-02-20 | End: 2025-03-02

## 2025-02-20 NOTE — PT/OT/SLP EVAL
Physical Therapy Evaluation    Patient Name:  Stephane Mathis   MRN:  [id number]    Recommendations:     Discharge Recommendations: No Therapy Indicated   Discharge Equipment Recommendations: none   Barriers to discharge: None    Assessment:     Stephane Mathis is a 79 y.o. male admitted with a medical diagnosis of Rotator cuff tear.  He presents with the following impairments/functional limitations: decreased ROM, gait instability Patient with good understanding of post op education.    Rehab Prognosis: Good; patient would benefit from acute skilled PT services to address these deficits and reach maximum level of function.    Recent Surgery: Procedure(s) (LRB):  DEBRIDEMENT, SHOULDER, ARTHROSCOPIC (Left)  OPEN REPAIR, ROTATOR CUFF (Left)  OPEN ACROMIOPLASTY (Left) * Day of Surgery *    Plan:     During this hospitalization, patient to be seen 1 x/week to address the identified rehab impairments via therapeutic activities and progress toward the following goals:    Plan of Care Expires:       Subjective     Chief Complaint: post op pain  Patient/Family Comments/goals: plan is dc home today  Pain/Comfort:  Pain Rating 1: 0/10  Pain Addressed 1: Cessation of Activity, Pre-medicate for activity    Patients cultural, spiritual, [employer]ism conflicts given the current situation:      Living Environment:  Lives with family  Prior to admission, patients level of function was independent.  Equipment used at home: none.  DME owned (not currently used): none.  Upon discharge, patient will have assistance from family.    Objective:     Communicated with independent prior to session.  Patient found supine with    upon PT entry to room.    General Precautions: Standard, fall  Orthopedic Precautions:    Braces:    Respiratory Status: Room air    Exams:  nt    Functional Mobility:  NT      AM-PAC 6 CLICK MOBILITY  Total Score:24       Treatment & Education:  HEP: prom protocol  Don/doff abd pillow    Patient left supine with call  button in reach.    GOALS:   Multidisciplinary Problems       Physical Therapy Goals       Not on file                    DME Justifications:  No DME recommended requiring DME justifications    History:     Past Medical History:   Diagnosis Date    Acute superficial gastritis without hemorrhage 7/12/2022    Alcoholism     Anemia     AVM (arteriovenous malformation) of colon 7/13/2022    Colon, diverticulosis 7/13/2022    Hypertension     Iron deficiency anemia due to chronic blood loss 7/12/2022    Polyp, sigmoid colon 7/13/2022    Seizures     Stroke     right sided weakness       Past Surgical History:   Procedure Laterality Date    CATARACT EXTRACTION W/ INTRAOCULAR LENS IMPLANT Right     KNEE SURGERY Right     LUMBAR DISCECTOMY      SCROTAL SURGERY      in february    TONSILLECTOMY         Time Tracking:     PT Received On: 02/19/25  PT Start Time: 1445     PT Stop Time: 1505  PT Total Time (min): 20 min     Billable Minutes: Evaluation 20      02/19/2025

## 2025-02-25 NOTE — ANESTHESIA POSTPROCEDURE EVALUATION
Anesthesia Post Evaluation    Patient: Stephane Mathis    Procedure(s) Performed: Procedure(s) (LRB):  DEBRIDEMENT, SHOULDER, ARTHROSCOPIC (Left)  OPEN REPAIR, ROTATOR CUFF (Left)  OPEN ACROMIOPLASTY (Left)    Final Anesthesia Type: general      Patient location during evaluation: PACU  Post-procedure vital signs: reviewed and stable  Pain management: adequate  Airway patency: patent    PONV status at discharge: No PONV  Anesthetic complications: no      Cardiovascular status: hemodynamically stable  Respiratory status: unassisted  Hydration status: euvolemic  Follow-up not needed.              Vitals Value Taken Time   /68 02/19/25 14:46   Temp 36.5 °C (97.7 °F) 02/19/25 13:07   Pulse 78 02/19/25 14:52   Resp 16 02/19/25 14:45   SpO2 95 % 02/19/25 14:52   Vitals shown include unfiled device data.      Event Time   Out of Recovery 13:56:00         Pain/Mariana Score: No data recorded

## 2025-02-26 ENCOUNTER — TELEPHONE (OUTPATIENT)
Dept: ORTHOPEDICS | Facility: CLINIC | Age: 80
End: 2025-02-26
Payer: MEDICARE

## 2025-02-26 NOTE — TELEPHONE ENCOUNTER
TRIED TO REACH PATIENT ABOUT APPT CHUN WE REALLY NEED TO WAIT ANOTHER WEEK UNTIL WE SEE HIM POST OP 8 DAYS IS TOO SOON WE CAN SEE HIM GLADYSES 4/2

## 2025-02-27 ENCOUNTER — TELEPHONE (OUTPATIENT)
Dept: ORTHOPEDICS | Facility: CLINIC | Age: 80
End: 2025-02-27
Payer: MEDICARE

## 2025-03-03 ENCOUNTER — OFFICE VISIT (OUTPATIENT)
Dept: FAMILY MEDICINE | Facility: CLINIC | Age: 80
End: 2025-03-03
Payer: MEDICARE

## 2025-03-03 VITALS
BODY MASS INDEX: 23.52 KG/M2 | TEMPERATURE: 98 F | DIASTOLIC BLOOD PRESSURE: 62 MMHG | OXYGEN SATURATION: 97 % | HEART RATE: 75 BPM | RESPIRATION RATE: 18 BRPM | WEIGHT: 168 LBS | HEIGHT: 71 IN | SYSTOLIC BLOOD PRESSURE: 148 MMHG

## 2025-03-03 DIAGNOSIS — B37.81 CANDIDA ESOPHAGITIS: ICD-10-CM

## 2025-03-03 DIAGNOSIS — R56.9 SEIZURE: ICD-10-CM

## 2025-03-03 DIAGNOSIS — R60.0 LOCALIZED EDEMA: Primary | ICD-10-CM

## 2025-03-03 DIAGNOSIS — I10 ESSENTIAL HYPERTENSION: ICD-10-CM

## 2025-03-03 LAB
ALBUMIN SERPL BCP-MCNC: 3.5 G/DL (ref 3.4–4.8)
ALBUMIN/GLOB SERPL: 0.9 {RATIO}
ALP SERPL-CCNC: 53 U/L (ref 40–150)
ALT SERPL W P-5'-P-CCNC: 10 U/L
ANION GAP SERPL CALCULATED.3IONS-SCNC: 13 MMOL/L (ref 7–16)
AST SERPL W P-5'-P-CCNC: 23 U/L (ref 5–34)
BASOPHILS # BLD AUTO: 0.07 K/UL (ref 0–0.2)
BASOPHILS NFR BLD AUTO: 0.9 % (ref 0–1)
BILIRUB SERPL-MCNC: 0.3 MG/DL
BUN SERPL-MCNC: 20 MG/DL (ref 8–26)
BUN/CREAT SERPL: 18 (ref 6–20)
CALCIUM SERPL-MCNC: 9.3 MG/DL (ref 8.8–10)
CHLORIDE SERPL-SCNC: 100 MMOL/L (ref 98–107)
CO2 SERPL-SCNC: 28 MMOL/L (ref 23–31)
CREAT SERPL-MCNC: 1.12 MG/DL (ref 0.72–1.25)
DIFFERENTIAL METHOD BLD: ABNORMAL
EGFR (NO RACE VARIABLE) (RUSH/TITUS): 68 ML/MIN/1.73M2
EOSINOPHIL # BLD AUTO: 0.33 K/UL (ref 0–0.5)
EOSINOPHIL NFR BLD AUTO: 4.3 % (ref 1–4)
ERYTHROCYTE [DISTWIDTH] IN BLOOD BY AUTOMATED COUNT: 13.1 % (ref 11.5–14.5)
GLOBULIN SER-MCNC: 4.1 G/DL (ref 2–4)
GLUCOSE SERPL-MCNC: 88 MG/DL (ref 82–115)
HCT VFR BLD AUTO: 32.4 % (ref 40–54)
HGB BLD-MCNC: 10.1 G/DL (ref 13.5–18)
HIV 1+O+2 AB SERPL QL: NORMAL
HYPOCHROMIA BLD QL SMEAR: ABNORMAL
IMM GRANULOCYTES # BLD AUTO: 0.03 K/UL (ref 0–0.04)
IMM GRANULOCYTES NFR BLD: 0.4 % (ref 0–0.4)
LYMPHOCYTES # BLD AUTO: 1.8 K/UL (ref 1–4.8)
LYMPHOCYTES NFR BLD AUTO: 23.3 % (ref 27–41)
MACROCYTES BLD QL SMEAR: ABNORMAL
MCH RBC QN AUTO: 33.6 PG (ref 27–31)
MCHC RBC AUTO-ENTMCNC: 31.2 G/DL (ref 32–36)
MCV RBC AUTO: 107.6 FL (ref 80–96)
MONOCYTES # BLD AUTO: 0.83 K/UL (ref 0–0.8)
MONOCYTES NFR BLD AUTO: 10.7 % (ref 2–6)
MPC BLD CALC-MCNC: 11.4 FL (ref 9.4–12.4)
NEUTROPHILS # BLD AUTO: 4.68 K/UL (ref 1.8–7.7)
NEUTROPHILS NFR BLD AUTO: 60.4 % (ref 53–65)
NRBC # BLD AUTO: 0 X10E3/UL
NRBC, AUTO (.00): 0 %
PLATELET # BLD AUTO: 256 K/UL (ref 150–400)
PLATELET MORPHOLOGY: ABNORMAL
POTASSIUM SERPL-SCNC: 3.7 MMOL/L (ref 3.5–5.1)
PROT SERPL-MCNC: 7.6 G/DL (ref 5.8–7.6)
RBC # BLD AUTO: 3.01 M/UL (ref 4.6–6.2)
SODIUM SERPL-SCNC: 137 MMOL/L (ref 136–145)
WBC # BLD AUTO: 7.74 K/UL (ref 4.5–11)

## 2025-03-03 PROCEDURE — 99213 OFFICE O/P EST LOW 20 MIN: CPT | Mod: ,,, | Performed by: FAMILY MEDICINE

## 2025-03-03 PROCEDURE — 1101F PT FALLS ASSESS-DOCD LE1/YR: CPT | Mod: ,,, | Performed by: FAMILY MEDICINE

## 2025-03-03 PROCEDURE — 85025 COMPLETE CBC W/AUTO DIFF WBC: CPT | Mod: ,,, | Performed by: CLINICAL MEDICAL LABORATORY

## 2025-03-03 PROCEDURE — 82570 ASSAY OF URINE CREATININE: CPT | Mod: ,,, | Performed by: CLINICAL MEDICAL LABORATORY

## 2025-03-03 PROCEDURE — 80053 COMPREHEN METABOLIC PANEL: CPT | Mod: ,,, | Performed by: CLINICAL MEDICAL LABORATORY

## 2025-03-03 PROCEDURE — 3288F FALL RISK ASSESSMENT DOCD: CPT | Mod: ,,, | Performed by: FAMILY MEDICINE

## 2025-03-03 PROCEDURE — 87389 HIV-1 AG W/HIV-1&-2 AB AG IA: CPT | Mod: ,,, | Performed by: CLINICAL MEDICAL LABORATORY

## 2025-03-03 PROCEDURE — 3077F SYST BP >= 140 MM HG: CPT | Mod: ,,, | Performed by: FAMILY MEDICINE

## 2025-03-03 PROCEDURE — 1159F MED LIST DOCD IN RCRD: CPT | Mod: ,,, | Performed by: FAMILY MEDICINE

## 2025-03-03 PROCEDURE — 82043 UR ALBUMIN QUANTITATIVE: CPT | Mod: ,,, | Performed by: CLINICAL MEDICAL LABORATORY

## 2025-03-03 PROCEDURE — 3078F DIAST BP <80 MM HG: CPT | Mod: ,,, | Performed by: FAMILY MEDICINE

## 2025-03-03 RX ORDER — FUROSEMIDE 20 MG/1
20 TABLET ORAL DAILY PRN
Qty: 90 TABLET | Refills: 1 | Status: SHIPPED | OUTPATIENT
Start: 2025-03-03 | End: 2026-03-03

## 2025-03-03 NOTE — PROGRESS NOTES
Clinic Note    Patient Name: Stephane Mathis  : 1945  MRN: [id number]    Chief Complaint   Patient presents with    Leg Swelling     Patient presents on today states that he has swelling in both legs, pt states that the swelling started Wednesday. But over the weekend the swelling progressed.       HPI:    Mr. Stephane Mathis is a 79 y.o. male who presents to clinic today with CC of swelling in bilateral legs X 5 days, gradually worsening.   Patient denies calf pain or tenderness. Denies redness or increased warmth. Reports swelling is bilateral. Denies pain other than legs feeling tight.  Patient is, otherwise, without complaints.     Medications:  Medication List with Changes/Refills   New Medications    FUROSEMIDE (LASIX) 20 MG TABLET    Take 1 tablet (20 mg total) by mouth daily as needed (swelling).   Current Medications    AMLODIPINE (NORVASC) 10 MG TABLET    Take 1 tablet (10 mg total) by mouth once daily.    ASPIRIN (ECOTRIN) 81 MG EC TABLET    Take 1 tablet (81 mg total) by mouth once daily.    ATORVASTATIN (LIPITOR) 40 MG TABLET    Take 1 tablet (40 mg total) by mouth every evening.    EAR DROPS, CARBAMIDE PEROXIDE, OTIC    Place in ear(s).    EPINEPHRINE (EPIPEN) 0.3 MG/0.3 ML ATIN    Inject 0.3 mLs (0.3 mg total) into the muscle as needed.    ERGOCALCIFEROL (ERGOCALCIFEROL) 50,000 UNIT CAP    Take 1 capsule (50,000 Units total) by mouth every 7 days.    FAMOTIDINE (PEPCID) 20 MG TABLET    Take 1 tablet (20 mg total) by mouth 2 (two) times daily as needed for Heartburn.    FERROUS SULFATE (FEOSOL) 325 MG (65 MG IRON) TAB TABLET    Take 1 tablet (325 mg total) by mouth 2 (two) times daily.    HYDRALAZINE (APRESOLINE) 100 MG TABLET    Take 1 tablet (100 mg total) by mouth 2 (two) times a day.    HYDROCHLOROTHIAZIDE (HYDRODIURIL) 25 MG TABLET    Take 1 tablet (25 mg total) by mouth once daily.    HYDROCODONE-ACETAMINOPHEN (NORCO) 7.5-325 MG PER TABLET    Take 1 tablet by mouth every 6 (six) hours as  "needed for Pain.    MELOXICAM (MOBIC) 7.5 MG TABLET    Take 1 tablet (7.5 mg total) by mouth once daily.    POTASSIUM CHLORIDE SA (K-DUR,KLOR-CON M) 10 MEQ TABLET    Take 1 tablet (10 mEq total) by mouth once daily.    TADALAFIL (CIALIS) 10 MG TABLET    Take 1 tablet (10 mg total) by mouth daily as needed for Erectile Dysfunction.        Allergies: Ace inhibitors and Codeine      Past Medical History:    Past Medical History:   Diagnosis Date    Acute superficial gastritis without hemorrhage 7/12/2022    Alcoholism     Anemia     AVM (arteriovenous malformation) of colon 7/13/2022    Colon, diverticulosis 7/13/2022    Hypertension     Iron deficiency anemia due to chronic blood loss 7/12/2022    Polyp, sigmoid colon 7/13/2022    Seizures     Stroke     right sided weakness       Past Surgical History:    Past Surgical History:   Procedure Laterality Date    ACROMIOPLASTY Left 2/19/2025    Procedure: OPEN ACROMIOPLASTY;  Surgeon: Rigo Fenton III, MD;  Location: [facility];  Service: Orthopedics;  Laterality: Left;    ARTHROSCOPIC DEBRIDEMENT OF SHOULDER Left 2/19/2025    Procedure: DEBRIDEMENT, SHOULDER, ARTHROSCOPIC;  Surgeon: Rigo Fenton III, MD;  Location: [facility] OR;  Service: Orthopedics;  Laterality: Left;    CATARACT EXTRACTION W/ INTRAOCULAR LENS IMPLANT Right     KNEE SURGERY Right     LUMBAR DISCECTOMY      ROTATOR CUFF REPAIR Left 2/19/2025    Procedure: OPEN REPAIR, ROTATOR CUFF;  Surgeon: Rigo Fenton III, MD;  Location: [facility] OR;  Service: Orthopedics;  Laterality: Left;    SCROTAL SURGERY      in february    TONSILLECTOMY           Social History:    Tobacco Use History[1]  Social History     Substance and Sexual Activity   Alcohol Use Yes    Alcohol/week: 12.0 standard drinks of alcohol    Types: 6 Cans of beer, 6 Shots of liquor per week    Comment: vodka/gray goose preference. " couple of drinks a day or two or sometimes on weekends. "     Social History " "    Substance and Sexual Activity   Drug Use Yes    Types: Marijuana    Comment: daily         Family History:    Family History   Problem Relation Name Age of Onset    Diabetes Mother      Hypertension Mother      Diabetes Sister      Diabetes Brother      Hypertension Brother      Cancer Brother      Throat cancer Brother      Alzheimer's disease Brother         Review of Systems:    Review of Systems   Constitutional:  Negative for appetite change, chills, fatigue, fever and unexpected weight change.   Eyes:  Negative for visual disturbance.   Respiratory:  Negative for cough and shortness of breath.    Cardiovascular:  Positive for leg swelling. Negative for chest pain.   Gastrointestinal:  Negative for abdominal pain, change in bowel habit, constipation, diarrhea, nausea and vomiting.   Musculoskeletal:  Positive for arthralgias.   Integumentary:  Negative for rash.   Neurological:  Negative for dizziness and headaches.   Psychiatric/Behavioral:  The patient is not nervous/anxious.         Vitals:    Vitals:    03/03/25 1311   BP: (!) 148/62   BP Location: Right arm   Patient Position: Sitting   Pulse: 75   Resp: 18   Temp: 98.2 °F (36.8 °C)   TempSrc: Oral   SpO2: 97%   Weight: 76.2 kg (168 lb)   Height: 5' 11" (1.803 m)       Body mass index is 23.43 kg/m².    Wt Readings from Last 3 Encounters:   03/03/25 1311 76.2 kg (168 lb)   02/19/25 1029 72.6 kg (160 lb)   01/30/25 1341 73.4 kg (161 lb 12.8 oz)        Physical Exam:    Physical Exam  Constitutional:       General: He is not in acute distress.     Appearance: Normal appearance.   HENT:      Nose: Nose normal.      Mouth/Throat:      Mouth: Mucous membranes are moist.      Pharynx: Oropharynx is clear.   Eyes:      Conjunctiva/sclera: Conjunctivae normal.   Cardiovascular:      Rate and Rhythm: Normal rate and regular rhythm.      Heart sounds: Normal heart sounds. No murmur heard.  Pulmonary:      Effort: Pulmonary effort is normal. No respiratory " distress.      Breath sounds: Normal breath sounds. No wheezing, rhonchi or rales.   Abdominal:      General: Bowel sounds are normal.      Palpations: Abdomen is soft.      Tenderness: There is no abdominal tenderness.   Musculoskeletal:      Cervical back: Neck supple.      Right lower leg: Edema present.      Left lower leg: Edema present.      Comments: + L arm in sling from prior shoulder surgery; reports this is improving   Skin:     Findings: No rash.   Neurological:      General: No focal deficit present.      Mental Status: He is alert. Mental status is at baseline.   Psychiatric:         Mood and Affect: Mood normal.         Assessment/Plan:   1. Localized edema  -     CBC Auto Differential; Future; Expected date: 03/03/2025  -     Comprehensive Metabolic Panel; Future; Expected date: 03/03/2025  -     Microalbumin/Creatinine Ratio, Urine  -     furosemide (LASIX) 20 MG tablet; Take 1 tablet (20 mg total) by mouth daily as needed (swelling).  Dispense: 90 tablet; Refill: 1- new medication. Risks/benefits/potential side effects/black box warning reviewed and discussed with patient.   -     CBC Morphology    2. Candida esophagitis  -     HIV 1/2 Ag/Ab (4th Gen)    3. Seizure  - Stable  The current medical regimen is effective;  continue present plan and medications.  - Denies any recent seizures    4. Essential hypertension  - Lasix added to medication regimen  - DASH Diet/exercise       Active Problem List with Overview Notes    Diagnosis Date Noted    Essential hypertension 07/12/2022    Seizure 06/10/2022    Iron deficiency anemia due to chronic blood loss 07/12/2022    AVM (arteriovenous malformation) of colon 07/13/2022    Continuous dependence on cigarette smoking 07/12/2022    Colon, diverticulosis 07/13/2022    Polyp, sigmoid colon 07/13/2022    Rotator cuff tear 02/19/2025    Encounter for pre-operative cardiovascular clearance 12/19/2024    Encounter for smoking cessation counseling 12/19/2024     Chronic kidney disease, stage 3a 04/08/2024    Primary osteoarthritis of left shoulder 02/06/2024    Mixed hyperlipidemia 10/04/2023    Primary osteoarthritis involving multiple joints 10/04/2023    Muscle spasm 10/04/2023    Erectile dysfunction 10/04/2023    Benign prostatic hyperplasia without lower urinary tract symptoms 10/04/2023    Vitamin D deficiency 10/04/2023        RTC in 2-4 weeks for follow up on HTN/edema - medication changes.   RTC sooner if symptoms worsen or fail to resolve.  Patient voiced understanding and is agreeable to plan.      Pebbles Dickerson MD    Family Medicine           [1]   Social History  Tobacco Use   Smoking Status Every Day    Current packs/day: 1.00    Average packs/day: 1 pack/day for 59.2 years (59.2 ttl pk-yrs)    Types: Cigarettes    Start date: 1966    Passive exposure: Current   Smokeless Tobacco Never   Tobacco Comments    Tobacco quitline information sheet given

## 2025-03-04 ENCOUNTER — RESULTS FOLLOW-UP (OUTPATIENT)
Dept: GASTROENTEROLOGY | Facility: HOSPITAL | Age: 80
End: 2025-03-04

## 2025-03-04 LAB
CREAT UR-MCNC: 101 MG/DL (ref 23–375)
MICROALBUMIN UR-MCNC: 1.1 MG/DL
MICROALBUMIN/CREAT RATIO PNL UR: 10.9 MG/G (ref 0–30)

## 2025-03-06 ENCOUNTER — RESULTS FOLLOW-UP (OUTPATIENT)
Dept: FAMILY MEDICINE | Facility: CLINIC | Age: 80
End: 2025-03-06

## 2025-03-06 ENCOUNTER — HOSPITAL ENCOUNTER (OUTPATIENT)
Dept: RADIOLOGY | Facility: HOSPITAL | Age: 80
Discharge: HOME | End: 2025-03-06
Attending: FAMILY MEDICINE
Payer: MEDICARE

## 2025-03-06 ENCOUNTER — OFFICE VISIT (OUTPATIENT)
Dept: ORTHOPEDICS | Facility: CLINIC | Age: 80
End: 2025-03-06
Payer: MEDICARE

## 2025-03-06 VITALS
DIASTOLIC BLOOD PRESSURE: 58 MMHG | BODY MASS INDEX: 22.68 KG/M2 | WEIGHT: 162 LBS | HEIGHT: 71 IN | TEMPERATURE: 98 F | WEIGHT: 166.19 LBS | OXYGEN SATURATION: 97 % | SYSTOLIC BLOOD PRESSURE: 115 MMHG | HEIGHT: 71 IN | BODY MASS INDEX: 23.27 KG/M2 | HEART RATE: 71 BPM

## 2025-03-06 DIAGNOSIS — M75.122 COMPLETE TEAR OF LEFT ROTATOR CUFF, UNSPECIFIED WHETHER TRAUMATIC: Primary | ICD-10-CM

## 2025-03-06 DIAGNOSIS — Z12.2 SCREENING FOR LUNG CANCER: ICD-10-CM

## 2025-03-06 DIAGNOSIS — F17.210 SMOKING GREATER THAN 30 PACK YEARS: ICD-10-CM

## 2025-03-06 PROCEDURE — 71271 CT THORAX LUNG CANCER SCR C-: CPT | Mod: TC

## 2025-03-06 PROCEDURE — 99215 OFFICE O/P EST HI 40 MIN: CPT | Mod: PBBFAC,25 | Performed by: ORTHOPAEDIC SURGERY

## 2025-03-06 PROCEDURE — 1159F MED LIST DOCD IN RCRD: CPT | Mod: CPTII,,, | Performed by: ORTHOPAEDIC SURGERY

## 2025-03-06 PROCEDURE — 3078F DIAST BP <80 MM HG: CPT | Mod: CPTII,,, | Performed by: ORTHOPAEDIC SURGERY

## 2025-03-06 PROCEDURE — 99024 POSTOP FOLLOW-UP VISIT: CPT | Mod: ,,, | Performed by: ORTHOPAEDIC SURGERY

## 2025-03-06 PROCEDURE — 3074F SYST BP LT 130 MM HG: CPT | Mod: CPTII,,, | Performed by: ORTHOPAEDIC SURGERY

## 2025-03-06 PROCEDURE — 99999 PR PBB SHADOW E&M-EST. PATIENT-LVL V: CPT | Mod: PBBFAC,,, | Performed by: ORTHOPAEDIC SURGERY

## 2025-03-06 NOTE — PROGRESS NOTES
CC:   Chief Complaint   Patient presents with    Left Shoulder - Post-op Evaluation     LT SHOULDER SCOPE OPEN ACROMIO, RCR 2/19 (2WKS)    Follow-up        PREVIOUS INFO:     Surgery Date: 2/19/2025      PREOPERATIVE DIAGNOSIS:  Left shoulder rotator cuff tear     POSTOPERATIVE DIAGNOSIS:  Left shoulder rotator cuff tear  Anterior labrum tear     PROCEDURE:  Left shoulder arthroscopy with extensive debridement of anterior labrum tear debridement of rotator cuff tear   Open releasing the coracoacromial ligament with a acromioplasty and rotator cuff repair  One bio corkscrew 2 push locks      HISTORY:   3/6/2025    Stephane Mathis  is a 79 y.o. two weeks out rotator cuff repair 02/19/2025 patient went home with the family to [place] after his surgery has not been to physical therapy comes in today      PAST MEDICAL HISTORY:   Past Medical History:   Diagnosis Date    Acute superficial gastritis without hemorrhage 7/12/2022    Alcoholism     Anemia     AVM (arteriovenous malformation) of colon 7/13/2022    Colon, diverticulosis 7/13/2022    Hypertension     Iron deficiency anemia due to chronic blood loss 7/12/2022    Polyp, sigmoid colon 7/13/2022    Seizures     Stroke     right sided weakness          PAST SURGICAL HISTORY:   Past Surgical History:   Procedure Laterality Date    ACROMIOPLASTY Left 2/19/2025    Procedure: OPEN ACROMIOPLASTY;  Surgeon: Rigo Fenton III, MD;  Location: [facility];  Service: Orthopedics;  Laterality: Left;    ARTHROSCOPIC DEBRIDEMENT OF SHOULDER Left 2/19/2025    Procedure: DEBRIDEMENT, SHOULDER, ARTHROSCOPIC;  Surgeon: Rigo Fenton III, MD;  Location: [facility] OR;  Service: Orthopedics;  Laterality: Left;    CATARACT EXTRACTION W/ INTRAOCULAR LENS IMPLANT Right     KNEE SURGERY Right     LUMBAR DISCECTOMY      ROTATOR CUFF REPAIR Left 2/19/2025    Procedure: OPEN REPAIR, ROTATOR CUFF;  Surgeon: Rigo Fenton III, MD;  Location: [facility] OR;   Service: Orthopedics;  Laterality: Left;    SCROTAL SURGERY      in february    TONSILLECTOMY            ALLERGIES:   Review of patient's allergies indicates:   Allergen Reactions    Ace inhibitors Swelling    Codeine Itching        MEDICATIONS :  Current Medications[1]     SOCIAL HISTORY: Social History[2]     ROS    FAMILY HISTORY:   Family History   Problem Relation Name Age of Onset    Diabetes Mother      Hypertension Mother      Diabetes Sister      Diabetes Brother      Hypertension Brother      Cancer Brother      Throat cancer Brother      Alzheimer's disease Brother            PHYSICAL EXAM:   Vitals:    03/06/25 0957   BP: (!) 115/58   Pulse: 71   Temp: 97.9 °F (36.6 °C)               Body mass index is 23.18 kg/m².     In general, this is a well-developed, well-nourished male . The patient is alert, oriented and cooperative.      HEENT:  Normocephalic, atraumatic.  Extraocular movements are intact bilaterally.  The oropharynx is benign.       NECK:  Nontender with good range of motion.      PULMONARY: Respirations are even and non-labored.       CARDIOVASCULAR: Pulses regular by peripheral palpation.       ABDOMEN:  Soft, non-tender, non-distended.        EXTREMITIES:  He has got about 90° forward flexion abduction about 60 not sure if he maybe doing too much    Ortho Exam      RADIOGRAPHIC FINDINGS:  None      .      IMPRESSION:  Talked to him about a home pulley only using his right arm to pull his left arm up we are going to get him set up for outpatient PT wants going to stiffness    PLAN:  We will check on him in 4 weeks his stitches were removed today his incision looks good        No follow-ups on file.         Rigo Fenton III      (Subject to voice recognition error, transcription service not allowed)           [1]   Current Outpatient Medications:     amLODIPine (NORVASC) 10 MG tablet, Take 1 tablet (10 mg total) by mouth once daily., Disp: 90 tablet, Rfl: 1    aspirin (ECOTRIN) 81 MG EC  tablet, Take 1 tablet (81 mg total) by mouth once daily. (Patient not taking: Reported on 1/29/2025), Disp: 90 tablet, Rfl: 1    atorvastatin (LIPITOR) 40 MG tablet, Take 1 tablet (40 mg total) by mouth every evening., Disp: 90 tablet, Rfl: 1    EAR DROPS, CARBAMIDE PEROXIDE, OTIC, Place in ear(s)., Disp: , Rfl:     EPINEPHrine (EPIPEN) 0.3 mg/0.3 mL AtIn, Inject 0.3 mLs (0.3 mg total) into the muscle as needed., Disp: 1 each, Rfl: 0    ergocalciferol (ERGOCALCIFEROL) 50,000 unit Cap, Take 1 capsule (50,000 Units total) by mouth every 7 days. (Patient not taking: Reported on 1/30/2025), Disp: 12 capsule, Rfl: 0    famotidine (PEPCID) 20 MG tablet, Take 1 tablet (20 mg total) by mouth 2 (two) times daily as needed for Heartburn. (Patient not taking: Reported on 1/30/2025), Disp: 60 tablet, Rfl: 3    ferrous sulfate (FEOSOL) 325 mg (65 mg iron) Tab tablet, Take 1 tablet (325 mg total) by mouth 2 (two) times daily., Disp: 180 tablet, Rfl: 1    furosemide (LASIX) 20 MG tablet, Take 1 tablet (20 mg total) by mouth daily as needed (swelling)., Disp: 90 tablet, Rfl: 1    hydrALAZINE (APRESOLINE) 100 MG tablet, Take 1 tablet (100 mg total) by mouth 2 (two) times a day., Disp: 180 tablet, Rfl: 3    hydroCHLOROthiazide (HYDRODIURIL) 25 MG tablet, Take 1 tablet (25 mg total) by mouth once daily., Disp: 90 tablet, Rfl: 1    HYDROcodone-acetaminophen (NORCO) 7.5-325 mg per tablet, Take 1 tablet by mouth every 6 (six) hours as needed for Pain., Disp: 20 tablet, Rfl: 0    meloxicam (MOBIC) 7.5 MG tablet, Take 1 tablet (7.5 mg total) by mouth once daily., Disp: 30 tablet, Rfl: 2    potassium chloride SA (K-DUR,KLOR-CON M) 10 MEQ tablet, Take 1 tablet (10 mEq total) by mouth once daily., Disp: 90 tablet, Rfl: 0    tadalafiL (CIALIS) 10 MG tablet, Take 1 tablet (10 mg total) by mouth daily as needed for Erectile Dysfunction., Disp: 6 tablet, Rfl: 5  No current facility-administered medications for this visit.    Facility-Administered  "Medications Ordered in Other Visits:     BUPivacaine (PF) 0.25% (2.5 mg/ml) injection 1 mL, 1 mL, , , Zora Dunn PA, 1 mL at 01/30/24 0930    triamcinolone acetonide injection 40 mg, 40 mg, Intra-articular, , Zora Dunn PA, 40 mg at 01/30/24 0930  [2]   Social History  Socioeconomic History    Marital status: Unknown   Tobacco Use    Smoking status: Every Day     Current packs/day: 1.00     Average packs/day: 1 pack/day for 59.2 years (59.2 ttl pk-yrs)     Types: Cigarettes     Start date: 1966     Passive exposure: Current    Smokeless tobacco: Never    Tobacco comments:     Tobacco quitline information sheet given    Substance and Sexual Activity    Alcohol use: Yes     Alcohol/week: 12.0 standard drinks of alcohol     Types: 6 Cans of beer, 6 Shots of liquor per week     Comment: vodka/gray goose preference. " couple of drinks a day or two or sometimes on weekends. "    Drug use: Yes     Types: Marijuana     Comment: daily    Sexual activity: Yes     Partners: Female     Social Drivers of Health     Financial Resource Strain: Low Risk  (1/30/2025)    Overall Financial Resource Strain (CARDIA)     Difficulty of Paying Living Expenses: Not very hard   Food Insecurity: No Food Insecurity (1/30/2025)    Hunger Vital Sign     Worried About Running Out of Food in the Last Year: Never true     Ran Out of Food in the Last Year: Never true   Transportation Needs: No Transportation Needs (1/30/2025)    PRAPARE - Transportation     Lack of Transportation (Medical): No     Lack of Transportation (Non-Medical): No   Physical Activity: Insufficiently Active (1/30/2025)    Exercise Vital Sign     Days of Exercise per Week: 7 days     Minutes of Exercise per Session: 20 min   Stress: Stress Concern Present (1/30/2025)    [employer] - Occupational Stress Questionnaire     Feeling of Stress : To some extent   Housing Stability: Unknown (1/30/2025)    Housing Stability Vital Sign     " Unable to Pay for Housing in the Last Year: No     Homeless in the Last Year: No

## 2025-03-11 ENCOUNTER — TELEPHONE (OUTPATIENT)
Dept: FAMILY MEDICINE | Facility: CLINIC | Age: 80
End: 2025-03-11
Payer: MEDICARE

## 2025-03-11 DIAGNOSIS — D64.9 ANEMIA, UNSPECIFIED TYPE: Primary | ICD-10-CM

## 2025-03-11 DIAGNOSIS — E53.8 B12 DEFICIENCY: ICD-10-CM

## 2025-03-11 NOTE — TELEPHONE ENCOUNTER
Contacted pt in regards to lab results. Informed pt of results. Pt voiced understanding and had no further questions. Labs ordered.     ----- Message from Sierra Dickerson MD sent at 3/6/2025 12:45 PM CST -----  Please call patient regarding lab results. Patient is anemic. This is lower than his baseline but is not severe enough to need blood. Recommend lab only within the next week for repeat CBC, iron,   ferritin, TIBC, B12, folate, and stool for occult blood (lab only).  Labs, otherwise, ok/stable. Thanks!  ----- Message -----  From: Lab, Background User  Sent: 3/3/2025   5:49 PM CST  To: Sierra Dickerson MD

## 2025-03-12 ENCOUNTER — OFFICE VISIT (OUTPATIENT)
Dept: FAMILY MEDICINE | Facility: CLINIC | Age: 80
End: 2025-03-12
Payer: MEDICARE

## 2025-03-12 ENCOUNTER — CLINICAL SUPPORT (OUTPATIENT)
Dept: REHABILITATION | Facility: HOSPITAL | Age: 80
End: 2025-03-12
Payer: MEDICARE

## 2025-03-12 VITALS
BODY MASS INDEX: 22.82 KG/M2 | HEIGHT: 71 IN | TEMPERATURE: 98 F | DIASTOLIC BLOOD PRESSURE: 64 MMHG | SYSTOLIC BLOOD PRESSURE: 138 MMHG | RESPIRATION RATE: 20 BRPM | HEART RATE: 70 BPM | WEIGHT: 163 LBS | OXYGEN SATURATION: 97 %

## 2025-03-12 DIAGNOSIS — M75.122 COMPLETE TEAR OF LEFT ROTATOR CUFF, UNSPECIFIED WHETHER TRAUMATIC: ICD-10-CM

## 2025-03-12 DIAGNOSIS — R60.0 LOCALIZED EDEMA: ICD-10-CM

## 2025-03-12 DIAGNOSIS — M25.612 DECREASED ROM OF LEFT SHOULDER: Primary | ICD-10-CM

## 2025-03-12 DIAGNOSIS — M15.0 PRIMARY OSTEOARTHRITIS INVOLVING MULTIPLE JOINTS: Chronic | ICD-10-CM

## 2025-03-12 DIAGNOSIS — I10 ESSENTIAL HYPERTENSION: ICD-10-CM

## 2025-03-12 DIAGNOSIS — M25.512 ACUTE PAIN OF LEFT SHOULDER: ICD-10-CM

## 2025-03-12 DIAGNOSIS — D64.9 ANEMIA, UNSPECIFIED TYPE: ICD-10-CM

## 2025-03-12 DIAGNOSIS — E78.2 MIXED HYPERLIPIDEMIA: ICD-10-CM

## 2025-03-12 DIAGNOSIS — E53.8 B12 DEFICIENCY: ICD-10-CM

## 2025-03-12 LAB
BASOPHILS # BLD AUTO: 0.03 K/UL (ref 0–0.2)
BASOPHILS NFR BLD AUTO: 0.4 % (ref 0–1)
DIFFERENTIAL METHOD BLD: ABNORMAL
EOSINOPHIL # BLD AUTO: 0.5 K/UL (ref 0–0.5)
EOSINOPHIL NFR BLD AUTO: 6.9 % (ref 1–4)
ERYTHROCYTE [DISTWIDTH] IN BLOOD BY AUTOMATED COUNT: 13.4 % (ref 11.5–14.5)
FERRITIN SERPL-MCNC: 67 NG/ML (ref 22–275)
FOLATE SERPL-MCNC: 6.7 NG/ML (ref 7–31.4)
HCT VFR BLD AUTO: 35.4 % (ref 40–54)
HGB BLD-MCNC: 10.8 G/DL (ref 13.5–18)
IMM GRANULOCYTES # BLD AUTO: 0.02 K/UL (ref 0–0.04)
IMM GRANULOCYTES NFR BLD: 0.3 % (ref 0–0.4)
IRON SATN MFR SERPL: ABNORMAL %
IRON SERPL-MCNC: 312 UG/DL (ref 65–175)
LYMPHOCYTES # BLD AUTO: 1.25 K/UL (ref 1–4.8)
LYMPHOCYTES NFR BLD AUTO: 17.1 % (ref 27–41)
MACROCYTES BLD QL SMEAR: NORMAL
MCH RBC QN AUTO: 32.9 PG (ref 27–31)
MCHC RBC AUTO-ENTMCNC: 30.5 G/DL (ref 32–36)
MCV RBC AUTO: 107.9 FL (ref 80–96)
MONOCYTES # BLD AUTO: 0.71 K/UL (ref 0–0.8)
MONOCYTES NFR BLD AUTO: 9.7 % (ref 2–6)
MPC BLD CALC-MCNC: 12 FL (ref 9.4–12.4)
NEUTROPHILS # BLD AUTO: 4.78 K/UL (ref 1.8–7.7)
NEUTROPHILS NFR BLD AUTO: 65.6 % (ref 53–65)
NRBC # BLD AUTO: 0 X10E3/UL
NRBC, AUTO (.00): 0 %
PLATELET # BLD AUTO: 231 K/UL (ref 150–400)
PLATELET MORPHOLOGY: NORMAL
RBC # BLD AUTO: 3.28 M/UL (ref 4.6–6.2)
TIBC SERPL-MCNC: <25 UG/DL (ref 69–240)
TIBC SERPL-MCNC: ABNORMAL UG/DL
TRANSFERRIN SERPL-MCNC: 236 MG/DL (ref 163–344)
VIT B12 SERPL-MCNC: 417 PG/ML (ref 213–816)
WBC # BLD AUTO: 7.29 K/UL (ref 4.5–11)

## 2025-03-12 PROCEDURE — 82607 VITAMIN B-12: CPT | Mod: ,,, | Performed by: CLINICAL MEDICAL LABORATORY

## 2025-03-12 PROCEDURE — 97161 PT EVAL LOW COMPLEX 20 MIN: CPT

## 2025-03-12 PROCEDURE — 1101F PT FALLS ASSESS-DOCD LE1/YR: CPT | Mod: ,,,

## 2025-03-12 PROCEDURE — 1159F MED LIST DOCD IN RCRD: CPT | Mod: ,,,

## 2025-03-12 PROCEDURE — 85025 COMPLETE CBC W/AUTO DIFF WBC: CPT | Mod: ,,, | Performed by: CLINICAL MEDICAL LABORATORY

## 2025-03-12 PROCEDURE — 3075F SYST BP GE 130 - 139MM HG: CPT | Mod: ,,,

## 2025-03-12 PROCEDURE — 3288F FALL RISK ASSESSMENT DOCD: CPT | Mod: ,,,

## 2025-03-12 PROCEDURE — 82728 ASSAY OF FERRITIN: CPT | Mod: ,,, | Performed by: CLINICAL MEDICAL LABORATORY

## 2025-03-12 PROCEDURE — 83550 IRON BINDING TEST: CPT | Mod: ,,, | Performed by: CLINICAL MEDICAL LABORATORY

## 2025-03-12 PROCEDURE — 3078F DIAST BP <80 MM HG: CPT | Mod: ,,,

## 2025-03-12 PROCEDURE — 1160F RVW MEDS BY RX/DR IN RCRD: CPT | Mod: ,,,

## 2025-03-12 PROCEDURE — 99213 OFFICE O/P EST LOW 20 MIN: CPT | Mod: ,,,

## 2025-03-12 PROCEDURE — 83540 ASSAY OF IRON: CPT | Mod: ,,, | Performed by: CLINICAL MEDICAL LABORATORY

## 2025-03-12 PROCEDURE — 82746 ASSAY OF FOLIC ACID SERUM: CPT | Mod: ,,, | Performed by: CLINICAL MEDICAL LABORATORY

## 2025-03-12 RX ORDER — MELOXICAM 7.5 MG/1
7.5 TABLET ORAL DAILY
Qty: 30 TABLET | Refills: 2 | Status: SHIPPED | OUTPATIENT
Start: 2025-03-12

## 2025-03-12 RX ORDER — AMLODIPINE BESYLATE 10 MG/1
10 TABLET ORAL DAILY
Qty: 90 TABLET | Refills: 1 | Status: SHIPPED | OUTPATIENT
Start: 2025-03-12

## 2025-03-12 RX ORDER — HYDRALAZINE HYDROCHLORIDE 100 MG/1
100 TABLET, FILM COATED ORAL 2 TIMES DAILY
Qty: 180 TABLET | Refills: 3 | Status: SHIPPED | OUTPATIENT
Start: 2025-03-12 | End: 2026-03-12

## 2025-03-12 RX ORDER — POTASSIUM CHLORIDE 750 MG/1
10 TABLET, EXTENDED RELEASE ORAL DAILY
Qty: 90 TABLET | Refills: 0 | Status: SHIPPED | OUTPATIENT
Start: 2025-03-12

## 2025-03-12 RX ORDER — ATORVASTATIN CALCIUM 40 MG/1
40 TABLET, FILM COATED ORAL NIGHTLY
Qty: 90 TABLET | Refills: 1 | Status: SHIPPED | OUTPATIENT
Start: 2025-03-12

## 2025-03-12 RX ORDER — FUROSEMIDE 20 MG/1
20 TABLET ORAL DAILY PRN
Qty: 90 TABLET | Refills: 1 | Status: SHIPPED | OUTPATIENT
Start: 2025-03-12 | End: 2026-03-12

## 2025-03-12 RX ORDER — HYDROCHLOROTHIAZIDE 25 MG/1
25 TABLET ORAL DAILY
Qty: 90 TABLET | Refills: 1 | Status: SHIPPED | OUTPATIENT
Start: 2025-03-12

## 2025-03-12 NOTE — PROGRESS NOTES
Subjective     Patient ID: Stephane Mathis is a 79 y.o. male.    Chief Complaint: Medication Refill and Health Maintenance (TETANUS VACCINE Never done/Sign Pain Contract Never done/Naloxone Prescription Never done/Shingles Vaccine(1 of 2) Never done/RSV Vaccine (Age 60+ and Pregnant patients)(1 - 1-dose 75+ series) Never done/COVID-19 Vaccine(2024-25 season) due on 2024 )    Medication Refill      Review of Systems       Objective     Physical Exam  Constitutional:       Appearance: Normal appearance.   HENT:      Head: Normocephalic and atraumatic.      Right Ear: Tympanic membrane, ear canal and external ear normal.      Left Ear: Tympanic membrane, ear canal and external ear normal.      Nose: Nose normal.      Mouth/Throat:      Mouth: Mucous membranes are moist.      Pharynx: Oropharynx is clear.   Eyes:      Pupils: Pupils are equal, round, and reactive to light.   Cardiovascular:      Rate and Rhythm: Normal rate and regular rhythm.      Pulses: Normal pulses.      Heart sounds: Normal heart sounds.   Pulmonary:      Effort: Pulmonary effort is normal.      Breath sounds: Normal breath sounds.   Musculoskeletal:      Cervical back: Normal range of motion.      Right lower le+ Edema present.      Left lower le+ Edema present.      Comments: Sling noted to left shoulder   Skin:     Capillary Refill: Capillary refill takes less than 2 seconds.   Neurological:      General: No focal deficit present.      Mental Status: He is alert.   Psychiatric:         Mood and Affect: Mood normal.            Assessment and Plan     1. Essential hypertension  -     hydrALAZINE (APRESOLINE) 100 MG tablet; Take 1 tablet (100 mg total) by mouth 2 (two) times a day.  Dispense: 180 tablet; Refill: 3  -     hydroCHLOROthiazide (HYDRODIURIL) 25 MG tablet; Take 1 tablet (25 mg total) by mouth once daily.  Dispense: 90 tablet; Refill: 1  -     amLODIPine (NORVASC) 10 MG tablet; Take 1 tablet (10 mg total) by mouth once  daily.  Dispense: 90 tablet; Refill: 1    2. Localized edema  -     furosemide (LASIX) 20 MG tablet; Take 1 tablet (20 mg total) by mouth daily as needed (swelling).  Dispense: 90 tablet; Refill: 1    3. Primary osteoarthritis involving multiple joints  -     meloxicam (MOBIC) 7.5 MG tablet; Take 1 tablet (7.5 mg total) by mouth once daily.  Dispense: 30 tablet; Refill: 2    4. Acute pain of left shoulder  -     meloxicam (MOBIC) 7.5 MG tablet; Take 1 tablet (7.5 mg total) by mouth once daily.  Dispense: 30 tablet; Refill: 2    5. Mixed hyperlipidemia  -     atorvastatin (LIPITOR) 40 MG tablet; Take 1 tablet (40 mg total) by mouth every evening.  Dispense: 90 tablet; Refill: 1    6. Anemia, unspecified type  -     CBC Auto Differential  -     Iron and TIBC  -     Ferritin  -     Vitamin B12 & Folate    7. B12 deficiency  -     Vitamin B12 & Folate    Other orders  -     potassium chloride SA (K-DUR,KLOR-CON M) 10 MEQ tablet; Take 1 tablet (10 mEq total) by mouth once daily.  Dispense: 90 tablet; Refill: 0        Pt presents for refills on chronic health conditions that were addressed last visit. No distress noted. Pt denies chest pain, sob, and melena. Pt will f/u in 3 months         Follow up in about 3 months (around 6/12/2025).

## 2025-03-12 NOTE — PROGRESS NOTES
Outpatient Rehab      Outpatient Rehab    Physical Therapy Evaluation    Patient Name: Stephane Mathis  MRN: [id number]  YOB: 1945  Encounter Date: 3/12/2025    Therapy Diagnosis:   Encounter Diagnoses   Name Primary?    Complete tear of left rotator cuff, unspecified whether traumatic     Decreased ROM of left shoulder Yes     Physician: Rigo Fenton III, MD    Physician Orders: Eval and Treat  Medical Diagnosis: M75.122 (ICD-10-CM) - Complete tear of left rotator cuff, unspecified whether traumatic    2/19/25 PROCEDURE:  Left shoulder arthroscopy with extensive debridement of anterior labrum tear debridement of rotator cuff tear. Open releasing the coracoacromial ligament with a acromioplasty and rotator cuff repair. One bio corkscrew 2 push locks    Visit # / Visits Authorized:  0 / 12   Date of Evaluation:  3/12/2025   Insurance Authorization Period: 3/12/25 to 4/11/25  Plan of Care Certification:  3/12/2025 to 4/11/25      Time In: 1010   Time Out: 1033  Total Time: 23   Total Billable Time: 23    Intake Outcome Measure for FOTO Survey    Therapist reviewed FOTO scores for Stephane aMthis on 3/12/2025.   FOTO report - see Media section or FOTO account episode details.     Intake Score: 52%         Subjective   History of Present Illness  Stephane is a 79 y.o. male who reports to physical therapy with a chief concern of Decreased mobility of left shoulder and pain..     The patient reports a medical diagnosis of Complete tear of left rotator cuff, unspecified whether traumatic.  Patient reports a surgery of Left shoulder arthroscopy with extensive debridement of anterior labrum tear debridement of rotator cuff tear. Open releasing the coracoacromial ligament with a acromioplasty and rotator cuff repair. One bio corkscrew 2 push locks. Surgery occurred on 02/19/25.         History of Present Condition/Illness: Patient is about 3 weeks post op left shoulder arthroscopy surgery. He is still in his  sling. He has been doing pendulum exercises at home and now referred for formal physical therapy.    Pain     Patient reports a current pain level of 4/10. Pain at best is reported as 4/10. Pain at worst is reported as 10/10.   Location: Left shoulder         Employment  Employment Status: Retired          Past Medical History/Physical Systems Review:   Stephane Mathis  has a past medical history of Acute superficial gastritis without hemorrhage, Alcoholism, Anemia, AVM (arteriovenous malformation) of colon, Colon, diverticulosis, Hypertension, Iron deficiency anemia due to chronic blood loss, Polyp, sigmoid colon, Seizures, and Stroke.    Stephane Mathis  has a past surgical history that includes Scrotal surgery; Knee surgery (Right); Lumbar discectomy; Cataract extraction w/ intraocular lens implant (Right); Tonsillectomy; Arthroscopic debridement of shoulder (Left, 2/19/2025); Rotator cuff repair (Left, 2/19/2025); and Acromioplasty (Left, 2/19/2025).    Stephane has a current medication list which includes the following prescription(s): amlodipine, aspirin, atorvastatin, carbamide peroxide, epinephrine, ergocalciferol, famotidine, ferrous sulfate, furosemide, hydralazine, hydrochlorothiazide, hydrocodone-acetaminophen, meloxicam, potassium chloride sa, and tadalafil, and the following Facility-Administered Medications: bupivacaine (pf) 0.25% (2.5 mg/ml) and triamcinolone acetonide.    Review of patient's allergies indicates:   Allergen Reactions    Ace inhibitors Swelling    Codeine Itching        Objective   Bracing  Patient presents with a Left shoulder brace. The shoulder brace type is Lenard sling.           Posture        Shoulders are Depressed and Rounded.             Shoulder Range of Motion  Left Shoulder   Active (deg) Passive (deg) Pain   Flexion   110 Yes   Extension   50     Scaption         ABduction   65 Yes   ADduction         Horizontal ABduction         Horizontal ADduction         External  Rotation (Shoulder ABducted 0 degrees)   30     External Rotation (Shoulder ABducted 45 degrees)         External Rotation (Shoulder ABducted 90 degrees)         Internal Rotation (Shoulder ABducted 0 degrees)   60 Yes   Internal Rotation (Shoulder ABducted 45 degrees)         Internal Rotation (Shoulder ABducted 90 degrees)                  Elbow/Forearm Range of Motion   Left Elbow/Forearm   Active (deg) Passive (deg) Pain   Flexion 150       Extension 10 0     Forearm Pronation         Forearm Supination                          Right  Strength  Right Hand Dynamometer Position: 2  Elbow Position Forearm Position Trial 1 (lbs) Trial 2  (lbs) Trial 3  (lbs) Average  (lbs) Pain   Flexed Neutral 70 60 60 63.33         Left  Strength  Left Hand Dynamometer Position: 2  Elbow Position Forearm Position Trial 1 (lbs) Trial 2 (lbs) Trial 3 (lbs) Average (lbs) Pain   Flexed Neutral 65 70 65 66.67                   Treatment:  None this visit.    Assessment & Plan   Assessment  Stephane presents with a condition of Low complexity.   Presentation of Symptoms: Stable  Will Comorbidities Impact Care: No       Functional Limitations: Activity tolerance, Carrying objects, Range of motion, Performing household chores, Pain when reaching, Pain with ADLs/IADLs, Reaching  Impairments: Abnormal or restricted range of motion, Impaired physical strength, Lack of appropriate home exercise program, Pain with functional activity    Patient Goal for Therapy (PT): Decrease pain and improve shoulder mobility.  Prognosis: Good  Assessment Details: Patient will benefit from skilled therapy to address decreased shoulder ROM, impaired strength, decreased activity tolerance and pain.    Plan  From a physical therapy perspective, the patient would benefit from: Skilled Rehab Services    Planned therapy interventions include: Therapeutic exercise, Neuromuscular re-education, and Manual therapy.    Planned modalities to include: Electrical  stimulation - passive/unattended and Cryotherapy (cold pack).        Visit Frequency: 3 times Per Week for 4 Weeks.       This plan was discussed with Patient and Therapy assistant.   Plan details: Post op shoulder rehab program.          Patient's spiritual, cultural, and educational needs considered and patient agreeable to plan of care and goals.     Education  Education was done with Patient. The patient's learning style includes Demonstration and Listening. The patient Demonstrates understanding.         Plan of care discussed with patient.       Goals:   Active       LTG       Patient will increase left shoulder PROM flexion to 160 degrees to improve joint mobility.       Start:  03/12/25    Expected End:  04/11/25            Patient will increase Patient will increase left shoulder PROM IR to 70 degrees with arm abducted 30 degrees to improve joint mobility.       Start:  03/12/25    Expected End:  04/11/25            Patient will increase Patient will increase left shoulder PROM ER to 80 degrees with arm abducted 30 degrees to improve joint mobility.       Start:  03/12/25    Expected End:  04/11/25            Patient will tolerate all exercises and ROM with pain 3/10 or less.       Start:  03/12/25    Expected End:  04/11/25               STG       Patient will demonstrate independent performance of home exercise program.       Start:  03/12/25    Expected End:  03/28/25            Patient will increase left shoulder flexion PROM to 125 degrees to improve joint mobility.       Start:  03/12/25    Expected End:  03/28/25            Patient will increase left shoulder PROM ER at 0 degrees abduction to 60 degrees to improve joint mobility.       Start:  03/12/25    Expected End:  03/28/25            Patient will tolerate all PROM activities with pain 5/10 or less.       Start:  03/12/25    Expected End:  03/28/25                Toño Nelson, PT

## 2025-03-13 ENCOUNTER — RESULTS FOLLOW-UP (OUTPATIENT)
Dept: FAMILY MEDICINE | Facility: CLINIC | Age: 80
End: 2025-03-13

## 2025-03-14 ENCOUNTER — CLINICAL SUPPORT (OUTPATIENT)
Dept: REHABILITATION | Facility: HOSPITAL | Age: 80
End: 2025-03-14
Payer: MEDICARE

## 2025-03-14 DIAGNOSIS — M25.612 DECREASED ROM OF LEFT SHOULDER: Primary | ICD-10-CM

## 2025-03-14 DIAGNOSIS — M75.122 COMPLETE TEAR OF LEFT ROTATOR CUFF, UNSPECIFIED WHETHER TRAUMATIC: ICD-10-CM

## 2025-03-14 PROCEDURE — 97140 MANUAL THERAPY 1/> REGIONS: CPT

## 2025-03-14 PROCEDURE — 97110 THERAPEUTIC EXERCISES: CPT

## 2025-03-14 PROCEDURE — 97014 ELECTRIC STIMULATION THERAPY: CPT

## 2025-03-14 NOTE — PROGRESS NOTES
Outpatient Rehab    Physical Therapy Visit    Patient Name: Stephane Mathis  MRN: [id number]  YOB: 1945  Encounter Date: 3/14/2025    Therapy Diagnosis:   Encounter Diagnoses   Name Primary?    Decreased ROM of left shoulder Yes    Complete tear of left rotator cuff, unspecified whether traumatic      Physician: Rigo Fenton III, MD    Physician Orders: Eval and Treat  Medical Diagnosis: M75.122 (ICD-10-CM) - Complete tear of left rotator cuff, unspecified whether traumatic     2/19/25 PROCEDURE:  Left shoulder arthroscopy with extensive debridement of anterior labrum tear debridement of rotator cuff tear. Open releasing the coracoacromial ligament with a acromioplasty and rotator cuff repair. One bio corkscrew 2 push locks     Visit # / Visits Authorized:  1 / 12   Date of Evaluation:  3/12/2025   Insurance Authorization Period: 3/12/25 to 4/11/25  Plan of Care Certification:  3/12/2025 to 4/11/25      Time In: 0917   Time Out: 1000  Total Time: 43   Total Billable Time: 43    FOTO:  Intake Score: 52%  Survey Score 1:  %  Survey Score 2:  %         Subjective   Some left shoulder pain..  Pain reported as 4/10.      Objective            Treatment:  Therapeutic Exercise  Therapeutic Exercise Activity 1: Shoulder pulleys PROM left shoulder with end range mild stretch x 8 minutes.  Therapeutic Exercise Activity 2: Left UE pendulum and codmans 3 minutes.    Manual Therapy  Manual Therapy Activity 1: Supine PROM left shoulder flexion to 90 degrees with elbow bent and mild end range stretch 2 x 15  Manual Therapy Activity 2: Supine PROM left shoulder ER and IR with elbow at side (0 degrees abduction) and mild end range stretch 2 x 15    Modalities  Electrical Stimulation (Parameters): IFC left shoulder concurrently with cold pack x 15 minutes. Parameters set at 80/150 Hz continuous at 9.4 cV    Assessment & Plan   Assessment: Stephane tolerated therapy fair. He has pain with PROM of left shoulder due to  joint tightness.       Patient will continue to benefit from skilled outpatient physical therapy to address the deficits listed in the problem list box on initial evaluation, provide pt/family education and to maximize pt's level of independence in the home and community environment.     Patient's spiritual, cultural, and educational needs considered and patient agreeable to plan of care and goals.           Plan: Continue with therapy 3 x per week.    Goals:   Active       LTG       Patient will increase left shoulder PROM flexion to 160 degrees to improve joint mobility.       Start:  03/12/25    Expected End:  04/11/25            Patient will increase Patient will increase left shoulder PROM IR to 70 degrees with arm abducted 30 degrees to improve joint mobility.       Start:  03/12/25    Expected End:  04/11/25            Patient will increase Patient will increase left shoulder PROM ER to 80 degrees with arm abducted 30 degrees to improve joint mobility.       Start:  03/12/25    Expected End:  04/11/25            Patient will tolerate all exercises and ROM with pain 3/10 or less.       Start:  03/12/25    Expected End:  04/11/25               STG       Patient will demonstrate independent performance of home exercise program.       Start:  03/12/25    Expected End:  03/28/25            Patient will increase left shoulder flexion PROM to 125 degrees to improve joint mobility.       Start:  03/12/25    Expected End:  03/28/25            Patient will increase left shoulder PROM ER at 0 degrees abduction to 60 degrees to improve joint mobility.       Start:  03/12/25    Expected End:  03/28/25            Patient will tolerate all PROM activities with pain 5/10 or less.       Start:  03/12/25    Expected End:  03/28/25                Toño Nelson, PT

## 2025-03-17 ENCOUNTER — CLINICAL SUPPORT (OUTPATIENT)
Dept: REHABILITATION | Facility: HOSPITAL | Age: 80
End: 2025-03-17
Payer: MEDICARE

## 2025-03-17 ENCOUNTER — DOCUMENTATION ONLY (OUTPATIENT)
Dept: REHABILITATION | Facility: HOSPITAL | Age: 80
End: 2025-03-17
Payer: MEDICARE

## 2025-03-17 DIAGNOSIS — M25.612 DECREASED ROM OF LEFT SHOULDER: Primary | ICD-10-CM

## 2025-03-17 DIAGNOSIS — M75.122 COMPLETE TEAR OF LEFT ROTATOR CUFF, UNSPECIFIED WHETHER TRAUMATIC: ICD-10-CM

## 2025-03-17 PROCEDURE — 97110 THERAPEUTIC EXERCISES: CPT

## 2025-03-17 PROCEDURE — 97140 MANUAL THERAPY 1/> REGIONS: CPT

## 2025-03-17 NOTE — PROGRESS NOTES
Outpatient Rehab    Physical Therapy Visit    Patient Name: Stephane Mathis  MRN: [id number]  YOB: 1945  Encounter Date: 3/17/2025    Therapy Diagnosis:   Encounter Diagnoses   Name Primary?    Decreased ROM of left shoulder Yes    Complete tear of left rotator cuff, unspecified whether traumatic      Physician: Rigo Fenton III, MD    Physician Orders: Eval and Treat  Medical Diagnosis: M75.122 (ICD-10-CM) - Complete tear of left rotator cuff, unspecified whether traumatic     2/19/25 PROCEDURE:  Left shoulder arthroscopy with extensive debridement of anterior labrum tear debridement of rotator cuff tear. Open releasing the coracoacromial ligament with a acromioplasty and rotator cuff repair. One bio corkscrew 2 push locks     Visit # / Visits Authorized:  2 / 12   Date of Evaluation:  3/12/2025   Insurance Authorization Period: 3/12/25 to 4/11/25  Plan of Care Certification:  3/12/2025 to 4/11/25      Time In: 0957   Time Out: 1024  Total Time: 27   Total Billable Time: 27    FOTO:  Intake Score: 52%  Survey Score 1:  %  Survey Score 2:  %         Subjective   A little left shoulder pain..  Pain reported as 2/10.      Objective            Treatment:  Therapeutic Exercise  TE 1: Shoulder pulleys PROM left shoulder with end range mild stretch x 8 minutes.  TE 2: Left UE pendulum and codmans 3 minutes.  TE 3: Seated scapular retraction no resistance 2 x 10    Manual Therapy  MT 1: Supine PROM left shoulder flexion to 90 degrees with elbow bent and mild end range stretch 2 x 15  MT 2: Supine PROM left shoulder ER and IR with elbow at side (0 degrees abduction) and mild end range stretch 2 x 15  MT 3: Supine gentle grade I glenohumeral glides 2 x15         Assessment & Plan   Assessment: Stephane presents today with decreased left shoulder pain and tolerated his therapy well.       Patient will continue to benefit from skilled outpatient physical therapy to address the deficits listed in the problem  list box on initial evaluation, provide pt/family education and to maximize pt's level of independence in the home and community environment.     Patient's spiritual, cultural, and educational needs considered and patient agreeable to plan of care and goals.           Plan: Continue with therapy 3 x per week.    Goals:   Active       LTG       Patient will increase left shoulder PROM flexion to 160 degrees to improve joint mobility.       Start:  03/12/25    Expected End:  04/11/25            Patient will increase Patient will increase left shoulder PROM IR to 70 degrees with arm abducted 30 degrees to improve joint mobility.       Start:  03/12/25    Expected End:  04/11/25            Patient will increase Patient will increase left shoulder PROM ER to 80 degrees with arm abducted 30 degrees to improve joint mobility.       Start:  03/12/25    Expected End:  04/11/25            Patient will tolerate all exercises and ROM with pain 3/10 or less.       Start:  03/12/25    Expected End:  04/11/25               STG       Patient will demonstrate independent performance of home exercise program.       Start:  03/12/25    Expected End:  03/28/25            Patient will increase left shoulder flexion PROM to 125 degrees to improve joint mobility.       Start:  03/12/25    Expected End:  03/28/25            Patient will increase left shoulder PROM ER at 0 degrees abduction to 60 degrees to improve joint mobility.       Start:  03/12/25    Expected End:  03/28/25            Patient will tolerate all PROM activities with pain 5/10 or less.       Start:  03/12/25    Expected End:  03/28/25                Toño Nelson, PT

## 2025-03-18 ENCOUNTER — OFFICE VISIT (OUTPATIENT)
Dept: FAMILY MEDICINE | Facility: CLINIC | Age: 80
End: 2025-03-18
Payer: MEDICARE

## 2025-03-18 VITALS
BODY MASS INDEX: 22.82 KG/M2 | RESPIRATION RATE: 16 BRPM | DIASTOLIC BLOOD PRESSURE: 60 MMHG | WEIGHT: 163 LBS | HEIGHT: 71 IN | HEART RATE: 71 BPM | OXYGEN SATURATION: 97 % | SYSTOLIC BLOOD PRESSURE: 136 MMHG | TEMPERATURE: 98 F

## 2025-03-18 DIAGNOSIS — I10 ESSENTIAL HYPERTENSION: Primary | ICD-10-CM

## 2025-03-18 DIAGNOSIS — R19.5 OCCULT BLOOD IN STOOLS: ICD-10-CM

## 2025-03-18 PROCEDURE — 99212 OFFICE O/P EST SF 10 MIN: CPT | Mod: ,,, | Performed by: NURSE PRACTITIONER

## 2025-03-18 PROCEDURE — 1160F RVW MEDS BY RX/DR IN RCRD: CPT | Mod: ,,, | Performed by: NURSE PRACTITIONER

## 2025-03-18 PROCEDURE — 3078F DIAST BP <80 MM HG: CPT | Mod: ,,, | Performed by: NURSE PRACTITIONER

## 2025-03-18 PROCEDURE — 3288F FALL RISK ASSESSMENT DOCD: CPT | Mod: ,,, | Performed by: NURSE PRACTITIONER

## 2025-03-18 PROCEDURE — 3075F SYST BP GE 130 - 139MM HG: CPT | Mod: ,,, | Performed by: NURSE PRACTITIONER

## 2025-03-18 PROCEDURE — 1101F PT FALLS ASSESS-DOCD LE1/YR: CPT | Mod: ,,, | Performed by: NURSE PRACTITIONER

## 2025-03-18 PROCEDURE — 1159F MED LIST DOCD IN RCRD: CPT | Mod: ,,, | Performed by: NURSE PRACTITIONER

## 2025-03-18 NOTE — PROGRESS NOTES
"Clinic Note    Stephane Mathis is a 79 y.o. male     Chief Complaint:   Chief Complaint   Patient presents with    Follow-up     2 week follow up        Subjective:    Patient comes in today for 2-4 week follow up on htn and edema.  Patient reports doing well on lasix. States edema resolved.  Denies headache, chest pain, or sob. Taking bp meds as directed.  Patient denies any blood in stool. Occult stool was positive. Long hx of anshul.   Denies any complaints or concerns.     Follow-up  Pertinent negatives include no abdominal pain, chest pain, coughing, fever, headaches, nausea or vomiting.        Allergies:   Review of patient's allergies indicates:   Allergen Reactions    Ace inhibitors Swelling    Codeine Itching        Past Medical History:  Past Medical History:   Diagnosis Date    Acute superficial gastritis without hemorrhage 7/12/2022    Alcoholism     Anemia     AVM (arteriovenous malformation) of colon 7/13/2022    Colon, diverticulosis 7/13/2022    Hypertension     Iron deficiency anemia due to chronic blood loss 7/12/2022    Polyp, sigmoid colon 7/13/2022    Seizures     Stroke     right sided weakness        Current Medications:  Current Medications[1]       Review of Systems   Constitutional:  Negative for fever.   Respiratory:  Negative for cough and shortness of breath.    Cardiovascular:  Negative for chest pain, palpitations and leg swelling.   Gastrointestinal:  Negative for abdominal pain, constipation, diarrhea, nausea and vomiting.   Genitourinary:  Negative for dysuria.   Neurological:  Negative for headaches.          Objective:    /60 (BP Location: Left arm, Patient Position: Sitting)   Pulse 71   Temp 98 °F (36.7 °C) (Oral)   Resp 16   Ht 5' 11" (1.803 m)   Wt 73.9 kg (163 lb)   SpO2 97%   BMI 22.73 kg/m²      Physical Exam  Constitutional:       Appearance: Normal appearance.   Eyes:      Extraocular Movements: Extraocular movements intact.   Cardiovascular:      Rate and " Rhythm: Normal rate and regular rhythm.      Pulses: Normal pulses.      Heart sounds: Normal heart sounds.   Pulmonary:      Effort: Pulmonary effort is normal.      Breath sounds: Normal breath sounds.   Musculoskeletal:      Right lower leg: No edema.      Left lower leg: No edema.   Neurological:      Mental Status: He is alert and oriented to person, place, and time.          Assessment and Plan:    1. Essential hypertension    2. Occult blood in stools         Essential hypertension  -continue current treatment plan  -edema resolved. Instructed to take lasix prn    Occult blood in stools  -stool positive. Message sent to GI to see if need to reschedule follow up .         There are no Patient Instructions on file for this visit.   Follow up if symptoms worsen or fail to improve.          [1]   Current Outpatient Medications:     amLODIPine (NORVASC) 10 MG tablet, Take 1 tablet (10 mg total) by mouth once daily., Disp: 90 tablet, Rfl: 1    atorvastatin (LIPITOR) 40 MG tablet, Take 1 tablet (40 mg total) by mouth every evening., Disp: 90 tablet, Rfl: 1    EAR DROPS, CARBAMIDE PEROXIDE, OTIC, Place in ear(s)., Disp: , Rfl:     ferrous sulfate (FEOSOL) 325 mg (65 mg iron) Tab tablet, Take 1 tablet (325 mg total) by mouth 2 (two) times daily., Disp: 180 tablet, Rfl: 1    furosemide (LASIX) 20 MG tablet, Take 1 tablet (20 mg total) by mouth daily as needed (swelling)., Disp: 90 tablet, Rfl: 1    hydrALAZINE (APRESOLINE) 100 MG tablet, Take 1 tablet (100 mg total) by mouth 2 (two) times a day., Disp: 180 tablet, Rfl: 3    hydroCHLOROthiazide (HYDRODIURIL) 25 MG tablet, Take 1 tablet (25 mg total) by mouth once daily., Disp: 90 tablet, Rfl: 1    HYDROcodone-acetaminophen (NORCO) 7.5-325 mg per tablet, Take 1 tablet by mouth every 6 (six) hours as needed for Pain., Disp: 20 tablet, Rfl: 0    meloxicam (MOBIC) 7.5 MG tablet, Take 1 tablet (7.5 mg total) by mouth once daily., Disp: 30 tablet, Rfl: 2    potassium chloride  SA (K-DUR,KLOR-CON M) 10 MEQ tablet, Take 1 tablet (10 mEq total) by mouth once daily., Disp: 90 tablet, Rfl: 0    EPINEPHrine (EPIPEN) 0.3 mg/0.3 mL AtIn, Inject 0.3 mLs (0.3 mg total) into the muscle as needed., Disp: 1 each, Rfl: 0    tadalafiL (CIALIS) 10 MG tablet, Take 1 tablet (10 mg total) by mouth daily as needed for Erectile Dysfunction., Disp: 6 tablet, Rfl: 5  No current facility-administered medications for this visit.    Facility-Administered Medications Ordered in Other Visits:     BUPivacaine (PF) 0.25% (2.5 mg/ml) injection 1 mL, 1 mL, , , Zora Dunn PA, 1 mL at 01/30/24 0930    triamcinolone acetonide injection 40 mg, 40 mg, Intra-articular, , Zora Dunn PA, 40 mg at 01/30/24 0930

## 2025-03-19 ENCOUNTER — CLINICAL SUPPORT (OUTPATIENT)
Dept: REHABILITATION | Facility: HOSPITAL | Age: 80
End: 2025-03-19
Payer: MEDICARE

## 2025-03-19 DIAGNOSIS — M25.612 DECREASED ROM OF LEFT SHOULDER: Primary | ICD-10-CM

## 2025-03-19 DIAGNOSIS — M75.122 COMPLETE TEAR OF LEFT ROTATOR CUFF, UNSPECIFIED WHETHER TRAUMATIC: ICD-10-CM

## 2025-03-19 PROCEDURE — 97110 THERAPEUTIC EXERCISES: CPT

## 2025-03-19 PROCEDURE — 97140 MANUAL THERAPY 1/> REGIONS: CPT

## 2025-03-19 NOTE — PROGRESS NOTES
Outpatient Rehab    Physical Therapy Visit    Patient Name: Stephane Mathis  MRN: [id number]  YOB: 1945  Encounter Date: 3/19/2025    Therapy Diagnosis:   Encounter Diagnoses   Name Primary?    Decreased ROM of left shoulder Yes    Complete tear of left rotator cuff, unspecified whether traumatic      Physician: Rigo Fenton III, MD    Physician Orders: Eval and Treat  Medical Diagnosis: M75.122 (ICD-10-CM) - Complete tear of left rotator cuff, unspecified whether traumatic     2/19/25 PROCEDURE:  Left shoulder arthroscopy with extensive debridement of anterior labrum tear debridement of rotator cuff tear. Open releasing the coracoacromial ligament with a acromioplasty and rotator cuff repair. One bio corkscrew 2 push locks     Visit # / Visits Authorized:  3 / 12   Date of Evaluation:  3/12/2025   Insurance Authorization Period: 3/12/25 to 4/11/25  Plan of Care Certification:  3/12/2025 to 4/11/25      Time In: 0913   Time Out: 0940  Total Time: 27   Total Billable Time: 27    FOTO:  Intake Score: 52%  Survey Score 1:  %  Survey Score 2:  %         Subjective   My shoulder is a little sore today..  Pain reported as 4/10. Left shoulder    Objective            Treatment:  Therapeutic Exercise  TE 1: Shoulder pulleys PROM left shoulder with end range mild stretch x 10 minutes.  TE 2: Left UE pendulum and codmans 3 minutes.  TE 3: Seated scapular retraction no resistance 2 x 10    Manual Therapy  MT 1: Supine PROM left shoulder flexion to 90 degrees with elbow bent and mild end range stretch 2 x 15  MT 2: Supine PROM left shoulder ER and IR with elbow at side (0 degrees abduction) and mild end range stretch 2 x 15  MT 3: Supine gentle grade I glenohumeral glides 2 x15         Assessment & Plan   Assessment: Stephane presents today with mild left shoulder pain but was able to tolerate his treatment well.       Patient will continue to benefit from skilled outpatient physical therapy to address the  deficits listed in the problem list box on initial evaluation, provide pt/family education and to maximize pt's level of independence in the home and community environment.     Patient's spiritual, cultural, and educational needs considered and patient agreeable to plan of care and goals.           Plan: Continue with therapy 3 x per week.    Goals:   Active       LTG       Patient will increase left shoulder PROM flexion to 160 degrees to improve joint mobility.       Start:  03/12/25    Expected End:  04/11/25            Patient will increase Patient will increase left shoulder PROM IR to 70 degrees with arm abducted 30 degrees to improve joint mobility.       Start:  03/12/25    Expected End:  04/11/25            Patient will increase Patient will increase left shoulder PROM ER to 80 degrees with arm abducted 30 degrees to improve joint mobility.       Start:  03/12/25    Expected End:  04/11/25            Patient will tolerate all exercises and ROM with pain 3/10 or less.       Start:  03/12/25    Expected End:  04/11/25               STG       Patient will demonstrate independent performance of home exercise program.       Start:  03/12/25    Expected End:  03/28/25            Patient will increase left shoulder flexion PROM to 125 degrees to improve joint mobility.       Start:  03/12/25    Expected End:  03/28/25            Patient will increase left shoulder PROM ER at 0 degrees abduction to 60 degrees to improve joint mobility.       Start:  03/12/25    Expected End:  03/28/25            Patient will tolerate all PROM activities with pain 5/10 or less.       Start:  03/12/25    Expected End:  03/28/25                Toño Nelson, PT

## 2025-03-20 ENCOUNTER — TELEPHONE (OUTPATIENT)
Dept: FAMILY MEDICINE | Facility: CLINIC | Age: 80
End: 2025-03-20
Payer: MEDICARE

## 2025-03-20 NOTE — TELEPHONE ENCOUNTER
Contacted pt in regards to lab results. Informed pt of results. Pt voiced understanding and had no further questions.     ----- Message from Sierra Dickerson MD sent at 3/13/2025  5:00 PM CDT -----  Please call patient to let him know that his CT scan for lung cancer screening is ok/stable. Repeat in 1 year. Thanks!  ----- Message -----  From: Interface, Rad Results In  Sent: 3/11/2025   1:53 PM CDT  To: Sierra Dickerson MD

## 2025-03-21 ENCOUNTER — CLINICAL SUPPORT (OUTPATIENT)
Dept: REHABILITATION | Facility: HOSPITAL | Age: 80
End: 2025-03-21
Payer: MEDICARE

## 2025-03-21 DIAGNOSIS — M25.612 DECREASED ROM OF LEFT SHOULDER: Primary | ICD-10-CM

## 2025-03-21 DIAGNOSIS — M75.122 COMPLETE TEAR OF LEFT ROTATOR CUFF, UNSPECIFIED WHETHER TRAUMATIC: ICD-10-CM

## 2025-03-21 PROCEDURE — 97140 MANUAL THERAPY 1/> REGIONS: CPT

## 2025-03-21 PROCEDURE — 97110 THERAPEUTIC EXERCISES: CPT

## 2025-03-21 NOTE — PROGRESS NOTES
Outpatient Rehab    Physical Therapy Visit    Patient Name: Stephane Mathis  MRN: [id number]  YOB: 1945  Encounter Date: 3/21/2025    Therapy Diagnosis:   Encounter Diagnoses   Name Primary?    Decreased ROM of left shoulder Yes    Complete tear of left rotator cuff, unspecified whether traumatic      Physician: Rigo Fenton III, MD    Physician Orders: Eval and Treat  Medical Diagnosis: M75.122 (ICD-10-CM) - Complete tear of left rotator cuff, unspecified whether traumatic     2/19/25 PROCEDURE:  Left shoulder arthroscopy with extensive debridement of anterior labrum tear debridement of rotator cuff tear. Open releasing the coracoacromial ligament with a acromioplasty and rotator cuff repair. One bio corkscrew 2 push locks     Visit # / Visits Authorized:  4 / 12   Date of Evaluation:  3/12/2025   Insurance Authorization Period: 3/12/25 to 4/11/25  Plan of Care Certification:  3/12/2025 to 4/11/25      Time In: 1002   Time Out: 1034  Total Time: 32   Total Billable Time: 32    FOTO:  Intake Score: 52%  Survey Score 1:  %  Survey Score 2:  %         Subjective   My shoulder is a little sore today..  Pain reported as 4/10. Left shoulder    Objective      Shoulder Range of Motion  Left Shoulder   Active (deg) Passive (deg) Pain   Flexion   128 Yes   Extension         Scaption         ABduction         ADduction         Horizontal ABduction         Horizontal ADduction         External Rotation (Shoulder ABducted 0 degrees)   55 Yes   External Rotation (Shoulder ABducted 45 degrees)         External Rotation (Shoulder ABducted 90 degrees)         Internal Rotation (Shoulder ABducted 0 degrees)   85     Internal Rotation (Shoulder ABducted 45 degrees)         Internal Rotation (Shoulder ABducted 90 degrees)                          Treatment:  Therapeutic Exercise  TE 1: Shoulder pulleys PROM left shoulder with end range mild stretch x 10 minutes to facilitate ROM  TE 2: Left UE pendulum and codmans 3  minutes.  TE 3: Seated scapular retraction no resistance 2 x 10    Manual Therapy  MT 1: Supine PROM left shoulder flexion to 90 degrees with elbow bent and mild end range stretch 2 x 15  MT 2: Supine PROM left shoulder ER and IR with elbow at side (0 degrees abduction) and mild end range stretch 2 x 15  MT 3: Supine gentle grade I glenohumeral glides 3 x 20         Assessment & Plan   Assessment: Stephane is improving with his PROM. His shoulder pain is minimal. Progress to full PROM for flexion, full ER and IR with arm abducted to 30 degrees.       Patient will continue to benefit from skilled outpatient physical therapy to address the deficits listed in the problem list box on initial evaluation, provide pt/family education and to maximize pt's level of independence in the home and community environment.     Patient's spiritual, cultural, and educational needs considered and patient agreeable to plan of care and goals.           Plan: Continue with therapy 3 x per week.    Goals:   Active       LTG       Patient will increase left shoulder PROM flexion to 160 degrees to improve joint mobility.       Start:  03/12/25    Expected End:  04/11/25            Patient will increase Patient will increase left shoulder PROM IR to 70 degrees with arm abducted 30 degrees to improve joint mobility.       Start:  03/12/25    Expected End:  04/11/25            Patient will increase Patient will increase left shoulder PROM ER to 80 degrees with arm abducted 30 degrees to improve joint mobility.       Start:  03/12/25    Expected End:  04/11/25            Patient will tolerate all exercises and ROM with pain 3/10 or less.       Start:  03/12/25    Expected End:  04/11/25               STG       Patient will demonstrate independent performance of home exercise program.       Start:  03/12/25    Expected End:  03/28/25            Patient will increase left shoulder flexion PROM to 125 degrees to improve joint mobility. (Met)        Start:  03/12/25    Expected End:  03/28/25    Resolved:  03/21/25         Patient will increase left shoulder PROM ER at 0 degrees abduction to 60 degrees to improve joint mobility.       Start:  03/12/25    Expected End:  03/28/25            Patient will tolerate all PROM activities with pain 5/10 or less. (Met)       Start:  03/12/25    Expected End:  03/28/25    Resolved:  03/21/25             Toño Nelson, PT

## 2025-03-28 ENCOUNTER — CLINICAL SUPPORT (OUTPATIENT)
Dept: REHABILITATION | Facility: HOSPITAL | Age: 80
End: 2025-03-28
Payer: MEDICARE

## 2025-03-28 DIAGNOSIS — M25.612 DECREASED ROM OF LEFT SHOULDER: Primary | ICD-10-CM

## 2025-03-28 PROCEDURE — 97140 MANUAL THERAPY 1/> REGIONS: CPT | Mod: CQ

## 2025-03-28 PROCEDURE — 97110 THERAPEUTIC EXERCISES: CPT | Mod: CQ

## 2025-03-28 NOTE — PROGRESS NOTES
Outpatient Rehab    Physical Therapy Visit    Patient Name: Stephane Mathis  MRN: [id number]  YOB: 1945  Encounter Date: 3/28/2025    Therapy Diagnosis:   No diagnosis found.    Physician: Rigo Fenton III, MD    Physician Orders: Eval and Treat  Medical Diagnosis: M75.122 (ICD-10-CM) - Complete tear of left rotator cuff, unspecified whether traumatic     2/19/25 PROCEDURE:  Left shoulder arthroscopy with extensive debridement of anterior labrum tear debridement of rotator cuff tear. Open releasing the coracoacromial ligament with a acromioplasty and rotator cuff repair. One bio corkscrew 2 push locks     Visit # / Visits Authorized:  5 / 12   Date of Evaluation:  3/12/2025   Insurance Authorization Period: 3/12/25 to 4/11/25  Plan of Care Certification:  3/12/2025 to 4/11/25      Time In: 1009   Time Out: 1032  Total Time: 23   Total Billable Time: 23 minutes    FOTO:  Intake Score:  %  Survey Score 1:  %  Survey Score 2:  %         Subjective   States shoulder is sore.  Pain reported as 3/10. Mild L shoulder pain    Objective            Treatment:  Therapeutic Exercise  TE 1: Shoulder pulleys PROM left shoulder with end range mild stretch x 10 minutes to facilitate ROM  TE 2: Left UE pendulum and codmans x 3 minutes.  TE 3: Seated scapular retraction no resistance 2 x 10    Manual Therapy  MT 1: Supine PROM left shoulder flexion to 90 degrees with elbow bent and mild end range stretch 2 x 15  MT 2: Supine PROM left shoulder ER and IR with elbow at side (0 degrees abduction) and mild end range stretch 2 x 15  MT 3: Supine gentle grade I glenohumeral glides 3 x 20         Assessment & Plan   Assessment: Stephane tolertaed treatment well with moderate amount of pain after treatment progressing to full PROM flexion and full ER and IR.  Evaluation/Treatment Tolerance: Patient tolerated treatment well    Patient will continue to benefit from skilled outpatient physical therapy to address the deficits  listed in the problem list box on initial evaluation, provide pt/family education and to maximize pt's level of independence in the home and community environment.     Patient's spiritual, cultural, and educational needs considered and patient agreeable to plan of care and goals.     Education  Education was done with Patient. The patient's learning style includes Demonstration and Listening. The patient Demonstrates understanding.                 Plan: Continue with therapy 3 x per week.    Goals:   Active       LTG       Patient will increase left shoulder PROM flexion to 160 degrees to improve joint mobility.       Start:  03/12/25    Expected End:  04/11/25            Patient will increase Patient will increase left shoulder PROM IR to 70 degrees with arm abducted 30 degrees to improve joint mobility.       Start:  03/12/25    Expected End:  04/11/25            Patient will increase Patient will increase left shoulder PROM ER to 80 degrees with arm abducted 30 degrees to improve joint mobility.       Start:  03/12/25    Expected End:  04/11/25            Patient will tolerate all exercises and ROM with pain 3/10 or less.       Start:  03/12/25    Expected End:  04/11/25               STG       Patient will demonstrate independent performance of home exercise program.       Start:  03/12/25    Expected End:  03/28/25            Patient will increase left shoulder flexion PROM to 125 degrees to improve joint mobility. (Met)       Start:  03/12/25    Expected End:  03/28/25    Resolved:  03/21/25         Patient will increase left shoulder PROM ER at 0 degrees abduction to 60 degrees to improve joint mobility.       Start:  03/12/25    Expected End:  03/28/25            Patient will tolerate all PROM activities with pain 5/10 or less. (Met)       Start:  03/12/25    Expected End:  03/28/25    Resolved:  03/21/25             Babita Chaudhari PTA

## 2025-03-31 ENCOUNTER — EXTERNAL CHRONIC CARE MANAGEMENT (OUTPATIENT)
Dept: FAMILY MEDICINE | Facility: CLINIC | Age: 80
End: 2025-03-31
Payer: MEDICARE

## 2025-03-31 PROCEDURE — G0511 CCM/BHI BY RHC/FQHC 20MIN MO: HCPCS | Mod: ,,, | Performed by: FAMILY MEDICINE

## 2025-04-02 ENCOUNTER — OFFICE VISIT (OUTPATIENT)
Dept: GASTROENTEROLOGY | Facility: CLINIC | Age: 80
End: 2025-04-02
Payer: MEDICARE

## 2025-04-02 VITALS
HEART RATE: 71 BPM | WEIGHT: 162.38 LBS | BODY MASS INDEX: 22.73 KG/M2 | DIASTOLIC BLOOD PRESSURE: 70 MMHG | SYSTOLIC BLOOD PRESSURE: 155 MMHG | OXYGEN SATURATION: 98 % | HEIGHT: 71 IN

## 2025-04-02 DIAGNOSIS — D50.0 IRON DEFICIENCY ANEMIA DUE TO CHRONIC BLOOD LOSS: Primary | ICD-10-CM

## 2025-04-02 PROCEDURE — 99999 PR PBB SHADOW E&M-EST. PATIENT-LVL IV: CPT | Mod: PBBFAC,,,

## 2025-04-02 PROCEDURE — 99214 OFFICE O/P EST MOD 30 MIN: CPT | Mod: S$PBB,,,

## 2025-04-02 PROCEDURE — 3077F SYST BP >= 140 MM HG: CPT | Mod: CPTII,,,

## 2025-04-02 PROCEDURE — 99214 OFFICE O/P EST MOD 30 MIN: CPT | Mod: PBBFAC

## 2025-04-02 PROCEDURE — 3078F DIAST BP <80 MM HG: CPT | Mod: CPTII,,,

## 2025-04-02 NOTE — PROGRESS NOTES
Gastroenterology Clinic Note    Patient ID: [id number]   Referring MD: Marleen Hernandez FNP   Chief Complaint:   Chief Complaint   Patient presents with    Rectal Bleeding       History of Present Illness   Stephane Mathis is an 79 y.o. AAM who is referred for MUSA. Patient with chronic history of MUSA. He has been on PO iron replacement therapy for several years with no improvement. He underwent EGD and colonoscopy for MUSA in July 2022 with Dr. Mahajan. There was no etiology of MUSA found on these exams. He denies any hematochezia or melena. He does have trouble with constipation when taking the iron. He treats this with stool softeners and Miralax as needed.     Previous workup:EGD    Last colonoscopy was 07/03/2024 with no further screening colonoscopies necessary given age.    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   Gastrointestinal:  Negative for abdominal pain, blood in stool, constipation, diarrhea, heartburn, melena, nausea and vomiting.       Past Medical History      Past Medical History:   Diagnosis Date    Acute superficial gastritis without hemorrhage 7/12/2022    Alcoholism     Anemia     AVM (arteriovenous malformation) of colon 7/13/2022    Colon, diverticulosis 7/13/2022    Hypertension     Iron deficiency anemia due to chronic blood loss 7/12/2022    Polyp, sigmoid colon 7/13/2022    Seizures     Stroke     right sided weakness       Past Surgical History     Past Surgical History:   Procedure Laterality Date    ACROMIOPLASTY Left 2/19/2025    Procedure: OPEN ACROMIOPLASTY;  Surgeon: Rigo Fenton III, MD;  Location: [facility] OR;   Service: Orthopedics;  Laterality: Left;    ARTHROSCOPIC DEBRIDEMENT OF SHOULDER Left 2/19/2025    Procedure: DEBRIDEMENT, SHOULDER, ARTHROSCOPIC;  Surgeon: Rigo Fenton III, MD;  Location: [facility];  Service: Orthopedics;  Laterality: Left;    CATARACT EXTRACTION W/ INTRAOCULAR LENS IMPLANT Right     KNEE SURGERY Right     LUMBAR DISCECTOMY      ROTATOR CUFF REPAIR Left 2/19/2025    Procedure: OPEN REPAIR, ROTATOR CUFF;  Surgeon: Rigo Fenton III, MD;  Location: [facility];  Service: Orthopedics;  Laterality: Left;    SCROTAL SURGERY      in february    TONSILLECTOMY         Allergies     Review of patient's allergies indicates:   Allergen Reactions    Ace inhibitors Swelling    Codeine Itching       Immunization History     Immunization History   Administered Date(s) Administered    COVID-19, MRNA, LN-S, PF (Pfizer) (Purple Cap) 01/22/2021, 02/22/2021, 11/16/2021, 10/05/2023    COVID-19, mRNA, LNP-S, bivalent booster, PF (PFIZER OMICRON) 12/09/2022    Influenza (FLUAD) - Quadrivalent - Adjuvanted - PF *Preferred* (65+) 12/12/2022, 10/04/2023    Influenza - Quadrivalent - High Dose - PF (65 years and older) 01/26/2022    Influenza - Trivalent - Fluad - Adjuvanted - PF (65 years and older 10/08/2024    Pneumococcal Conjugate - 20 Valent 09/29/2022    Pneumococcal Polysaccharide - 23 Valent 10/23/2018       Past Family History      Family History   Problem Relation Name Age of Onset    Diabetes Mother      Hypertension Mother      Diabetes Sister      Diabetes Brother      Hypertension Brother      Cancer Brother      Throat cancer Brother      Alzheimer's disease Brother         Past Social History      Social History     Socioeconomic History    Marital status: Unknown   Tobacco Use    Smoking status: Every Day     Current packs/day: 1.00     Average packs/day: 1 pack/day for 59.3 years (59.3 ttl pk-yrs)     Types: Cigarettes     Start date: 1966     Passive exposure: Current    Smokeless  "tobacco: Never    Tobacco comments:     Tobacco quitline information sheet given    Substance and Sexual Activity    Alcohol use: Yes     Alcohol/week: 12.0 standard drinks of alcohol     Types: 6 Cans of beer, 6 Shots of liquor per week     Comment: vodka/gray goose preference. " couple of drinks a day or two or sometimes on weekends. "    Drug use: Yes     Types: Marijuana     Comment: daily    Sexual activity: Yes     Partners: Female     Social Drivers of Health     Financial Resource Strain: Low Risk  (1/30/2025)    Overall Financial Resource Strain (CARDIA)     Difficulty of Paying Living Expenses: Not very hard   Food Insecurity: No Food Insecurity (1/30/2025)    Hunger Vital Sign     Worried About Running Out of Food in the Last Year: Never true     Ran Out of Food in the Last Year: Never true   Transportation Needs: No Transportation Needs (1/30/2025)    PRAPARE - Transportation     Lack of Transportation (Medical): No     Lack of Transportation (Non-Medical): No   Physical Activity: Insufficiently Active (1/30/2025)    Exercise Vital Sign     Days of Exercise per Week: 7 days     Minutes of Exercise per Session: 20 min   Stress: Stress Concern Present (1/30/2025)    [employer] [place] [employer] - Occupational Stress Questionnaire     Feeling of Stress : To some extent   Housing Stability: Unknown (1/30/2025)    Housing Stability Vital Sign     Unable to Pay for Housing in the Last Year: No     Homeless in the Last Year: No       Current Medications     Outpatient Medications Marked as Taking for the 4/2/25 encounter (Office Visit) with Marleen Hernandez FNP   Medication Sig Dispense Refill    amLODIPine (NORVASC) 10 MG tablet Take 1 tablet (10 mg total) by mouth once daily. 90 tablet 1    atorvastatin (LIPITOR) 40 MG tablet Take 1 tablet (40 mg total) by mouth every evening. 90 tablet 1    EAR DROPS, CARBAMIDE PEROXIDE, OTIC Place in ear(s).      ferrous sulfate (FEOSOL) 325 mg (65 mg iron) Tab " "tablet Take 1 tablet (325 mg total) by mouth 2 (two) times daily. 180 tablet 1    furosemide (LASIX) 20 MG tablet Take 1 tablet (20 mg total) by mouth daily as needed (swelling). 90 tablet 1    hydrALAZINE (APRESOLINE) 100 MG tablet Take 1 tablet (100 mg total) by mouth 2 (two) times a day. 180 tablet 3    hydroCHLOROthiazide (HYDRODIURIL) 25 MG tablet Take 1 tablet (25 mg total) by mouth once daily. 90 tablet 1    HYDROcodone-acetaminophen (NORCO) 7.5-325 mg per tablet Take 1 tablet by mouth every 6 (six) hours as needed for Pain. (Patient not taking: Reported on 4/3/2025) 20 tablet 0    meloxicam (MOBIC) 7.5 MG tablet Take 1 tablet (7.5 mg total) by mouth once daily. 30 tablet 2    potassium chloride SA (K-DUR,KLOR-CON M) 10 MEQ tablet Take 1 tablet (10 mEq total) by mouth once daily. 90 tablet 0        I have reviewed the current medications, allergies, vital signs, past medical and surgical history, family medical history, and social history for this encounter and agree with all findings.    OBJECTIVE    Physical Exam    BP (!) 155/70   Pulse 71   Ht 5' 11" (1.803 m)   Wt 73.7 kg (162 lb 6.4 oz)   SpO2 98%   BMI 22.65 kg/m²   GEN: Well appearing, cooperative, NAD  NECK: Supple, no LAD  CV: Normal rate  RESP: Unlabored  ABD: ND, no guarding  EXT: No clubbing, cyanosis, or edema  SKIN: Warm and dry  NEURO: AAO x4.     LABS    CBC (with or without Differential):   Lab Results   Component Value Date    WBC 9.54 04/07/2025    HGB 12.1 (L) 04/07/2025    HCT 39.0 (L) 04/07/2025    .3 (H) 04/07/2025    MCH 33.6 (H) 04/07/2025    MCHC 31.0 (L) 04/07/2025    RDW 13.2 04/07/2025     04/07/2025    MPV 11.8 04/07/2025    NEUTOPHILPCT 69.4 (H) 04/07/2025    DIFFTYPE Scan Smear 04/07/2025     BMP/CMP:   Lab Results   Component Value Date     03/03/2025    K 3.7 03/03/2025     03/03/2025    CO2 28 03/03/2025    BUN 20 03/03/2025    CREATININE 1.12 03/03/2025    GLU 88 03/03/2025    CALCIUM 9.3 " 03/03/2025    ALBUMIN 3.5 03/03/2025    AST 23 03/03/2025    ALT 10 03/03/2025    ALKPHOS 53 03/03/2025    MG 2.3 07/12/2022        IMAGING  No pertinent imaging.    ASSESSMENT  Stephane Mathis is a 79 y.o. AAM with history of MUSA, HTN, Hyperlipidemia, CKD, and AVM of colon who is referred for follow-up of MUSA.    1. Iron deficiency anemia due to chronic blood loss             PLAN    - continue oral iron supplement  - CBC and iron studies ordered today to reassess H&H; we will discuss repeat endoscopy if there has been a significant drop in his blood counts, otherwise patient is currently asymptomatic  - return to GI clinic for follow-up with repeat labs in 4 months, sooner as needed  - he was provided with our contact number today on exam and advised to notify our office if he sees any overt bleeding    There are no Patient Instructions on file for this visit.      Orders Placed This Encounter   Procedures    CBC Auto Differential     Standing Status:   Future     Number of Occurrences:   1     Expected Date:   4/2/2025     Expiration Date:   6/1/2026    Iron and TIBC     Standing Status:   Future     Number of Occurrences:   1     Expected Date:   4/2/2025     Expiration Date:   6/2/2026    Ferritin     Standing Status:   Future     Number of Occurrences:   1     Expected Date:   4/2/2025     Expiration Date:   6/2/2026         The risks and benefits of my recommendations, as well as other treatment options were discussed with the patient today. All questions were answered.    30 minutes of total time spent on the encounter, which includes face to face time and non-face to face time preparing to see the patient (eg, review of tests), obtaining and/or reviewing separately obtained history, documenting clinical information in the electronic or other health record, Independently interpreting results (not separately reported) and communicating results to the patient/family/caregiver, or care coordination (not  separately reported).        Marleen Hernandez, FNP/ACNP  [facility]

## 2025-04-03 ENCOUNTER — OFFICE VISIT (OUTPATIENT)
Dept: ORTHOPEDICS | Facility: CLINIC | Age: 80
End: 2025-04-03
Payer: MEDICARE

## 2025-04-03 VITALS
WEIGHT: 162 LBS | DIASTOLIC BLOOD PRESSURE: 67 MMHG | SYSTOLIC BLOOD PRESSURE: 151 MMHG | BODY MASS INDEX: 22.68 KG/M2 | HEART RATE: 66 BPM | HEIGHT: 71 IN | OXYGEN SATURATION: 98 %

## 2025-04-03 DIAGNOSIS — Z09 FOLLOW-UP EXAMINATION, FOLLOWING OTHER SURGERY: Primary | ICD-10-CM

## 2025-04-03 PROCEDURE — 99214 OFFICE O/P EST MOD 30 MIN: CPT | Mod: PBBFAC | Performed by: ORTHOPAEDIC SURGERY

## 2025-04-03 PROCEDURE — 1159F MED LIST DOCD IN RCRD: CPT | Mod: CPTII,,, | Performed by: ORTHOPAEDIC SURGERY

## 2025-04-03 PROCEDURE — 3288F FALL RISK ASSESSMENT DOCD: CPT | Mod: CPTII,,, | Performed by: ORTHOPAEDIC SURGERY

## 2025-04-03 PROCEDURE — 1101F PT FALLS ASSESS-DOCD LE1/YR: CPT | Mod: CPTII,,, | Performed by: ORTHOPAEDIC SURGERY

## 2025-04-03 PROCEDURE — 3078F DIAST BP <80 MM HG: CPT | Mod: CPTII,,, | Performed by: ORTHOPAEDIC SURGERY

## 2025-04-03 PROCEDURE — 99024 POSTOP FOLLOW-UP VISIT: CPT | Mod: ,,, | Performed by: ORTHOPAEDIC SURGERY

## 2025-04-03 PROCEDURE — 3077F SYST BP >= 140 MM HG: CPT | Mod: CPTII,,, | Performed by: ORTHOPAEDIC SURGERY

## 2025-04-03 PROCEDURE — 99999 PR PBB SHADOW E&M-EST. PATIENT-LVL IV: CPT | Mod: PBBFAC,,, | Performed by: ORTHOPAEDIC SURGERY

## 2025-04-03 NOTE — PROGRESS NOTES
CC:    Chief Complaint   Patient presents with    Left Shoulder - Post-op Evaluation     LT SHOULDER SCOPE OPEN ACROMIO, RCR 2/19 (6WKS)           Previos History :  Left shoulder rotator cuff repair 1 bio corkscrew 2 push locks 02/19/2025 ILM      History:  4/3/2025   Stephane Mathis is a 79 y.o.  status post 6 week follow-up rotator cuff repair we started a home pulley last visit he has been out of town out think he has been to physical therapy we saw him in 2 weeks      PE:   He has got about 110° forward flexion abduction is 85 neurovascularly intact incision looks good      Radiology:          Ass/Plan:  Appears to be doing well he is going to physical therapy up but [facility]        Rigo Fenton III, MD    Subject to voice recognition errors,  transcription services are not allowed

## 2025-04-07 ENCOUNTER — CLINICAL SUPPORT (OUTPATIENT)
Dept: REHABILITATION | Facility: HOSPITAL | Age: 80
End: 2025-04-07
Payer: MEDICARE

## 2025-04-07 DIAGNOSIS — M75.122 COMPLETE TEAR OF LEFT ROTATOR CUFF, UNSPECIFIED WHETHER TRAUMATIC: ICD-10-CM

## 2025-04-07 DIAGNOSIS — M25.612 DECREASED ROM OF LEFT SHOULDER: Primary | ICD-10-CM

## 2025-04-07 PROCEDURE — 97110 THERAPEUTIC EXERCISES: CPT

## 2025-04-08 ENCOUNTER — RESULTS FOLLOW-UP (OUTPATIENT)
Dept: GASTROENTEROLOGY | Facility: CLINIC | Age: 80
End: 2025-04-08

## 2025-04-08 NOTE — PROGRESS NOTES
Outpatient Rehab    Physical Therapy Progress Note : Updated Plan of Care    Patient Name: Stephane Mathis  MRN: [id number]  YOB: 1945  Encounter Date: 4/7/2025    Therapy Diagnosis:   Encounter Diagnoses   Name Primary?    Decreased ROM of left shoulder Yes    Complete tear of left rotator cuff, unspecified whether traumatic      Physician: Rigo Fenton III, MD    Physician Orders: Eval and Treat  Medical Diagnosis: Complete tear of left rotator cuff, unspecified whether traumatic    Visit # / Visits Authorized:  6 / 12  Insurance Authorization Period:  pending  Date of Evaluation: 3/12/25  Plan of Care Certification: 4/14/25 to 5/9/25     PT/PTA: PT   Number of PTA visits since last PT visit:0  Time In: 1012   Time Out: 1052  Total Time: 40   Total Billable Time:  40    FOTO:  Intake Score: 52%  Survey Score 1: 64%  Survey Score 2:  %         Subjective   States shoulder is sore.  Pain reported as 5/10. Left shoulder    Objective      Shoulder Range of Motion  Left Shoulder   Active (deg) Passive (deg) Pain   Flexion 118 145     Extension 58 70     Scaption         ABduction 81 125     ADduction         Horizontal ABduction         Horizontal ADduction         External Rotation (Shoulder ABducted 0 degrees)         External Rotation (Shoulder ABducted 45 degrees)         External Rotation (Shoulder ABducted 90 degrees) 75 80     Internal Rotation (Shoulder ABducted 0 degrees)         Internal Rotation (Shoulder ABducted 45 degrees)         Internal Rotation (Shoulder ABducted 90 degrees) 68 76                      Treatment:  Therapeutic Exercise  TE 1: Shoulder pulleys PROM left shoulder with end range mild stretch x 6 minutes to facilitate ROM  TE 2: Finger ladder flexion and abduction x 10 each  TE 3: Seated scapular retraction no resistance 3 x 10  TE 4: Cross body arm stretch 10 x 5 seconds  TE 5: Supine sleeper stretch 10 x 5 seconds  TE 6: Supine AAROM cane flexion and ER 2 x 10      Time  Entry(in minutes):  Therapeutic Exercise Time Entry: 40    Assessment & Plan   Assessment  Stephane presents with a condition of Low complexity.   Presentation of Symptoms: Stable  Will Comorbidities Impact Care: No       Functional Limitations: Activity tolerance, Carrying objects, Pain when reaching, Pain with ADLs/IADLs, Participating in leisure activities, Range of motion  Impairments: Abnormal or restricted range of motion, Impaired physical strength, Pain with functional activity    Patient Goal for Therapy (PT): Decrease pain and improve shoulder mobility.  Prognosis: Good  Assessment Details: Patient will benefit from continued skilled therapy to address decreased shoulder ROM, impaired strength, decreased activity tolerance and pain.    Plan  From a physical therapy perspective, the patient would benefit from: Skilled Rehab Services    Planned therapy interventions include: Therapeutic exercise, Neuromuscular re-education, and Manual therapy.    Planned modalities to include: Cryotherapy (cold pack), Electrical stimulation - passive/unattended, Thermotherapy (hot pack), and Ultrasound.        Visit Frequency: 3 times Per Week for 4 Weeks.       This plan was discussed with Patient and Therapy assistant.   Discussion participants: Agreed Upon Plan of Care  Plan details: Continue Post op shoulder rehab program.          Patient will continue to benefit from skilled outpatient physical therapy to address the deficits listed in the problem list box on initial evaluation, provide pt/family education and to maximize pt's level of independence in the home and community environment.     Patient's spiritual, cultural, and educational needs considered and patient agreeable to plan of care and goals.           Goals:   Active       LTG       Patient will increase left shoulder PROM flexion to 160 degrees to improve joint mobility. (Progressing)       Start:  03/12/25    Expected End:  05/09/25            Patient will  increase Patient will increase left shoulder PROM IR to 70 degrees with arm abducted 30 degrees to improve joint mobility. (Met)       Start:  03/12/25    Expected End:  04/11/25    Resolved:  04/07/25         Patient will increase Patient will increase left shoulder PROM ER to 80 degrees with arm abducted 30 degrees to improve joint mobility. (Met)       Start:  03/12/25    Expected End:  04/11/25    Resolved:  04/07/25         Patient will tolerate all exercises and ROM with pain 3/10 or less.       Start:  03/12/25    Expected End:  05/09/25               LTG       Patient will increase active range of motion left shoulder flexion to 140 degrees to improve  functional mobility.       Start:  04/08/25    Expected End:  05/09/25            Patient will increase active range of motion left shoulder abduction to 120 degrees to improve  functional mobility.       Start:  04/08/25    Expected End:  05/09/25            Patient will increase left shoulder strength to 3-/5 all planes to improve functional activity.       Start:  04/08/25    Expected End:  05/09/25               STG       Patient will increase PROM shoulder abduction to 140 degrees to improve joint mobility.       Start:  04/08/25    Expected End:  04/25/25            Patient will active range of motion left shoulder ER to 80 degrees to improve functional mobility.       Start:  04/08/25    Expected End:  04/25/25            Patient will increase active range of motion left shoulder IR to 70 degrees to improve  functional mobility.       Start:  04/08/25    Expected End:  04/25/25              Resolved       STG       Patient will demonstrate independent performance of home exercise program. (Met)       Start:  03/12/25    Expected End:  03/28/25    Resolved:  04/07/25         Patient will increase left shoulder flexion PROM to 125 degrees to improve joint mobility. (Met)       Start:  03/12/25    Expected End:  03/28/25    Resolved:  03/21/25          Patient will increase left shoulder PROM ER at 0 degrees abduction to 60 degrees to improve joint mobility. (Met)       Start:  03/12/25    Expected End:  03/28/25    Resolved:  04/07/25         Patient will tolerate all PROM activities with pain 5/10 or less. (Met)       Start:  03/12/25    Expected End:  03/28/25    Resolved:  03/21/25             Toño Nelson, PT

## 2025-04-09 ENCOUNTER — CLINICAL SUPPORT (OUTPATIENT)
Dept: REHABILITATION | Facility: HOSPITAL | Age: 80
End: 2025-04-09
Payer: MEDICARE

## 2025-04-09 DIAGNOSIS — M25.612 DECREASED ROM OF LEFT SHOULDER: Primary | ICD-10-CM

## 2025-04-09 PROCEDURE — 97110 THERAPEUTIC EXERCISES: CPT | Mod: CQ

## 2025-04-09 NOTE — PROGRESS NOTES
Outpatient Rehab    Physical Therapy Progress Note : Updated Plan of Care    Patient Name: Stephane Mathis  MRN: [id number]  YOB: 1945  Encounter Date: 4/9/2025    Therapy Diagnosis:   No diagnosis found.    Physician: Rigo Fenton III, MD    Physician Orders: Eval and Treat  Medical Diagnosis: Complete tear of left rotator cuff, unspecified whether traumatic    Visit # / Visits Authorized:  7 / 24  Insurance Authorization Period:  pending  Date of Evaluation: 3/12/25  Plan of Care Certification: 4/14/25 to 5/9/25     PT/PTA: PTA   Number of PTA visits since last PT visit:1  Time In: 1011   Time Out: 1034  Total Time: 23   Total Billable Time:  23 minutes    FOTO:  Intake Score:  %  Survey Score 1:  %  Survey Score 2:  %         Subjective   mild shoulder pain.  Pain reported as 2/10. Left Shoulder    Objective            Treatment:  Therapeutic Exercise  TE 1: Shoulder pulleys PROM left shoulder with end range mild stretch x 6 minutes to facilitate ROM  TE 2: Finger ladder flexion and abduction x 10 each  TE 3: Seated scapular retraction no resistance 3 x 10  TE 4: Cross body arm stretch 10 x 5 seconds  TE 5: Supine sleeper stretch 10 x 5 seconds  TE 6: Supine AAROM cane flexion and ER 2 x 10      Time Entry(in minutes):  Therapeutic Exercise Time Entry: 23    Assessment & Plan          Patient will continue to benefit from skilled outpatient physical therapy to address the deficits listed in the problem list box on initial evaluation, provide pt/family education and to maximize pt's level of independence in the home and community environment.     Patient's spiritual, cultural, and educational needs considered and patient agreeable to plan of care and goals.     Education  Education was done with Patient. The patient's learning style includes Demonstration and Listening. The patient Demonstrates understanding and Verbalizes understanding.                 Goals:   Active       LTG       Patient  will increase left shoulder PROM flexion to 160 degrees to improve joint mobility. (Progressing)       Start:  03/12/25    Expected End:  05/09/25            Patient will increase Patient will increase left shoulder PROM IR to 70 degrees with arm abducted 30 degrees to improve joint mobility. (Met)       Start:  03/12/25    Expected End:  04/11/25    Resolved:  04/07/25         Patient will increase Patient will increase left shoulder PROM ER to 80 degrees with arm abducted 30 degrees to improve joint mobility. (Met)       Start:  03/12/25    Expected End:  04/11/25    Resolved:  04/07/25         Patient will tolerate all exercises and ROM with pain 3/10 or less.       Start:  03/12/25    Expected End:  05/09/25               LTG       Patient will increase active range of motion left shoulder flexion to 140 degrees to improve  functional mobility.       Start:  04/08/25    Expected End:  05/09/25            Patient will increase active range of motion left shoulder abduction to 120 degrees to improve  functional mobility.       Start:  04/08/25    Expected End:  05/09/25            Patient will increase left shoulder strength to 3-/5 all planes to improve functional activity.       Start:  04/08/25    Expected End:  05/09/25               STG       Patient will increase PROM shoulder abduction to 140 degrees to improve joint mobility.       Start:  04/08/25    Expected End:  04/25/25            Patient will active range of motion left shoulder ER to 80 degrees to improve functional mobility.       Start:  04/08/25    Expected End:  04/25/25            Patient will increase active range of motion left shoulder IR to 70 degrees to improve  functional mobility.       Start:  04/08/25    Expected End:  04/25/25              Resolved       STG       Patient will demonstrate independent performance of home exercise program. (Met)       Start:  03/12/25    Expected End:  03/28/25    Resolved:  04/07/25         Patient  will increase left shoulder flexion PROM to 125 degrees to improve joint mobility. (Met)       Start:  03/12/25    Expected End:  03/28/25    Resolved:  03/21/25         Patient will increase left shoulder PROM ER at 0 degrees abduction to 60 degrees to improve joint mobility. (Met)       Start:  03/12/25    Expected End:  03/28/25    Resolved:  04/07/25         Patient will tolerate all PROM activities with pain 5/10 or less. (Met)       Start:  03/12/25    Expected End:  03/28/25    Resolved:  03/21/25             Babita Chaudhari PTA

## 2025-04-10 ENCOUNTER — TELEPHONE (OUTPATIENT)
Dept: GASTROENTEROLOGY | Facility: CLINIC | Age: 80
End: 2025-04-10
Payer: MEDICARE

## 2025-04-10 NOTE — TELEPHONE ENCOUNTER
----- Message from EVIE Alves sent at 4/8/2025  4:38 PM CDT -----  His H&H is improved.  Iron studies are also mildly improved.  ----- Message -----  From: Lab, Background User  Sent: 4/7/2025   5:54 PM CDT  To: EVIE Alves

## 2025-04-14 ENCOUNTER — CLINICAL SUPPORT (OUTPATIENT)
Dept: REHABILITATION | Facility: HOSPITAL | Age: 80
End: 2025-04-14
Payer: MEDICARE

## 2025-04-14 DIAGNOSIS — M25.612 DECREASED ROM OF LEFT SHOULDER: Primary | ICD-10-CM

## 2025-04-14 PROCEDURE — 97110 THERAPEUTIC EXERCISES: CPT | Mod: CQ

## 2025-04-14 NOTE — PROGRESS NOTES
Outpatient Rehab    Physical Therapy Progress Note : Updated Plan of Care    Patient Name: Stephane Mathis  MRN: [id number]  YOB: 1945  Encounter Date: 4/14/2025    Therapy Diagnosis:   No diagnosis found.    Physician: Rigo Fenton III, MD    Physician Orders: Eval and Treat  Medical Diagnosis: Complete tear of left rotator cuff, unspecified whether traumatic    Visit # / Visits Authorized:  8 / 24  Insurance Authorization Period:  pending  Date of Evaluation: 3/12/25  Plan of Care Certification: 4/14/25 to 5/9/25     PT/PTA: PTA   Number of PTA visits since last PT visit:2  Time In: 0959   Time Out: 1023  Total Time: 24   Total Billable Time:  24 minutes    FOTO:  Intake Score:  %  Survey Score 1:  %  Survey Score 2:  %         Subjective   mild shoulder pain.  Pain reported as 5/10. Left Shoulder    Objective            Treatment:  Therapeutic Exercise  TE 1: Shoulder pulleys PROM left shoulder with end range mild stretch x 6 minutes to facilitate ROM  TE 2: Finger ladder flexion and abduction x 10 each  TE 3: Cross Body arm stretch 10 x 5s/h  TE 4: Isometric shoulder strengthening with PTA  flexion, extension, IR, ER and abduction 2 x 10  TE 6: Supine AAROM cane flexion and ER 2 x 10  TE 7: Rowing with yellow band emphasis on forward flexion stretching 2 x 10      Time Entry(in minutes):  Therapeutic Exercise Time Entry: 24    Assessment & Plan          Patient will continue to benefit from skilled outpatient physical therapy to address the deficits listed in the problem list box on initial evaluation, provide pt/family education and to maximize pt's level of independence in the home and community environment.     Patient's spiritual, cultural, and educational needs considered and patient agreeable to plan of care and goals.     Education  Education was done with Patient. The patient's learning style includes Demonstration, Listening, and Pictures/video. The patient Demonstrates understanding  and Verbalizes understanding.         Patient given HEP with isometric exercises and instructed to perform.        Goals:   Active       LTG       Patient will increase left shoulder PROM flexion to 160 degrees to improve joint mobility. (Progressing)       Start:  03/12/25    Expected End:  05/09/25            Patient will increase Patient will increase left shoulder PROM IR to 70 degrees with arm abducted 30 degrees to improve joint mobility. (Met)       Start:  03/12/25    Expected End:  04/11/25    Resolved:  04/07/25         Patient will increase Patient will increase left shoulder PROM ER to 80 degrees with arm abducted 30 degrees to improve joint mobility. (Met)       Start:  03/12/25    Expected End:  04/11/25    Resolved:  04/07/25         Patient will tolerate all exercises and ROM with pain 3/10 or less.       Start:  03/12/25    Expected End:  05/09/25               LTG       Patient will increase active range of motion left shoulder flexion to 140 degrees to improve  functional mobility.       Start:  04/08/25    Expected End:  05/09/25            Patient will increase active range of motion left shoulder abduction to 120 degrees to improve  functional mobility.       Start:  04/08/25    Expected End:  05/09/25            Patient will increase left shoulder strength to 3-/5 all planes to improve functional activity.       Start:  04/08/25    Expected End:  05/09/25               STG       Patient will increase PROM shoulder abduction to 140 degrees to improve joint mobility.       Start:  04/08/25    Expected End:  04/25/25            Patient will active range of motion left shoulder ER to 80 degrees to improve functional mobility.       Start:  04/08/25    Expected End:  04/25/25            Patient will increase active range of motion left shoulder IR to 70 degrees to improve  functional mobility.       Start:  04/08/25    Expected End:  04/25/25              Resolved       STG       Patient will  demonstrate independent performance of home exercise program. (Met)       Start:  03/12/25    Expected End:  03/28/25    Resolved:  04/07/25         Patient will increase left shoulder flexion PROM to 125 degrees to improve joint mobility. (Met)       Start:  03/12/25    Expected End:  03/28/25    Resolved:  03/21/25         Patient will increase left shoulder PROM ER at 0 degrees abduction to 60 degrees to improve joint mobility. (Met)       Start:  03/12/25    Expected End:  03/28/25    Resolved:  04/07/25         Patient will tolerate all PROM activities with pain 5/10 or less. (Met)       Start:  03/12/25    Expected End:  03/28/25    Resolved:  03/21/25             Babita Chaudhari PTA

## 2025-04-16 ENCOUNTER — CLINICAL SUPPORT (OUTPATIENT)
Dept: REHABILITATION | Facility: HOSPITAL | Age: 80
End: 2025-04-16
Payer: MEDICARE

## 2025-04-16 DIAGNOSIS — M25.612 DECREASED ROM OF LEFT SHOULDER: Primary | ICD-10-CM

## 2025-04-16 PROCEDURE — 97110 THERAPEUTIC EXERCISES: CPT | Mod: CQ

## 2025-04-16 NOTE — PROGRESS NOTES
Outpatient Rehab    Physical Therapy Progress Note : Updated Plan of Care    Patient Name: Stephane Mathis  MRN: [id number]  YOB: 1945  Encounter Date: 4/16/2025    Therapy Diagnosis:   No diagnosis found.    Physician: Rigo Fenton III, MD    Physician Orders: Eval and Treat  Medical Diagnosis: Complete tear of left rotator cuff, unspecified whether traumatic    Visit # / Visits Authorized:  9 / 24  Insurance Authorization Period:  pending  Date of Evaluation: 3/12/25  Plan of Care Certification: 4/14/25 to 5/9/25     PT/PTA: PTA   Number of PTA visits since last PT visit:3  Time In: 1011   Time Out: 1046  Total Time: 35   Total Billable Time:  35 minutes    FOTO:  Intake Score:  %  Survey Score 1:  %  Survey Score 2:  %    Precautions     Phase III of Shoulder Protocol : Strengthening       Subjective   no shoulder pain.  Pain reported as 0/10. Left Shoulder    Objective      Shoulder Range of Motion  Left Shoulder   Active (deg) Passive (deg) Pain   Flexion 137 150     Extension 75 80     Scaption         ABduction 115 150     ADduction         Horizontal ABduction         Horizontal ADduction         External Rotation (Shoulder ABducted 0 degrees)         External Rotation (Shoulder ABducted 45 degrees)         External Rotation (Shoulder ABducted 90 degrees)         Internal Rotation (Shoulder ABducted 0 degrees) 80 90     Internal Rotation (Shoulder ABducted 45 degrees)         Internal Rotation (Shoulder ABducted 90 degrees) 75 80                      Treatment:  Therapeutic Exercise  TE 1: Shoulder pulleys PROM left shoulder with end range mild stretch x 6 minutes to facilitate ROM  TE 2: Finger ladder flexion and abduction x 10 each  TE 3: Cross Body arm stretch 10 x 5s/h  TE 4: Isometric shoulder strengthening with PTA  flexion, extension, IR, ER and abduction 2 x 10  TE 6: Supine AAROM cane flexion and ER 3 x 10  TE 7: Scapular retreaction and shoulder Extension with yellow band  emphasis on forward flexion stretching 2 x 10  TE 8: Forward flexion on mat with large ball 2 x 10, Shoulder flexion with large ball on wall 2 x 10      Time Entry(in minutes):  Therapeutic Exercise Time Entry: 35    Assessment & Plan          Patient will continue to benefit from skilled outpatient physical therapy to address the deficits listed in the problem list box on initial evaluation, provide pt/family education and to maximize pt's level of independence in the home and community environment.     Patient's spiritual, cultural, and educational needs considered and patient agreeable to plan of care and goals.     Education  Education was done with Patient. The patient's learning style includes Demonstration and Listening. The patient Demonstrates understanding and Verbalizes understanding.                 Goals:   Active       LTG       Patient will increase left shoulder PROM flexion to 160 degrees to improve joint mobility. (Progressing)       Start:  03/12/25    Expected End:  05/09/25            Patient will increase Patient will increase left shoulder PROM IR to 70 degrees with arm abducted 30 degrees to improve joint mobility. (Met)       Start:  03/12/25    Expected End:  04/11/25    Resolved:  04/07/25         Patient will increase Patient will increase left shoulder PROM ER to 80 degrees with arm abducted 30 degrees to improve joint mobility. (Met)       Start:  03/12/25    Expected End:  04/11/25    Resolved:  04/07/25         Patient will tolerate all exercises and ROM with pain 3/10 or less. (Progressing)       Start:  03/12/25    Expected End:  05/09/25               LTG       Patient will increase active range of motion left shoulder flexion to 140 degrees to improve  functional mobility. (Progressing)       Start:  04/08/25    Expected End:  05/09/25            Patient will increase active range of motion left shoulder abduction to 120 degrees to improve  functional mobility. (Met)       Start:   04/08/25    Expected End:  05/09/25    Resolved:  04/16/25         Patient will increase left shoulder strength to 3-/5 all planes to improve functional activity. (Progressing)       Start:  04/08/25    Expected End:  05/09/25               STG       Patient will increase PROM shoulder abduction to 140 degrees to improve joint mobility. (Met)       Start:  04/08/25    Expected End:  04/25/25    Resolved:  04/16/25         Patient will active range of motion left shoulder ER to 80 degrees to improve functional mobility. (Progressing)       Start:  04/08/25    Expected End:  04/25/25            Patient will increase active range of motion left shoulder IR to 70 degrees to improve  functional mobility. (Progressing)       Start:  04/08/25    Expected End:  04/25/25              Resolved       STG       Patient will demonstrate independent performance of home exercise program. (Met)       Start:  03/12/25    Expected End:  03/28/25    Resolved:  04/07/25         Patient will increase left shoulder flexion PROM to 125 degrees to improve joint mobility. (Met)       Start:  03/12/25    Expected End:  03/28/25    Resolved:  03/21/25         Patient will increase left shoulder PROM ER at 0 degrees abduction to 60 degrees to improve joint mobility. (Met)       Start:  03/12/25    Expected End:  03/28/25    Resolved:  04/07/25         Patient will tolerate all PROM activities with pain 5/10 or less. (Met)       Start:  03/12/25    Expected End:  03/28/25    Resolved:  03/21/25             Babita Chaudhari PTA

## 2025-04-20 ENCOUNTER — PATIENT MESSAGE (OUTPATIENT)
Dept: REHABILITATION | Facility: HOSPITAL | Age: 80
End: 2025-04-20
Payer: MEDICARE

## 2025-05-13 ENCOUNTER — CLINICAL SUPPORT (OUTPATIENT)
Dept: REHABILITATION | Facility: HOSPITAL | Age: 80
End: 2025-05-13
Payer: MEDICARE

## 2025-05-13 DIAGNOSIS — M75.122 COMPLETE TEAR OF LEFT ROTATOR CUFF, UNSPECIFIED WHETHER TRAUMATIC: ICD-10-CM

## 2025-05-13 DIAGNOSIS — M25.612 DECREASED ROM OF LEFT SHOULDER: Primary | ICD-10-CM

## 2025-05-13 PROCEDURE — 97110 THERAPEUTIC EXERCISES: CPT

## 2025-05-13 NOTE — PROGRESS NOTES
Outpatient Rehab    Physical Therapy Progress Note : Updated Plan of Care    Patient Name: Stephane Mathis  MRN: [id number]  YOB: 1945  Encounter Date: 5/13/2025    Therapy Diagnosis:   Encounter Diagnoses   Name Primary?    Decreased ROM of left shoulder Yes    Complete tear of left rotator cuff, unspecified whether traumatic      Physician: Rigo Fenton III, MD    Physician Orders: Eval and Treat  Medical Diagnosis: Complete tear of left rotator cuff, unspecified whether traumatic    Visit # / Visits Authorized:  10 / 34  Insurance Authorization Period: 3/14/2025 to 6/13/2025  Date of Evaluation: 3/12/25  Plan of Care Certification: 3/18/25 to 6/13/20     PT/PTA: PT   Number of PTA visits since last PT visit:0  Time In: 0925   Time Out: 1001  Total Time (in minutes): 36   Total Billable Time (in minutes):      FOTO:  Intake Score: 52%  Survey Score 2: 64%  Survey Score 3:  %    Precautions:       Subjective   My shoulder is sore sometimes if I sleep on it, otherwise it is doing good..  Pain reported as 0/10. Left Shoulder    Objective      Shoulder Range of Motion  Left Shoulder   Active (deg) Passive (deg) Pain   Flexion 128 150     Extension 58 65     Scaption         ABduction 103 140     ADduction         Horizontal ABduction         Horizontal ADduction         External Rotation (Shoulder ABducted 0 degrees)         External Rotation (Shoulder ABducted 45 degrees)         External Rotation (Shoulder ABducted 90 degrees) 78 90     Internal Rotation (Shoulder ABducted 0 degrees)         Internal Rotation (Shoulder ABducted 45 degrees)         Internal Rotation (Shoulder ABducted 90 degrees) 60 75                   Shoulder Strength - Planes of Motion   Right Strength Right Pain Left Strength Left  Pain   Flexion     3-     Extension     3+     ABduction     3-     ADduction           Horizontal ABduction           Horizontal ADduction           Internal Rotation 0°           Internal Rotation  90°     3-     External Rotation 0°           External Rotation 90°     3-                      Treatment:  Therapeutic Exercise  TE 1: Shoulder pulleys PROM left shoulder with end range mild stretch x6 minutes to facilitate ROM  TE 2: Finger ladder flexion and abduction x 20 each  TE 3: Shoulder wheel flexion with end range stretch x 20  TE 4: Standing ER stretch on brown cabinet 10 x 5 sec  TE 6: Supine flexion with 2 lb bar and end range stretch 3 x 10  TE 7: Scapular retraction and shoulder Extension and ER red band 2 x 10  IR and punches green band 2 x 10      Time Entry(in minutes):  Therapeutic Exercise Time Entry: 36    Assessment & Plan   Assessment  Stephane presents with a condition of Low complexity.   Presentation of Symptoms: Uncomplicated  Will Comorbidities Impact Care: No       Functional Limitations: Activity tolerance, Participating in leisure activities, Reaching, Range of motion  Impairments: Abnormal or restricted range of motion, Activity intolerance, Impaired physical strength    Patient Goal for Therapy (PT): Improve shoulder mobility and function.  Prognosis: Good  Assessment Details: Patient will benefit from continued skilled therapy to address decreased shoulder ROM, impaired strength, decreased activity tolerance.    Plan  From a physical therapy perspective, the patient would benefit from: Skilled Rehab Services    Planned therapy interventions include: Therapeutic exercise, Neuromuscular re-education, and Manual therapy.    Planned modalities to include: Cryotherapy (cold pack), Electrical stimulation - passive/unattended, Thermotherapy (hot pack), and Ultrasound.        Visit Frequency: 3 times Per Week for 4 Weeks.       This plan was discussed with Patient and Therapy assistant.   Discussion participants: Agreed Upon Plan of Care  Plan details: Continue Post op shoulder rehab program.          Patient will continue to benefit from skilled outpatient physical therapy to address the  deficits listed in the problem list box on initial evaluation, provide pt/family education and to maximize pt's level of independence in the home and community environment.     Patient's spiritual, cultural, and educational needs considered and patient agreeable to plan of care and goals.           Goals:   Active       LTG       Patient will increase left shoulder PROM flexion to 160 degrees to improve joint mobility. (Progressing)       Start:  03/12/25    Expected End:  06/13/25            Patient will increase Patient will increase left shoulder PROM IR to 70 degrees with arm abducted 30 degrees to improve joint mobility. (Met)       Start:  03/12/25    Expected End:  04/11/25    Resolved:  04/07/25         Patient will increase Patient will increase left shoulder PROM ER to 80 degrees with arm abducted 30 degrees to improve joint mobility. (Met)       Start:  03/12/25    Expected End:  04/11/25    Resolved:  04/07/25         Patient will tolerate all exercises and ROM with pain 3/10 or less. (Met)       Start:  03/12/25    Expected End:  05/09/25    Resolved:  05/13/25            LTG       Patient will increase active range of motion left shoulder flexion to 140 degrees to improve  functional mobility. (Progressing)       Start:  04/08/25    Expected End:  06/13/25            Patient will increase active range of motion left shoulder abduction to 120 degrees to improve  functional mobility. (Met)       Start:  04/08/25    Expected End:  05/09/25    Resolved:  04/16/25         Patient will increase left shoulder strength to 3-/5 all planes to improve functional activity. (Met)       Start:  04/08/25    Expected End:  05/09/25    Resolved:  05/13/25            LTG       Patient will increase left shoulder active range of motion abduction to 130 degrees.       Start:  05/13/25    Expected End:  06/13/25               STG       Patient will increase PROM shoulder abduction to 140 degrees to improve joint mobility.  (Met)       Start:  04/08/25    Expected End:  04/25/25    Resolved:  04/16/25         Patient will active range of motion left shoulder ER to 80 degrees to improve functional mobility. (Progressing)       Start:  04/08/25    Expected End:  05/27/25            Patient will increase active range of motion left shoulder IR to 70 degrees to improve  functional mobility. (Progressing)       Start:  04/08/25    Expected End:  05/27/25              Resolved       STG       Patient will demonstrate independent performance of home exercise program. (Met)       Start:  03/12/25    Expected End:  03/28/25    Resolved:  04/07/25         Patient will increase left shoulder flexion PROM to 125 degrees to improve joint mobility. (Met)       Start:  03/12/25    Expected End:  03/28/25    Resolved:  03/21/25         Patient will increase left shoulder PROM ER at 0 degrees abduction to 60 degrees to improve joint mobility. (Met)       Start:  03/12/25    Expected End:  03/28/25    Resolved:  04/07/25         Patient will tolerate all PROM activities with pain 5/10 or less. (Met)       Start:  03/12/25    Expected End:  03/28/25    Resolved:  03/21/25             Toño Nelson, PT

## 2025-05-14 ENCOUNTER — TELEPHONE (OUTPATIENT)
Dept: ORTHOPEDICS | Facility: CLINIC | Age: 80
End: 2025-05-14
Payer: MEDICARE

## 2025-05-14 NOTE — TELEPHONE ENCOUNTER
Attempted to contact patient to reschedule him for a follow up appt with Dr Fenton that he was a no show on 05/01. No answer left       Thank you,  Ava Hernandez,KENNEDY

## 2025-05-19 ENCOUNTER — CLINICAL SUPPORT (OUTPATIENT)
Dept: REHABILITATION | Facility: HOSPITAL | Age: 80
End: 2025-05-19
Payer: MEDICARE

## 2025-05-19 DIAGNOSIS — M25.612 DECREASED ROM OF LEFT SHOULDER: Primary | ICD-10-CM

## 2025-05-19 PROCEDURE — 97110 THERAPEUTIC EXERCISES: CPT | Mod: CQ

## 2025-05-19 NOTE — PROGRESS NOTES
Outpatient Rehab    Physical Therapy Visit    Patient Name: Stephane Mathis  MRN: [id number]  YOB: 1945  Encounter Date: 5/19/2025    Therapy Diagnosis:   Encounter Diagnosis   Name Primary?    Decreased ROM of left shoulder Yes     Physician: Rigo Fenton III, MD    Physician Orders: Eval and Treat  Medical Diagnosis: Complete tear of left rotator cuff, unspecified whether traumatic    Visit # / Visits Authorized:  11 / 34  Insurance Authorization Period: 3/14/2025 to 6/13/2025  Date of Evaluation: 3/12/2025  Plan of Care Certification: 5/13/2025 to 6/13/2025      PT/PTA: PTA   Number of PTA visits since last PT visit:1  Time In: 1103   Time Out: 1130  Total Time (in minutes): 27   Total Billable Time (in minutes):  27 Minutes     FOTO:  Intake Score:  %  Survey Score 2:  %  Survey Score 3:  %    Precautions:       Subjective   No pain at this time.  Pain reported as 0/10. Left Shoulder    Objective            Treatment:  Therapeutic Exercise  TE 1: Shoulder pulleys PROM left shoulder with end range mild stretch x6 minutes to facilitate ROM  TE 2: Finger ladder flexion and abduction x 20 each  TE 3: Shoulder wheel flexion with end range stretch x 20  TE 4: Standing ER stretch on brown cabinet 10 x 5 sec  TE 6: Supine flexion with 2 lb bar and end range stretch 3 x 10  TE 7: Scapular retraction and shoulder Extension and ER red band 2 x 10  IR and punches green band 2 x 10      Time Entry(in minutes):  Therapeutic Exercise Time Entry: 27    Assessment & Plan   Assessment: Stephane tolerated treatment well with minimal complaints of pain with exercises.  Evaluation/Treatment Tolerance: Patient tolerated treatment well    Patient will continue to benefit from skilled outpatient physical therapy to address the deficits listed in the problem list box on initial evaluation, provide pt/family education and to maximize pt's level of independence in the home and community environment.     Patient's  spiritual, cultural, and educational needs considered and patient agreeable to plan of care and goals.     Education  Education was done with Patient. The patient's learning style includes Demonstration and Listening. The patient Demonstrates understanding and Verbalizes understanding.                 Plan: Continue with PT    Goals:   Active       LTG       Patient will increase left shoulder PROM flexion to 160 degrees to improve joint mobility. (Progressing)       Start:  03/12/25    Expected End:  06/13/25            Patient will increase Patient will increase left shoulder PROM IR to 70 degrees with arm abducted 30 degrees to improve joint mobility. (Met)       Start:  03/12/25    Expected End:  04/11/25    Resolved:  04/07/25         Patient will increase Patient will increase left shoulder PROM ER to 80 degrees with arm abducted 30 degrees to improve joint mobility. (Met)       Start:  03/12/25    Expected End:  04/11/25    Resolved:  04/07/25         Patient will tolerate all exercises and ROM with pain 3/10 or less. (Met)       Start:  03/12/25    Expected End:  05/09/25    Resolved:  05/13/25            LTG       Patient will increase active range of motion left shoulder flexion to 140 degrees to improve  functional mobility. (Progressing)       Start:  04/08/25    Expected End:  06/13/25            Patient will increase active range of motion left shoulder abduction to 120 degrees to improve  functional mobility. (Met)       Start:  04/08/25    Expected End:  05/09/25    Resolved:  04/16/25         Patient will increase left shoulder strength to 3-/5 all planes to improve functional activity. (Met)       Start:  04/08/25    Expected End:  05/09/25    Resolved:  05/13/25            LTG       Patient will increase left shoulder active range of motion abduction to 130 degrees. (Progressing)       Start:  05/13/25    Expected End:  06/13/25               STG       Patient will increase PROM shoulder abduction  to 140 degrees to improve joint mobility. (Met)       Start:  04/08/25    Expected End:  04/25/25    Resolved:  04/16/25         Patient will active range of motion left shoulder ER to 80 degrees to improve functional mobility. (Progressing)       Start:  04/08/25    Expected End:  05/27/25            Patient will increase active range of motion left shoulder IR to 70 degrees to improve  functional mobility. (Progressing)       Start:  04/08/25    Expected End:  05/27/25              Resolved       STG       Patient will demonstrate independent performance of home exercise program. (Met)       Start:  03/12/25    Expected End:  03/28/25    Resolved:  04/07/25         Patient will increase left shoulder flexion PROM to 125 degrees to improve joint mobility. (Met)       Start:  03/12/25    Expected End:  03/28/25    Resolved:  03/21/25         Patient will increase left shoulder PROM ER at 0 degrees abduction to 60 degrees to improve joint mobility. (Met)       Start:  03/12/25    Expected End:  03/28/25    Resolved:  04/07/25         Patient will tolerate all PROM activities with pain 5/10 or less. (Met)       Start:  03/12/25    Expected End:  03/28/25    Resolved:  03/21/25             Babita Chaudhari PTA

## 2025-05-21 ENCOUNTER — CLINICAL SUPPORT (OUTPATIENT)
Dept: REHABILITATION | Facility: HOSPITAL | Age: 80
End: 2025-05-21
Payer: MEDICARE

## 2025-05-21 DIAGNOSIS — M25.612 DECREASED ROM OF LEFT SHOULDER: Primary | ICD-10-CM

## 2025-05-21 PROCEDURE — 97110 THERAPEUTIC EXERCISES: CPT | Mod: CQ

## 2025-05-21 NOTE — PROGRESS NOTES
Outpatient Rehab    Physical Therapy Visit    Patient Name: Stephane Mathis  MRN: [id number]  YOB: 1945  Encounter Date: 5/21/2025    Therapy Diagnosis:   Encounter Diagnosis   Name Primary?    Decreased ROM of left shoulder Yes     Physician: Rigo Fenton III, MD    Physician Orders: Eval and Treat  Medical Diagnosis: Complete tear of left rotator cuff, unspecified whether traumatic    Visit # / Visits Authorized:  12 / 34  Insurance Authorization Period: 3/14/2025 to 6/13/2025  Date of Evaluation: 3/12/2025  Plan of Care Certification: 5/13/2025 to 6/13/2025      PT/PTA: PTA   Number of PTA visits since last PT visit:2  Time In: 1100   Time Out: 1133  Total Time (in minutes): 33   Total Billable Time (in minutes):  33 Minutes     FOTO:  Intake Score:  %  Survey Score 2:  %  Survey Score 3:  %    Precautions:       Subjective   Mild L shoulder pain.  Pain reported as 2/10. Left Shoulder    Objective            Treatment:  Therapeutic Exercise  TE 1: Shoulder pulleys PROM left shoulder with end range mild stretch x 5 minutes to facilitate ROM  TE 2: Finger ladder flexion and abduction x 20 each  TE 3: Shoulder wheel flexion with end range stretch x 20 with green band  TE 4: Standing ER stretch on brown cabinet 10 x 5 sec  TE 5: placing cones on black cabinet 2 x 12  TE 6: Supine flexion with 2 lb bar and end range stretch 3 x 10  TE 7: Scapular retraction and shoulder Extension and ER, IR, and punches 2 x 10 with green band  TE 8: Ball toss with arms raised 2 x 10      Time Entry(in minutes):  Therapeutic Exercise Time Entry: 33    Assessment & Plan   Assessment: Stephane is progressing with PT well  Evaluation/Treatment Tolerance: Patient tolerated treatment well    Patient will continue to benefit from skilled outpatient physical therapy to address the deficits listed in the problem list box on initial evaluation, provide pt/family education and to maximize pt's level of independence in the home  and community environment.     Patient's spiritual, cultural, and educational needs considered and patient agreeable to plan of care and goals.     Education  Education was done with Patient. The patient's learning style includes Demonstration, Listening, and Tactile. The patient Demonstrates understanding and Verbalizes understanding.                 Plan: Continue with PT    Goals:   Active       LTG       Patient will increase left shoulder PROM flexion to 160 degrees to improve joint mobility. (Progressing)       Start:  03/12/25    Expected End:  06/13/25            Patient will increase Patient will increase left shoulder PROM IR to 70 degrees with arm abducted 30 degrees to improve joint mobility. (Met)       Start:  03/12/25    Expected End:  04/11/25    Resolved:  04/07/25         Patient will increase Patient will increase left shoulder PROM ER to 80 degrees with arm abducted 30 degrees to improve joint mobility. (Met)       Start:  03/12/25    Expected End:  04/11/25    Resolved:  04/07/25         Patient will tolerate all exercises and ROM with pain 3/10 or less. (Met)       Start:  03/12/25    Expected End:  05/09/25    Resolved:  05/13/25            LTG       Patient will increase active range of motion left shoulder flexion to 140 degrees to improve  functional mobility. (Progressing)       Start:  04/08/25    Expected End:  06/13/25            Patient will increase active range of motion left shoulder abduction to 120 degrees to improve  functional mobility. (Met)       Start:  04/08/25    Expected End:  05/09/25    Resolved:  04/16/25         Patient will increase left shoulder strength to 3-/5 all planes to improve functional activity. (Met)       Start:  04/08/25    Expected End:  05/09/25    Resolved:  05/13/25            LTG       Patient will increase left shoulder active range of motion abduction to 130 degrees. (Progressing)       Start:  05/13/25    Expected End:  06/13/25               STG        Patient will increase PROM shoulder abduction to 140 degrees to improve joint mobility. (Met)       Start:  04/08/25    Expected End:  04/25/25    Resolved:  04/16/25         Patient will active range of motion left shoulder ER to 80 degrees to improve functional mobility. (Progressing)       Start:  04/08/25    Expected End:  05/27/25            Patient will increase active range of motion left shoulder IR to 70 degrees to improve  functional mobility. (Progressing)       Start:  04/08/25    Expected End:  05/27/25              Resolved       STG       Patient will demonstrate independent performance of home exercise program. (Met)       Start:  03/12/25    Expected End:  03/28/25    Resolved:  04/07/25         Patient will increase left shoulder flexion PROM to 125 degrees to improve joint mobility. (Met)       Start:  03/12/25    Expected End:  03/28/25    Resolved:  03/21/25         Patient will increase left shoulder PROM ER at 0 degrees abduction to 60 degrees to improve joint mobility. (Met)       Start:  03/12/25    Expected End:  03/28/25    Resolved:  04/07/25         Patient will tolerate all PROM activities with pain 5/10 or less. (Met)       Start:  03/12/25    Expected End:  03/28/25    Resolved:  03/21/25             Babita Chaudhari PTA

## 2025-06-12 ENCOUNTER — OFFICE VISIT (OUTPATIENT)
Dept: FAMILY MEDICINE | Facility: CLINIC | Age: 80
End: 2025-06-12
Payer: MEDICARE

## 2025-06-12 VITALS
TEMPERATURE: 98 F | HEART RATE: 80 BPM | HEIGHT: 71 IN | WEIGHT: 161 LBS | SYSTOLIC BLOOD PRESSURE: 134 MMHG | BODY MASS INDEX: 22.54 KG/M2 | DIASTOLIC BLOOD PRESSURE: 68 MMHG | OXYGEN SATURATION: 98 % | RESPIRATION RATE: 17 BRPM

## 2025-06-12 DIAGNOSIS — I10 ESSENTIAL HYPERTENSION: Primary | ICD-10-CM

## 2025-06-12 PROCEDURE — 3288F FALL RISK ASSESSMENT DOCD: CPT | Mod: ,,, | Performed by: FAMILY MEDICINE

## 2025-06-12 PROCEDURE — 3078F DIAST BP <80 MM HG: CPT | Mod: ,,, | Performed by: FAMILY MEDICINE

## 2025-06-12 PROCEDURE — 3075F SYST BP GE 130 - 139MM HG: CPT | Mod: ,,, | Performed by: FAMILY MEDICINE

## 2025-06-12 PROCEDURE — 1101F PT FALLS ASSESS-DOCD LE1/YR: CPT | Mod: ,,, | Performed by: FAMILY MEDICINE

## 2025-06-12 PROCEDURE — 99212 OFFICE O/P EST SF 10 MIN: CPT | Mod: ,,, | Performed by: FAMILY MEDICINE

## 2025-06-12 PROCEDURE — 1159F MED LIST DOCD IN RCRD: CPT | Mod: ,,, | Performed by: FAMILY MEDICINE

## 2025-06-12 NOTE — PROGRESS NOTES
Clinic Note    Patient Name: Stephane Mathis  : 1945  MRN: [id number]    Chief Complaint   Patient presents with    Follow-up     3 month    Hypertension       HPI:    Mr. Stephane Mathis is a 79 y.o. male who presents to clinic today with CC of follow up on chronic disease processes including HTN, HLD, seizure DO, iron deficiency anemia, AVM, OA, erectile dysfunction, BPH, CKD, and vitamin D deficiency.   BP is elevated today. Reports he only took one of his blood pressure medications today. Reports that he got busy and never finished taking his medication.   Patient reports chronic issues are well controlled on current medication regimen.  Denies problems or side effects with medications.  Patient is, otherwise, without complaints.     Medications:  Medication List with Changes/Refills   Current Medications    AMLODIPINE (NORVASC) 10 MG TABLET    Take 1 tablet (10 mg total) by mouth once daily.    ATORVASTATIN (LIPITOR) 40 MG TABLET    Take 1 tablet (40 mg total) by mouth every evening.    EAR DROPS, CARBAMIDE PEROXIDE, OTIC    Place in ear(s).    EPINEPHRINE (EPIPEN) 0.3 MG/0.3 ML ATIN    Inject 0.3 mLs (0.3 mg total) into the muscle as needed.    FERROUS SULFATE (FEOSOL) 325 MG (65 MG IRON) TAB TABLET    Take 1 tablet (325 mg total) by mouth 2 (two) times daily.    FUROSEMIDE (LASIX) 20 MG TABLET    Take 1 tablet (20 mg total) by mouth daily as needed (swelling).    HYDRALAZINE (APRESOLINE) 100 MG TABLET    Take 1 tablet (100 mg total) by mouth 2 (two) times a day.    HYDROCHLOROTHIAZIDE (HYDRODIURIL) 25 MG TABLET    Take 1 tablet (25 mg total) by mouth once daily.    HYDROCODONE-ACETAMINOPHEN (NORCO) 7.5-325 MG PER TABLET    Take 1 tablet by mouth every 6 (six) hours as needed for Pain.    MELOXICAM (MOBIC) 7.5 MG TABLET    Take 1 tablet (7.5 mg total) by mouth once daily.    POTASSIUM CHLORIDE SA (K-DUR,KLOR-CON M) 10 MEQ TABLET    Take 1 tablet (10 mEq total) by mouth once daily.    TADALAFIL (CIALIS)  "10 MG TABLET    Take 1 tablet (10 mg total) by mouth daily as needed for Erectile Dysfunction.        Allergies: Ace inhibitors and Codeine      Past Medical History:    Past Medical History:   Diagnosis Date    Acute superficial gastritis without hemorrhage 7/12/2022    Alcoholism     Anemia     AVM (arteriovenous malformation) of colon 7/13/2022    Colon, diverticulosis 7/13/2022    Hypertension     Iron deficiency anemia due to chronic blood loss 7/12/2022    Polyp, sigmoid colon 7/13/2022    Seizures     Stroke     right sided weakness       Past Surgical History:    Past Surgical History:   Procedure Laterality Date    ACROMIOPLASTY Left 2/19/2025    Procedure: OPEN ACROMIOPLASTY;  Surgeon: Rigo Fenton III, MD;  Location: [facility] OR;  Service: Orthopedics;  Laterality: Left;    ARTHROSCOPIC DEBRIDEMENT OF SHOULDER Left 2/19/2025    Procedure: DEBRIDEMENT, SHOULDER, ARTHROSCOPIC;  Surgeon: Rigo Fenton III, MD;  Location: [facility] OR;  Service: Orthopedics;  Laterality: Left;    CATARACT EXTRACTION W/ INTRAOCULAR LENS IMPLANT Right     KNEE SURGERY Right     LUMBAR DISCECTOMY      ROTATOR CUFF REPAIR Left 2/19/2025    Procedure: OPEN REPAIR, ROTATOR CUFF;  Surgeon: Rigo Fenton III, MD;  Location: [facility] OR;  Service: Orthopedics;  Laterality: Left;    SCROTAL SURGERY      in february    TONSILLECTOMY           Social History:    Tobacco Use History[1]  Social History     Substance and Sexual Activity   Alcohol Use Yes    Alcohol/week: 12.0 standard drinks of alcohol    Types: 6 Cans of beer, 6 Shots of liquor per week    Comment: vodka/gray goose preference. " couple of drinks a day or two or sometimes on weekends. "     Social History     Substance and Sexual Activity   Drug Use Yes    Types: Marijuana    Comment: daily         Family History:    Family History   Problem Relation Name Age of Onset    Diabetes Mother      Hypertension Mother      Diabetes Sister      Diabetes " "Brother      Hypertension Brother      Cancer Brother      Throat cancer Brother      Alzheimer's disease Brother         Review of Systems:    Review of Systems   Constitutional:  Negative for appetite change, chills, fatigue, fever and unexpected weight change.   Eyes:  Negative for visual disturbance.   Respiratory:  Negative for cough and shortness of breath.    Cardiovascular:  Negative for chest pain and leg swelling.   Gastrointestinal:  Negative for abdominal pain, change in bowel habit, constipation, diarrhea, nausea and vomiting.   Musculoskeletal:  Negative for arthralgias.   Integumentary:  Negative for rash.   Neurological:  Negative for dizziness and headaches.   Psychiatric/Behavioral:  The patient is not nervous/anxious.         Vitals:    Vitals:    06/12/25 1130 06/12/25 1143   BP: (!) 165/67 134/68   BP Location: Right arm Left arm   Patient Position: Sitting Sitting   Pulse: 80    Resp: 17    Temp: 97.8 °F (36.6 °C)    TempSrc: Oral    SpO2: 98%    Weight: 73 kg (161 lb)    Height: 5' 11" (1.803 m)        Body mass index is 22.45 kg/m².    Wt Readings from Last 3 Encounters:   06/12/25 1130 73 kg (161 lb)   04/03/25 0912 73.5 kg (162 lb)   04/02/25 0953 73.7 kg (162 lb 6.4 oz)        Physical Exam:    Physical Exam  Constitutional:       General: He is not in acute distress.     Appearance: Normal appearance.   HENT:      Nose: Nose normal.      Mouth/Throat:      Mouth: Mucous membranes are moist.      Pharynx: Oropharynx is clear.   Eyes:      Conjunctiva/sclera: Conjunctivae normal.   Cardiovascular:      Rate and Rhythm: Normal rate and regular rhythm.      Heart sounds: Normal heart sounds. No murmur heard.  Pulmonary:      Effort: Pulmonary effort is normal. No respiratory distress.      Breath sounds: Normal breath sounds. No wheezing, rhonchi or rales.   Abdominal:      General: Bowel sounds are normal.      Palpations: Abdomen is soft.      Tenderness: There is no abdominal tenderness. "   Musculoskeletal:      Cervical back: Neck supple.      Right lower leg: No edema.      Left lower leg: No edema.   Skin:     Findings: No rash.   Neurological:      General: No focal deficit present.      Mental Status: He is alert. Mental status is at baseline.   Psychiatric:         Mood and Affect: Mood normal.           Assessment/Plan:   1. Essential hypertension  -     Hypertension Digital Medicine (HDMP) Enrollment Order  - BP normal on recheck. Patient has a h/o non-compliance. He is agreeable to digital medicine which will hopefully help with compliance and monitoring.   - DASH diet  - Stressed importance of medication compliance and bringing all medications to appts with him. Voiced understanding.          Active Problem List with Overview Notes    Diagnosis Date Noted    Essential hypertension 07/12/2022    Seizure 06/10/2022    Iron deficiency anemia due to chronic blood loss 07/12/2022    AVM (arteriovenous malformation) of colon 07/13/2022    Continuous dependence on cigarette smoking 07/12/2022    Colon, diverticulosis 07/13/2022    Polyp, sigmoid colon 07/13/2022    Decreased ROM of left shoulder 03/12/2025    Rotator cuff tear 02/19/2025    Encounter for pre-operative cardiovascular clearance 12/19/2024    Encounter for smoking cessation counseling 12/19/2024    Chronic kidney disease, stage 3a 04/08/2024    Primary osteoarthritis of left shoulder 02/06/2024    Mixed hyperlipidemia 10/04/2023    Primary osteoarthritis involving multiple joints 10/04/2023    Muscle spasm 10/04/2023    Erectile dysfunction 10/04/2023    Benign prostatic hyperplasia without lower urinary tract symptoms 10/04/2023    Vitamin D deficiency 10/04/2023        Health Maintenance:  Health Maintenance   Topic Date Due    TETANUS VACCINE  Never done    Shingles Vaccine (1 of 2) Never done    RSV Vaccine (Age 60+ and Pregnant patients) (1 - 1-dose 75+ series) Never done    COVID-19 Vaccine (6 - 2024-25 season) 09/01/2024     LDCT Lung Screen  03/06/2026    Lipid Panel  10/08/2029    Hepatitis C Screening  Completed    Influenza Vaccine  Completed    Pneumococcal Vaccines (Age 50+)  Completed       RTC in 3 months for chronic follow up.   RTC sooner if needed.   Patient voiced understanding and is agreeable to plan.      Pebbles Dickerson MD    Family Medicine           [1]   Social History  Tobacco Use   Smoking Status Every Day    Current packs/day: 1.00    Average packs/day: 1 pack/day for 59.4 years (59.4 ttl pk-yrs)    Types: Cigarettes    Start date: 1966    Passive exposure: Current   Smokeless Tobacco Never   Tobacco Comments    Tobacco quitline information sheet given

## 2025-06-17 ENCOUNTER — DOCUMENTATION ONLY (OUTPATIENT)
Dept: REHABILITATION | Facility: HOSPITAL | Age: 80
End: 2025-06-17
Payer: MEDICARE

## 2025-06-17 NOTE — PROGRESS NOTES
Outpatient Rehab    Physical Therapy Discharge    Patient Name: Stephane Mathis  MRN: [id number]  YOB: 1945  Encounter Date: 2025    Therapy Diagnosis:        Encounter Diagnosis   Name Primary?    Decreased ROM of left shoulder Yes      Physician: Rigo Fenton III, MD     Physician Orders: Eval and Treat  Medical Diagnosis: Complete tear of left rotator cuff, unspecified whether traumatic     Visit # / Visits Authorized:    Insurance Authorization Period: 3/14/2025 to 2025  Date of Evaluation: 3/12/2025  Plan of Care Certification: 2025 to 2025                   FOTO:  Intake Score:52  %  Survey Score 2: 64 %  Survey Score 3:  %    Precautions: none       Subjective  None            Objective  none                         Assessment & Plan   Assessment:  Patient has not returned to therapy since 25. His POC has .       The patient's spiritual, cultural, and educational needs were considered, and the patient is agreeable to the plan of care and goals.           Plan:  Discharge therapy services.    Goals:   LTG         Patient will increase left shoulder PROM flexion to 160 degrees to improve joint mobility. (Progressing)         Start:  25    Expected End:  25              Patient will increase Patient will increase left shoulder PROM IR to 70 degrees with arm abducted 30 degrees to improve joint mobility. (Met)         Start:  25    Expected End:  25    Resolved:  25           Patient will increase Patient will increase left shoulder PROM ER to 80 degrees with arm abducted 30 degrees to improve joint mobility. (Met)         Start:  25    Expected End:  25    Resolved:  25           Patient will tolerate all exercises and ROM with pain 3/10 or less. (Met)         Start:  25    Expected End:  25    Resolved:  25              LTG         Patient will increase active range of motion left shoulder  flexion to 140 degrees to improve  functional mobility. (Progressing)         Start:  04/08/25    Expected End:  06/13/25              Patient will increase active range of motion left shoulder abduction to 120 degrees to improve  functional mobility. (Met)         Start:  04/08/25    Expected End:  05/09/25    Resolved:  04/16/25           Patient will increase left shoulder strength to 3-/5 all planes to improve functional activity. (Met)         Start:  04/08/25    Expected End:  05/09/25    Resolved:  05/13/25           Tñoo Nelson, PT

## 2025-06-30 ENCOUNTER — EXTERNAL CHRONIC CARE MANAGEMENT (OUTPATIENT)
Dept: FAMILY MEDICINE | Facility: CLINIC | Age: 80
End: 2025-06-30
Payer: MEDICARE

## 2025-06-30 PROCEDURE — 99490 CHRNC CARE MGMT STAFF 1ST 20: CPT | Mod: ,,, | Performed by: FAMILY MEDICINE

## 2025-07-18 ENCOUNTER — TELEPHONE (OUTPATIENT)
Dept: FAMILY MEDICINE | Facility: CLINIC | Age: 80
End: 2025-07-18
Payer: MEDICARE

## 2025-07-21 ENCOUNTER — TELEPHONE (OUTPATIENT)
Dept: FAMILY MEDICINE | Facility: CLINIC | Age: 80
End: 2025-07-21
Payer: MEDICARE

## 2025-08-25 ENCOUNTER — PATIENT MESSAGE (OUTPATIENT)
Facility: HOSPITAL | Age: 80
End: 2025-08-25
Payer: MEDICARE

## (undated) DEVICE — SOL NORMAL USPCA 0.9%

## (undated) DEVICE — CLOSURE SKIN STERI STRIP 1/2X4

## (undated) DEVICE — SUT PROLENE 4-0 FS-2 BL MON

## (undated) DEVICE — SUT 2-0 VICRYL / CT-1

## (undated) DEVICE — SPONGE COTTON TRAY 4X4IN

## (undated) DEVICE — SOL NACL IRR 1000ML BTL

## (undated) DEVICE — GOWN NONREINF SET-IN SLV 2XL

## (undated) DEVICE — STAPLER SKIN PROXIMATE WIDE

## (undated) DEVICE — BLADE SAG MIC FINE 9.5X25.5MM

## (undated) DEVICE — APPLICATOR CHLORAPREP ORN 26ML

## (undated) DEVICE — SUT #2 TI-CRON HGS-21 30IN

## (undated) DEVICE — GLOVE SENSICARE PI GRN 7.5

## (undated) DEVICE — TUBING CROSSFLOW INFLOW CASS

## (undated) DEVICE — PENCIL SMK EVAC CONNECTOR 10FT

## (undated) DEVICE — PAD ABDOMINAL 8X7.5 STERILE

## (undated) DEVICE — SOLIDIFIER BTL W/TREAT 1500CC

## (undated) DEVICE — ADHESIVE MASTISOL VIAL 48/BX

## (undated) DEVICE — Device

## (undated) DEVICE — GLOVE SENSICARE PI SURG 7

## (undated) DEVICE — GOWN POLY REINF BRTH SLV XL

## (undated) DEVICE — GLOVE SENSICARE PI SURG 8

## (undated) DEVICE — GLOVE SENSICARE PI GRN 8

## (undated) DEVICE — BLADE SHAVER ARTHSCP CUT 3.5MM

## (undated) DEVICE — GLOVE SENSICARE PI SURG 6

## (undated) DEVICE — GLOVE SENSICARE PI GRN 7

## (undated) DEVICE — GLOVE SENSICARE PI GRN 6

## (undated) DEVICE — CANNULA OBT CONICAL TIP 4.5MM

## (undated) DEVICE — GLOVE SENSICARE PI SURG 7.5